# Patient Record
Sex: MALE | Race: WHITE | NOT HISPANIC OR LATINO | Employment: UNEMPLOYED | URBAN - METROPOLITAN AREA
[De-identification: names, ages, dates, MRNs, and addresses within clinical notes are randomized per-mention and may not be internally consistent; named-entity substitution may affect disease eponyms.]

---

## 2018-11-06 ENCOUNTER — APPOINTMENT (EMERGENCY)
Dept: CT IMAGING | Facility: HOSPITAL | Age: 48
End: 2018-11-06

## 2018-11-06 ENCOUNTER — HOSPITAL ENCOUNTER (EMERGENCY)
Facility: HOSPITAL | Age: 48
Discharge: HOME | End: 2018-11-06
Attending: EMERGENCY MEDICINE | Admitting: EMERGENCY MEDICINE

## 2018-11-06 ENCOUNTER — APPOINTMENT (EMERGENCY)
Dept: RADIOLOGY | Facility: HOSPITAL | Age: 48
End: 2018-11-06

## 2018-11-06 VITALS
OXYGEN SATURATION: 99 % | WEIGHT: 170.42 LBS | DIASTOLIC BLOOD PRESSURE: 80 MMHG | RESPIRATION RATE: 20 BRPM | BODY MASS INDEX: 25.91 KG/M2 | SYSTOLIC BLOOD PRESSURE: 132 MMHG | HEART RATE: 64 BPM | TEMPERATURE: 98.1 F

## 2018-11-06 DIAGNOSIS — H81.10 BPPV (BENIGN PAROXYSMAL POSITIONAL VERTIGO): Primary | ICD-10-CM

## 2018-11-06 DIAGNOSIS — R07.9 CHEST PAIN: ICD-10-CM

## 2018-11-06 LAB
ALBUMIN SERPL BCP-MCNC: 3.5 G/DL (ref 3.5–5)
ALP SERPL-CCNC: 53 U/L (ref 46–116)
ALT SERPL W P-5'-P-CCNC: 57 U/L (ref 12–78)
ANION GAP SERPL CALCULATED.3IONS-SCNC: 7 MMOL/L (ref 4–13)
AST SERPL W P-5'-P-CCNC: 41 U/L (ref 5–45)
ATRIAL RATE: 59 BPM
ATRIAL RATE: 74 BPM
BASOPHILS # BLD AUTO: 0.03 THOUSANDS/ΜL (ref 0–0.1)
BASOPHILS NFR BLD AUTO: 0 % (ref 0–1)
BILIRUB SERPL-MCNC: 0.5 MG/DL (ref 0.2–1)
BUN SERPL-MCNC: 11 MG/DL (ref 5–25)
CALCIUM SERPL-MCNC: 9 MG/DL (ref 8.3–10.1)
CHLORIDE SERPL-SCNC: 105 MMOL/L (ref 100–108)
CO2 SERPL-SCNC: 29 MMOL/L (ref 21–32)
CREAT SERPL-MCNC: 0.78 MG/DL (ref 0.6–1.3)
EOSINOPHIL # BLD AUTO: 0.05 THOUSAND/ΜL (ref 0–0.61)
EOSINOPHIL NFR BLD AUTO: 1 % (ref 0–6)
ERYTHROCYTE [DISTWIDTH] IN BLOOD BY AUTOMATED COUNT: 11.5 % (ref 11.6–15.1)
GFR SERPL CREATININE-BSD FRML MDRD: 105 ML/MIN/1.73SQ M
GLUCOSE SERPL-MCNC: 92 MG/DL (ref 65–140)
HCT VFR BLD AUTO: 43.9 % (ref 36.5–49.3)
HGB BLD-MCNC: 15.3 G/DL (ref 12–17)
IMM GRANULOCYTES # BLD AUTO: 0.02 THOUSAND/UL (ref 0–0.2)
IMM GRANULOCYTES NFR BLD AUTO: 0 % (ref 0–2)
LYMPHOCYTES # BLD AUTO: 1.34 THOUSANDS/ΜL (ref 0.6–4.47)
LYMPHOCYTES NFR BLD AUTO: 19 % (ref 14–44)
MCH RBC QN AUTO: 34.2 PG (ref 26.8–34.3)
MCHC RBC AUTO-ENTMCNC: 34.9 G/DL (ref 31.4–37.4)
MCV RBC AUTO: 98 FL (ref 82–98)
MONOCYTES # BLD AUTO: 0.7 THOUSAND/ΜL (ref 0.17–1.22)
MONOCYTES NFR BLD AUTO: 10 % (ref 4–12)
NEUTROPHILS # BLD AUTO: 5.1 THOUSANDS/ΜL (ref 1.85–7.62)
NEUTS SEG NFR BLD AUTO: 70 % (ref 43–75)
NRBC BLD AUTO-RTO: 0 /100 WBCS
P AXIS: 73 DEGREES
P AXIS: 75 DEGREES
PLATELET # BLD AUTO: 143 THOUSANDS/UL (ref 149–390)
PMV BLD AUTO: 10.2 FL (ref 8.9–12.7)
POTASSIUM SERPL-SCNC: 4.3 MMOL/L (ref 3.5–5.3)
PR INTERVAL: 152 MS
PR INTERVAL: 154 MS
PROT SERPL-MCNC: 6.9 G/DL (ref 6.4–8.2)
QRS AXIS: 64 DEGREES
QRS AXIS: 64 DEGREES
QRSD INTERVAL: 92 MS
QRSD INTERVAL: 94 MS
QT INTERVAL: 364 MS
QT INTERVAL: 398 MS
QTC INTERVAL: 394 MS
QTC INTERVAL: 404 MS
RBC # BLD AUTO: 4.48 MILLION/UL (ref 3.88–5.62)
SODIUM SERPL-SCNC: 141 MMOL/L (ref 136–145)
T WAVE AXIS: 67 DEGREES
T WAVE AXIS: 70 DEGREES
TROPONIN I SERPL-MCNC: <0.02 NG/ML
TROPONIN I SERPL-MCNC: <0.02 NG/ML
VENTRICULAR RATE: 59 BPM
VENTRICULAR RATE: 74 BPM
WBC # BLD AUTO: 7.24 THOUSAND/UL (ref 4.31–10.16)

## 2018-11-06 PROCEDURE — 80053 COMPREHEN METABOLIC PANEL: CPT | Performed by: PHYSICIAN ASSISTANT

## 2018-11-06 PROCEDURE — 70450 CT HEAD/BRAIN W/O DYE: CPT

## 2018-11-06 PROCEDURE — 85025 COMPLETE CBC W/AUTO DIFF WBC: CPT | Performed by: PHYSICIAN ASSISTANT

## 2018-11-06 PROCEDURE — 71046 X-RAY EXAM CHEST 2 VIEWS: CPT

## 2018-11-06 PROCEDURE — 93010 ELECTROCARDIOGRAM REPORT: CPT | Performed by: INTERNAL MEDICINE

## 2018-11-06 PROCEDURE — 93005 ELECTROCARDIOGRAM TRACING: CPT

## 2018-11-06 PROCEDURE — 96360 HYDRATION IV INFUSION INIT: CPT

## 2018-11-06 PROCEDURE — 96361 HYDRATE IV INFUSION ADD-ON: CPT

## 2018-11-06 PROCEDURE — 36415 COLL VENOUS BLD VENIPUNCTURE: CPT | Performed by: PHYSICIAN ASSISTANT

## 2018-11-06 PROCEDURE — 99285 EMERGENCY DEPT VISIT HI MDM: CPT

## 2018-11-06 PROCEDURE — 84484 ASSAY OF TROPONIN QUANT: CPT | Performed by: PHYSICIAN ASSISTANT

## 2018-11-06 RX ORDER — MECLIZINE HCL 12.5 MG/1
25 TABLET ORAL ONCE
Status: COMPLETED | OUTPATIENT
Start: 2018-11-06 | End: 2018-11-06

## 2018-11-06 RX ORDER — ONDANSETRON 4 MG/1
4 TABLET, FILM COATED ORAL EVERY 6 HOURS
Qty: 12 TABLET | Refills: 0 | Status: SHIPPED | OUTPATIENT
Start: 2018-11-06 | End: 2019-05-06

## 2018-11-06 RX ORDER — ASPIRIN 325 MG
325 TABLET ORAL ONCE
Status: COMPLETED | OUTPATIENT
Start: 2018-11-06 | End: 2018-11-06

## 2018-11-06 RX ORDER — MECLIZINE HYDROCHLORIDE 25 MG/1
25 TABLET ORAL 3 TIMES DAILY PRN
Qty: 30 TABLET | Refills: 0 | Status: SHIPPED | OUTPATIENT
Start: 2018-11-06 | End: 2020-06-18

## 2018-11-06 RX ADMIN — MECLIZINE 25 MG: 12.5 TABLET ORAL at 10:26

## 2018-11-06 RX ADMIN — ASPIRIN 325 MG: 325 TABLET ORAL at 10:26

## 2018-11-06 RX ADMIN — SODIUM CHLORIDE 1000 ML: 0.9 INJECTION, SOLUTION INTRAVENOUS at 10:27

## 2018-11-06 NOTE — ED PROVIDER NOTES
History  Chief Complaint   Patient presents with    Dizziness     dizziness with movement since thursday morning, + nausea,  +headache, my head feels "groggy"  and blurred vision, pt had chest pain on saturday, felt like a muscle fatigue, denies on arrival     58-year-old male with past medical history of hepatitis-C, who presents to the emergency department for dizziness that started 3 days ago  Patient states that he woke up in the morning, and when he sat up he experienced a spinning sensation  Dizziness is worse with any positional change of the head, and improved with lying down and staying still completely  Patient also reports associated bandlike headache to the frontal head, difficulty with ambulation secondary to losing balance, nausea, vomiting  Patient had multiple episodes of nausea and vomiting for the initial 2 days from the dizziness, but none today  Does have abdominal pain, which he suspects is due to the vomiting  Denies diarrhea  He also reports a head strike yesterday as patient tripped and lost his balance  Denies LOC, neck pain/stiffness  Denies fever, chills, double vision, blurred vision, slurred speech, confusion, numbness, tingling, weakness  Does take ASA 81 mg daily  He also complains of left sided chest pain that started 2 days ago  States that it feels muscular in nature as he does do lots of heavy lifting at work  Worse with lifting arm over the head and palpation and improved with rest, but is still present with rest  Denies SOB, palpitations, leg pain or swelling  History provided by:  Patient   used: No        None       History reviewed  No pertinent past medical history  History reviewed  No pertinent surgical history  History reviewed  No pertinent family history  I have reviewed and agree with the history as documented      Social History   Substance Use Topics    Smoking status: Current Every Day Smoker     Packs/day: 0 50     Years: 25 00     Types: Cigarettes    Smokeless tobacco: Never Used    Alcohol use No        Review of Systems   Constitutional: Negative for chills and fever  HENT: Negative for congestion, ear pain, postnasal drip, rhinorrhea, sinus pain, sinus pressure, sore throat and trouble swallowing  Eyes: Negative  Negative for visual disturbance  Respiratory: Negative for cough, chest tightness, shortness of breath and wheezing  Cardiovascular: Positive for chest pain  Negative for palpitations and leg swelling  Gastrointestinal: Positive for abdominal pain, nausea and vomiting  Negative for anal bleeding, constipation and diarrhea  Genitourinary: Negative for dysuria, flank pain, frequency, hematuria and urgency  Musculoskeletal: Positive for gait problem  Negative for arthralgias, back pain, joint swelling, myalgias, neck pain and neck stiffness  Skin: Negative for color change, pallor, rash and wound  Neurological: Positive for dizziness, light-headedness and headaches  Negative for tremors, seizures, syncope, facial asymmetry, speech difficulty, weakness and numbness  Psychiatric/Behavioral: Negative  Physical Exam  Physical Exam   Constitutional: He is oriented to person, place, and time  He appears well-developed and well-nourished  No distress  HENT:   Head: Normocephalic and atraumatic  Mouth/Throat: Oropharynx is clear and moist    Eyes: Pupils are equal, round, and reactive to light  Conjunctivae and EOM are normal    Neck: Normal range of motion  Neck supple  Cardiovascular: Normal rate, regular rhythm and intact distal pulses  Pulmonary/Chest: Effort normal and breath sounds normal  No respiratory distress  He has no wheezes  He has no rales  He exhibits tenderness (Mild Left superior anterior chest pain that is reproducible on palpation  )  Abdominal: Soft  Bowel sounds are normal  He exhibits no distension  There is no tenderness  There is no rebound and no guarding  Musculoskeletal: Normal range of motion  He exhibits no edema or tenderness  Neurological: He is alert and oriented to person, place, and time  No cranial nerve deficit or sensory deficit  He exhibits normal muscle tone  Coordination normal    Positive jhoan spaulding pike on the left  +horizontal nystagmus  Normal finger to nose  Negative pronator drift  Able to ambulate without support  Skin: Skin is warm and dry  Capillary refill takes less than 2 seconds  He is not diaphoretic  Psychiatric: He has a normal mood and affect  His behavior is normal    Nursing note and vitals reviewed        Vital Signs  ED Triage Vitals   Temperature Pulse Respirations Blood Pressure SpO2   11/06/18 0816 11/06/18 0816 11/06/18 0816 11/06/18 0816 11/06/18 0816   98 1 °F (36 7 °C) 78 20 147/90 99 %      Temp Source Heart Rate Source Patient Position - Orthostatic VS BP Location FiO2 (%)   11/06/18 0816 11/06/18 1103 11/06/18 0816 11/06/18 0816 --   Oral Monitor Lying Right arm       Pain Score       11/06/18 0816       5           Vitals:    11/06/18 0816 11/06/18 0900 11/06/18 1045 11/06/18 1103   BP: 147/90 130/75 132/86 132/80   Pulse: 78 66 60 64   Patient Position - Orthostatic VS: Lying   Sitting       Visual Acuity  Visual Acuity      Most Recent Value   L Pupil Size (mm)  4   R Pupil Size (mm)  4          ED Medications  Medications   meclizine (ANTIVERT) tablet 25 mg (25 mg Oral Given 11/6/18 1026)   sodium chloride 0 9 % bolus 1,000 mL (0 mL Intravenous Stopped 11/6/18 1458)   aspirin tablet 325 mg (325 mg Oral Given 11/6/18 1026)       Diagnostic Studies  Results Reviewed     Procedure Component Value Units Date/Time    Troponin I [[id number]]  (Normal) Collected:  11/06/18 1347    Lab Status:  Final result Specimen:  Blood from Arm, Left Updated:  11/06/18 1410     Troponin I <0 02 ng/mL     Comprehensive metabolic panel [[id number]] Collected:  11/06/18 1027    Lab Status:  Final result Specimen:  Blood from Arm, Left Updated:  11/06/18 1144     Sodium 141 mmol/L      Potassium 4 3 mmol/L      Chloride 105 mmol/L      CO2 29 mmol/L      ANION GAP 7 mmol/L      BUN 11 mg/dL      Creatinine 0 78 mg/dL      Glucose 92 mg/dL      Calcium 9 0 mg/dL      AST 41 U/L      ALT 57 U/L      Alkaline Phosphatase 53 U/L      Total Protein 6 9 g/dL      Albumin 3 5 g/dL      Total Bilirubin 0 50 mg/dL      eGFR 105 ml/min/1 73sq m     Narrative:         National Kidney Disease Education Program recommendations are as follows:  GFR calculation is accurate only with a steady state creatinine  Chronic Kidney disease less than 60 ml/min/1 73 sq  meters  Kidney failure less than 15 ml/min/1 73 sq  meters  Troponin I [[id number]]  (Normal) Collected:  11/06/18 1110    Lab Status:  Final result Specimen:  Blood from Arm, Left Updated:  11/06/18 1133     Troponin I <0 02 ng/mL     CBC and differential [[id number]]  (Abnormal) Collected:  11/06/18 1027    Lab Status:  Final result Specimen:  Blood from Arm, Left Updated:  11/06/18 1037     WBC 7 24 Thousand/uL      RBC 4 48 Million/uL      Hemoglobin 15 3 g/dL      Hematocrit 43 9 %      MCV 98 fL      MCH 34 2 pg      MCHC 34 9 g/dL      RDW 11 5 (L) %      MPV 10 2 fL      Platelets 147 (L) Thousands/uL      nRBC 0 /100 WBCs      Neutrophils Relative 70 %      Immat GRANS % 0 %      Lymphocytes Relative 19 %      Monocytes Relative 10 %      Eosinophils Relative 1 %      Basophils Relative 0 %      Neutrophils Absolute 5 10 Thousands/µL      Immature Grans Absolute 0 02 Thousand/uL      Lymphocytes Absolute 1 34 Thousands/µL      Monocytes Absolute 0 70 Thousand/µL      Eosinophils Absolute 0 05 Thousand/µL      Basophils Absolute 0 03 Thousands/µL                  XR chest 2 views   Final Result by Reina Kuo DO (11/06 1132)   No acute cardiopulmonary disease              Workstation performed: [id number]         CT head without contrast   Final Result by Maria D Osman MD (11/06 9802)      No acute intracranial hemorrhage seen   No mass effect or midline shift seen   No CT signs of acute territorial infarction                  Workstation performed: [id number]                    Procedures  ECG 12 Lead Documentation  Date/Time: 11/6/2018 1:54 PM  Performed by: Real Form  Authorized by: JOAQUINA MATA     Indications / Diagnosis:  Dizziness, chest pain  ECG reviewed by me, the ED Provider: no    Patient location:  ED  Previous ECG:     Previous ECG:  Compared to current    Comparison ECG info:  11/6/18    Similarity:  No change  Interpretation:     Interpretation: normal    Rate:     ECG rate:  59    ECG rate assessment: bradycardic    Rhythm:     Rhythm: sinus bradycardia    Ectopy:     Ectopy: none    QRS:     QRS axis:  Normal  Conduction:     Conduction: normal    ST segments:     ST segments:  Normal  T waves:     T waves: normal      ECG 12 Lead Documentation  Date/Time: 11/6/2018 8:26 AM  Performed by: Real Form  Authorized by: JOAQUINA MATA     Indications / Diagnosis:  Dizziness, chest pain  ECG reviewed by me, the ED Provider: no    Patient location:  ED  Interpretation:     Interpretation: normal    Rate:     ECG rate:  74    ECG rate assessment: normal    Rhythm:     Rhythm: sinus rhythm    Ectopy:     Ectopy: none    QRS:     QRS axis:  Normal  Conduction:     Conduction: normal    ST segments:     ST segments:  Normal  T waves:     T waves: normal             Phone Contacts  ED Phone Contact    ED Course  ED Course as of Nov 06 1636   Tue Nov 06, 2018   0926 Positive jhoan hallpike on left with horizontal nystagmus  1105 Trop was not sent  Requested RN to send trop  1202 Room spinning sensation improved following IV fluids and meclizine  Pending repeat EKG and Trop due at 2 pm     1417 Patient ambulatory with steady gait  Eating and tolerating po intake                                   MDM  Number of Diagnoses or Management Options  BPPV (benign paroxysmal positional vertigo):   Chest pain:   Diagnosis management comments: Differential Diagnosis includes but is not limited to: vertigo, BPPV, stroke, electrolyte abnormality, dehydration, ACS, muscle spasm, costochondritis  CT head negative for ICH or ischemia  EKG NSR with no ectopy x2  Trop negative x2  Chest x-ray is negative  IV fluids and meclizine and zofran given  Patient feels improved following medications  Able to ambulate with stable gait  Patient tolerating p o  Symptoms are consistent with BPPV  Left shoulder pain is likely musculoskeletal in nature  Improved after aspirin  Patient will be discharged home with primary care follow-up  Amount and/or Complexity of Data Reviewed  Clinical lab tests: ordered and reviewed  Tests in the radiology section of CPT®: ordered and reviewed  Independent visualization of images, tracings, or specimens: yes      CritCare Time    Disposition  Final diagnoses:   Chest pain   BPPV (benign paroxysmal positional vertigo)     Time reflects when diagnosis was documented in both MDM as applicable and the Disposition within this note     Time User Action Codes Description Comment    11/6/2018  2:23 PM Gena Quintero Add [R07 9] Chest pain     11/6/2018  2:23 PM Alfa Gomez Add [H81 10] BPPV (benign paroxysmal positional vertigo)     11/6/2018  2:23 PM Gena Yoderer Modify [R07 9] Chest pain     11/6/2018  2:23 PM Alfa Gomez Modify [H81 10] BPPV (benign paroxysmal positional vertigo)       ED Disposition     ED Disposition Condition Comment    Discharge  [facility] discharge to home/self care      Condition at discharge: Good        Follow-up Information     Follow up With Specialties Details Why Contact Info    Shayla Aguillon MD Internal Medicine In 1 week If symptoms worsen [address] [place] [address] [phone]            Discharge Medication List as of 11/6/2018  2:25 PM      START taking these medications    Details   meclizine (ANTIVERT) 25 mg tablet Take 1 tablet (25 mg total) by mouth 3 (three) times a day as needed for dizziness, Starting Tue 11/6/2018, Print           No discharge procedures on file      ED Provider  Electronically Signed by           Manju Mcelroy PA-C  11/06/18 9247

## 2018-11-06 NOTE — DISCHARGE INSTRUCTIONS
Benign Paroxysmal Positional Vertigo, Ambulatory Care   GENERAL INFORMATION:   Benign paroxysmal positional vertigo (BPPV)  is an inner ear condition  With BPPV you have paroxysmal (sudden) attacks of vertigo when you change your head position  Vertigo is the sudden feeling that you or the room is moving or spinning  With each attack of vertigo, you may have nystagmus  Nystagmus is a quick, shaky eye movement that you cannot control  The attacks of vertigo and nystagmus last from a few seconds up to 1 minute  Common symptoms include the following:  Head movements, such as looking up or down and bending over, may lead to an attack of vertigo  Vertigo may also occur when you first lie down or roll over in bed  The nystagmus will decrease with vertigo attacks that occur close together  When you have an attack of BPPV, you may also have the following symptoms:  · Changes in your vision    · Nausea or vomiting    · Poor balance or feeling unsteady when you walk  Seek immediate care for the following symptoms:   · A severe headache that does not go away    · New changes in your vision or feeling weak or confused    · Trouble hearing, or ringing or buzzing in your ears    · Symptoms that last longer than 1 minute  Treatment for BPPV  may go away on it's own  Treatments may include head movements or balance therapy  You may need surgery if other treatments have failed  Manage your symptoms:   · Avoid sudden head movements  · Do not bend over at the waist       · Keep your head raised when you lie down  Place pillows under your upper back and head or rest in a recliner  · Try not to stay in bed for long periods of time  Change your position often when you are in bed  Try not to lie with your head on the same side for long periods of time  · Go to vestibular and balance rehabilitation therapy (VBRT)  During VBRT, you learn exercises to improve your balance and strength   VBRT may help decrease your dizziness and prevent injuries if you are at risk for falls  Ask for more information about VBRT and exercises to decrease your symptoms  Follow up with your healthcare provider as directed:  Write down your questions so you remember to ask them during your visits  CARE AGREEMENT:   You have the right to help plan your care  Learn about your health condition and how it may be treated  Discuss treatment options with your caregivers to decide what care you want to receive  You always have the right to refuse treatment  The above information is an  only  It is not intended as medical advice for individual conditions or treatments  Talk to your doctor, nurse or pharmacist before following any medical regimen to see if it is safe and effective for you  © 2014 [employer] is for End User's use only and may not be sold, redistributed or otherwise used for commercial purposes  All illustrations and images included in CareNotes® are the copyrighted property of A D A M , Inc  or [facility]  Chest Pain   WHAT YOU NEED TO KNOW:   Chest pain can be caused by a range of conditions, from not serious to life-threatening  Chest pain can be a symptom of a digestive problem, such as acid reflux or a stomach ulcer  An anxiety attack or a strong emotion, such as anger, can also cause chest pain  Infection, inflammation, or a fracture in the bones or cartilage in your chest can cause pain or discomfort  Sometimes chest pain or pressure is caused by poor blood flow to your heart (angina)  Chest pain may also be caused by life-threatening conditions such as a heart attack or blood clot in your lungs     DISCHARGE INSTRUCTIONS:   Call 911 if:   · You have any of the following signs of a heart attack:      ¨ Squeezing, pressure, or pain in your chest that lasts longer than 5 minutes or returns    ¨ Discomfort or pain in your back, neck, jaw, stomach, or arm     ¨ Trouble breathing    ¨ Nausea or vomiting    ¨ Lightheadedness or a sudden cold sweat, especially with chest pain or trouble breathing    Seek care immediately if:   · You have chest discomfort that gets worse, even with medicine  · You cough or vomit blood  · Your bowel movements are black or bloody  · You cannot stop vomiting, or it hurts to swallow  Contact your healthcare provider if:   · You have questions or concerns about your condition or care  Medicines:   · Medicines  may be given to treat the cause of your chest pain  Examples include pain medicine, anxiety medicine, or medicines to increase blood flow to your heart  · Do not take certain medicines without asking your healthcare provider first   These include NSAIDs, herbal or vitamin supplements, or hormones (estrogen or progestin)  · Take your medicine as directed  Contact your healthcare provider if you think your medicine is not helping or if you have side effects  Tell him or her if you are allergic to any medicine  Keep a list of the medicines, vitamins, and herbs you take  Include the amounts, and when and why you take them  Bring the list or the pill bottles to follow-up visits  Carry your medicine list with you in case of an emergency  Follow up with your healthcare provider within 72 hours, or as directed: You may need to return for more tests to find the cause of your chest pain  You may be referred to a specialist, such as a cardiologist or gastroenterologist  Write down your questions so you remember to ask them during your visits  Healthy living tips: The following are general healthy guidelines  If your chest pain is caused by a heart problem, your healthcare provider will give you specific guidelines to follow  · Do not smoke  Nicotine and other chemicals in cigarettes and cigars can cause lung and heart damage  Ask your healthcare provider for information if you currently smoke and need help to quit   E-cigarettes or smokeless tobacco still contain nicotine  Talk to your healthcare provider before you use these products  · Eat a variety of healthy, low-fat foods  Healthy foods include fruits, vegetables, whole-grain breads, low-fat dairy products, beans, lean meats, and fish  Ask for more information about a heart healthy diet  · Ask about activity  Your healthcare provider will tell you which activities to limit or avoid  Ask when you can drive, return to work, and have sex  Ask about the best exercise plan for you  · Maintain a healthy weight  Ask your healthcare provider how much you should weigh  Ask him or her to help you create a weight loss plan if you are overweight  © 2017 [address] [employer] Information is for End User's use only and may not be sold, redistributed or otherwise used for commercial purposes  All illustrations and images included in CareNotes® are the copyrighted property of [employer]  or Isai Longoria  The above information is an  only  It is not intended as medical advice for individual conditions or treatments  Talk to your doctor, nurse or pharmacist before following any medical regimen to see if it is safe and effective for you

## 2019-04-03 ENCOUNTER — APPOINTMENT (EMERGENCY)
Dept: RADIOLOGY | Facility: HOSPITAL | Age: 49
End: 2019-04-03
Payer: COMMERCIAL

## 2019-04-03 ENCOUNTER — HOSPITAL ENCOUNTER (EMERGENCY)
Facility: HOSPITAL | Age: 49
Discharge: HOME | End: 2019-04-03
Attending: EMERGENCY MEDICINE | Admitting: EMERGENCY MEDICINE
Payer: COMMERCIAL

## 2019-04-03 VITALS
HEART RATE: 65 BPM | SYSTOLIC BLOOD PRESSURE: 178 MMHG | WEIGHT: 163.58 LBS | BODY MASS INDEX: 24.87 KG/M2 | TEMPERATURE: 97.9 F | OXYGEN SATURATION: 98 % | DIASTOLIC BLOOD PRESSURE: 92 MMHG | RESPIRATION RATE: 18 BRPM

## 2019-04-03 DIAGNOSIS — S60.222A CONTUSION OF LEFT HAND, INITIAL ENCOUNTER: Primary | ICD-10-CM

## 2019-04-03 PROCEDURE — 73130 X-RAY EXAM OF HAND: CPT

## 2019-04-03 PROCEDURE — 99283 EMERGENCY DEPT VISIT LOW MDM: CPT

## 2019-04-03 PROCEDURE — 99283 EMERGENCY DEPT VISIT LOW MDM: CPT | Performed by: EMERGENCY MEDICINE

## 2019-05-06 ENCOUNTER — APPOINTMENT (EMERGENCY)
Dept: CT IMAGING | Facility: HOSPITAL | Age: 49
End: 2019-05-06
Payer: COMMERCIAL

## 2019-05-06 ENCOUNTER — HOSPITAL ENCOUNTER (EMERGENCY)
Facility: HOSPITAL | Age: 49
Discharge: HOME | End: 2019-05-06
Attending: EMERGENCY MEDICINE | Admitting: EMERGENCY MEDICINE
Payer: COMMERCIAL

## 2019-05-06 VITALS
SYSTOLIC BLOOD PRESSURE: 139 MMHG | RESPIRATION RATE: 18 BRPM | BODY MASS INDEX: 23.63 KG/M2 | HEART RATE: 80 BPM | TEMPERATURE: 101.3 F | DIASTOLIC BLOOD PRESSURE: 82 MMHG | WEIGHT: 155.42 LBS | OXYGEN SATURATION: 98 %

## 2019-05-06 DIAGNOSIS — R10.30 LOWER ABDOMINAL PAIN: ICD-10-CM

## 2019-05-06 DIAGNOSIS — K52.9 ENTERITIS: Primary | ICD-10-CM

## 2019-05-06 LAB
ALBUMIN SERPL BCP-MCNC: 3.7 G/DL (ref 3.5–5)
ALP SERPL-CCNC: 59 U/L (ref 46–116)
ALT SERPL W P-5'-P-CCNC: 49 U/L (ref 12–78)
ANION GAP SERPL CALCULATED.3IONS-SCNC: 9 MMOL/L (ref 4–13)
AST SERPL W P-5'-P-CCNC: 48 U/L (ref 5–45)
BASOPHILS # BLD AUTO: 0.02 THOUSANDS/ΜL (ref 0–0.1)
BASOPHILS NFR BLD AUTO: 0 % (ref 0–1)
BILIRUB SERPL-MCNC: 0.8 MG/DL (ref 0.2–1)
BUN SERPL-MCNC: 21 MG/DL (ref 5–25)
CALCIUM SERPL-MCNC: 8.6 MG/DL (ref 8.3–10.1)
CHLORIDE SERPL-SCNC: 102 MMOL/L (ref 100–108)
CO2 SERPL-SCNC: 28 MMOL/L (ref 21–32)
CREAT SERPL-MCNC: 1.06 MG/DL (ref 0.6–1.3)
EOSINOPHIL # BLD AUTO: 0.05 THOUSAND/ΜL (ref 0–0.61)
EOSINOPHIL NFR BLD AUTO: 1 % (ref 0–6)
ERYTHROCYTE [DISTWIDTH] IN BLOOD BY AUTOMATED COUNT: 11.7 % (ref 11.6–15.1)
GFR SERPL CREATININE-BSD FRML MDRD: 81 ML/MIN/1.73SQ M
GLUCOSE SERPL-MCNC: 119 MG/DL (ref 65–140)
HCT VFR BLD AUTO: 44.5 % (ref 36.5–49.3)
HGB BLD-MCNC: 15.8 G/DL (ref 12–17)
IMM GRANULOCYTES # BLD AUTO: 0.02 THOUSAND/UL (ref 0–0.2)
IMM GRANULOCYTES NFR BLD AUTO: 0 % (ref 0–2)
LACTATE SERPL-SCNC: 1.4 MMOL/L (ref 0.5–2)
LIPASE SERPL-CCNC: 878 U/L (ref 73–393)
LYMPHOCYTES # BLD AUTO: 0.83 THOUSANDS/ΜL (ref 0.6–4.47)
LYMPHOCYTES NFR BLD AUTO: 11 % (ref 14–44)
MCH RBC QN AUTO: 34.8 PG (ref 26.8–34.3)
MCHC RBC AUTO-ENTMCNC: 35.5 G/DL (ref 31.4–37.4)
MCV RBC AUTO: 98 FL (ref 82–98)
MONOCYTES # BLD AUTO: 0.96 THOUSAND/ΜL (ref 0.17–1.22)
MONOCYTES NFR BLD AUTO: 13 % (ref 4–12)
NEUTROPHILS # BLD AUTO: 5.52 THOUSANDS/ΜL (ref 1.85–7.62)
NEUTS SEG NFR BLD AUTO: 75 % (ref 43–75)
NRBC BLD AUTO-RTO: 0 /100 WBCS
PLATELET # BLD AUTO: 127 THOUSANDS/UL (ref 149–390)
PMV BLD AUTO: 10.6 FL (ref 8.9–12.7)
POTASSIUM SERPL-SCNC: 3.8 MMOL/L (ref 3.5–5.3)
PROT SERPL-MCNC: 7.1 G/DL (ref 6.4–8.2)
RBC # BLD AUTO: 4.54 MILLION/UL (ref 3.88–5.62)
SODIUM SERPL-SCNC: 139 MMOL/L (ref 136–145)
WBC # BLD AUTO: 7.4 THOUSAND/UL (ref 4.31–10.16)

## 2019-05-06 PROCEDURE — 80053 COMPREHEN METABOLIC PANEL: CPT | Performed by: EMERGENCY MEDICINE

## 2019-05-06 PROCEDURE — 36415 COLL VENOUS BLD VENIPUNCTURE: CPT | Performed by: EMERGENCY MEDICINE

## 2019-05-06 PROCEDURE — 96360 HYDRATION IV INFUSION INIT: CPT

## 2019-05-06 PROCEDURE — 83605 ASSAY OF LACTIC ACID: CPT | Performed by: EMERGENCY MEDICINE

## 2019-05-06 PROCEDURE — 99284 EMERGENCY DEPT VISIT MOD MDM: CPT

## 2019-05-06 PROCEDURE — 99284 EMERGENCY DEPT VISIT MOD MDM: CPT | Performed by: EMERGENCY MEDICINE

## 2019-05-06 PROCEDURE — 85025 COMPLETE CBC W/AUTO DIFF WBC: CPT | Performed by: EMERGENCY MEDICINE

## 2019-05-06 PROCEDURE — 74177 CT ABD & PELVIS W/CONTRAST: CPT

## 2019-05-06 PROCEDURE — 83690 ASSAY OF LIPASE: CPT | Performed by: EMERGENCY MEDICINE

## 2019-05-06 PROCEDURE — 96361 HYDRATE IV INFUSION ADD-ON: CPT

## 2019-05-06 RX ORDER — IBUPROFEN 400 MG/1
400 TABLET ORAL ONCE
Status: COMPLETED | OUTPATIENT
Start: 2019-05-06 | End: 2019-05-06

## 2019-05-06 RX ADMIN — IBUPROFEN 400 MG: 400 TABLET ORAL at 18:10

## 2019-05-06 RX ADMIN — SODIUM CHLORIDE 1000 ML: 0.9 INJECTION, SOLUTION INTRAVENOUS at 18:06

## 2019-05-06 RX ADMIN — IOHEXOL 100 ML: 350 INJECTION, SOLUTION INTRAVENOUS at 19:01

## 2019-05-13 ENCOUNTER — HOSPITAL ENCOUNTER (EMERGENCY)
Facility: HOSPITAL | Age: 49
Discharge: HOME | End: 2019-05-13
Attending: EMERGENCY MEDICINE | Admitting: EMERGENCY MEDICINE
Payer: COMMERCIAL

## 2019-05-13 ENCOUNTER — APPOINTMENT (EMERGENCY)
Dept: RADIOLOGY | Facility: HOSPITAL | Age: 49
End: 2019-05-13
Payer: COMMERCIAL

## 2019-05-13 VITALS
SYSTOLIC BLOOD PRESSURE: 146 MMHG | TEMPERATURE: 97.9 F | BODY MASS INDEX: 23.97 KG/M2 | OXYGEN SATURATION: 98 % | WEIGHT: 157.63 LBS | DIASTOLIC BLOOD PRESSURE: 84 MMHG | RESPIRATION RATE: 17 BRPM | HEART RATE: 84 BPM

## 2019-05-13 DIAGNOSIS — K52.9 GASTROENTERITIS: ICD-10-CM

## 2019-05-13 DIAGNOSIS — K58.9 SPASM OF BOWEL: ICD-10-CM

## 2019-05-13 DIAGNOSIS — R10.9 ABDOMINAL PAIN: Primary | ICD-10-CM

## 2019-05-13 DIAGNOSIS — R19.7 DIARRHEA: ICD-10-CM

## 2019-05-13 LAB
ALBUMIN SERPL BCP-MCNC: 3.3 G/DL (ref 3.5–5)
ALP SERPL-CCNC: 58 U/L (ref 46–116)
ALT SERPL W P-5'-P-CCNC: 39 U/L (ref 12–78)
ANION GAP SERPL CALCULATED.3IONS-SCNC: 6 MMOL/L (ref 4–13)
AST SERPL W P-5'-P-CCNC: 31 U/L (ref 5–45)
BASOPHILS # BLD AUTO: 0.06 THOUSANDS/ΜL (ref 0–0.1)
BASOPHILS NFR BLD AUTO: 1 % (ref 0–1)
BILIRUB SERPL-MCNC: 0.5 MG/DL (ref 0.2–1)
BUN SERPL-MCNC: 12 MG/DL (ref 5–25)
CALCIUM SERPL-MCNC: 8.4 MG/DL (ref 8.3–10.1)
CHLORIDE SERPL-SCNC: 105 MMOL/L (ref 100–108)
CO2 SERPL-SCNC: 31 MMOL/L (ref 21–32)
CREAT SERPL-MCNC: 0.97 MG/DL (ref 0.6–1.3)
EOSINOPHIL # BLD AUTO: 0.17 THOUSAND/ΜL (ref 0–0.61)
EOSINOPHIL NFR BLD AUTO: 2 % (ref 0–6)
ERYTHROCYTE [DISTWIDTH] IN BLOOD BY AUTOMATED COUNT: 11.8 % (ref 11.6–15.1)
GFR SERPL CREATININE-BSD FRML MDRD: 91 ML/MIN/1.73SQ M
GLUCOSE SERPL-MCNC: 91 MG/DL (ref 65–140)
HCT VFR BLD AUTO: 43.1 % (ref 36.5–49.3)
HGB BLD-MCNC: 15.3 G/DL (ref 12–17)
IMM GRANULOCYTES # BLD AUTO: 0.04 THOUSAND/UL (ref 0–0.2)
IMM GRANULOCYTES NFR BLD AUTO: 1 % (ref 0–2)
LACTATE SERPL-SCNC: 1.2 MMOL/L (ref 0.5–2)
LIPASE SERPL-CCNC: 310 U/L (ref 73–393)
LYMPHOCYTES # BLD AUTO: 1.28 THOUSANDS/ΜL (ref 0.6–4.47)
LYMPHOCYTES NFR BLD AUTO: 15 % (ref 14–44)
MCH RBC QN AUTO: 34.7 PG (ref 26.8–34.3)
MCHC RBC AUTO-ENTMCNC: 35.5 G/DL (ref 31.4–37.4)
MCV RBC AUTO: 98 FL (ref 82–98)
MONOCYTES # BLD AUTO: 0.82 THOUSAND/ΜL (ref 0.17–1.22)
MONOCYTES NFR BLD AUTO: 10 % (ref 4–12)
NEUTROPHILS # BLD AUTO: 6.05 THOUSANDS/ΜL (ref 1.85–7.62)
NEUTS SEG NFR BLD AUTO: 71 % (ref 43–75)
NRBC BLD AUTO-RTO: 0 /100 WBCS
PLATELET # BLD AUTO: 183 THOUSANDS/UL (ref 149–390)
PMV BLD AUTO: 9.9 FL (ref 8.9–12.7)
POTASSIUM SERPL-SCNC: 3.9 MMOL/L (ref 3.5–5.3)
PROT SERPL-MCNC: 6.6 G/DL (ref 6.4–8.2)
RBC # BLD AUTO: 4.41 MILLION/UL (ref 3.88–5.62)
SODIUM SERPL-SCNC: 142 MMOL/L (ref 136–145)
WBC # BLD AUTO: 8.42 THOUSAND/UL (ref 4.31–10.16)

## 2019-05-13 PROCEDURE — 99284 EMERGENCY DEPT VISIT MOD MDM: CPT

## 2019-05-13 PROCEDURE — 74022 RADEX COMPL AQT ABD SERIES: CPT

## 2019-05-13 PROCEDURE — 83605 ASSAY OF LACTIC ACID: CPT | Performed by: EMERGENCY MEDICINE

## 2019-05-13 PROCEDURE — 99283 EMERGENCY DEPT VISIT LOW MDM: CPT | Performed by: EMERGENCY MEDICINE

## 2019-05-13 PROCEDURE — 80053 COMPREHEN METABOLIC PANEL: CPT | Performed by: EMERGENCY MEDICINE

## 2019-05-13 PROCEDURE — 96360 HYDRATION IV INFUSION INIT: CPT

## 2019-05-13 PROCEDURE — 85025 COMPLETE CBC W/AUTO DIFF WBC: CPT | Performed by: EMERGENCY MEDICINE

## 2019-05-13 PROCEDURE — 83690 ASSAY OF LIPASE: CPT | Performed by: EMERGENCY MEDICINE

## 2019-05-13 PROCEDURE — 36415 COLL VENOUS BLD VENIPUNCTURE: CPT | Performed by: EMERGENCY MEDICINE

## 2019-05-13 PROCEDURE — 96361 HYDRATE IV INFUSION ADD-ON: CPT

## 2019-05-13 RX ORDER — SIMETHICONE 125 MG
125 TABLET,CHEWABLE ORAL EVERY 6 HOURS PRN
Qty: 30 TABLET | Refills: 0 | Status: SHIPPED | OUTPATIENT
Start: 2019-05-13 | End: 2020-06-18

## 2019-05-13 RX ORDER — DICYCLOMINE HCL 20 MG
20 TABLET ORAL EVERY 6 HOURS PRN
Qty: 20 TABLET | Refills: 0 | Status: SHIPPED | OUTPATIENT
Start: 2019-05-13 | End: 2020-06-18

## 2019-05-13 RX ORDER — DICYCLOMINE HCL 20 MG
20 TABLET ORAL EVERY 6 HOURS PRN
Qty: 20 TABLET | Refills: 0 | Status: SHIPPED | OUTPATIENT
Start: 2019-05-13 | End: 2019-05-13

## 2019-05-13 RX ADMIN — SODIUM CHLORIDE 1000 ML: 0.9 INJECTION, SOLUTION INTRAVENOUS at 10:34

## 2019-12-06 ENCOUNTER — HOSPITAL ENCOUNTER (EMERGENCY)
Facility: HOSPITAL | Age: 49
Discharge: HOME | End: 2019-12-06
Attending: EMERGENCY MEDICINE | Admitting: EMERGENCY MEDICINE

## 2019-12-06 ENCOUNTER — APPOINTMENT (EMERGENCY)
Dept: RADIOLOGY | Facility: HOSPITAL | Age: 49
End: 2019-12-06

## 2019-12-06 VITALS
RESPIRATION RATE: 16 BRPM | TEMPERATURE: 98.2 F | DIASTOLIC BLOOD PRESSURE: 89 MMHG | SYSTOLIC BLOOD PRESSURE: 136 MMHG | HEART RATE: 86 BPM | OXYGEN SATURATION: 98 %

## 2019-12-06 DIAGNOSIS — S61.012A LACERATION OF LEFT THUMB: Primary | ICD-10-CM

## 2019-12-06 PROCEDURE — 99284 EMERGENCY DEPT VISIT MOD MDM: CPT | Performed by: PHYSICIAN ASSISTANT

## 2019-12-06 PROCEDURE — 90471 IMMUNIZATION ADMIN: CPT

## 2019-12-06 PROCEDURE — 90715 TDAP VACCINE 7 YRS/> IM: CPT | Performed by: PHYSICIAN ASSISTANT

## 2019-12-06 PROCEDURE — 73130 X-RAY EXAM OF HAND: CPT

## 2019-12-06 PROCEDURE — 12001 RPR S/N/AX/GEN/TRNK 2.5CM/<: CPT | Performed by: PHYSICIAN ASSISTANT

## 2019-12-06 PROCEDURE — 99282 EMERGENCY DEPT VISIT SF MDM: CPT

## 2019-12-06 RX ORDER — GINSENG 100 MG
1 CAPSULE ORAL ONCE
Status: COMPLETED | OUTPATIENT
Start: 2019-12-06 | End: 2019-12-06

## 2019-12-06 RX ORDER — CEPHALEXIN 500 MG/1
500 CAPSULE ORAL EVERY 6 HOURS SCHEDULED
Qty: 20 CAPSULE | Refills: 0 | Status: SHIPPED | OUTPATIENT
Start: 2019-12-06 | End: 2019-12-11

## 2019-12-06 RX ORDER — LIDOCAINE HYDROCHLORIDE 10 MG/ML
5 INJECTION, SOLUTION EPIDURAL; INFILTRATION; INTRACAUDAL; PERINEURAL ONCE
Status: COMPLETED | OUTPATIENT
Start: 2019-12-06 | End: 2019-12-06

## 2019-12-06 RX ADMIN — LIDOCAINE HYDROCHLORIDE 5 ML: 10 INJECTION, SOLUTION EPIDURAL; INFILTRATION; INTRACAUDAL; PERINEURAL at 14:27

## 2019-12-06 RX ADMIN — BACITRACIN ZINC 1 SMALL APPLICATION: 500 OINTMENT TOPICAL at 14:27

## 2019-12-06 RX ADMIN — TETANUS TOXOID, REDUCED DIPHTHERIA TOXOID AND ACELLULAR PERTUSSIS VACCINE, ADSORBED 0.5 ML: 5; 2.5; 8; 8; 2.5 SUSPENSION INTRAMUSCULAR at 14:27

## 2019-12-06 NOTE — ED PROVIDER NOTES
History  Chief Complaint   Patient presents with    Thumb Laceration     pt sustained lac to his L thumb  unknown it if tetnus UTD     The patient is a 15-year-old male who presents to the emergency department for a laceration of his left thumb  He states he was working on his house when a piece of tin roof slipped causing the laceration  He states he feels like he cannot move his left thumb normally, and he is unsure if this is just due to swelling  He states the bleeding is under control at this time  He denies any numbness or tingling to his finger  He states he is unsure of when he last had his tetanus updated  History provided by:  Patient   used: No        Prior to Admission Medications   Prescriptions Last Dose Informant Patient Reported? Taking?   dicyclomine (BENTYL) 20 mg tablet   No No   Sig: Take 1 tablet (20 mg total) by mouth every 6 (six) hours as needed (abdominal cramping)   meclizine (ANTIVERT) 25 mg tablet   No No   Sig: Take 1 tablet (25 mg total) by mouth 3 (three) times a day as needed for dizziness   Patient not taking: Reported on 5/6/2019   simethicone (MYLICON) 281 MG chewable tablet   No No   Sig: Chew 1 tablet (125 mg total) every 6 (six) hours as needed for flatulence      Facility-Administered Medications: None       History reviewed  No pertinent past medical history  Past Surgical History:   Procedure Laterality Date    APPENDECTOMY         History reviewed  No pertinent family history  I have reviewed and agree with the history as documented  Social History     Tobacco Use    Smoking status: Current Every Day Smoker     Packs/day: 0 50     Years: 25 00     Pack years: 12 50     Types: Cigarettes    Smokeless tobacco: Never Used   Substance Use Topics    Alcohol use: No    Drug use: Yes     Types: Marijuana        Review of Systems   Constitutional: Negative for chills and fever  Skin: Positive for wound (Laceration)   Negative for color change and pallor  Neurological: Negative for numbness  Physical Exam  Physical Exam   Constitutional: He is oriented to person, place, and time  He appears well-developed and well-nourished  Non-toxic appearance  He does not have a sickly appearance  He does not appear ill  No distress  HENT:   Head: Normocephalic and atraumatic  Eyes: Conjunctivae and lids are normal    Neck: Normal range of motion  Neck supple  Cardiovascular: Intact distal pulses  Musculoskeletal:        Hands:  Neurological: He is alert and oriented to person, place, and time  He has normal strength  No sensory deficit  Skin: Skin is warm and dry  Vital Signs  ED Triage Vitals [12/06/19 1347]   Temperature Pulse Respirations Blood Pressure SpO2   98 2 °F (36 8 °C) 86 16 136/89 98 %      Temp Source Heart Rate Source Patient Position - Orthostatic VS BP Location FiO2 (%)   Oral Monitor -- -- --      Pain Score       No Pain           Vitals:    12/06/19 1347   BP: 136/89   Pulse: 86         Visual Acuity      ED Medications  Medications   tetanus-diphtheria-acellular pertussis (BOOSTRIX) IM injection 0 5 mL (0 5 mL Intramuscular Given 12/6/19 1427)   lidocaine (PF) (XYLOCAINE-MPF) 1 % injection 5 mL (5 mL Infiltration Given 12/6/19 1427)   bacitracin topical ointment 1 small application (1 small application Topical Given 12/6/19 1427)       Diagnostic Studies  Results Reviewed     None                 XR hand 3+ views LEFT   ED Interpretation by Anna Martin PA-C (12/06 1430)   No fracture or foreign body noted  Final Result by Bernardo Arrieta MD (12/06 7811)      No acute osseous abnormality  Workstation performed: [id number]                    Procedures  Laceration repair  Date/Time: 12/6/2019 3:07 PM  Performed by: Anna Martin PA-C  Authorized by: Anna Martin PA-C   Consent: Verbal consent obtained    Risks and benefits: risks, benefits and alternatives were discussed  Consent given by: patient  Patient understanding: patient states understanding of the procedure being performed  Patient identity confirmed: verbally with patient  Body area: upper extremity  Location details: left thumb  Laceration length: 2 cm  Foreign bodies: no foreign bodies  Tendon involvement: none  Nerve involvement: none  Vascular damage: no  Anesthesia: local infiltration    Anesthesia:  Local Anesthetic: lidocaine 1% without epinephrine      Procedure Details:  Preparation: Patient was prepped and draped in the usual sterile fashion  Irrigation solution: saline  Irrigation method: jet lavage  Amount of cleaning: standard  Debridement: none  Degree of undermining: none  Skin closure: 5-0 nylon  Number of sutures: 4  Technique: simple  Approximation: close  Approximation difficulty: simple  Dressing: 4x4 sterile gauze, antibiotic ointment and splint  Patient tolerance: Patient tolerated the procedure well with no immediate complications               ED Course                               MDM  Number of Diagnoses or Management Options  Laceration of left thumb: new and requires workup  Diagnosis management comments: Patient sustained laceration to left thumb with a piece of metal     X-ray obtained and reviewed  No foreign body noted and no bony abnormality noted  Patient was expressing concern for inability to move his thumb, however patient had full active range of motion on my exam     Patient's tetanus was updated  Repaired the laceration without complication  I placed the patient's thumb and is splint to allow for better healing  The patient was given standard laceration care instructions  The patient was advised to return in 7-10 days to have the sutures removed or sooner if any signs of infection such as increased redness, swelling, purulent discharge or fever  Patient will be started on a course of Keflex prophylactically    Patient acknowledges understanding and agrees with plan     Patient was given opportunity to ask questions  Patient is appropriate for discharge at this time  Amount and/or Complexity of Data Reviewed  Tests in the radiology section of CPT®: ordered and reviewed  Decide to obtain previous medical records or to obtain history from someone other than the patient: yes  Review and summarize past medical records: yes    Risk of Complications, Morbidity, and/or Mortality  Presenting problems: minimal  Diagnostic procedures: minimal  Management options: minimal    Patient Progress  Patient progress: stable        Disposition  Final diagnoses:   Laceration of left thumb     Time reflects when diagnosis was documented in both MDM as applicable and the Disposition within this note     Time User Action Codes Description Comment    12/6/2019  2:55 PM Edgar Villatoro Add [Q29 793O] Laceration of left thumb       ED Disposition     ED Disposition Condition Date/Time Comment    Discharge Stable Fri Dec 6, 2019  2:55 PM Carmelo Arredondo discharge to home/self care  Follow-up Information     Follow up With Specialties Details Why Contact Info Additional Information    [facility] Emergency Medicine  For suture removal in 7-10 days or sooner if signs of infection   [address] [facility] [address] [phone]  ED, [address], [place], [place], [address]          Discharge Medication List as of 12/6/2019  2:57 PM      START taking these medications    Details   cephalexin (KEFLEX) 500 mg capsule Take 1 capsule (500 mg total) by mouth every 6 (six) hours for 5 days, Starting Fri 12/6/2019, Until Wed 12/11/2019, Normal         CONTINUE these medications which have NOT CHANGED    Details   dicyclomine (BENTYL) 20 mg tablet Take 1 tablet (20 mg total) by mouth every 6 (six) hours as needed (abdominal cramping), Starting Mon 5/13/2019, Print      meclizine (ANTIVERT) 25 mg tablet Take 1 tablet (25 mg total) by mouth 3 (three) times a day as needed for dizziness, Starting Tue 11/6/2018, Print      simethicone (MYLICON) 605 MG chewable tablet Chew 1 tablet (125 mg total) every 6 (six) hours as needed for flatulence, Starting Mon 5/13/2019, Print           No discharge procedures on file      ED Provider  Electronically Signed by           Deondre Luo PA-C  12/06/19 2541

## 2020-01-07 ENCOUNTER — APPOINTMENT (EMERGENCY)
Dept: RADIOLOGY | Facility: HOSPITAL | Age: 50
End: 2020-01-07

## 2020-01-07 ENCOUNTER — HOSPITAL ENCOUNTER (EMERGENCY)
Facility: HOSPITAL | Age: 50
Discharge: HOME | End: 2020-01-07
Attending: EMERGENCY MEDICINE | Admitting: EMERGENCY MEDICINE

## 2020-01-07 VITALS
HEART RATE: 76 BPM | TEMPERATURE: 97.7 F | OXYGEN SATURATION: 97 % | RESPIRATION RATE: 14 BRPM | DIASTOLIC BLOOD PRESSURE: 78 MMHG | SYSTOLIC BLOOD PRESSURE: 127 MMHG

## 2020-01-07 DIAGNOSIS — R07.9 CHEST PAIN: Primary | ICD-10-CM

## 2020-01-07 LAB
ANION GAP SERPL CALCULATED.3IONS-SCNC: 8 MMOL/L (ref 4–13)
APTT PPP: 44 SECONDS (ref 23–37)
BASOPHILS # BLD AUTO: 0.03 THOUSANDS/ΜL (ref 0–0.1)
BASOPHILS NFR BLD AUTO: 0 % (ref 0–1)
BUN SERPL-MCNC: 35 MG/DL (ref 5–25)
CALCIUM SERPL-MCNC: 8.4 MG/DL (ref 8.3–10.1)
CHLORIDE SERPL-SCNC: 100 MMOL/L (ref 100–108)
CO2 SERPL-SCNC: 29 MMOL/L (ref 21–32)
CREAT SERPL-MCNC: 1.57 MG/DL (ref 0.6–1.3)
EOSINOPHIL # BLD AUTO: 0.09 THOUSAND/ΜL (ref 0–0.61)
EOSINOPHIL NFR BLD AUTO: 1 % (ref 0–6)
ERYTHROCYTE [DISTWIDTH] IN BLOOD BY AUTOMATED COUNT: 12.1 % (ref 11.6–15.1)
GFR SERPL CREATININE-BSD FRML MDRD: 50 ML/MIN/1.73SQ M
GLUCOSE SERPL-MCNC: 102 MG/DL (ref 65–140)
HCT VFR BLD AUTO: 47.9 % (ref 36.5–49.3)
HGB BLD-MCNC: 16.7 G/DL (ref 12–17)
IMM GRANULOCYTES # BLD AUTO: 0.03 THOUSAND/UL (ref 0–0.2)
IMM GRANULOCYTES NFR BLD AUTO: 0 % (ref 0–2)
INR PPP: 1.07 (ref 0.84–1.19)
LYMPHOCYTES # BLD AUTO: 1.83 THOUSANDS/ΜL (ref 0.6–4.47)
LYMPHOCYTES NFR BLD AUTO: 21 % (ref 14–44)
MCH RBC QN AUTO: 33.9 PG (ref 26.8–34.3)
MCHC RBC AUTO-ENTMCNC: 34.9 G/DL (ref 31.4–37.4)
MCV RBC AUTO: 97 FL (ref 82–98)
MONOCYTES # BLD AUTO: 0.76 THOUSAND/ΜL (ref 0.17–1.22)
MONOCYTES NFR BLD AUTO: 9 % (ref 4–12)
NEUTROPHILS # BLD AUTO: 6.14 THOUSANDS/ΜL (ref 1.85–7.62)
NEUTS SEG NFR BLD AUTO: 69 % (ref 43–75)
NRBC BLD AUTO-RTO: 0 /100 WBCS
PLATELET # BLD AUTO: 170 THOUSANDS/UL (ref 149–390)
PMV BLD AUTO: 10.1 FL (ref 8.9–12.7)
POTASSIUM SERPL-SCNC: 4.2 MMOL/L (ref 3.5–5.3)
PROTHROMBIN TIME: 13.3 SECONDS (ref 11.6–14.5)
RBC # BLD AUTO: 4.92 MILLION/UL (ref 3.88–5.62)
SODIUM SERPL-SCNC: 137 MMOL/L (ref 136–145)
TROPONIN I SERPL-MCNC: 0.02 NG/ML
WBC # BLD AUTO: 8.88 THOUSAND/UL (ref 4.31–10.16)

## 2020-01-07 PROCEDURE — 85025 COMPLETE CBC W/AUTO DIFF WBC: CPT | Performed by: EMERGENCY MEDICINE

## 2020-01-07 PROCEDURE — 80048 BASIC METABOLIC PNL TOTAL CA: CPT | Performed by: EMERGENCY MEDICINE

## 2020-01-07 PROCEDURE — 93005 ELECTROCARDIOGRAM TRACING: CPT

## 2020-01-07 PROCEDURE — 99285 EMERGENCY DEPT VISIT HI MDM: CPT

## 2020-01-07 PROCEDURE — 85610 PROTHROMBIN TIME: CPT | Performed by: EMERGENCY MEDICINE

## 2020-01-07 PROCEDURE — 84484 ASSAY OF TROPONIN QUANT: CPT | Performed by: EMERGENCY MEDICINE

## 2020-01-07 PROCEDURE — 36415 COLL VENOUS BLD VENIPUNCTURE: CPT | Performed by: EMERGENCY MEDICINE

## 2020-01-07 PROCEDURE — 71046 X-RAY EXAM CHEST 2 VIEWS: CPT

## 2020-01-07 PROCEDURE — 85730 THROMBOPLASTIN TIME PARTIAL: CPT | Performed by: EMERGENCY MEDICINE

## 2020-01-07 PROCEDURE — 96360 HYDRATION IV INFUSION INIT: CPT

## 2020-01-07 PROCEDURE — 99284 EMERGENCY DEPT VISIT MOD MDM: CPT | Performed by: EMERGENCY MEDICINE

## 2020-01-07 RX ADMIN — SODIUM CHLORIDE 1000 ML: 0.9 INJECTION, SOLUTION INTRAVENOUS at 18:06

## 2020-01-07 NOTE — ED PROVIDER NOTES
History  Chief Complaint   Patient presents with    Chest Pain     Pt reports 2 days of intermittant substernal chest pain with exertion  Reports dizziness artur when standing  Reports episodes of sweating   Blurred Vision     reports blurry vision over the past day     Patient presents to the emergency department for evaluation of intermittent chest discomfort that he describes as chest tightness over the past 40 hours  Currently does not have symptoms  He states he has some stress him and his wife may getting   Denies jaw arm neck pain but has had some diaphoresis  He also reports some blurry vision  He headache visual complaints currently  Denies neck stiffness  Denies fever chills rash  Denies back or belly pain  Denies nausea vomiting diarrhea  Denies weight  Denies trauma fall injury  Prior to Admission Medications   Prescriptions Last Dose Informant Patient Reported? Taking?   dicyclomine (BENTYL) 20 mg tablet   No No   Sig: Take 1 tablet (20 mg total) by mouth every 6 (six) hours as needed (abdominal cramping)   meclizine (ANTIVERT) 25 mg tablet   No No   Sig: Take 1 tablet (25 mg total) by mouth 3 (three) times a day as needed for dizziness   Patient not taking: Reported on 5/6/2019   simethicone (MYLICON) 513 MG chewable tablet   No No   Sig: Chew 1 tablet (125 mg total) every 6 (six) hours as needed for flatulence      Facility-Administered Medications: None       Past Medical History:   Diagnosis Date    Hepatitis C     PTSD (post-traumatic stress disorder)        Past Surgical History:   Procedure Laterality Date    APPENDECTOMY         History reviewed  No pertinent family history  I have reviewed and agree with the history as documented      Social History     Tobacco Use    Smoking status: Current Every Day Smoker     Packs/day: 0 50     Years: 25 00     Pack years: 12 50     Types: Cigarettes    Smokeless tobacco: Never Used   Substance Use Topics    Alcohol use: No    Drug use: Yes     Types: Marijuana        Review of Systems   Constitutional: Positive for diaphoresis  Negative for activity change, appetite change, chills, fatigue and fever  HENT: Negative  Eyes: Positive for visual disturbance  Negative for photophobia  Respiratory: Positive for chest tightness  Negative for cough, choking, shortness of breath, wheezing and stridor  Cardiovascular: Positive for chest pain  Negative for palpitations and leg swelling  Gastrointestinal: Negative  Negative for abdominal pain, diarrhea, nausea and vomiting  Endocrine: Negative  Genitourinary: Negative  Negative for difficulty urinating, discharge, dysuria, frequency, hematuria and urgency  Musculoskeletal: Negative  Negative for back pain, neck pain and neck stiffness  Skin: Negative  Negative for rash and wound  Allergic/Immunologic: Negative  Neurological: Positive for dizziness  Negative for tremors, seizures, syncope, speech difficulty, weakness, numbness and headaches  Hematological: Negative  Does not bruise/bleed easily  Psychiatric/Behavioral: Negative  Negative for confusion  The patient is not nervous/anxious and is not hyperactive          Physical Exam  Physical Exam    Vital Signs  ED Triage Vitals [01/07/20 1628]   Temperature Pulse Respirations Blood Pressure SpO2   97 7 °F (36 5 °C) 93 14 127/78 97 %      Temp Source Heart Rate Source Patient Position - Orthostatic VS BP Location FiO2 (%)   Oral -- -- -- --      Pain Score       3           Vitals:    01/07/20 1628   BP: 127/78   Pulse: 93         Visual Acuity      ED Medications  Medications   sodium chloride 0 9 % bolus 1,000 mL (1,000 mL Intravenous New Bag 1/7/20 1806)       Diagnostic Studies  Results Reviewed     Procedure Component Value Units Date/Time    Troponin I [[id number]]  (Normal) Collected:  01/07/20 1705    Lab Status:  Final result Specimen:  Blood from Arm, Right Updated:  01/07/20 1724     Troponin I 0 02 ng/mL     Protime-INR [[id number]]  (Normal) Collected:  01/07/20 1705    Lab Status:  Final result Specimen:  Blood from Arm, Right Updated:  01/07/20 1725     Protime 13 3 seconds      INR 1 07    APTT [[id number]]  (Abnormal) Collected:  01/07/20 1705    Lab Status:  Final result Specimen:  Blood from Arm, Right Updated:  01/07/20 1725     PTT 44 seconds     Basic metabolic panel [[id number]]  (Abnormal) Collected:  01/07/20 1705    Lab Status:  Final result Specimen:  Blood from Arm, Right Updated:  01/07/20 1720     Sodium 137 mmol/L      Potassium 4 2 mmol/L      Chloride 100 mmol/L      CO2 29 mmol/L      ANION GAP 8 mmol/L      BUN 35 mg/dL      Creatinine 1 57 mg/dL      Glucose 102 mg/dL      Calcium 8 4 mg/dL      eGFR 50 ml/min/1 73sq m     Narrative:       National Kidney Disease Foundation guidelines for Chronic Kidney Disease (CKD):     Stage 1 with normal or high GFR (GFR > 90 mL/min/1 73 square meters)    Stage 2 Mild CKD (GFR = 60-89 mL/min/1 73 square meters)    Stage 3A Moderate CKD (GFR = 45-59 mL/min/1 73 square meters)    Stage 3B Moderate CKD (GFR = 30-44 mL/min/1 73 square meters)    Stage 4 Severe CKD (GFR = 15-29 mL/min/1 73 square meters)    Stage 5 End Stage CKD (GFR <15 mL/min/1 73 square meters)  Note: GFR calculation is accurate only with a steady state creatinine    CBC and differential [[id number]] Collected:  01/07/20 1705    Lab Status:  Final result Specimen:  Blood from Arm, Right Updated:  01/07/20 1711     WBC 8 88 Thousand/uL      RBC 4 92 Million/uL      Hemoglobin 16 7 g/dL      Hematocrit 47 9 %      MCV 97 fL      MCH 33 9 pg      MCHC 34 9 g/dL      RDW 12 1 %      MPV 10 1 fL      Platelets 488 Thousands/uL      nRBC 0 /100 WBCs      Neutrophils Relative 69 %      Immat GRANS % 0 %      Lymphocytes Relative 21 %      Monocytes Relative 9 %      Eosinophils Relative 1 %      Basophils Relative 0 %      Neutrophils Absolute 6 14 Thousands/µL      Immature Grans Absolute 0 03 Thousand/uL      Lymphocytes Absolute 1 83 Thousands/µL      Monocytes Absolute 0 76 Thousand/µL      Eosinophils Absolute 0 09 Thousand/µL      Basophils Absolute 0 03 Thousands/µL                  XR chest 2 views   ED Interpretation by Mesha Ceja MD (01/07 1824)   No acute disease  No CHF consolidation or pneumothorax  Procedures  ECG 12 Lead Documentation Only  Date/Time: 1/7/2020 5:03 PM  Performed by: Mesha Ceja MD  Authorized by: Mesha Ceja MD     ECG reviewed by me, the ED Provider: yes    Patient location:  ED  Previous ECG:     Previous ECG:  Compared to current    Similarity:  Changes noted  Interpretation:     Interpretation: abnormal    Rate:     ECG rate:  84    ECG rate assessment: normal    Rhythm:     Rhythm: sinus rhythm    Ectopy:     Ectopy: PAC    QRS:     QRS axis:  Normal    QRS intervals:  Normal  Conduction:     Conduction: abnormal      Abnormal conduction: non-specific intraventricular conduction delay    ST segments:     ST segments:  Non-specific  T waves:     T waves: non-specific               ED Course  ED Course as of Jan 07 1829   Minerva Span Jan 07, 2020 1827 Patient is stable for discharge  I preferred to admit this patient but patient states he is lots personal issues to deal with tomorrow  I expressed concern that this may be angina and the tip of the ice burning and that patient could have a myocardial infarction if not diagnosed accurately  Welcomed patient back to the emergency department at any time  He is asymptomatic at this point  Will refer to Cardiology              HEART Risk Score      Most Recent Value   History  1 Filed at: 01/07/2020 1828   ECG  0 Filed at: 01/07/2020 1828   Age  1 Filed at: 01/07/2020 1828   Risk Factors  1 Filed at: 01/07/2020 1828   Troponin  0 Filed at: 01/07/2020 1828   Heart Score Risk Calculator   History  1 Filed at: 01/07/2020 1828   ECG  0 Filed at: 01/07/2020 1828   Age  1 Filed at: 01/07/2020 1828   Risk Factors  1 Filed at: 01/07/2020 1828   Troponin  0 Filed at: 01/07/2020 1828   HEART Score  3 Filed at: 01/07/2020 1828   HEART Score  3 Filed at: 01/07/2020 4881                            MDM      Disposition  Final diagnoses:   Chest pain     Time reflects when diagnosis was documented in both MDM as applicable and the Disposition within this note     Time User Action Codes Description Comment    1/7/2020  6:29 PM Paula Gandhi Add [R07 9] Chest pain       ED Disposition     ED Disposition Condition Date/Time Comment    Discharge Stable Tue Jan 7, 2020  6:29 PM Amador Marcelino discharge to home/self care  Follow-up Information     Follow up With Specialties Details Why Janis Yepez MD Cardiology, Cardiology Imaging, Multidisciplinary Schedule an appointment as soon as possible for a visit  Call cardiologist for an appointment [address] [place] [phone]            Patient's Medications   Discharge Prescriptions    No medications on file     No discharge procedures on file      ED Provider  Electronically Signed by           Adamaris Noriega MD  01/07/20 1186

## 2020-01-08 LAB
ATRIAL RATE: 88 BPM
P AXIS: 82 DEGREES
PR INTERVAL: 114 MS
QRS AXIS: 71 DEGREES
QRSD INTERVAL: 94 MS
QT INTERVAL: 344 MS
QTC INTERVAL: 407 MS
T WAVE AXIS: 77 DEGREES
VENTRICULAR RATE: 84 BPM

## 2020-01-08 PROCEDURE — 93010 ELECTROCARDIOGRAM REPORT: CPT | Performed by: INTERNAL MEDICINE

## 2020-01-16 ENCOUNTER — OFFICE VISIT (OUTPATIENT)
Dept: CARDIOLOGY CLINIC | Facility: CLINIC | Age: 50
End: 2020-01-16

## 2020-01-16 VITALS
WEIGHT: 161 LBS | HEART RATE: 86 BPM | OXYGEN SATURATION: 98 % | BODY MASS INDEX: 24.4 KG/M2 | DIASTOLIC BLOOD PRESSURE: 90 MMHG | SYSTOLIC BLOOD PRESSURE: 134 MMHG | HEIGHT: 68 IN

## 2020-01-16 DIAGNOSIS — I10 ESSENTIAL HYPERTENSION: ICD-10-CM

## 2020-01-16 DIAGNOSIS — F43.10 PTSD (POST-TRAUMATIC STRESS DISORDER): ICD-10-CM

## 2020-01-16 DIAGNOSIS — R07.9 CHEST PAIN, UNSPECIFIED TYPE: Primary | ICD-10-CM

## 2020-01-16 DIAGNOSIS — F19.11 HISTORY OF SUBSTANCE ABUSE (HCC): ICD-10-CM

## 2020-01-16 PROCEDURE — 99204 OFFICE O/P NEW MOD 45 MIN: CPT | Performed by: INTERNAL MEDICINE

## 2020-01-16 NOTE — PROGRESS NOTES
Cardiology Consultation     Cuca Burns  [id number]  1970  [facility] [place]  [address] [facility] [address]      1  Chest pain, unspecified type  Lipid Panel with Direct LDL reflex    Basic metabolic panel    TSH, 3rd generation with Free T4 reflex    Stress test only, exercise    Echo complete with contrast if indicated   2  Essential hypertension  Lipid Panel with Direct LDL reflex    Basic metabolic panel    TSH, 3rd generation with Free T4 reflex    Stress test only, exercise    Echo complete with contrast if indicated   3  PTSD (post-traumatic stress disorder)     4  History of substance abuse ([address] )  Echo complete with contrast if indicated       Discussion/Summary:    - chest pain: smoker, borderline BP  History of substance abuse  No previous lipid panel, family history of early CAD in   His cousins  Recommend exercise treadmill test   Should have lipid panel for risk stratification  Check TSH  Monitor BP  Decrease sodium intake  Smoking cessation     - Abnormal EKG: right atrial enlargement  Chest pain, check echocardiogram  History of substance abuse as well which can cause cardiomyopathy     - JOÃO: likely dehydration in the ER  Repeat BMP  He has no insurance coverage  He can arrange testing through [facility], although advised to check cost of this before performing the tests  Alternatively, he tells me he is eligible for VA benefits  Would probably be best for him to establish with them  History of Present Illness:    52-year-old man  He does not follow regularly with physicians  Does not take any medications  He typically works in active job in better weather rides his bicycle regularly without any symptoms  He was in the [facility], has PTSD  He does not have a family physician    Has not sought any care through the [place] previously, although he tells me this could be possible  He was in the emergency room for chest pain recently  The symptoms were going on for a few days prior to the ER visit, but were progressive  His EKG showed no ischemia, troponin was negative  He did have an acute kidney injury  Offered admission, but did not want to stay, a lot of family issues going on causing stress  Smokes 3/4 PPD  Smokes MJ nightly  No significant EtOH currently, but previously with heavy substance abuse (cocaine, heroin, alcohol, "everything") but sober 6 years  No previous lipid panel  No prior cardiac workup  Cousins with stents at a young age  Patient Active Problem List   Diagnosis    Chest pain    Essential hypertension    PTSD (post-traumatic stress disorder)    History of substance abuse ([facility] )     Past Medical History:   Diagnosis Date    Hepatitis C     PTSD (post-traumatic stress disorder)      Social History     Tobacco Use    Smoking status: Current Every Day Smoker     Packs/day: 0 50     Years: 25 00     Pack years: 12 50     Types: Cigarettes    Smokeless tobacco: Never Used   Substance Use Topics    Alcohol use: No    Drug use: Yes     Types: Marijuana      No family history on file    Past Surgical History:   Procedure Laterality Date    APPENDECTOMY         Current Outpatient Medications:     dicyclomine (BENTYL) 20 mg tablet, Take 1 tablet (20 mg total) by mouth every 6 (six) hours as needed (abdominal cramping) (Patient not taking: Reported on 1/16/2020), Disp: 20 tablet, Rfl: 0    meclizine (ANTIVERT) 25 mg tablet, Take 1 tablet (25 mg total) by mouth 3 (three) times a day as needed for dizziness (Patient not taking: Reported on 1/16/2020), Disp: 30 tablet, Rfl: 0    simethicone (MYLICON) 388 MG chewable tablet, Chew 1 tablet (125 mg total) every 6 (six) hours as needed for flatulence (Patient not taking: Reported on 1/16/2020), Disp: 30 tablet, Rfl: 0  Allergies   Allergen Reactions    Acetaminophen        Vitals:    01/16/20 1412 BP: 134/90   BP Location: Right arm   Patient Position: Sitting   Cuff Size: Adult   Pulse: 86   SpO2: 98%   Weight: 73 kg (161 lb)   Height: 5' 8" (1 727 m)     Vitals:    01/16/20 1412   Weight: 73 kg (161 lb)      Height: 5' 8" (172 7 cm)   Body mass index is 24 48 kg/m²  Physical Exam:  GENERAL: Alert, well appearing, and in no distress  HEENT:  PERRL, EOMI, no scleral icterus, no conjunctival pallor  NECK:  Supple, No elevated JVP, no thyromegaly, no carotid bruits  HEART:  Regular rate and rhythm, normal S1/S2, no S3/S4, no murmur or rub  LUNGS:  Clear to auscultation bilaterally  ABDOMEN:  Soft, non-tender, positive bowel sounds, no rebound or guarding  EXTREMITIES:  No edema  VASCULAR:  Normal pedal pulses   NEURO: No focal deficits,  SKIN: Normal without suspicious lesions on exposed skin      ROS:  Positive for anxiety, depression  Except as noted in HPI, is otherwise reviewed in detail and a 12 point review of systems is negative  Labs:  Lab Results   Component Value Date     11/15/2015    K 4 2 01/07/2020     01/07/2020    CREATININE 1 57 (H) 01/07/2020    BUN 35 (H) 01/07/2020    CO2 29 01/07/2020    ALT 39 05/13/2019    AST 31 05/13/2019    INR 1 07 01/07/2020    WBC 8 88 01/07/2020    HGB 16 7 01/07/2020    HCT 47 9 01/07/2020     01/07/2020     EKG:  Sinus rhythm, PAC   Right atrial enlargement, RSR' V1

## 2020-06-18 ENCOUNTER — HOSPITAL ENCOUNTER (EMERGENCY)
Facility: HOSPITAL | Age: 50
Discharge: HOME | End: 2020-06-18
Attending: EMERGENCY MEDICINE | Admitting: EMERGENCY MEDICINE
Payer: OTHER GOVERNMENT

## 2020-06-18 VITALS
OXYGEN SATURATION: 97 % | DIASTOLIC BLOOD PRESSURE: 84 MMHG | HEIGHT: 68 IN | RESPIRATION RATE: 16 BRPM | BODY MASS INDEX: 24.48 KG/M2 | SYSTOLIC BLOOD PRESSURE: 145 MMHG | HEART RATE: 98 BPM | TEMPERATURE: 98.4 F

## 2020-06-18 DIAGNOSIS — Z13.9 ENCOUNTER FOR MEDICAL SCREENING EXAMINATION: Primary | ICD-10-CM

## 2020-06-18 LAB — SARS-COV-2 RNA RESP QL NAA+PROBE: NEGATIVE

## 2020-06-18 PROCEDURE — 99282 EMERGENCY DEPT VISIT SF MDM: CPT

## 2020-06-18 PROCEDURE — 87635 SARS-COV-2 COVID-19 AMP PRB: CPT | Performed by: PHYSICIAN ASSISTANT

## 2020-06-18 PROCEDURE — 99283 EMERGENCY DEPT VISIT LOW MDM: CPT | Performed by: PHYSICIAN ASSISTANT

## 2020-06-18 RX ORDER — MIRTAZAPINE 15 MG/1
15 TABLET, FILM COATED ORAL
COMMUNITY
End: 2021-04-05 | Stop reason: ALTCHOICE

## 2020-06-18 RX ORDER — BUSPIRONE HYDROCHLORIDE 5 MG/1
5 TABLET ORAL DAILY
COMMUNITY
End: 2021-04-05 | Stop reason: ALTCHOICE

## 2020-06-18 RX ORDER — DIVALPROEX SODIUM 250 MG/1
250 TABLET, DELAYED RELEASE ORAL DAILY
COMMUNITY
End: 2021-04-05 | Stop reason: ALTCHOICE

## 2020-08-31 ENCOUNTER — HOSPITAL ENCOUNTER (EMERGENCY)
Facility: HOSPITAL | Age: 50
Discharge: HOME | End: 2020-08-31
Attending: EMERGENCY MEDICINE | Admitting: EMERGENCY MEDICINE
Payer: OTHER GOVERNMENT

## 2020-08-31 VITALS
TEMPERATURE: 97.8 F | SYSTOLIC BLOOD PRESSURE: 138 MMHG | RESPIRATION RATE: 16 BRPM | DIASTOLIC BLOOD PRESSURE: 86 MMHG | BODY MASS INDEX: 27.98 KG/M2 | OXYGEN SATURATION: 98 % | HEART RATE: 86 BPM | WEIGHT: 184 LBS

## 2020-08-31 DIAGNOSIS — Z20.822 ENCOUNTER FOR LABORATORY TESTING FOR COVID-19 VIRUS: Primary | ICD-10-CM

## 2020-08-31 PROCEDURE — U0003 INFECTIOUS AGENT DETECTION BY NUCLEIC ACID (DNA OR RNA); SEVERE ACUTE RESPIRATORY SYNDROME CORONAVIRUS 2 (SARS-COV-2) (CORONAVIRUS DISEASE [COVID-19]), AMPLIFIED PROBE TECHNIQUE, MAKING USE OF HIGH THROUGHPUT TECHNOLOGIES AS DESCRIBED BY CMS-2020-01-R: HCPCS | Performed by: PHYSICIAN ASSISTANT

## 2020-08-31 PROCEDURE — 99282 EMERGENCY DEPT VISIT SF MDM: CPT

## 2020-08-31 PROCEDURE — 99282 EMERGENCY DEPT VISIT SF MDM: CPT | Performed by: PHYSICIAN ASSISTANT

## 2020-08-31 NOTE — ED PROVIDER NOTES
History  Chief Complaint   Patient presents with    Labs Only     sent here from [facility] for covid testing for placement     44year old male hx PTSD, hep C presents for covid testing  Needs housing for [facility] again and they are requesting covid test  He is completely asymptomatic at this time  No fever, chills, cough, shortness of breath, difficulty breathing  Prior to Admission Medications   Prescriptions Last Dose Informant Patient Reported? Taking?   busPIRone (BUSPAR) 5 mg tablet   Yes No   Sig: Take 5 mg by mouth daily   divalproex sodium (DEPAKOTE) 250 mg EC tablet   Yes No   Sig: Take 250 mg by mouth daily   mirtazapine (REMERON) 15 mg tablet   Yes No   Sig: Take 15 mg by mouth daily at bedtime      Facility-Administered Medications: None       Past Medical History:   Diagnosis Date    Hepatitis C     PTSD (post-traumatic stress disorder)        Past Surgical History:   Procedure Laterality Date    APPENDECTOMY         History reviewed  No pertinent family history  I have reviewed and agree with the history as documented  E-Cigarette/Vaping    E-Cigarette Use Never User      E-Cigarette/Vaping Substances     Social History     Tobacco Use    Smoking status: Current Every Day Smoker     Packs/day: 0 50     Years: 25 00     Pack years: 12 50     Types: Cigarettes    Smokeless tobacco: Never Used   Substance Use Topics    Alcohol use: No    Drug use: Not Currently       Review of Systems   Constitutional: Negative for chills and fever  HENT: Negative for sneezing and sore throat  Respiratory: Negative for cough and shortness of breath  Cardiovascular: Negative for chest pain, palpitations and leg swelling  Gastrointestinal: Negative for abdominal pain, constipation, diarrhea, nausea and vomiting  Musculoskeletal: Negative for back pain, gait problem, joint swelling and myalgias  Skin: Negative for color change, pallor, rash and wound     Neurological: Negative for dizziness, syncope, weakness, light-headedness, numbness and headaches  All other systems reviewed and are negative  Physical Exam  Physical Exam  Vitals signs and nursing note reviewed  Constitutional:       General: He is not in acute distress  Appearance: Normal appearance  He is well-developed and normal weight  He is not diaphoretic  HENT:      Head: Normocephalic and atraumatic  Nose: Nose normal    Neck:      Musculoskeletal: Normal range of motion  Cardiovascular:      Rate and Rhythm: Normal rate and regular rhythm  Pulses: Normal pulses  Heart sounds: Normal heart sounds  No murmur  No friction rub  No gallop  Pulmonary:      Effort: Pulmonary effort is normal  No respiratory distress  Breath sounds: Normal breath sounds  No stridor  No wheezing or rales  Comments: Sp02 is 98% indicating adequate oxygenation on room air  Musculoskeletal: Normal range of motion  Skin:     General: Skin is warm and dry  Capillary Refill: Capillary refill takes less than 2 seconds  Coloration: Skin is not pale  Findings: No erythema or rash  Neurological:      General: No focal deficit present  Mental Status: He is alert  Mental status is at baseline  Vital Signs  ED Triage Vitals [08/31/20 1351]   Temperature Pulse Respirations Blood Pressure SpO2   97 8 °F (36 6 °C) 86 16 138/86 98 %      Temp Source Heart Rate Source Patient Position - Orthostatic VS BP Location FiO2 (%)   Tympanic Monitor Sitting Right arm --      Pain Score       No Pain           Vitals:    08/31/20 1351   BP: 138/86   Pulse: 86   Patient Position - Orthostatic VS: Sitting         Visual Acuity      ED Medications  Medications - No data to display    Diagnostic Studies  Results Reviewed     Procedure Component Value Units Date/Time    Novel Coronavirus (COVID-19), PCR [facility] [[id number]] Collected:  08/31/20 1408    Lab Status:   In process Specimen:  Nares from Nose Updated: 08/31/20 1413                 No orders to display              Procedures  Procedures         ED Course                                             MDM  Number of Diagnoses or Management Options  Encounter for laboratory testing for COVID-19 virus:   Diagnosis management comments: Sent for covid test, will contact with results  Gave patient proper education regarding diagnosis  Answered all questions  Return to ED for any worsening of symptoms otherwise follow up with primary care physician for re-evaluation  Discussed plan with patient who verbalized understanding and agreed to plan  Amount and/or Complexity of Data Reviewed  Tests in the medicine section of CPT®: ordered  Review and summarize past medical records: yes  Discuss the patient with other providers: yes          Disposition  Final diagnoses:   Encounter for laboratory testing for COVID-19 virus     Time reflects when diagnosis was documented in both MDM as applicable and the Disposition within this note     Time User Action Codes Description Comment    8/31/2020  2:09 PM Winter Noland Add [Z11 59] Encounter for laboratory testing for COVID-19 virus       ED Disposition     ED Disposition Condition Date/Time Comment    Discharge Stable Mon Aug 31, 2020  2:09 PM Courtney Hartmann discharge to home/self care  Follow-up Information     Follow up With Specialties Details Why Contact Info Additional Information    [facility] Emergency Department Emergency Medicine Go to  As needed [facility]  [phone] [facility] ED, [facility], [facility]          Patient's Medications   Discharge Prescriptions    No medications on file     No discharge procedures on file      PDMP Review     None          ED Provider  Electronically Signed by           Deann Nolan PA-C  08/31/20 1420

## 2020-08-31 NOTE — DISCHARGE INSTRUCTIONS

## 2020-09-02 LAB — SARS-COV-2 RNA SPEC QL NAA+PROBE: NOT DETECTED

## 2020-09-03 ENCOUNTER — TELEPHONE (OUTPATIENT)
Dept: OTHER | Facility: OTHER | Age: 50
End: 2020-09-03

## 2020-09-03 NOTE — TELEPHONE ENCOUNTER
The patient was called for notification of a test result for COVID-19  The patient did not answer the phone  Received a recorded message that subscriber dialed is not in service  Verified number and called again  Same message was received  Attempted to call the emergency contact  Received a recorded message that subscriber is not available at this time

## 2020-09-03 NOTE — RESULT ENCOUNTER NOTE
Patient was called for COVID-19 test results  Phone number listed for patient is a non-working number  Emergency contact's number was called, but no answer or VM was received

## 2021-03-22 ENCOUNTER — HOSPITAL ENCOUNTER (EMERGENCY)
Facility: HOSPITAL | Age: 51
Discharge: HOME | End: 2021-03-22
Attending: EMERGENCY MEDICINE | Admitting: EMERGENCY MEDICINE
Payer: COMMERCIAL

## 2021-03-22 VITALS
OXYGEN SATURATION: 100 % | HEART RATE: 79 BPM | RESPIRATION RATE: 18 BRPM | TEMPERATURE: 98.1 F | DIASTOLIC BLOOD PRESSURE: 61 MMHG | SYSTOLIC BLOOD PRESSURE: 111 MMHG

## 2021-03-22 DIAGNOSIS — R06.02 SOB (SHORTNESS OF BREATH): ICD-10-CM

## 2021-03-22 DIAGNOSIS — J06.9 URI (UPPER RESPIRATORY INFECTION): Primary | ICD-10-CM

## 2021-03-22 LAB
FLUAV RNA RESP QL NAA+PROBE: NEGATIVE
FLUBV RNA RESP QL NAA+PROBE: NEGATIVE
RSV RNA RESP QL NAA+PROBE: NEGATIVE
SARS-COV-2 RNA RESP QL NAA+PROBE: NEGATIVE

## 2021-03-22 PROCEDURE — 0241U HB NFCT DS VIR RESP RNA 4 TRGT: CPT | Performed by: PHYSICIAN ASSISTANT

## 2021-03-22 PROCEDURE — 99284 EMERGENCY DEPT VISIT MOD MDM: CPT

## 2021-03-22 PROCEDURE — 99282 EMERGENCY DEPT VISIT SF MDM: CPT | Performed by: PHYSICIAN ASSISTANT

## 2021-03-22 NOTE — DISCHARGE INSTRUCTIONS
You can use over-the-counter antihistamines like Claritin-D, Zyrtec-D with Flonase for nasal congestion symptoms  Drink plenty of fluids and get rest   If no improvement follow-up with your doctor in next few days

## 2021-03-22 NOTE — ED PROVIDER NOTES
History  Chief Complaint   Patient presents with    Shortness of Breath     Pt with PMH: Hep C, PTSD, PSH: appendectomy  Presents to ED c/o 1 week h/o intermittent uri sx, congestion, myalgias, "feeling more run down"/SOB, that was worse today  Was more SOB at work, although was able to finish his work day, but employer wanted him tested for Sharon  NO fever, no cp, no abd pain, no NVD, no joint pain/swelling, no rash  Pt staying with friend, but states room gets hot  Prior to Admission Medications   Prescriptions Last Dose Informant Patient Reported? Taking?   busPIRone (BUSPAR) 5 mg tablet   Yes No   Sig: Take 5 mg by mouth daily   divalproex sodium (DEPAKOTE) 250 mg EC tablet   Yes No   Sig: Take 250 mg by mouth daily   mirtazapine (REMERON) 15 mg tablet   Yes No   Sig: Take 15 mg by mouth daily at bedtime      Facility-Administered Medications: None       Past Medical History:   Diagnosis Date    Hepatitis C     PTSD (post-traumatic stress disorder)        Past Surgical History:   Procedure Laterality Date    APPENDECTOMY         No family history on file  I have reviewed and agree with the history as documented  E-Cigarette/Vaping    E-Cigarette Use Never User      E-Cigarette/Vaping Substances     Social History     Tobacco Use    Smoking status: Current Every Day Smoker     Packs/day: 0 50     Years: 25 00     Pack years: 12 50     Types: Cigarettes    Smokeless tobacco: Never Used   Substance Use Topics    Alcohol use: No    Drug use: Not Currently       Review of Systems   Constitutional: Positive for fatigue  Negative for chills and fever  HENT: Positive for congestion  Negative for hearing loss, sore throat and trouble swallowing  Eyes: Negative for visual disturbance  Respiratory: Positive for shortness of breath and wheezing  Negative for cough  Cardiovascular: Negative for chest pain and leg swelling     Gastrointestinal: Negative for abdominal pain, constipation, diarrhea and vomiting  Genitourinary: Negative for dysuria and frequency  Musculoskeletal: Positive for myalgias  Negative for arthralgias  Skin: Negative for color change and pallor  Neurological: Positive for weakness  Negative for dizziness, light-headedness and headaches  Psychiatric/Behavioral: Negative for behavioral problems  All other systems reviewed and are negative  Physical Exam  Physical Exam  Vitals signs and nursing note reviewed  Constitutional:       General: He is not in acute distress  Appearance: He is well-developed  He is not ill-appearing  HENT:      Head: Normocephalic and atraumatic  Right Ear: External ear normal       Left Ear: External ear normal       Nose: Nose normal       Mouth/Throat:      Mouth: Mucous membranes are moist       Pharynx: Oropharynx is clear  No oropharyngeal exudate  Comments: enlongated uvula without swelling  Eyes:      Conjunctiva/sclera: Conjunctivae normal       Pupils: Pupils are equal, round, and reactive to light  Neck:      Musculoskeletal: Normal range of motion  Cardiovascular:      Rate and Rhythm: Normal rate and regular rhythm  Pulmonary:      Effort: Pulmonary effort is normal       Breath sounds: Normal breath sounds  No wheezing or rhonchi  Abdominal:      General: Bowel sounds are normal       Palpations: Abdomen is soft  Musculoskeletal: Normal range of motion  Right lower leg: No edema  Left lower leg: No edema  Skin:     General: Skin is warm and dry  Findings: No rash  Neurological:      General: No focal deficit present  Mental Status: He is alert and oriented to person, place, and time     Psychiatric:         Mood and Affect: Mood normal          Behavior: Behavior normal          Vital Signs  ED Triage Vitals [03/22/21 1700]   Temperature Pulse Respirations Blood Pressure SpO2   98 1 °F (36 7 °C) 79 18 111/61 100 %      Temp src Heart Rate Source Patient Position - Orthostatic VS BP Location FiO2 (%)   -- -- -- -- --      Pain Score       --           Vitals:    03/22/21 1700   BP: 111/61   Pulse: 79         Visual Acuity      ED Medications  Medications - No data to display    Diagnostic Studies  Results Reviewed     Procedure Component Value Units Date/Time    COVID19, Influenza A/B, RSV PCR, [facility] [[id number]]  (Normal) Collected: 03/22/21 1850    Lab Status: Final result Specimen: Nares from Nasopharyngeal Swab Updated: 03/22/21 1936     SARS-CoV-2 Negative     INFLUENZA A PCR Negative     INFLUENZA B PCR Negative     RSV PCR Negative    Narrative: This test has been authorized by FDA under an EUA (Emergency Use Assay) for use by authorized laboratories  Clinical caution and judgement should be used with the interpretation of these results with consideration of the clinical impression and other laboratory testing  Testing reported as "Positive" or "Negative" has been proven to be accurate according to standard laboratory validation requirements  All testing is performed with control materials showing appropriate reactivity at standard intervals  No orders to display              Procedures  Procedures         ED Course                             SBIRT 20yo+      Most Recent Value   SBIRT (24 yo +)   In order to provide better care to our patients, we are screening all of our patients for alcohol and drug use  Would it be okay to ask you these screening questions? Yes Filed at: 03/22/2021 6589   Initial Alcohol Screen: US AUDIT-C    1  How often do you have a drink containing alcohol?  0 Filed at: 03/22/2021 1838   2  How many drinks containing alcohol do you have on a typical day you are drinking? 0 Filed at: 03/22/2021 1838   3a  Male UNDER 65: How often do you have five or more drinks on one occasion? 0 Filed at: 03/22/2021 1838   3b  FEMALE Any Age, or MALE 65+: How often do you have 4 or more drinks on one occassion?   0 Filed at: 03/22/2021 1838   Audit-C Score  0 Filed at: 03/22/2021 9627   FRANCIA: How many times in the past year have you    Used an illegal drug or used a prescription medication for non-medical reasons? Never Filed at: 03/22/2021 8864                    MDM    Disposition  Final diagnoses:   URI (upper respiratory infection)   SOB (shortness of breath)     Time reflects when diagnosis was documented in both MDM as applicable and the Disposition within this note     Time User Action Codes Description Comment    3/22/2021  7:51 PM Nitish Jones Add [J06 9] URI (upper respiratory infection)     3/22/2021  7:51 PM Nitish Karen Add [R06 02] SOB (shortness of breath)       ED Disposition     ED Disposition Condition Date/Time Comment    Discharge Stable Mon Mar 22, 2021  7:51 PM Niki Ramírez discharge to home/self care  Follow-up Information     Follow up With Specialties Details Why Contact Info Additional Information    [employer]    [address] [facility]   [address] [id number]  [address], [address] [place], [place], [address], [phone]          Patient's Medications   Discharge Prescriptions    No medications on file     No discharge procedures on file      PDMP Review     None          ED Provider  Electronically Signed by           González Singh PA-C  03/22/21 1956

## 2021-03-22 NOTE — Clinical Note
Carmelo Arredondo was seen and treated in our emergency department on 3/22/2021  Diagnosis:     Melquiades Lee  may return to work on return date  He may return on this date: 03/23/2021    COVID test NEGATIVE  If you have any questions or concerns, please don't hesitate to call        Stanford Trinidad PA-C    ______________________________           _______________          _______________  Hospital Representative                              Date                                Time

## 2021-03-23 NOTE — ED NOTES
AVS reviewed and questions answered, pt verbalized understanding  Pt d/c to home, ambulatory with steady gait        Bora Peres RN  03/22/21 2019

## 2021-04-05 ENCOUNTER — HOSPITAL ENCOUNTER (EMERGENCY)
Facility: HOSPITAL | Age: 51
Discharge: HOME | End: 2021-04-05
Attending: EMERGENCY MEDICINE | Admitting: EMERGENCY MEDICINE
Payer: COMMERCIAL

## 2021-04-05 ENCOUNTER — APPOINTMENT (EMERGENCY)
Dept: RADIOLOGY | Facility: HOSPITAL | Age: 51
End: 2021-04-05
Payer: COMMERCIAL

## 2021-04-05 VITALS
WEIGHT: 159.39 LBS | SYSTOLIC BLOOD PRESSURE: 143 MMHG | OXYGEN SATURATION: 97 % | TEMPERATURE: 98.1 F | RESPIRATION RATE: 16 BRPM | DIASTOLIC BLOOD PRESSURE: 91 MMHG | HEART RATE: 98 BPM | BODY MASS INDEX: 24.24 KG/M2

## 2021-04-05 DIAGNOSIS — M77.8 TENDINITIS OF LEFT WRIST: Primary | ICD-10-CM

## 2021-04-05 PROCEDURE — 73110 X-RAY EXAM OF WRIST: CPT

## 2021-04-05 PROCEDURE — 99284 EMERGENCY DEPT VISIT MOD MDM: CPT | Performed by: PHYSICIAN ASSISTANT

## 2021-04-05 PROCEDURE — 99283 EMERGENCY DEPT VISIT LOW MDM: CPT

## 2021-04-05 RX ORDER — NAPROXEN 500 MG/1
500 TABLET ORAL 2 TIMES DAILY PRN
Qty: 10 TABLET | Refills: 0 | Status: SHIPPED | OUTPATIENT
Start: 2021-04-05 | End: 2022-06-24

## 2021-04-05 NOTE — Clinical Note
Griselda Tucker was seen and treated in our emergency department on 4/5/2021  Diagnosis: Tendinitis left wrist    Gagandeep    He may return on this date: 04/07/2021         If you have any questions or concerns, please don't hesitate to call        Hector David PA-C    ______________________________           _______________          _______________  Hospital Representative                              Date                                Time

## 2021-04-05 NOTE — ED PROVIDER NOTES
History  Chief Complaint   Patient presents with    Hand Swelling     Left hand swelling x 2 days, denies any trauma or injury  This is a 57-year-old male patient who works with his hands  4 days ago he started with pain over his left wrist with swelling  He states when he holds something he gets sharp pain in his wrist and radiates into his forearm  He does not recall any trauma but once again works with his hands  No numbness or tingling  The swelling has greatly reduced he has tried nothing over-the-counter however he still gets pain with picking something up  He is ambidextrous  Has never had this before  Denies any fever chills headache blurred vision double vision cough congestion sore throat nausea vomiting diarrhea abdominal pain no pain or shortness of breath no urinary symptoms  Differential diagnosis includes not limited to tendinitis, tenosynovitis, scaphoid fracture less likely, gout less likely, infection less likely          None       Past Medical History:   Diagnosis Date    Hepatitis C     PTSD (post-traumatic stress disorder)        Past Surgical History:   Procedure Laterality Date    APPENDECTOMY         History reviewed  No pertinent family history  I have reviewed and agree with the history as documented  E-Cigarette/Vaping    E-Cigarette Use Never User      E-Cigarette/Vaping Substances     Social History     Tobacco Use    Smoking status: Current Every Day Smoker     Packs/day: 0 50     Years: 25 00     Pack years: 12 50     Types: Cigarettes    Smokeless tobacco: Never Used   Substance Use Topics    Alcohol use: No    Drug use: Yes     Types: Marijuana       Review of Systems   Constitutional: Negative for diaphoresis, fatigue and fever  HENT: Negative for congestion, ear pain, nosebleeds and sore throat  Eyes: Negative for photophobia, pain, discharge and visual disturbance     Respiratory: Negative for cough, choking, chest tightness, shortness of breath and wheezing  Cardiovascular: Negative for chest pain and palpitations  Gastrointestinal: Negative for abdominal distention, abdominal pain, diarrhea and vomiting  Genitourinary: Negative for dysuria, flank pain and frequency  Musculoskeletal: Negative for back pain, gait problem and joint swelling  Left wrist pain   Skin: Negative for color change and rash  Neurological: Negative for dizziness, syncope and headaches  Psychiatric/Behavioral: Negative for behavioral problems and confusion  The patient is not nervous/anxious  All other systems reviewed and are negative  Physical Exam  Physical Exam  Vitals signs and nursing note reviewed  Constitutional:       Appearance: He is well-developed  HENT:      Head: Normocephalic and atraumatic  Right Ear: Tympanic membrane, ear canal and external ear normal       Left Ear: Tympanic membrane, ear canal and external ear normal       Nose: Nose normal       Mouth/Throat:      Mouth: Mucous membranes are moist       Pharynx: No oropharyngeal exudate or posterior oropharyngeal erythema  Eyes:      Conjunctiva/sclera: Conjunctivae normal       Pupils: Pupils are equal, round, and reactive to light  Neck:      Musculoskeletal: Normal range of motion and neck supple  Cardiovascular:      Rate and Rhythm: Normal rate and regular rhythm  Pulmonary:      Effort: Pulmonary effort is normal       Breath sounds: Normal breath sounds  Abdominal:      General: Bowel sounds are normal       Palpations: Abdomen is soft  Tenderness: There is no abdominal tenderness  Musculoskeletal: Normal range of motion  Hands:    Skin:     General: Skin is warm  Neurological:      Mental Status: He is alert     Psychiatric:         Behavior: Behavior normal          Vital Signs  ED Triage Vitals [04/05/21 1608]   Temperature Pulse Respirations Blood Pressure SpO2   98 1 °F (36 7 °C) 98 16 143/91 97 %      Temp src Heart Rate Source Patient Position - Orthostatic VS BP Location FiO2 (%)   -- Monitor Sitting Left arm --      Pain Score       --           Vitals:    04/05/21 1608   BP: 143/91   Pulse: 98   Patient Position - Orthostatic VS: Sitting         Visual Acuity      ED Medications  Medications - No data to display    Diagnostic Studies  Results Reviewed     None                 XR wrist 3+ views LEFT   ED Interpretation by Ash Baker PA-C (04/05 3378)   No acute fracture dislocation                 Procedures  Procedures         ED Course                                           MDM    Disposition  Final diagnoses:   Tendinitis of left wrist     Time reflects when diagnosis was documented in both MDM as applicable and the Disposition within this note     Time User Action Codes Description Comment    4/5/2021  4:59 PM Jevon Santana Add [M77 8] Tendinitis of left wrist       ED Disposition     ED Disposition Condition Date/Time Comment    Discharge Stable Mon Apr 5, 2021  4:59 PM Alva Foots discharge to home/self care  Follow-up Information     Follow up With Specialties Details Why Contact Info Additional Information    [facility] WITH [facility] Orthopedic Surgery Schedule an appointment as soon as possible for a visit   [id number] [address] [facility], [address] [place] [address]          Patient's Medications   Discharge Prescriptions    NAPROXEN (NAPROSYN) 500 MG TABLET    Take 1 tablet (500 mg total) by mouth 2 (two) times a day as needed for mild pain       Start Date: 4/5/2021  End Date: --       Order Dose: 500 mg       Quantity: 10 tablet    Refills: 0     No discharge procedures on file      PDMP Review     None          ED Provider  Electronically Signed by           Ash Baker PA-C  04/05/21 6077

## 2021-04-07 NOTE — ED ATTENDING ATTESTATION
I supervised the Advanced Practitioner  I was present in the ED at the time the patient was seen, and I reviewed the AP note, prescribed medications, orders placed, and was available for consultation      Carolina Joseph DO

## 2021-07-08 ENCOUNTER — HOSPITAL ENCOUNTER (OUTPATIENT)
Facility: HOSPITAL | Age: 51
Setting detail: OBSERVATION
Discharge: LEFT BEFORE DISCHARGE | End: 2021-07-08
Attending: EMERGENCY MEDICINE | Admitting: INTERNAL MEDICINE
Payer: COMMERCIAL

## 2021-07-08 ENCOUNTER — APPOINTMENT (EMERGENCY)
Dept: NON INVASIVE DIAGNOSTICS | Facility: HOSPITAL | Age: 51
End: 2021-07-08
Payer: COMMERCIAL

## 2021-07-08 ENCOUNTER — APPOINTMENT (EMERGENCY)
Dept: RADIOLOGY | Facility: HOSPITAL | Age: 51
End: 2021-07-08
Payer: COMMERCIAL

## 2021-07-08 VITALS
HEIGHT: 68 IN | DIASTOLIC BLOOD PRESSURE: 86 MMHG | HEART RATE: 70 BPM | RESPIRATION RATE: 20 BRPM | TEMPERATURE: 98 F | OXYGEN SATURATION: 99 % | BODY MASS INDEX: 24.25 KG/M2 | WEIGHT: 160 LBS | SYSTOLIC BLOOD PRESSURE: 140 MMHG

## 2021-07-08 DIAGNOSIS — R42 LIGHTHEADEDNESS: ICD-10-CM

## 2021-07-08 DIAGNOSIS — R07.9 CHEST PAIN, UNSPECIFIED TYPE: Primary | ICD-10-CM

## 2021-07-08 LAB
ALBUMIN SERPL BCP-MCNC: 3.8 G/DL (ref 3.4–4.8)
ALP SERPL-CCNC: 46 U/L (ref 10–129)
ALT SERPL W P-5'-P-CCNC: 31 U/L (ref 5–63)
ANION GAP SERPL CALCULATED.3IONS-SCNC: 6 MMOL/L (ref 4–13)
AST SERPL W P-5'-P-CCNC: 25 U/L (ref 15–41)
ATRIAL RATE: 84 BPM
BASOPHILS # BLD AUTO: 0.01 THOUSANDS/ΜL (ref 0–0.1)
BASOPHILS NFR BLD AUTO: 0 % (ref 0–1)
BILIRUB SERPL-MCNC: 0.56 MG/DL (ref 0.3–1.2)
BUN SERPL-MCNC: 11 MG/DL (ref 6–20)
CALCIUM SERPL-MCNC: 8.4 MG/DL (ref 8.4–10.2)
CHLORIDE SERPL-SCNC: 107 MMOL/L (ref 96–108)
CO2 SERPL-SCNC: 28 MMOL/L (ref 22–33)
CREAT SERPL-MCNC: 1.17 MG/DL (ref 0.5–1.2)
EOSINOPHIL # BLD AUTO: 0.13 THOUSAND/ΜL (ref 0–0.61)
EOSINOPHIL NFR BLD AUTO: 2 % (ref 0–6)
ERYTHROCYTE [DISTWIDTH] IN BLOOD BY AUTOMATED COUNT: 11.6 % (ref 11.6–15.1)
GFR SERPL CREATININE-BSD FRML MDRD: 71 ML/MIN/1.73SQ M
GLUCOSE SERPL-MCNC: 110 MG/DL (ref 65–140)
HCT VFR BLD AUTO: 42.7 % (ref 36.5–49.3)
HGB BLD-MCNC: 15.4 G/DL (ref 12–17)
IMM GRANULOCYTES # BLD AUTO: 0.01 THOUSAND/UL (ref 0–0.2)
IMM GRANULOCYTES NFR BLD AUTO: 0 % (ref 0–2)
LYMPHOCYTES # BLD AUTO: 1.56 THOUSANDS/ΜL (ref 0.6–4.47)
LYMPHOCYTES NFR BLD AUTO: 20 % (ref 14–44)
MCH RBC QN AUTO: 35.3 PG (ref 26.8–34.3)
MCHC RBC AUTO-ENTMCNC: 36.1 G/DL (ref 31.4–37.4)
MCV RBC AUTO: 98 FL (ref 82–98)
MONOCYTES # BLD AUTO: 0.67 THOUSAND/ΜL (ref 0.17–1.22)
MONOCYTES NFR BLD AUTO: 9 % (ref 4–12)
NEUTROPHILS # BLD AUTO: 5.26 THOUSANDS/ΜL (ref 1.85–7.62)
NEUTS SEG NFR BLD AUTO: 69 % (ref 43–75)
P AXIS: 70 DEGREES
PLATELET # BLD AUTO: 155 THOUSANDS/UL (ref 149–390)
PMV BLD AUTO: 10.7 FL (ref 8.9–12.7)
POTASSIUM SERPL-SCNC: 4 MMOL/L (ref 3.5–5)
PR INTERVAL: 150 MS
PROT SERPL-MCNC: 6.5 G/DL (ref 6.4–8.3)
QRS AXIS: -23 DEGREES
QRSD INTERVAL: 102 MS
QT INTERVAL: 366 MS
QTC INTERVAL: 430 MS
RBC # BLD AUTO: 4.36 MILLION/UL (ref 3.88–5.62)
SODIUM SERPL-SCNC: 141 MMOL/L (ref 133–145)
T WAVE AXIS: 60 DEGREES
TROPONIN I SERPL-MCNC: <0.03 NG/ML (ref 0–0.07)
TROPONIN I SERPL-MCNC: <0.03 NG/ML (ref 0–0.07)
VENTRICULAR RATE: 83 BPM
WBC # BLD AUTO: 7.64 THOUSAND/UL (ref 4.31–10.16)

## 2021-07-08 PROCEDURE — 80053 COMPREHEN METABOLIC PANEL: CPT | Performed by: STUDENT IN AN ORGANIZED HEALTH CARE EDUCATION/TRAINING PROGRAM

## 2021-07-08 PROCEDURE — 71045 X-RAY EXAM CHEST 1 VIEW: CPT

## 2021-07-08 PROCEDURE — 99285 EMERGENCY DEPT VISIT HI MDM: CPT | Performed by: INTERNAL MEDICINE

## 2021-07-08 PROCEDURE — 36415 COLL VENOUS BLD VENIPUNCTURE: CPT | Performed by: STUDENT IN AN ORGANIZED HEALTH CARE EDUCATION/TRAINING PROGRAM

## 2021-07-08 PROCEDURE — 93306 TTE W/DOPPLER COMPLETE: CPT

## 2021-07-08 PROCEDURE — 93005 ELECTROCARDIOGRAM TRACING: CPT

## 2021-07-08 PROCEDURE — 85025 COMPLETE CBC W/AUTO DIFF WBC: CPT | Performed by: STUDENT IN AN ORGANIZED HEALTH CARE EDUCATION/TRAINING PROGRAM

## 2021-07-08 PROCEDURE — 99285 EMERGENCY DEPT VISIT HI MDM: CPT

## 2021-07-08 PROCEDURE — 84484 ASSAY OF TROPONIN QUANT: CPT | Performed by: STUDENT IN AN ORGANIZED HEALTH CARE EDUCATION/TRAINING PROGRAM

## 2021-07-08 PROCEDURE — 93306 TTE W/DOPPLER COMPLETE: CPT | Performed by: INTERNAL MEDICINE

## 2021-07-08 PROCEDURE — 93010 ELECTROCARDIOGRAM REPORT: CPT | Performed by: INTERNAL MEDICINE

## 2021-07-08 PROCEDURE — 84484 ASSAY OF TROPONIN QUANT: CPT | Performed by: INTERNAL MEDICINE

## 2021-07-08 RX ORDER — ASPIRIN 81 MG/1
324 TABLET, CHEWABLE ORAL ONCE
Status: COMPLETED | OUTPATIENT
Start: 2021-07-08 | End: 2021-07-08

## 2021-07-08 RX ADMIN — SODIUM CHLORIDE 1000 ML: 0.9 INJECTION, SOLUTION INTRAVENOUS at 07:26

## 2021-07-08 RX ADMIN — ASPIRIN 81 MG CHEWABLE TABLET 324 MG: 81 TABLET CHEWABLE at 07:25

## 2021-07-08 NOTE — ED NOTES
Pt signed out AMA, risks explained by Dr Daniela Randhawa  Pt verbalized understanding  IV removed, site symptomatic        Darci Delacruz RN  07/08/21 2687

## 2021-07-08 NOTE — ED PROCEDURE NOTE
PROCEDURE  Pt while at er stated he can no to stay because he has anxiety , and refused admission  Pt already admitted for work up and cardiology evaluation, but he refused  All risks and dangers of signing AMA had been explained to the pt  Pt full understand and he said he appreciate all what I did for him       Rivka Miramontes MD  07/08/21 3296
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
## 2021-07-08 NOTE — DISCHARGE INSTRUCTIONS
Follow up with cardiologist dr Mackenzie Naidu, ASAP  You are signing AMA , which can result in heart attack, arrhythemias, syncope, shock, loss of function, death  You can come back to er at [facility] if you change your mind  Labs Reviewed   CBC AND DIFFERENTIAL - Abnormal       Result Value Ref Range Status    WBC 7 64  4 31 - 10 16 Thousand/uL Final    RBC 4 36  3 88 - 5 62 Million/uL Final    Hemoglobin 15 4  12 0 - 17 0 g/dL Final    Hematocrit 42 7  36 5 - 49 3 % Final    MCV 98  82 - 98 fL Final    MCH 35 3 (*) 26 8 - 34 3 pg Final    MCHC 36 1  31 4 - 37 4 g/dL Final    RDW 11 6  11 6 - 15 1 % Final    MPV 10 7  8 9 - 12 7 fL Final    Platelets 665  207 - 390 Thousands/uL Final    Neutrophils Relative 69  43 - 75 % Final    Immat GRANS % 0  0 - 2 % Final    Lymphocytes Relative 20  14 - 44 % Final    Monocytes Relative 9  4 - 12 % Final    Eosinophils Relative 2  0 - 6 % Final    Basophils Relative 0  0 - 1 % Final    Neutrophils Absolute 5 26  1 85 - 7 62 Thousands/µL Final    Immature Grans Absolute 0 01  0 00 - 0 20 Thousand/uL Final    Lymphocytes Absolute 1 56  0 60 - 4 47 Thousands/µL Final    Monocytes Absolute 0 67  0 17 - 1 22 Thousand/µL Final    Eosinophils Absolute 0 13  0 00 - 0 61 Thousand/µL Final    Basophils Absolute 0 01  0 00 - 0 10 Thousands/µL Final   TROPONIN I - Normal    Troponin I <0 03  0 00 - 0 07 ng/mL Final    Comment: [employer] Chemistry analyzer 99% cutoff is > 0 03ng/mL    o cTnl 99% cutoff is useful only when applied to patients in the clinical setting of myocardial ischemia  o cTnl 99% cutoff should be interpreted in the context of clinical history, ECG findings and possibly cardiac imaging to establish correct diagnosis  o cTnl 99% cutoff may be suggestive but clearly not indicative of a coronary event without the clinical setting of myocardial ischemia     TROPONIN I - Normal    Troponin I <0 03  0 00 - 0 07 ng/mL Final    Comment: [employer] Chemistry analyzer 99% cutoff is > 0 03ng/mL    o cTnl 99% cutoff is useful only when applied to patients in the clinical setting of myocardial ischemia  o cTnl 99% cutoff should be interpreted in the context of clinical history, ECG findings and possibly cardiac imaging to establish correct diagnosis  o cTnl 99% cutoff may be suggestive but clearly not indicative of a coronary event without the clinical setting of myocardial ischemia  NOVEL CORONAVIRUS (COVID-19), PCR [facility]   COMPREHENSIVE METABOLIC PANEL    Sodium 798  133 - 145 mmol/L Final    Potassium 4 0  3 5 - 5 0 mmol/L Final    Chloride 107  96 - 108 mmol/L Final    CO2 28  22 - 33 mmol/L Final    ANION GAP 6  4 - 13 mmol/L Final    BUN 11  6 - 20 mg/dL Final    Creatinine 1 17  0 50 - 1 20 mg/dL Final    Comment: Standardized to IDMS reference method    Glucose 110  65 - 140 mg/dL Final    Comment: If the patient is fasting, the ADA then defines impaired fasting glucose as > 100 mg/dL and diabetes as > or equal to 123 mg/dL  Specimen collection should occur prior to Sulfasalazine administration due to the potential for falsely depressed results  Specimen collection should occur prior to Sulfapyridine administration due to the potential for falsely elevated results  Calcium 8 4  8 4 - 10 2 mg/dL Final    AST 25  15 - 41 U/L Final    Comment: Specimen collection should occur prior to Sulfasalazine administration due to the potential for falsely depressed results  ALT 31  5 - 63 U/L Final    Comment: Specimen collection should occur prior to Sulfasalazine administration due to the potential for falsely depressed results       Alkaline Phosphatase 46 0  10 - 129 U/L Final    Total Protein 6 5  6 4 - 8 3 g/dL Final    Albumin 3 8  3 4 - 4 8 g/dL Final    Total Bilirubin 0 56  0 30 - 1 20 mg/dL Final    eGFR 71  ml/min/1 73sq m Final    Narrative:     [place] guidelines for Chronic Kidney Disease (CKD):     Stage 1 with normal or high GFR (GFR > 90 mL/min/1 73 square meters)    Stage 2 Mild CKD (GFR = 60-89 mL/min/1 73 square meters)    Stage 3A Moderate CKD (GFR = 45-59 mL/min/1 73 square meters)    Stage 3B Moderate CKD (GFR = 30-44 mL/min/1 73 square meters)    Stage 4 Severe CKD (GFR = 15-29 mL/min/1 73 square meters)    Stage 5 End Stage CKD (GFR <15 mL/min/1 73 square meters)  Note: GFR calculation is accurate only with a steady state creatinine     XR chest 1 view portable   ED Interpretation   No acute cardiopulmonary findings      Final Result      No acute cardiopulmonary disease                 Workstation performed: [id number]

## 2021-07-08 NOTE — ED NOTES
Pt came to nurses station and told staff he is needs to leave and does not want to stay in hospital  Dr Pedro Lentz made aware and came to bedside to speak with pt  Pt very anxious and pacing        Domingo Davila RN  07/08/21 7848

## 2021-07-08 NOTE — ED PROVIDER NOTES
History  Chief Complaint   Patient presents with    Dizziness     got dizzy at work 2 days ago, almost passed out and vomited  CO HA and dull CP "like someone punched me"      Patient 78-year-old male presents ED today with complaint of dizziness x2 days and chest discomfort x12 hours  Patient states 2 days ago while he was at work he began experiencing sudden onset dizziness feeling as though the room was spinning which prompted him to and sit down where he began sweating and feeling as though he was about to pass out  He states he had 1 of his friends drive him home from work and when he got of the car at his apartment he began profusely vomiting and felt so fatigued that he could barely get himself into his apartment on the 4th floor  He states since then he has been experiencing continued fatigue, intermittent dizziness that is worse with rapid head movement and a new onset of chest discomfort x 12 hours  He describes the chest discomfort as pressure located in center of his chest and feeling as though someone punched him in the chest, but denies presence of actual pain  Patient denies any aggravating or relieving factors denies taking any medications for relief of symptoms  He states he originally thought that he was severely dehydrated and has been drinking plenty of Gatorade and additional fluids but still feels off    He also admits to associated headache which has been present for the past day  Patient states he had very similar symptoms to today's presentation 1 year ago was advised to follow-up with cardiology for an exercise stress test but was unable to fulfill this evaluation due to lack of insurance  He denies current dizziness or lightheadedness, palpitations, shortness of breath, abdominal pain, nausea, vomiting, diarrhea, constipation, tinnitus, weakness, change in vision        History provided by:  Patient   used: No    Dizziness  Associated symptoms: chest pain and headaches    Associated symptoms: no blood in stool, no diarrhea, no hearing loss, no nausea, no palpitations, no shortness of breath, no tinnitus, no vomiting and no weakness        Prior to Admission Medications   Prescriptions Last Dose Informant Patient Reported? Taking?   naproxen (NAPROSYN) 500 mg tablet   No No   Sig: Take 1 tablet (500 mg total) by mouth 2 (two) times a day as needed for mild pain      Facility-Administered Medications: None       Past Medical History:   Diagnosis Date    Hepatitis C     PTSD (post-traumatic stress disorder)        Past Surgical History:   Procedure Laterality Date    APPENDECTOMY         History reviewed  No pertinent family history  I have reviewed and agree with the history as documented  E-Cigarette/Vaping    E-Cigarette Use Never User      E-Cigarette/Vaping Substances     Social History     Tobacco Use    Smoking status: Current Every Day Smoker     Packs/day: 0 50     Years: 25 00     Pack years: 12 50     Types: Cigarettes    Smokeless tobacco: Never Used   Vaping Use    Vaping Use: Never used   Substance Use Topics    Alcohol use: No    Drug use: Yes     Types: Marijuana     Comment: smokes every other day       Review of Systems   Constitutional: Negative for chills and fever  HENT: Negative for congestion, ear pain, facial swelling, hearing loss, rhinorrhea, sinus pain, sore throat and tinnitus  Eyes: Negative for visual disturbance  Respiratory: Positive for chest tightness  Negative for shortness of breath  Cardiovascular: Positive for chest pain  Negative for palpitations  Gastrointestinal: Negative for abdominal pain, blood in stool, constipation, diarrhea, nausea and vomiting  Musculoskeletal: Positive for neck stiffness  Negative for arthralgias, back pain, gait problem and neck pain  Skin: Negative  Neurological: Positive for dizziness, light-headedness and headaches   Negative for syncope, facial asymmetry, weakness and numbness  All other systems reviewed and are negative  Physical Exam  Physical Exam  Vitals and nursing note reviewed  Constitutional:       General: He is not in acute distress  Appearance: Normal appearance  He is normal weight  He is not ill-appearing  HENT:      Head: Normocephalic and atraumatic  Right Ear: Hearing and external ear normal       Left Ear: Hearing and external ear normal       Nose: Nose normal       Mouth/Throat:      Mouth: Mucous membranes are moist       Dentition: Dental caries present  Pharynx: Oropharynx is clear  No oropharyngeal exudate  Tonsils: No tonsillar exudate  Eyes:      General: Lids are normal  Vision grossly intact  No visual field deficit  Extraocular Movements: Extraocular movements intact  Right eye: Normal extraocular motion and no nystagmus  Left eye: Normal extraocular motion and no nystagmus  Conjunctiva/sclera: Conjunctivae normal       Pupils: Pupils are equal, round, and reactive to light  Right eye: Pupil is not sluggish  Left eye: Pupil is not sluggish  Neck:      Trachea: Trachea normal    Cardiovascular:      Rate and Rhythm: Normal rate and regular rhythm  Heart sounds: Normal heart sounds  No murmur heard  Pulmonary:      Effort: Pulmonary effort is normal  No respiratory distress  Breath sounds: Normal breath sounds  No decreased breath sounds or wheezing  Chest:      Chest wall: No tenderness  Abdominal:      General: Abdomen is flat  Bowel sounds are normal  There is no distension  Palpations: Abdomen is soft  Tenderness: There is no abdominal tenderness  There is no guarding or rebound  Musculoskeletal:         General: Normal range of motion  Cervical back: Normal range of motion  No pain with movement, spinous process tenderness or muscular tenderness  Right lower leg: No edema  Left lower leg: No edema     Lymphadenopathy:      Cervical: No cervical adenopathy  Skin:     General: Skin is warm and dry  Neurological:      General: No focal deficit present  Mental Status: He is alert and oriented to person, place, and time  Cranial Nerves: Cranial nerves are intact  Sensory: Sensation is intact  No sensory deficit  Motor: Motor function is intact  No weakness or tremor  Coordination: Coordination is intact  Coordination normal  Finger-Nose-Finger Test normal       Gait: Gait is intact  Comments: Muscle strength 5 of 5 in upper and lower extremities bilaterally  Cranial nerves intact  Sensation to light touch throughout upper and lower extremities bilaterally   Psychiatric:         Attention and Perception: Attention normal          Mood and Affect: Mood normal          Speech: Speech normal          Behavior: Behavior normal  Behavior is cooperative  Thought Content:  Thought content normal          Cognition and Memory: Cognition normal          Judgment: Judgment normal          Vital Signs  ED Triage Vitals [07/08/21 0648]   Temperature Pulse Respirations Blood Pressure SpO2   97 8 °F (36 6 °C) 81 20 96/75 100 %      Temp Source Heart Rate Source Patient Position - Orthostatic VS BP Location FiO2 (%)   Oral Monitor Sitting -- --      Pain Score       4           Vitals:    07/08/21 0648   BP: 96/75   Pulse: 81   Patient Position - Orthostatic VS: Sitting         Visual Acuity      ED Medications  Medications   sodium chloride 0 9 % bolus 1,000 mL (has no administration in time range)   aspirin chewable tablet 324 mg (has no administration in time range)       Diagnostic Studies  Results Reviewed     Procedure Component Value Units Date/Time    CBC and differential [[id number]]     Lab Status: No result Specimen: Blood     Comprehensive metabolic panel [[id number]]     Lab Status: No result Specimen: Blood     Troponin I [[id number]]     Lab Status: No result Specimen: Blood                  XR chest 1 view portable (Results Pending)              Procedures  ECG 12 Lead Documentation Only    Date/Time: 7/8/2021 7:43 AM  Performed by: Dieter Valles PA-C  Authorized by: Dieter Valles PA-C     Indications / Diagnosis:  Chest discomfort  ECG reviewed by me, the ED Provider: yes    Patient location:  ED  Previous ECG:     Previous ECG:  Unavailable    Comparison to cardiac monitor: No    Interpretation:     Interpretation: abnormal    Rate:     ECG rate:  83    ECG rate assessment: normal    Rhythm:     Rhythm: sinus rhythm    Ectopy:     Ectopy: PAC    QRS:     QRS axis:  Normal  Conduction:     Conduction: normal    ST segments:     ST segments:  Normal  T waves:     T waves: normal               ED Course  ED Course as of Jul 08 1905   Thu Jul 08, 2021   0732 EKG; Sinus rhythm rate 83/min, no ST elevation or T wave inversions  APC       7280 Case discussed with hospitalist  And she called dr Kyleigh Zambrano cardiologist who want him to be transferred to [facility] for stress test and evaluation  65 Pt signing AMA, risks and dangers of doing that explained to him and he understand                                                MDM  Number of Diagnoses or Management Options  Diagnosis management comments: Patient 52-year-old male presents ED today with complaint of dizziness x2 days and chest discomfort x12 hours  Examination is unremarkable; muscle strength 5/5, sensation of light touch, cranial nerves intact, no nystagmus, regular rate and rhythm, clear to auscultation, no abdominal tenderness, bowel sounds normal  Suspected cardiac etiology indicating full cardiac workup  CBC/CMP/troponin all within normal limits    Sign out given to Dr Gibson Favorite       Amount and/or Complexity of Data Reviewed  Clinical lab tests: ordered and reviewed  Tests in the radiology section of CPT®: ordered and reviewed  Independent visualization of images, tracings, or specimens: yes    Patient Progress  Patient progress: stable      Disposition  Final diagnoses:   None     ED Disposition     None      Follow-up Information    None         Patient's Medications   Discharge Prescriptions    No medications on file     No discharge procedures on file      PDMP Review     None          ED Provider  Electronically Signed by           Shani Subramanian PA-C  07/08/21 [address] [place] [address] MD Kian  07/08/21 7175

## 2021-07-08 NOTE — ED RE-EVALUATION NOTE
hospitalist dr Laney Anderson, talked to cardiologist dr Marlo Malone , by phone or text, and he advised to transfer to [facility] for stress test  Called transfer center and [facility] is full  So talked to dr Maru Kincaid and he stated stress test can be done at outpatient, does not need to be admitted for it , in addition will not accept pt unless he is examined and seen physically  by cardiologist in our facility   Pt had echo at ER  And will admit to [facility] for cardiology evaluation and cardiologist to read echo done at er       Benson Monterroso MD  07/08/21 8755

## 2021-07-08 NOTE — Clinical Note
Case was discussed with dr Dorita Moore and the patient's admission status was agreed to be admitted to tele observation to the service of Dr Dorita Moore

## 2021-08-25 ENCOUNTER — HOSPITAL ENCOUNTER (EMERGENCY)
Facility: HOSPITAL | Age: 51
Discharge: HOME | End: 2021-08-25
Payer: COMMERCIAL

## 2021-08-25 VITALS
OXYGEN SATURATION: 98 % | BODY MASS INDEX: 24.33 KG/M2 | WEIGHT: 160 LBS | HEART RATE: 107 BPM | SYSTOLIC BLOOD PRESSURE: 154 MMHG | TEMPERATURE: 98.3 F | DIASTOLIC BLOOD PRESSURE: 75 MMHG | RESPIRATION RATE: 20 BRPM

## 2021-08-25 DIAGNOSIS — B34.9 ACUTE VIRAL SYNDROME: Primary | ICD-10-CM

## 2021-08-25 DIAGNOSIS — Z20.822 COVID-19 VIRUS TEST RESULT UNKNOWN: ICD-10-CM

## 2021-08-25 LAB — SARS-COV-2 RNA RESP QL NAA+PROBE: NEGATIVE

## 2021-08-25 PROCEDURE — 87635 SARS-COV-2 COVID-19 AMP PRB: CPT | Performed by: PHYSICIAN ASSISTANT

## 2021-08-25 PROCEDURE — 99283 EMERGENCY DEPT VISIT LOW MDM: CPT

## 2021-08-25 PROCEDURE — 99282 EMERGENCY DEPT VISIT SF MDM: CPT | Performed by: PHYSICIAN ASSISTANT

## 2021-08-25 NOTE — ED NOTES
Pt family member advised she could not wait in the waiting room at this time due to pts symptoms  She stated they live together and it was explained that she has also been exposed and that is why we can't allow her to sit in the waiting room at this time  She said he didn't have a phone  I offered to take her number and we could let him use a phone to call her  She threw her hands up in the air and walked out       Polly Dotson RN  08/25/21 [address], RN  08/25/21 6935

## 2021-08-25 NOTE — ED PROVIDER NOTES
History  Chief Complaint   Patient presents with    Cough     Pt states he is ONLY here for a COVID test   Pt c/o SOB, cough, diarrhea  Pt does not want anything else done  26-year-old male comes in today requesting a COVID test after he began having viral syndrome on Monday  Reports runny nose, sneezing, cough productive of white phlegm, chest tightness, intermittent abdominal pain, vomiting, diarrhea, fatigue, generalized weakness, frontal headache, dyspnea on exertion  Denies any loss of taste or smell  No known exposures to COVID that he is aware of  He is fully vaccinated  Denies any chest pain  Denies any medical problems aside from anxiety  Smokes marijuana at night  Smokes 1 pack per day of cigarettes          Prior to Admission Medications   Prescriptions Last Dose Informant Patient Reported? Taking?   naproxen (NAPROSYN) 500 mg tablet   No No   Sig: Take 1 tablet (500 mg total) by mouth 2 (two) times a day as needed for mild pain      Facility-Administered Medications: None       Past Medical History:   Diagnosis Date    Hepatitis C     PTSD (post-traumatic stress disorder)        Past Surgical History:   Procedure Laterality Date    APPENDECTOMY         History reviewed  No pertinent family history  I have reviewed and agree with the history as documented  E-Cigarette/Vaping    E-Cigarette Use Never User      E-Cigarette/Vaping Substances     Social History     Tobacco Use    Smoking status: Current Every Day Smoker     Packs/day: 0 50     Years: 25 00     Pack years: 12 50     Types: Cigarettes    Smokeless tobacco: Never Used   Vaping Use    Vaping Use: Never used   Substance Use Topics    Alcohol use: No    Drug use: Yes     Types: Marijuana     Comment: smokes every other day       Review of Systems   Constitutional: Positive for fatigue  Negative for fever  HENT: Positive for rhinorrhea and sneezing  Negative for ear pain and sore throat      Eyes: Negative for visual disturbance  Respiratory: Positive for cough, chest tightness and shortness of breath  Cardiovascular: Negative for chest pain  Gastrointestinal: Positive for diarrhea and vomiting  Negative for abdominal pain and nausea  Musculoskeletal: Negative for back pain  Skin: Negative for rash  Neurological: Positive for headaches  Psychiatric/Behavioral: Negative for behavioral problems  Physical Exam  Physical Exam  Constitutional:       Appearance: Normal appearance  HENT:      Head: Normocephalic and atraumatic  Right Ear: Tympanic membrane and external ear normal       Left Ear: Tympanic membrane and external ear normal       Nose: Nose normal    Eyes:      Extraocular Movements: Extraocular movements intact  Cardiovascular:      Rate and Rhythm: Regular rhythm  Tachycardia present  Pulmonary:      Effort: Pulmonary effort is normal  No respiratory distress  Breath sounds: Normal breath sounds  Comments: Exertional pulse ox drops to 97%, but quickly recovers  Abdominal:      General: Abdomen is flat  Musculoskeletal:         General: Normal range of motion  Cervical back: Normal range of motion  Skin:     General: Skin is warm and dry  Neurological:      General: No focal deficit present  Mental Status: He is alert  Psychiatric:         Mood and Affect: Mood normal          Speech: Speech is rapid and pressured  Behavior: Behavior is hyperactive           Vital Signs  ED Triage Vitals   Temperature Pulse Respirations Blood Pressure SpO2   08/25/21 0923 08/25/21 0923 08/25/21 0923 08/25/21 0923 08/25/21 0923   98 3 °F (36 8 °C) (!) 107 20 154/75 98 %      Temp Source Heart Rate Source Patient Position - Orthostatic VS BP Location FiO2 (%)   08/25/21 0923 08/25/21 0923 -- -- --   Oral Monitor         Pain Score       08/25/21 0921       No Pain           Vitals:    08/25/21 0923   BP: 154/75   Pulse: (!) 107         Visual Acuity      ED Medications  Medications - No data to display    Diagnostic Studies  Results Reviewed     Procedure Component Value Units Date/Time    Novel Coronavirus [facility] - 2 Hour Stat [[id number]] Collected: 08/25/21 0943    Lab Status: No result Specimen: Nares from Nasopharyngeal Swab                  No orders to display              Procedures  Procedures         ED Course                                           MDM    Disposition  Final diagnoses:   Acute viral syndrome   COVID-19 virus test result unknown     Time reflects when diagnosis was documented in both MDM as applicable and the Disposition within this note     Time User Action Codes Description Comment    8/25/2021  9:42 AM Alicja Walters [B34 9] Acute viral syndrome     8/25/2021  9:43 AM Alicja Walters [Z20 822] COVID-19 virus test result unknown       ED Disposition     ED Disposition Condition Date/Time Comment    Discharge Stable Wed Aug 25, 2021  9:42 AM Peyman Lennon discharge to home/self care  Follow-up Information    None         Patient's Medications   Discharge Prescriptions    No medications on file     No discharge procedures on file      PDMP Review     None          ED Provider  Electronically Signed by           Viet Long PA-C  08/25/21 6361

## 2021-08-25 NOTE — DISCHARGE INSTRUCTIONS
Please Be Courteous:  You are being tested for novel coronavirus (COVID-19) your test is pending at this time  You need to self quarantine at least until you have the results back  This means go home and stay home  Have someone else  your medications for you and bring them to you and drop them off at your door  In Your Home:  If you live with other people, trying to avoid common spaces and disinfect areas that you come into contact with  Per the CDC's recommendations: persons who suspect that they might have COVID-19 should isolate, stay at home, and use a separate bedroom and bathroom if feasible  Isolation should begin even before seeking testing and before test results become available  All household members should start wearing a mask in the home, particularly in shared spaces where appropriate distancing is not possible  Close household contacts of the patient should also self-quarantine, to the extent possible, particularly staying away from those at higher risk of getting severe COVID-19  CDC also recommends a 14 day quarantine for close contacts of COVID+ patients  Take Care of Yourself:  Try to sleep on your stomach as much as possible  If you have the ability to, take vitamin D3 2000 IU by mouth daily, vitamin-C 1000 mg by mouth twice a day, a multivitamin daily to help boost your immune system  You should check your temperature twice day  Return to the emergency department for any severe shortness of breath or pulse ox less than 92%

## 2021-11-18 ENCOUNTER — NURSE TRIAGE (OUTPATIENT)
Dept: OTHER | Facility: OTHER | Age: 51
End: 2021-11-18

## 2021-11-18 DIAGNOSIS — Z20.822 ENCOUNTER FOR SCREENING LABORATORY TESTING FOR COVID-19 VIRUS IN ASYMPTOMATIC PATIENT: Primary | ICD-10-CM

## 2021-11-18 PROCEDURE — U0003 INFECTIOUS AGENT DETECTION BY NUCLEIC ACID (DNA OR RNA); SEVERE ACUTE RESPIRATORY SYNDROME CORONAVIRUS 2 (SARS-COV-2) (CORONAVIRUS DISEASE [COVID-19]), AMPLIFIED PROBE TECHNIQUE, MAKING USE OF HIGH THROUGHPUT TECHNOLOGIES AS DESCRIBED BY CMS-2020-01-R: HCPCS | Performed by: FAMILY MEDICINE

## 2021-11-18 PROCEDURE — U0005 INFEC AGEN DETEC AMPLI PROBE: HCPCS | Performed by: FAMILY MEDICINE

## 2021-11-19 ENCOUNTER — TELEPHONE (OUTPATIENT)
Dept: OTHER | Facility: OTHER | Age: 51
End: 2021-11-19

## 2022-06-10 ENCOUNTER — HOSPITAL ENCOUNTER (INPATIENT)
Facility: HOSPITAL | Age: 52
LOS: 14 days | Discharge: HOME HEALTH | DRG: 469 | End: 2022-06-24
Attending: INTERNAL MEDICINE | Admitting: INTERNAL MEDICINE
Payer: COMMERCIAL

## 2022-06-10 ENCOUNTER — APPOINTMENT (EMERGENCY)
Dept: CT IMAGING | Facility: HOSPITAL | Age: 52
End: 2022-06-10
Payer: COMMERCIAL

## 2022-06-10 ENCOUNTER — HOSPITAL ENCOUNTER (EMERGENCY)
Facility: HOSPITAL | Age: 52
Discharge: ANOTHER HOSPITAL | End: 2022-06-10
Attending: EMERGENCY MEDICINE
Payer: COMMERCIAL

## 2022-06-10 ENCOUNTER — APPOINTMENT (EMERGENCY)
Dept: RADIOLOGY | Facility: HOSPITAL | Age: 52
End: 2022-06-10
Payer: COMMERCIAL

## 2022-06-10 VITALS
HEART RATE: 101 BPM | BODY MASS INDEX: 26.63 KG/M2 | SYSTOLIC BLOOD PRESSURE: 103 MMHG | OXYGEN SATURATION: 100 % | TEMPERATURE: 99.1 F | DIASTOLIC BLOOD PRESSURE: 67 MMHG | WEIGHT: 175.71 LBS | HEIGHT: 68 IN | RESPIRATION RATE: 16 BRPM

## 2022-06-10 DIAGNOSIS — R78.81 BACTEREMIA: ICD-10-CM

## 2022-06-10 DIAGNOSIS — N17.9 AKI (ACUTE KIDNEY INJURY) (HCC): ICD-10-CM

## 2022-06-10 DIAGNOSIS — R77.8 ELEVATED TROPONIN: Primary | ICD-10-CM

## 2022-06-10 DIAGNOSIS — T79.7XXA SUBCUTANEOUS EMPHYSEMA, INITIAL ENCOUNTER (HCC): ICD-10-CM

## 2022-06-10 DIAGNOSIS — R41.82 ALTERED MENTAL STATUS: Primary | ICD-10-CM

## 2022-06-10 DIAGNOSIS — E87.5 HYPERKALEMIA: ICD-10-CM

## 2022-06-10 DIAGNOSIS — A41.9 SEPSIS (HCC): ICD-10-CM

## 2022-06-10 DIAGNOSIS — I48.91 ATRIAL FIBRILLATION, UNSPECIFIED TYPE (HCC): ICD-10-CM

## 2022-06-10 DIAGNOSIS — F19.11 HISTORY OF SUBSTANCE ABUSE (HCC): ICD-10-CM

## 2022-06-10 DIAGNOSIS — K72.00 ACUTE LIVER FAILURE WITHOUT HEPATIC COMA: ICD-10-CM

## 2022-06-10 DIAGNOSIS — N17.9 ACUTE RENAL FAILURE, UNSPECIFIED ACUTE RENAL FAILURE TYPE (HCC): ICD-10-CM

## 2022-06-10 DIAGNOSIS — R57.9 SHOCK (HCC): ICD-10-CM

## 2022-06-10 DIAGNOSIS — N17.0 ACUTE RENAL FAILURE WITH TUBULAR NECROSIS (HCC): ICD-10-CM

## 2022-06-10 DIAGNOSIS — R77.8 ELEVATED TROPONIN: ICD-10-CM

## 2022-06-10 DIAGNOSIS — I10 HYPERTENSION, UNSPECIFIED TYPE: ICD-10-CM

## 2022-06-10 DIAGNOSIS — F14.10 COCAINE ABUSE (HCC): ICD-10-CM

## 2022-06-10 PROBLEM — R41.89 UNRESPONSIVENESS: Status: ACTIVE | Noted: 2022-06-10

## 2022-06-10 PROBLEM — F19.90 IV DRUG USER: Status: ACTIVE | Noted: 2022-06-10

## 2022-06-10 LAB
2HR DELTA HS TROPONIN: -1617 NG/L
2HR DELTA HS TROPONIN: 489 NG/L
4HR DELTA HS TROPONIN: 1617 NG/L
ALBUMIN SERPL BCP-MCNC: 3.2 G/DL (ref 3.5–5)
ALBUMIN SERPL BCP-MCNC: 4.5 G/DL (ref 3.5–5)
ALP SERPL-CCNC: 77 U/L (ref 46–116)
ALP SERPL-CCNC: 82 U/L (ref 34–104)
ALT SERPL W P-5'-P-CCNC: 195 U/L (ref 7–52)
ALT SERPL W P-5'-P-CCNC: 893 U/L (ref 12–78)
AMMONIA PLAS-SCNC: 42 UMOL/L (ref 18–72)
AMPHETAMINES SERPL QL SCN: POSITIVE
ANION GAP SERPL CALCULATED.3IONS-SCNC: 13 MMOL/L (ref 4–13)
ANION GAP SERPL CALCULATED.3IONS-SCNC: 15 MMOL/L (ref 4–13)
ANION GAP SERPL CALCULATED.3IONS-SCNC: 9 MMOL/L (ref 4–13)
APAP SERPL-MCNC: <10 UG/ML (ref 10–20)
APTT PPP: 33 SECONDS (ref 23–37)
APTT PPP: 33 SECONDS (ref 23–37)
ARTERIAL PATENCY WRIST A: YES
ARTERIAL PATENCY WRIST A: YES
AST SERPL W P-5'-P-CCNC: 2717 U/L (ref 5–45)
AST SERPL W P-5'-P-CCNC: 350 U/L (ref 13–39)
ATRIAL RATE: 106 BPM
BACTERIA UR QL AUTO: ABNORMAL /HPF
BARBITURATES UR QL: NEGATIVE
BASE EXCESS BLDA CALC-SCNC: -10.5 MMOL/L
BASE EXCESS BLDA CALC-SCNC: -4.9 MMOL/L
BASOPHILS # BLD AUTO: 0.05 THOUSANDS/ΜL (ref 0–0.1)
BASOPHILS NFR BLD AUTO: 0 % (ref 0–1)
BENZODIAZ UR QL: NEGATIVE
BILIRUB SERPL-MCNC: 0.85 MG/DL (ref 0.2–1)
BILIRUB SERPL-MCNC: 1.12 MG/DL (ref 0.2–1)
BILIRUB UR QL STRIP: NEGATIVE
BODY TEMPERATURE: 97.5 DEGREES FEHRENHEIT
BUN SERPL-MCNC: 33 MG/DL (ref 5–25)
BUN SERPL-MCNC: 37 MG/DL (ref 5–25)
BUN SERPL-MCNC: 40 MG/DL (ref 5–25)
CA-I BLD-SCNC: 0.89 MMOL/L (ref 1.12–1.32)
CALCIUM ALBUM COR SERPL-MCNC: 7.9 MG/DL (ref 8.3–10.1)
CALCIUM SERPL-MCNC: 7.3 MG/DL (ref 8.3–10.1)
CALCIUM SERPL-MCNC: 7.3 MG/DL (ref 8.4–10.2)
CALCIUM SERPL-MCNC: 8.3 MG/DL (ref 8.4–10.2)
CARDIAC TROPONIN I PNL SERPL HS: 2102 NG/L
CARDIAC TROPONIN I PNL SERPL HS: 3771 NG/L
CARDIAC TROPONIN I PNL SERPL HS: 4075 NG/L
CARDIAC TROPONIN I PNL SERPL HS: 485 NG/L
CARDIAC TROPONIN I PNL SERPL HS: 5388 NG/L
CARDIAC TROPONIN I PNL SERPL HS: 974 NG/L
CHLORIDE SERPL-SCNC: 104 MMOL/L (ref 96–108)
CHLORIDE SERPL-SCNC: 107 MMOL/L (ref 100–108)
CHLORIDE SERPL-SCNC: 98 MMOL/L (ref 96–108)
CK MB SERPL-MCNC: 1652.8 NG/ML (ref 0–5)
CK MB SERPL-MCNC: <1.7 % (ref 0–2.5)
CK MB SERPL-MCNC: >2.5 % (ref 0–2.5)
CK MB SERPL-MCNC: >540 NG/ML (ref 0.6–6.3)
CK SERPL-CCNC: ABNORMAL U/L (ref 39–308)
CK SERPL-CCNC: ABNORMAL U/L (ref 39–308)
CLARITY UR: ABNORMAL
CO2 SERPL-SCNC: 22 MMOL/L (ref 21–32)
CO2 SERPL-SCNC: 23 MMOL/L (ref 21–32)
CO2 SERPL-SCNC: 25 MMOL/L (ref 21–32)
COCAINE UR QL: POSITIVE
COLOR UR: ABNORMAL
CREAT SERPL-MCNC: 2.91 MG/DL (ref 0.6–1.3)
CREAT SERPL-MCNC: 2.93 MG/DL (ref 0.6–1.3)
CREAT SERPL-MCNC: 3.23 MG/DL (ref 0.6–1.3)
EOSINOPHIL # BLD AUTO: 0 THOUSAND/ΜL (ref 0–0.61)
EOSINOPHIL NFR BLD AUTO: 0 % (ref 0–6)
ERYTHROCYTE [DISTWIDTH] IN BLOOD BY AUTOMATED COUNT: 11.6 % (ref 11.6–15.1)
ETHANOL SERPL-MCNC: <10 MG/DL
FIBRINOGEN PPP-MCNC: 243 MG/DL (ref 227–495)
FINE GRAN CASTS URNS QL MICRO: ABNORMAL /LPF
FLUAV RNA RESP QL NAA+PROBE: NEGATIVE
FLUBV RNA RESP QL NAA+PROBE: NEGATIVE
GFR SERPL CREATININE-BSD FRML MDRD: 20 ML/MIN/1.73SQ M
GFR SERPL CREATININE-BSD FRML MDRD: 23 ML/MIN/1.73SQ M
GFR SERPL CREATININE-BSD FRML MDRD: 23 ML/MIN/1.73SQ M
GLUCOSE SERPL-MCNC: 118 MG/DL (ref 65–140)
GLUCOSE SERPL-MCNC: 119 MG/DL (ref 65–140)
GLUCOSE SERPL-MCNC: 125 MG/DL (ref 65–140)
GLUCOSE SERPL-MCNC: 126 MG/DL (ref 65–140)
GLUCOSE SERPL-MCNC: 131 MG/DL (ref 65–140)
GLUCOSE SERPL-MCNC: 179 MG/DL (ref 65–140)
GLUCOSE SERPL-MCNC: 86 MG/DL (ref 65–140)
GLUCOSE SERPL-MCNC: 87 MG/DL (ref 65–140)
GLUCOSE SERPL-MCNC: 87 MG/DL (ref 65–140)
GLUCOSE UR STRIP-MCNC: NEGATIVE MG/DL
HCO3 BLDA-SCNC: 17 MMOL/L (ref 22–28)
HCO3 BLDA-SCNC: 18.5 MMOL/L (ref 22–28)
HCT VFR BLD AUTO: 51.1 % (ref 36.5–49.3)
HGB BLD-MCNC: 17.7 G/DL (ref 12–17)
HGB UR QL STRIP.AUTO: ABNORMAL
HOROWITZ INDEX BLDA+IHG-RTO: 40 MM[HG]
HOROWITZ INDEX BLDA+IHG-RTO: 50 MM[HG]
IMM GRANULOCYTES # BLD AUTO: 0.17 THOUSAND/UL (ref 0–0.2)
IMM GRANULOCYTES NFR BLD AUTO: 1 % (ref 0–2)
INR PPP: 1.22 (ref 0.84–1.19)
KETONES UR STRIP-MCNC: NEGATIVE MG/DL
LACTATE SERPL-SCNC: 2.6 MMOL/L (ref 0.5–2)
LACTATE SERPL-SCNC: 2.8 MMOL/L (ref 0.5–2)
LACTATE SERPL-SCNC: 3.2 MMOL/L (ref 0.5–2)
LACTATE SERPL-SCNC: 3.3 MMOL/L (ref 0.5–2)
LACTATE SERPL-SCNC: 4.6 MMOL/L (ref 0.5–2)
LEUKOCYTE ESTERASE UR QL STRIP: NEGATIVE
LYMPHOCYTES # BLD AUTO: 0.87 THOUSANDS/ΜL (ref 0.6–4.47)
LYMPHOCYTES NFR BLD AUTO: 4 % (ref 14–44)
MAGNESIUM SERPL-MCNC: 2.8 MG/DL (ref 1.6–2.6)
MCH RBC QN AUTO: 35 PG (ref 26.8–34.3)
MCHC RBC AUTO-ENTMCNC: 34.6 G/DL (ref 31.4–37.4)
MCV RBC AUTO: 101 FL (ref 82–98)
METHADONE UR QL: NEGATIVE
MONOCYTES # BLD AUTO: 1.71 THOUSAND/ΜL (ref 0.17–1.22)
MONOCYTES NFR BLD AUTO: 8 % (ref 4–12)
NEUTROPHILS # BLD AUTO: 19.2 THOUSANDS/ΜL (ref 1.85–7.62)
NEUTS SEG NFR BLD AUTO: 87 % (ref 43–75)
NITRITE UR QL STRIP: NEGATIVE
NON-SQ EPI CELLS URNS QL MICRO: ABNORMAL /HPF
NRBC BLD AUTO-RTO: 0 /100 WBCS
O2 CT BLDA-SCNC: 21.6 ML/DL (ref 16–23)
O2 CT BLDA-SCNC: 21.7 ML/DL (ref 16–23)
OPIATES UR QL SCN: NEGATIVE
OXYCODONE+OXYMORPHONE UR QL SCN: NEGATIVE
OXYHGB MFR BLDA: 96.3 % (ref 94–97)
OXYHGB MFR BLDA: 96.6 % (ref 94–97)
P AXIS: 66 DEGREES
PCO2 BLDA: 30.6 MM HG (ref 36–44)
PCO2 BLDA: 43.5 MM HG (ref 36–44)
PCO2 TEMP ADJ BLDA: 42.4 MM HG (ref 36–44)
PCP UR QL: NEGATIVE
PEEP RESPIRATORY: 6 CM[H2O]
PEEP RESPIRATORY: 6 CM[H2O]
PH BLD: 7.22 [PH] (ref 7.35–7.45)
PH BLDA: 7.21 [PH] (ref 7.35–7.45)
PH BLDA: 7.4 [PH] (ref 7.35–7.45)
PH UR STRIP.AUTO: 6 [PH]
PHOSPHATE SERPL-MCNC: 6.5 MG/DL (ref 2.7–4.5)
PLATELET # BLD AUTO: 213 THOUSANDS/UL (ref 149–390)
PLATELET # BLD AUTO: 258 THOUSANDS/UL (ref 149–390)
PMV BLD AUTO: 10.2 FL (ref 8.9–12.7)
PMV BLD AUTO: 10.5 FL (ref 8.9–12.7)
PO2 BLD: 127.5 MM HG (ref 75–129)
PO2 BLDA: 116 MM HG (ref 75–129)
PO2 BLDA: 131.2 MM HG (ref 75–129)
POTASSIUM SERPL-SCNC: 6.6 MMOL/L (ref 3.5–5.3)
POTASSIUM SERPL-SCNC: 7.3 MMOL/L (ref 3.5–5.3)
POTASSIUM SERPL-SCNC: 8.3 MMOL/L (ref 3.5–5.3)
PR INTERVAL: 142 MS
PROT SERPL-MCNC: 6.3 G/DL (ref 6.4–8.2)
PROT SERPL-MCNC: 7.5 G/DL (ref 6.4–8.4)
PROT UR STRIP-MCNC: ABNORMAL MG/DL
PROTHROMBIN TIME: 14.9 SECONDS (ref 11.6–14.5)
PROTHROMBIN TIME: 14.9 SECONDS (ref 11.6–14.5)
PT 1H NP PPP: 14.2 SEC (ref 12–14.3)
PT IMM NP PPP: 13.4 SECONDS (ref 12–14.3)
QRS AXIS: 81 DEGREES
QRSD INTERVAL: 96 MS
QT INTERVAL: 333 MS
QTC INTERVAL: 443 MS
RBC # BLD AUTO: 5.05 MILLION/UL (ref 3.88–5.62)
RBC #/AREA URNS AUTO: ABNORMAL /HPF
RSV RNA RESP QL NAA+PROBE: NEGATIVE
SALICYLATES SERPL-MCNC: <5 MG/DL (ref 3–20)
SARS-COV-2 RNA RESP QL NAA+PROBE: NEGATIVE
SODIUM SERPL-SCNC: 138 MMOL/L (ref 135–147)
SODIUM SERPL-SCNC: 139 MMOL/L (ref 135–147)
SODIUM SERPL-SCNC: 139 MMOL/L (ref 136–145)
SP GR UR STRIP.AUTO: >=1.03 (ref 1–1.03)
SPECIMEN SOURCE: ABNORMAL
SPECIMEN SOURCE: ABNORMAL
T WAVE AXIS: 48 DEGREES
THC UR QL: POSITIVE
THROMBIN TIME MIXING INCUBATED: 18.5 SECONDS (ref 14.7–18.4)
THROMBIN TIME MIXING ROOM TEMP: 18.3 SECONDS (ref 14.7–18.4)
THROMBIN TIME: 19.1 SECONDS (ref 14.7–18.4)
THROMBIN TIME: 19.1 SECONDS (ref 14.7–18.4)
UROBILINOGEN UR QL STRIP.AUTO: 4 E.U./DL
VENT AC: 14
VENT AC: 24
VENT- AC: AC
VENTRICULAR RATE: 106 BPM
VT SETTING VENT: 500 ML
VT SETTING VENT: 500 ML
WBC # BLD AUTO: 22 THOUSAND/UL (ref 4.31–10.16)
WBC #/AREA URNS AUTO: ABNORMAL /HPF

## 2022-06-10 PROCEDURE — 96361 HYDRATE IV INFUSION ADD-ON: CPT

## 2022-06-10 PROCEDURE — 81003 URINALYSIS AUTO W/O SCOPE: CPT | Performed by: EMERGENCY MEDICINE

## 2022-06-10 PROCEDURE — 85610 PROTHROMBIN TIME: CPT | Performed by: STUDENT IN AN ORGANIZED HEALTH CARE EDUCATION/TRAINING PROGRAM

## 2022-06-10 PROCEDURE — 36415 COLL VENOUS BLD VENIPUNCTURE: CPT | Performed by: EMERGENCY MEDICINE

## 2022-06-10 PROCEDURE — 84484 ASSAY OF TROPONIN QUANT: CPT | Performed by: STUDENT IN AN ORGANIZED HEALTH CARE EDUCATION/TRAINING PROGRAM

## 2022-06-10 PROCEDURE — 96360 HYDRATION IV INFUSION INIT: CPT

## 2022-06-10 PROCEDURE — 96365 THER/PROPH/DIAG IV INF INIT: CPT

## 2022-06-10 PROCEDURE — 36556 INSERT NON-TUNNEL CV CATH: CPT | Performed by: EMERGENCY MEDICINE

## 2022-06-10 PROCEDURE — 84484 ASSAY OF TROPONIN QUANT: CPT | Performed by: EMERGENCY MEDICINE

## 2022-06-10 PROCEDURE — 87040 BLOOD CULTURE FOR BACTERIA: CPT | Performed by: EMERGENCY MEDICINE

## 2022-06-10 PROCEDURE — 82140 ASSAY OF AMMONIA: CPT | Performed by: EMERGENCY MEDICINE

## 2022-06-10 PROCEDURE — 83605 ASSAY OF LACTIC ACID: CPT | Performed by: STUDENT IN AN ORGANIZED HEALTH CARE EDUCATION/TRAINING PROGRAM

## 2022-06-10 PROCEDURE — 96375 TX/PRO/DX INJ NEW DRUG ADDON: CPT

## 2022-06-10 PROCEDURE — 80053 COMPREHEN METABOLIC PANEL: CPT | Performed by: EMERGENCY MEDICINE

## 2022-06-10 PROCEDURE — NC001 PR NO CHARGE: Performed by: EMERGENCY MEDICINE

## 2022-06-10 PROCEDURE — 5A1945Z RESPIRATORY VENTILATION, 24-96 CONSECUTIVE HOURS: ICD-10-PCS | Performed by: HOSPITALIST

## 2022-06-10 PROCEDURE — 80307 DRUG TEST PRSMV CHEM ANLYZR: CPT | Performed by: EMERGENCY MEDICINE

## 2022-06-10 PROCEDURE — 82948 REAGENT STRIP/BLOOD GLUCOSE: CPT

## 2022-06-10 PROCEDURE — 80048 BASIC METABOLIC PNL TOTAL CA: CPT | Performed by: EMERGENCY MEDICINE

## 2022-06-10 PROCEDURE — 80179 DRUG ASSAY SALICYLATE: CPT | Performed by: EMERGENCY MEDICINE

## 2022-06-10 PROCEDURE — 83735 ASSAY OF MAGNESIUM: CPT | Performed by: STUDENT IN AN ORGANIZED HEALTH CARE EDUCATION/TRAINING PROGRAM

## 2022-06-10 PROCEDURE — 94644 CONT INHLJ TX 1ST HOUR: CPT

## 2022-06-10 PROCEDURE — 87040 BLOOD CULTURE FOR BACTERIA: CPT | Performed by: STUDENT IN AN ORGANIZED HEALTH CARE EDUCATION/TRAINING PROGRAM

## 2022-06-10 PROCEDURE — 36600 WITHDRAWAL OF ARTERIAL BLOOD: CPT

## 2022-06-10 PROCEDURE — 83605 ASSAY OF LACTIC ACID: CPT | Performed by: EMERGENCY MEDICINE

## 2022-06-10 PROCEDURE — 85025 COMPLETE CBC W/AUTO DIFF WBC: CPT | Performed by: EMERGENCY MEDICINE

## 2022-06-10 PROCEDURE — 85384 FIBRINOGEN ACTIVITY: CPT | Performed by: STUDENT IN AN ORGANIZED HEALTH CARE EDUCATION/TRAINING PROGRAM

## 2022-06-10 PROCEDURE — 85611 PROTHROMBIN TEST: CPT | Performed by: STUDENT IN AN ORGANIZED HEALTH CARE EDUCATION/TRAINING PROGRAM

## 2022-06-10 PROCEDURE — 81001 URINALYSIS AUTO W/SCOPE: CPT | Performed by: EMERGENCY MEDICINE

## 2022-06-10 PROCEDURE — 85670 THROMBIN TIME PLASMA: CPT | Performed by: STUDENT IN AN ORGANIZED HEALTH CARE EDUCATION/TRAINING PROGRAM

## 2022-06-10 PROCEDURE — 93005 ELECTROCARDIOGRAM TRACING: CPT

## 2022-06-10 PROCEDURE — 84100 ASSAY OF PHOSPHORUS: CPT | Performed by: STUDENT IN AN ORGANIZED HEALTH CARE EDUCATION/TRAINING PROGRAM

## 2022-06-10 PROCEDURE — 0241U HB NFCT DS VIR RESP RNA 4 TRGT: CPT | Performed by: EMERGENCY MEDICINE

## 2022-06-10 PROCEDURE — 85732 THROMBOPLASTIN TIME PARTIAL: CPT | Performed by: STUDENT IN AN ORGANIZED HEALTH CARE EDUCATION/TRAINING PROGRAM

## 2022-06-10 PROCEDURE — 85049 AUTOMATED PLATELET COUNT: CPT | Performed by: STUDENT IN AN ORGANIZED HEALTH CARE EDUCATION/TRAINING PROGRAM

## 2022-06-10 PROCEDURE — 99291 CRITICAL CARE FIRST HOUR: CPT | Performed by: EMERGENCY MEDICINE

## 2022-06-10 PROCEDURE — 82550 ASSAY OF CK (CPK): CPT | Performed by: EMERGENCY MEDICINE

## 2022-06-10 PROCEDURE — 93010 ELECTROCARDIOGRAM REPORT: CPT | Performed by: INTERNAL MEDICINE

## 2022-06-10 PROCEDURE — 94002 VENT MGMT INPAT INIT DAY: CPT

## 2022-06-10 PROCEDURE — 96376 TX/PRO/DX INJ SAME DRUG ADON: CPT

## 2022-06-10 PROCEDURE — 71250 CT THORAX DX C-: CPT

## 2022-06-10 PROCEDURE — 94760 N-INVAS EAR/PLS OXIMETRY 1: CPT

## 2022-06-10 PROCEDURE — 99291 CRITICAL CARE FIRST HOUR: CPT

## 2022-06-10 PROCEDURE — 82805 BLOOD GASES W/O2 SATURATION: CPT | Performed by: STUDENT IN AN ORGANIZED HEALTH CARE EDUCATION/TRAINING PROGRAM

## 2022-06-10 PROCEDURE — C9113 INJ PANTOPRAZOLE SODIUM, VIA: HCPCS | Performed by: STUDENT IN AN ORGANIZED HEALTH CARE EDUCATION/TRAINING PROGRAM

## 2022-06-10 PROCEDURE — 70450 CT HEAD/BRAIN W/O DYE: CPT

## 2022-06-10 PROCEDURE — 82553 CREATINE MB FRACTION: CPT | Performed by: EMERGENCY MEDICINE

## 2022-06-10 PROCEDURE — 83605 ASSAY OF LACTIC ACID: CPT | Performed by: PHYSICIAN ASSISTANT

## 2022-06-10 PROCEDURE — G1004 CDSM NDSC: HCPCS

## 2022-06-10 PROCEDURE — 31500 INSERT EMERGENCY AIRWAY: CPT | Performed by: EMERGENCY MEDICINE

## 2022-06-10 PROCEDURE — 82553 CREATINE MB FRACTION: CPT | Performed by: STUDENT IN AN ORGANIZED HEALTH CARE EDUCATION/TRAINING PROGRAM

## 2022-06-10 PROCEDURE — 82805 BLOOD GASES W/O2 SATURATION: CPT | Performed by: EMERGENCY MEDICINE

## 2022-06-10 PROCEDURE — 82330 ASSAY OF CALCIUM: CPT | Performed by: STUDENT IN AN ORGANIZED HEALTH CARE EDUCATION/TRAINING PROGRAM

## 2022-06-10 PROCEDURE — 85027 COMPLETE CBC AUTOMATED: CPT | Performed by: STUDENT IN AN ORGANIZED HEALTH CARE EDUCATION/TRAINING PROGRAM

## 2022-06-10 PROCEDURE — 99292 CRITICAL CARE ADDL 30 MIN: CPT | Performed by: EMERGENCY MEDICINE

## 2022-06-10 PROCEDURE — 96368 THER/DIAG CONCURRENT INF: CPT

## 2022-06-10 PROCEDURE — 82077 ASSAY SPEC XCP UR&BREATH IA: CPT | Performed by: EMERGENCY MEDICINE

## 2022-06-10 PROCEDURE — 94664 DEMO&/EVAL PT USE INHALER: CPT

## 2022-06-10 PROCEDURE — 82550 ASSAY OF CK (CPK): CPT | Performed by: STUDENT IN AN ORGANIZED HEALTH CARE EDUCATION/TRAINING PROGRAM

## 2022-06-10 PROCEDURE — 72125 CT NECK SPINE W/O DYE: CPT

## 2022-06-10 PROCEDURE — 85730 THROMBOPLASTIN TIME PARTIAL: CPT | Performed by: STUDENT IN AN ORGANIZED HEALTH CARE EDUCATION/TRAINING PROGRAM

## 2022-06-10 PROCEDURE — 80143 DRUG ASSAY ACETAMINOPHEN: CPT | Performed by: EMERGENCY MEDICINE

## 2022-06-10 PROCEDURE — 80053 COMPREHEN METABOLIC PANEL: CPT | Performed by: STUDENT IN AN ORGANIZED HEALTH CARE EDUCATION/TRAINING PROGRAM

## 2022-06-10 PROCEDURE — 0BH17EZ INSERTION OF ENDOTRACHEAL AIRWAY INTO TRACHEA, VIA NATURAL OR ARTIFICIAL OPENING: ICD-10-PCS | Performed by: HOSPITALIST

## 2022-06-10 PROCEDURE — 74176 CT ABD & PELVIS W/O CONTRAST: CPT

## 2022-06-10 RX ORDER — CALCIUM GLUCONATE 20 MG/ML
1 INJECTION, SOLUTION INTRAVENOUS ONCE
Status: COMPLETED | OUTPATIENT
Start: 2022-06-10 | End: 2022-06-10

## 2022-06-10 RX ORDER — CALCIUM GLUCONATE 20 MG/ML
INJECTION, SOLUTION INTRAVENOUS
Status: COMPLETED
Start: 2022-06-10 | End: 2022-06-10

## 2022-06-10 RX ORDER — SODIUM CHLORIDE 9 MG/ML
125 INJECTION, SOLUTION INTRAVENOUS CONTINUOUS
Status: DISCONTINUED | OUTPATIENT
Start: 2022-06-10 | End: 2022-06-10 | Stop reason: HOSPADM

## 2022-06-10 RX ORDER — DEXTROSE MONOHYDRATE 25 G/50ML
25 INJECTION, SOLUTION INTRAVENOUS ONCE
Status: COMPLETED | OUTPATIENT
Start: 2022-06-10 | End: 2022-06-10

## 2022-06-10 RX ORDER — PROPOFOL 10 MG/ML
5-50 INJECTION, EMULSION INTRAVENOUS
Status: DISCONTINUED | OUTPATIENT
Start: 2022-06-10 | End: 2022-06-10 | Stop reason: HOSPADM

## 2022-06-10 RX ORDER — FENTANYL CITRATE 50 UG/ML
50 INJECTION, SOLUTION INTRAMUSCULAR; INTRAVENOUS EVERY 4 HOURS PRN
Status: DISCONTINUED | OUTPATIENT
Start: 2022-06-10 | End: 2022-06-11

## 2022-06-10 RX ORDER — FENTANYL CITRATE 50 UG/ML
100 INJECTION, SOLUTION INTRAMUSCULAR; INTRAVENOUS ONCE
Status: COMPLETED | OUTPATIENT
Start: 2022-06-10 | End: 2022-06-10

## 2022-06-10 RX ORDER — SODIUM CHLORIDE, SODIUM GLUCONATE, SODIUM ACETATE, POTASSIUM CHLORIDE, MAGNESIUM CHLORIDE, SODIUM PHOSPHATE, DIBASIC, AND POTASSIUM PHOSPHATE .53; .5; .37; .037; .03; .012; .00082 G/100ML; G/100ML; G/100ML; G/100ML; G/100ML; G/100ML; G/100ML
2000 INJECTION, SOLUTION INTRAVENOUS ONCE
Status: COMPLETED | OUTPATIENT
Start: 2022-06-10 | End: 2022-06-10

## 2022-06-10 RX ORDER — SODIUM CHLORIDE, SODIUM GLUCONATE, SODIUM ACETATE, POTASSIUM CHLORIDE, MAGNESIUM CHLORIDE, SODIUM PHOSPHATE, DIBASIC, AND POTASSIUM PHOSPHATE .53; .5; .37; .037; .03; .012; .00082 G/100ML; G/100ML; G/100ML; G/100ML; G/100ML; G/100ML; G/100ML
150 INJECTION, SOLUTION INTRAVENOUS CONTINUOUS
Status: DISCONTINUED | OUTPATIENT
Start: 2022-06-10 | End: 2022-06-10

## 2022-06-10 RX ORDER — SODIUM CHLORIDE, SODIUM GLUCONATE, SODIUM ACETATE, POTASSIUM CHLORIDE, MAGNESIUM CHLORIDE, SODIUM PHOSPHATE, DIBASIC, AND POTASSIUM PHOSPHATE .53; .5; .37; .037; .03; .012; .00082 G/100ML; G/100ML; G/100ML; G/100ML; G/100ML; G/100ML; G/100ML
1000 INJECTION, SOLUTION INTRAVENOUS ONCE
Status: COMPLETED | OUTPATIENT
Start: 2022-06-10 | End: 2022-06-10

## 2022-06-10 RX ORDER — PANTOPRAZOLE SODIUM 40 MG/1
40 INJECTION, POWDER, FOR SOLUTION INTRAVENOUS
Status: DISCONTINUED | OUTPATIENT
Start: 2022-06-10 | End: 2022-06-13

## 2022-06-10 RX ORDER — CALCIUM GLUCONATE 20 MG/ML
1 INJECTION, SOLUTION INTRAVENOUS ONCE
Status: COMPLETED | OUTPATIENT
Start: 2022-06-10 | End: 2022-06-11

## 2022-06-10 RX ORDER — ACETAMINOPHEN 325 MG/1
650 TABLET ORAL EVERY 6 HOURS PRN
Status: DISCONTINUED | OUTPATIENT
Start: 2022-06-10 | End: 2022-06-11

## 2022-06-10 RX ORDER — PROPOFOL 10 MG/ML
5-50 INJECTION, EMULSION INTRAVENOUS
Status: DISCONTINUED | OUTPATIENT
Start: 2022-06-10 | End: 2022-06-11

## 2022-06-10 RX ORDER — DEXTROSE MONOHYDRATE 25 G/50ML
50 INJECTION, SOLUTION INTRAVENOUS ONCE
Status: COMPLETED | OUTPATIENT
Start: 2022-06-10 | End: 2022-06-10

## 2022-06-10 RX ORDER — ROCURONIUM BROMIDE 10 MG/ML
1 INJECTION, SOLUTION INTRAVENOUS ONCE
Status: COMPLETED | OUTPATIENT
Start: 2022-06-10 | End: 2022-06-10

## 2022-06-10 RX ORDER — DEXTROSE MONOHYDRATE 25 G/50ML
25 INJECTION, SOLUTION INTRAVENOUS ONCE
Status: DISCONTINUED | OUTPATIENT
Start: 2022-06-10 | End: 2022-06-10

## 2022-06-10 RX ORDER — FENTANYL CITRATE 50 UG/ML
INJECTION, SOLUTION INTRAMUSCULAR; INTRAVENOUS
Status: COMPLETED
Start: 2022-06-10 | End: 2022-06-10

## 2022-06-10 RX ORDER — ETOMIDATE 2 MG/ML
25 INJECTION INTRAVENOUS ONCE
Status: COMPLETED | OUTPATIENT
Start: 2022-06-10 | End: 2022-06-10

## 2022-06-10 RX ADMIN — NOREPINEPHRINE BITARTRATE 6 MCG/MIN: 1 INJECTION, SOLUTION, CONCENTRATE INTRAVENOUS at 19:30

## 2022-06-10 RX ADMIN — SODIUM CHLORIDE 1000 ML: 0.9 INJECTION, SOLUTION INTRAVENOUS at 10:28

## 2022-06-10 RX ADMIN — SODIUM CHLORIDE 125 ML/HR: 0.9 INJECTION, SOLUTION INTRAVENOUS at 14:37

## 2022-06-10 RX ADMIN — PANTOPRAZOLE SODIUM 40 MG: 40 INJECTION, POWDER, FOR SOLUTION INTRAVENOUS at 22:01

## 2022-06-10 RX ADMIN — PROPOFOL 20 MCG/KG/MIN: 10 INJECTION, EMULSION INTRAVENOUS at 16:07

## 2022-06-10 RX ADMIN — FENTANYL CITRATE 100 MCG: 50 INJECTION INTRAMUSCULAR; INTRAVENOUS at 21:00

## 2022-06-10 RX ADMIN — INSULIN HUMAN 10 UNITS: 100 INJECTION, SOLUTION PARENTERAL at 23:00

## 2022-06-10 RX ADMIN — CALCIUM GLUCONATE 1 G: 20 INJECTION, SOLUTION INTRAVENOUS at 21:00

## 2022-06-10 RX ADMIN — DEXTROSE MONOHYDRATE 50 ML: 25 INJECTION, SOLUTION INTRAVENOUS at 11:19

## 2022-06-10 RX ADMIN — SODIUM CHLORIDE, SODIUM GLUCONATE, SODIUM ACETATE, POTASSIUM CHLORIDE, MAGNESIUM CHLORIDE, SODIUM PHOSPHATE, DIBASIC, AND POTASSIUM PHOSPHATE 2000 ML: .53; .5; .37; .037; .03; .012; .00082 INJECTION, SOLUTION INTRAVENOUS at 11:32

## 2022-06-10 RX ADMIN — PROPOFOL 30 MCG/KG/MIN: 10 INJECTION, EMULSION INTRAVENOUS at 22:59

## 2022-06-10 RX ADMIN — SODIUM BICARBONATE 50 MEQ: 84 INJECTION, SOLUTION INTRAVENOUS at 21:00

## 2022-06-10 RX ADMIN — ETOMIDATE 25 MG: 20 INJECTION, SOLUTION INTRAVENOUS at 11:57

## 2022-06-10 RX ADMIN — FENTANYL CITRATE 100 MCG: 50 INJECTION, SOLUTION INTRAMUSCULAR; INTRAVENOUS at 14:04

## 2022-06-10 RX ADMIN — PIPERACILLIN AND TAZOBACTAM 3.38 G: 36; 4.5 INJECTION, POWDER, FOR SOLUTION INTRAVENOUS at 21:57

## 2022-06-10 RX ADMIN — CALCIUM GLUCONATE 1 G: 20 INJECTION, SOLUTION INTRAVENOUS at 12:19

## 2022-06-10 RX ADMIN — CALCIUM GLUCONATE 1 G: 20 INJECTION, SOLUTION INTRAVENOUS at 11:22

## 2022-06-10 RX ADMIN — SODIUM CHLORIDE, SODIUM GLUCONATE, SODIUM ACETATE, POTASSIUM CHLORIDE, MAGNESIUM CHLORIDE, SODIUM PHOSPHATE, DIBASIC, AND POTASSIUM PHOSPHATE 1000 ML: .53; .5; .37; .037; .03; .012; .00082 INJECTION, SOLUTION INTRAVENOUS at 17:06

## 2022-06-10 RX ADMIN — PIPERACILLIN AND TAZOBACTAM 3.38 G: 3; .375 INJECTION, POWDER, FOR SOLUTION INTRAVENOUS at 12:10

## 2022-06-10 RX ADMIN — SODIUM CHLORIDE, SODIUM GLUCONATE, SODIUM ACETATE, POTASSIUM CHLORIDE, MAGNESIUM CHLORIDE, SODIUM PHOSPHATE, DIBASIC, AND POTASSIUM PHOSPHATE 150 ML/HR: .53; .5; .37; .037; .03; .012; .00082 INJECTION, SOLUTION INTRAVENOUS at 21:00

## 2022-06-10 RX ADMIN — PROPOFOL 10 MCG/KG/MIN: 10 INJECTION, EMULSION INTRAVENOUS at 15:31

## 2022-06-10 RX ADMIN — CALCIUM GLUCONATE 1 G: 20 INJECTION, SOLUTION INTRAVENOUS at 23:00

## 2022-06-10 RX ADMIN — ACETAMINOPHEN 650 MG: 325 TABLET ORAL at 23:51

## 2022-06-10 RX ADMIN — ALBUTEROL SULFATE 10 MG: 2.5 SOLUTION RESPIRATORY (INHALATION) at 11:22

## 2022-06-10 RX ADMIN — DEXTROSE MONOHYDRATE 25 ML: 25 INJECTION, SOLUTION INTRAVENOUS at 22:59

## 2022-06-10 RX ADMIN — ROCURONIUM BROMIDE 80 MG: 50 INJECTION, SOLUTION INTRAVENOUS at 11:57

## 2022-06-10 RX ADMIN — FENTANYL CITRATE 100 MCG: 50 INJECTION, SOLUTION INTRAMUSCULAR; INTRAVENOUS at 21:00

## 2022-06-10 RX ADMIN — FENTANYL CITRATE 100 MCG: 50 INJECTION, SOLUTION INTRAMUSCULAR; INTRAVENOUS at 14:42

## 2022-06-10 RX ADMIN — INSULIN HUMAN 10 UNITS: 100 INJECTION, SOLUTION PARENTERAL at 11:19

## 2022-06-10 RX ADMIN — Medication 50 MEQ: at 21:00

## 2022-06-10 NOTE — H&P
H&P Exam - Critical Care   Ranjeet Barrios 46 y o  male MRN: [id number]  Unit/Bed#: [facility] 04 Encounter: [id number]      -------------------------------------------------------------------------------------------------------------  Chief Complaint: Hyperkalemia and Non-Traumatic Collapse     History of Present Illness   HX and PE limited by: Sedated and intubated ; hx obtained from sign out / EMS, Family and chart review     Ranjeet Barrios is a 46 y o  male   · with PMH remarkable for: IV drug use, hepatitis C, PTSD and nicotine smoking   · had witnessed collapse while with a friend, deemed shaky and the friend noted that there was some blood unclear source if hemoptysis/hematemisis or other, then patient collapsed and EMS was called who took him to the ED  · found to have hyperkalemic K 8 3, troponin 485 > 974, LA 4 6, ABG 7 2, 43 5, 131, 17 0 & U cocaine, Meth and TCH +,  · was intubated in ED for airway protection, given Ca Gluconate, Insulin, Dextrose, 2 L Isolyte + 1 L NS +  cc/hr started, BP remained low, was started on Levo, and brought from ED to MICU  · On arrival to the MICU, sedated, intubated, ABG improved, LA trending down 2 6, HS Trop trending up, bedside echo and chest xray both unremarkable, on levo 6, propofol 20,  cc/hr, volume control 24/500/6/40, followed commands on holding propofol during exam    · Daughter who is next of kin, son and his girlfriend (do not live together) came into bedside, confirmed full code, consented for PICC, A line, dialysis,+/- transfusion if needed  · Family reported patient noted not feeling well x 1-2 weeks, "some" nausea and vomiting; works as  and carry "tires" outdoor with extensive sweating/dehydration reported, confirmed drug abuse including iv injection, no allergy (tylenol was in error reported as allergy in chart), updated and agreeable      History obtained from chart review, the patient and unobtainable from patient due to mental status   -------------------------------------------------------------------------------------------------------------  Assessment and Plan:    Neuro:    Diagnosis: non-traumatic collapse / unresponsiveness   o Likely due to cocaine & Meth intoxication, possible arrhythmia, hyperkalemia, and questionable if had seizure vs tremors as reported by the witness friend prior to collapsing  o PLAN: sedated and intubated for airway protection ; on Propofol; PRN Fentanyl and Tylenol    o BPS score 3, no pain   Diagnosis: hx PTSD   o Plan: not on psych medication at home, per family  o Will continue to monitor  CV:    Diagnosis: Elevated Troponin   o Plan: likely type II supply/demand due to cocaine use and suspected arrhythmia   o EKG, initially with peaked T waves; sinus tachy 100 BPM with PVCs,   o Will continue to trend troponin per protocol, with repeate EKG  o Bedside echo done overall unremarkable; Xray chest unremarkable  o Complete Echo ordered  o Cardiology consulted   Diagnosis: r/o cardiogenic shock   o Plan: BP running low requiring levophed in ED & MICU   o Wean down as appropriate ; was 9 >> 6 mcg/min  o IV fluids : s/p 3 L boluses and following with infusion ; in/out    o ECHO ordered   o Cardiology consulted  o Close BP monitoring; on tele  Pulm:   Diagnosis: failure to protect Airway d/t AMS   o Plan: intubated ; on volume control 24/500/6/40  o Repeate ABG improved; Xray chest unremarkable, tube in place; CTAB  GI:    Diagnosis: Nausea and vomiting  o Reported per family as recent symptoms over past 1-2 weeks  o Differential include but not limited to GI infection, d/t drug abuse; electrolyte abnormality, GERD     o The friend witnessed the collapse noted blood at site of unclear source, GI!   o Currently intubated; OG tube in place  o Prophylactic IV Protonix 40 mg QD     :    Diagnosis: Acute Kidney Injury +/- Rhabdomyolysis (d/t reported tremors & collapse on floor)     JOÃO likely multifactorial; cardiorenal in context of low BP/arrhythmia, drug abuse/intoxication; baseline Cr 0 9-1 0; Cr on admission 3 23 with BUN 33 ; torres in, producing urine, dark yellow;    R/o UTI / urosepsis; UA + blood, protein and innumerable bacteria; nitrite and leukocytes negative; leukocytosis and low BP requiring levo  o Plan: IV fluids ; 3 L boluses + 150 cc/hr after; avoid hypotension/ BP fluctuation; on Levophed    o Avoid nephrotoxic agents as possible    o In / Out ; torres in    o Was started on Zosyn in ED ; Blood cultures & MRSA swab collected and sent in; will continue Zosyn + Vancomycin  o Recheck BMP and CBC , continue monitor vitals  F/E/N:    Diagnosis: Hyperkalemia   o Plan: s/p dextrose , insulin and Ca gluconate  o Repeate BMP and manage as indicated   Donell Hernandes Diagnosis:Hypocalcemia    o Plan: s/p Ca Gluconate    Diagnosis: Elevated Anion Gap Acidosis; AG 15; HCO3 25; with Delta Delta gradient 7 ; consistent with Metabolic acidosis w pre-exist elevated bicarb   o Plan: BMP q6H ; Sodium Bicarb   Diagnosis:Hypocalcemia    o Plan: s/p Calcium Gluconate; recheck and manage     Heme/Onc:    Diagnosis: Leukocytosis  o Likely reactive vs infection ; see ID   o Will recheck CBC       Endo:    Diagnosis: no active issue; glucose on admission 87 and repeate WNL ; will check TSH     ID:    Diagnosis: r/o septic shock ; hx IV drug use, leukocytosis and low BP requiring levo   UA innumerable bacteria but negative nitrite and leukocytes   o Plan: repeate blood cultures collected and sent in; MRSA swab in process   o Was given 1 dose Zosyn in ED; will continue Zosyn and Vancomycin for now   o Trend CBC and monitor vitals     MSK/Skin:   Diagnosis: no acute issue ; frequent turn and reposition     Disposition: Admit to Critical Care   Code Status: No Order  --------------------------------------------------------------------------------------------------------------  Review of Systems    Review of systems was unable to be performed secondary to sedated and intubated; obtained from family; see HPI    Physical Exam  Constitutional:       Comments: Sedated and intubated ; BPS 3 , no pain    HENT:      Head: Normocephalic and atraumatic  Comments: Long beared       Right Ear: External ear normal       Left Ear: External ear normal       Nose: Nose normal       Mouth/Throat:      Mouth: Mucous membranes are dry  Eyes:      General: No scleral icterus  Conjunctiva/sclera: Conjunctivae normal       Pupils: Pupils are equal, round, and reactive to light  Cardiovascular:      Rate and Rhythm: Regular rhythm  Tachycardia present  Pulses: Normal pulses  Heart sounds: No murmur heard  No gallop  Comments: Mild sinus tachy ; feet felt cold on arrival   Pulmonary:      Breath sounds: No stridor  No wheezing  Comments: Sedated and intubated; CTAB   Abdominal:      General: Abdomen is flat  Bowel sounds are normal  There is no distension  Palpations: Abdomen is soft  Musculoskeletal:         General: No swelling  Right lower leg: No edema  Left lower leg: No edema  Skin:     Capillary Refill: Capillary refill takes less than 2 seconds  Neurological:      Comments: Sedated; was able to follow simple commands on weaning down the sedation ; moved all 4 extremities        --------------------------------------------------------------------------------------------------------------  Vitals: There were no vitals filed for this visit    Temp  Min: 96 3 °F (35 7 °C)  Max: 99 1 °F (37 3 °C)        BMI 27 35    Laboratory and Diagnostics:  Results from last 7 days   Lab Units 06/10/22  1027   WBC Thousand/uL 22 00*   HEMOGLOBIN g/dL 17 7*   HEMATOCRIT % 51 1*   PLATELETS Thousands/uL 258   NEUTROS PCT % 87*   MONOS PCT % 8     Results from last 7 days   Lab Units 06/10/22  1559 06/10/22  1027   SODIUM mmol/L 139 138   POTASSIUM mmol/L 6 6* 8 3* CHLORIDE mmol/L 104 98   CO2 mmol/L 22 25   ANION GAP mmol/L 13 15*   BUN mg/dL 37* 33*   CREATININE mg/dL 2 93* 3 23*   CALCIUM mg/dL 7 3* 8 3*   GLUCOSE RANDOM mg/dL 125 86   ALT U/L  --  195*   AST U/L  --  350*   ALK PHOS U/L  --  82   ALBUMIN g/dL  --  4 5   TOTAL BILIRUBIN mg/dL  --  0 85                   Results from last 7 days   Lab Units 06/10/22  1700 06/10/22  1357 06/10/22  1113   LACTIC ACID mmol/L 2 8* 3 3* 4 6*     ABG:  Results from last 7 days   Lab Units 06/10/22  1229   PH ART  7 211*   PCO2 ART mm Hg 43 5   PO2 ART mm Hg 131 2*   HCO3 ART mmol/L 17 0*   BASE EXC ART mmol/L -10 5   ABG SOURCE  Radial, Right     VBG:  Results from last 7 days   Lab Units 06/10/22  1229   ABG SOURCE  Radial, Right           Micro:  Results from last 7 days   Lab Units 06/10/22  1113   BLOOD CULTURE  Received in Microbiology Lab  Culture in Progress  Received in Microbiology Lab  Culture in Progress  EKG: EKG, initially with peaked T waves; sinus tachy 100 BPM with PVCs,  Imaging: I have personally reviewed pertinent reports  Historical Information   Past Medical History:   Diagnosis Date    Hepatitis C     PTSD (post-traumatic stress disorder)      Past Surgical History:   Procedure Laterality Date    APPENDECTOMY       Social History   Social History     Substance and Sexual Activity   Alcohol Use No     Social History     Substance and Sexual Activity   Drug Use Yes    Types: Marijuana    Comment: smokes every other day     Social History     Tobacco Use   Smoking Status Current Every Day Smoker    Packs/day: 0 50    Years: 25 00    Pack years: 12 50    Types: Cigarettes   Smokeless Tobacco Never Used     Exercise History: N/A  Family History:   No family history on file  I have reviewed this patient's family history and commented on sigificant items within the HPI      Medications:  No current facility-administered medications for this encounter       Facility-Administered Medications Ordered in Other Encounters   Medication Dose Route Frequency    propofol (DIPRIVAN) 1000 mg in 100 mL infusion (premix)  5-50 mcg/kg/min Intravenous Titrated    sodium chloride 0 9 % infusion  125 mL/hr Intravenous Continuous     Home medications:  Cannot display prior to admission medications because the patient has not been admitted in this contact  Allergies: Allergies   Allergen Reactions    Acetaminophen      ------------------------------------------------------------------------------------------------------------  Advance Directive and Living Will:      Power of :    POLST:    ------------------------------------------------------------------------------------------------------------  Anticipated Length of Stay is > 2 midnights    Care Time Delivered:   No Critical Care time spent     Dock DO Shawn   Internal [address] record may have been created with voice recognition software  Occasional wrong word or "sound a like" substitutions may have occurred due to the inherent limitations of voice recognition software    Read the chart carefully and recognize, using context, where substitutions have occurred

## 2022-06-10 NOTE — ED PROVIDER NOTES
History  Chief Complaint   Patient presents with    Altered Mental Status     Pt presents to ED via EMS after pt had witnessed fall, now AMS  This is a 56-year-old male brought in by EMS for altered mental status  As per EMS the patient was talking with a friend at a table and then became unresponsive and fell to the ground  As per EMS his blood sugar was 77  They state he responds to verbal stimuli  History review of systems are limited due to patient's mental status  Prior to Admission Medications   Prescriptions Last Dose Informant Patient Reported? Taking?   naproxen (NAPROSYN) 500 mg tablet   No No   Sig: Take 1 tablet (500 mg total) by mouth 2 (two) times a day as needed for mild pain      Facility-Administered Medications: None       Past Medical History:   Diagnosis Date    Hepatitis C     PTSD (post-traumatic stress disorder)        Past Surgical History:   Procedure Laterality Date    APPENDECTOMY         No family history on file  I have reviewed and agree with the history as documented      E-Cigarette/Vaping    E-Cigarette Use Never User      E-Cigarette/Vaping Substances     Social History     Tobacco Use    Smoking status: Current Every Day Smoker     Packs/day: 0 50     Years: 25 00     Pack years: 12 50     Types: Cigarettes    Smokeless tobacco: Never Used   Vaping Use    Vaping Use: Never used   Substance Use Topics    Alcohol use: No    Drug use: Yes     Types: Marijuana     Comment: smokes every other day       Review of Systems   Unable to perform ROS: Mental status change       Physical Exam  Physical Exam  Constitutional:  Vital signs reviewed, response to verbal stimuli  Eyes: Pupils equal round reactive to light and accommodation, 3 mm,   HEENT: trachea midline, no JVD, dry mucous membranes, small amount of blood on the lateral aspect of the tongue  Respiratory: lung sounds clear throughout, no rhonchi, no rales  Cardiovascular:  Tachycardic rate, regular rhythm, no murmurs or rubs  Abdomen: soft, nontender, mild right upper quadrant tenderness, no rebound or guarding  Back: no CVA tenderness, normal inspection  Extremities: no edema, pulses equal in all 4 extremities  Neuro:  Responsive to verbal stimuli, no focal weakness, able to move all extremities  Skin: warm, dry, intact, no rashes noted    Vital Signs  ED Triage Vitals   Temperature Pulse Respirations Blood Pressure SpO2   06/10/22 1019 06/10/22 1014 06/10/22 1014 06/10/22 1020 06/10/22 1014   (!) 96 3 °F (35 7 °C) 102 15 152/93 100 %      Temp Source Heart Rate Source Patient Position - Orthostatic VS BP Location FiO2 (%)   06/10/22 1019 06/10/22 1203 06/10/22 1203 06/10/22 1203 --   Axillary Monitor Lying Left arm       Pain Score       --                  Vitals:    06/10/22 1439 06/10/22 1540 06/10/22 1620 06/10/22 1750   BP: 107/77 114/72 106/65 103/67   Pulse: (!) 108 105 104 101   Patient Position - Orthostatic VS: Lying Lying Lying Lying         Visual Acuity      ED Medications  Medications   sodium chloride 0 9 % infusion (125 mL/hr Intravenous New Bag 6/10/22 1437)   propofol (DIPRIVAN) 1000 mg in 100 mL infusion (premix) (20 mcg/kg/min × 79 7 kg Intravenous New Bag 6/10/22 1607)   sodium chloride 0 9 % bolus 1,000 mL (0 mL Intravenous Stopped 6/10/22 1131)   insulin regular (HumuLIN R,NovoLIN R) injection 10 Units (10 Units Intravenous Given 6/10/22 1119)   albuterol inhalation solution 10 mg (10 mg Nebulization Given 6/10/22 1122)   calcium gluconate 1 g in sodium chloride 0 9% 50 mL (premix) (0 g Intravenous Stopped 6/10/22 1141)   dextrose 50 % IV solution 50 mL (50 mL Intravenous Given 6/10/22 1119)   piperacillin-tazobactam (ZOSYN) IVPB 3 375 g (0 g Intravenous Stopped 6/10/22 1258)   multi-electrolyte (ISOLYTE-S PH 7 4) bolus 2,000 mL (0 mL Intravenous Stopped 6/10/22 1345)   etomidate (AMIDATE) 2 mg/mL injection 25 mg (25 mg Intravenous Given 6/10/22 1157)   ROCuronium (ZEMURON) injection 80 mg (80 mg Intravenous Given 6/10/22 1157)   calcium gluconate 1 g in sodium chloride 0 9% 50 mL (premix) (0 g Intravenous Stopped 6/10/22 1320)   fentanyl citrate (PF) 100 MCG/2ML 100 mcg (100 mcg Intravenous Given 6/10/22 1404)   fentanyl citrate (PF) 100 MCG/2ML 100 mcg (100 mcg Intravenous Given 6/10/22 1442)   multi-electrolyte (ISOLYTE-S PH 7 4) bolus 1,000 mL (0 mL Intravenous Stopped 6/10/22 1800)       Diagnostic Studies  Results Reviewed     Procedure Component Value Units Date/Time    HS Troponin I 2hr [[id number]]     Lab Status: No result Specimen: Blood     HS Troponin 0hr (reflex protocol) [[id number]]  (Abnormal) Collected: 06/10/22 1659    Lab Status: Final result Specimen: Blood from Hand, Right Updated: 06/10/22 1726     hs TnI 0hr 4,075 ng/L     Lactic acid, plasma [[id number]]  (Abnormal) Collected: 06/10/22 1700    Lab Status: Final result Specimen: Blood from Hand, Right Updated: 06/10/22 1724     LACTIC ACID 2 8 mmol/L     Narrative:      Result may be elevated if tourniquet was used during collection      Lactic acid 2 Hours [[id number]]     Lab Status: No result Specimen: Blood     Basic metabolic panel [[id number]]  (Abnormal) Collected: 06/10/22 1559    Lab Status: Final result Specimen: Blood from Arm, Left Updated: 06/10/22 1630     Sodium 139 mmol/L      Potassium 6 6 mmol/L      Chloride 104 mmol/L      CO2 22 mmol/L      ANION GAP 13 mmol/L      BUN 37 mg/dL      Creatinine 2 93 mg/dL      Glucose 125 mg/dL      Calcium 7 3 mg/dL      eGFR 23 ml/min/1 73sq m     Narrative:      Rosalia guidelines for Chronic Kidney Disease (CKD):     Stage 1 with normal or high GFR (GFR > 90 mL/min/1 73 square meters)    Stage 2 Mild CKD (GFR = 60-89 mL/min/1 73 square meters)    Stage 3A Moderate CKD (GFR = 45-59 mL/min/1 73 square meters)    Stage 3B Moderate CKD (GFR = 30-44 mL/min/1 73 square meters)    Stage 4 Severe CKD (GFR = 15-29 mL/min/1 73 square meters)    Stage 5 End Stage CKD (GFR <15 mL/min/1 73 square meters)  Note: GFR calculation is accurate only with a steady state creatinine    Blood culture #1 [[id number]] Collected: 06/10/22 1113    Lab Status: Preliminary result Specimen: Blood from Arm, Left Updated: 06/10/22 1604     Blood Culture Received in Microbiology Lab  Culture in Progress  Blood culture #2 [[id number]] Collected: 06/10/22 1113    Lab Status: Preliminary result Specimen: Blood from Arm, Left Updated: 06/10/22 1604     Blood Culture Received in Microbiology Lab  Culture in Progress  HS Troponin I 4hr [[id number]]  (Abnormal) Collected: 06/10/22 1449    Lab Status: Final result Specimen: Blood from Arm, Left Updated: 06/10/22 1516     hs TnI 4hr 2,102 ng/L      Delta 4hr hsTnI 1,617 ng/L     CK (with reflex to MB) [[id number]]  (Abnormal) Collected: 06/10/22 1027    Lab Status: Final result Specimen: Blood from Arm, Left Updated: 06/10/22 1449     Total CK 21,706 U/L     CKMB [[id number]] Collected: 06/10/22 1027    Lab Status: In process Specimen: Blood from Arm, Left Updated: 06/10/22 1449    Lactic acid 2 Hours [[id number]]  (Abnormal) Collected: 06/10/22 1357    Lab Status: Final result Specimen: Blood from Arm, Right Updated: 06/10/22 1424     LACTIC ACID 3 3 mmol/L     Narrative:      Result may be elevated if tourniquet was used during collection      Fingerstick Glucose (POCT) [[id number]]  (Normal) Collected: 06/10/22 1402    Lab Status: Final result Updated: 06/10/22 1403     POC Glucose 87 mg/dl     HS Troponin I 2hr [[id number]]  (Abnormal) Collected: 06/10/22 1227    Lab Status: Final result Specimen: Blood from Hand, Left Updated: 06/10/22 1258     hs TnI 2hr 974 ng/L      Delta 2hr hsTnI 489 ng/L     COVID/FLU/RSV - 2 hour TAT [[id number]]  (Normal) Collected: 06/10/22 1155    Lab Status: Final result Specimen: Nares from Nose Updated: 06/10/22 1242     SARS-CoV-2 Negative     INFLUENZA A PCR Negative     INFLUENZA B PCR Negative     RSV PCR Negative Narrative:      FOR PEDIATRIC PATIENTS - copy/paste COVID Guidelines URL to browser: [email or web address]    SARS-CoV-2 assay is a Nucleic Acid Amplification assay intended for the  qualitative detection of nucleic acid from SARS-CoV-2 in nasopharyngeal  swabs  Results are for the presumptive identification of SARS-CoV-2 RNA  Positive results are indicative of infection with SARS-CoV-2, the virus  causing COVID-19, but do not rule out bacterial infection or co-infection  with other viruses  Laboratories within the [place] and its  territories are required to report all positive results to the appropriate  public health authorities  Negative results do not preclude SARS-CoV-2  infection and should not be used as the sole basis for treatment or other  patient management decisions  Negative results must be combined with  clinical observations, patient history, and epidemiological information  This test has not been FDA cleared or approved  This test has been authorized by FDA under an Emergency Use Authorization  (EUA)  This test is only authorized for the duration of time the  declaration that circumstances exist justifying the authorization of the  emergency use of an in vitro diagnostic tests for detection of SARS-CoV-2  virus and/or diagnosis of COVID-19 infection under section 564(b)(1) of  the Act, 21 U  S C  141QBX-6(A)(6), unless the authorization is terminated  or revoked sooner  The test has been validated but independent review by FDA  and CLIA is pending  Test performed using [facility] GeneXpert: This RT-PCR assay targets N2,  a region unique to SARS-CoV-2  A conserved region in the E-gene was chosen  for pan-Sarbecovirus detection which includes SARS-CoV-2      Blood gas, arterial [[id number]]  (Abnormal) Collected: 06/10/22 1229    Lab Status: Final result Specimen: Blood, Arterial from Radial, Right Updated: 06/10/22 1237     pH, Arterial 7 211     PH ART TC 7 219     pCO2, Arterial 43 5 mm Hg      PCO2 (TC) Arterial 42 4 mm Hg      pO2, Arterial 131 2 mm Hg      PO2 (TC) Arterial 127 5 mm Hg      HCO3, Arterial 17 0 mmol/L      Base Excess, Arterial -10 5 mmol/L      O2 Content, Arterial 21 7 mL/dL      O2 HGB,Arterial  96 3 %      SOURCE Radial, Right     NARDA TEST Yes     Temperature 97 5 Degrees Fehrenheit      AC Rate 14     Tidal Volume 500 ml      Inspired Air (FIO2) 50     PEEP 6    Fingerstick Glucose (POCT) [[id number]]  (Normal) Collected: 06/10/22 1217    Lab Status: Final result Updated: 06/10/22 1218     POC Glucose 126 mg/dl     Rapid drug screen, urine [[id number]]  (Abnormal) Collected: 06/10/22 1149    Lab Status: Final result Specimen: Urine, Catheter Updated: 06/10/22 1211     Amph/Meth UR Positive     Barbiturate Ur Negative     Benzodiazepine Urine Negative     Cocaine Urine Positive     Methadone Urine Negative     Opiate Urine Negative     PCP Ur Negative     THC Urine Positive     Oxycodone Urine Negative    Narrative:      Presumptive report  If requested, specimen will be sent to reference lab for confirmation  FOR MEDICAL PURPOSES ONLY  IF CONFIRMATION NEEDED PLEASE CONTACT THE LAB WITHIN 5 DAYS      Drug Screen Cutoff Levels:  AMPHETAMINE/METHAMPHETAMINES  1000 ng/mL  BARBITURATES     200 ng/mL  BENZODIAZEPINES     200 ng/mL  COCAINE      300 ng/mL  METHADONE      300 ng/mL  OPIATES      300 ng/mL  PHENCYCLIDINE     25 ng/mL  THC       50 ng/mL  OXYCODONE      100 ng/mL    Urine Microscopic [[id number]]  (Abnormal) Collected: 06/10/22 1149    Lab Status: Final result Specimen: Urine, Straight Cath Updated: 06/10/22 1211     RBC, UA Innumerable /hpf      WBC, UA 1-2 /hpf      Epithelial Cells Occasional /hpf      Bacteria, UA Innumerable /hpf      Fine granular casts 0-1 /lpf     UA (URINE) with reflex to Scope [[id number]]  (Abnormal) Collected: 06/10/22 1149    Lab Status: Final result Specimen: Urine, Straight Cath Updated: 06/10/22 1202     Color, UA Ambika     Clarity, UA Cloudy     Specific Gravity, UA >=1 030     pH, UA 6 0     Leukocytes, UA Negative     Nitrite, UA Negative     Protein, UA 2+ mg/dl      Glucose, UA Negative mg/dl      Ketones, UA Negative mg/dl      Urobilinogen, UA 4 0 E U /dl      Bilirubin, UA Negative     Blood, UA 3+    Lactic acid [[id number]]  (Abnormal) Collected: 06/10/22 1113    Lab Status: Final result Specimen: Blood from Arm, Left Updated: 06/10/22 1145     LACTIC ACID 4 6 mmol/L     Narrative:      Result may be elevated if tourniquet was used during collection      Fingerstick Glucose (POCT) [[id number]]  (Abnormal) Collected: 06/10/22 1125    Lab Status: Final result Updated: 06/10/22 1128     POC Glucose 179 mg/dl     Comprehensive metabolic panel [[id number]]  (Abnormal) Collected: 06/10/22 1027    Lab Status: Final result Specimen: Blood from Arm, Left Updated: 06/10/22 1108     Sodium 138 mmol/L      Potassium 8 3 mmol/L      Chloride 98 mmol/L      CO2 25 mmol/L      ANION GAP 15 mmol/L      BUN 33 mg/dL      Creatinine 3 23 mg/dL      Glucose 86 mg/dL      Calcium 8 3 mg/dL       U/L       U/L      Alkaline Phosphatase 82 U/L      Total Protein 7 5 g/dL      Albumin 4 5 g/dL      Total Bilirubin 0 85 mg/dL      eGFR 20 ml/min/1 73sq m     Narrative:      Rosalia guidelines for Chronic Kidney Disease (CKD):     Stage 1 with normal or high GFR (GFR > 90 mL/min/1 73 square meters)    Stage 2 Mild CKD (GFR = 60-89 mL/min/1 73 square meters)    Stage 3A Moderate CKD (GFR = 45-59 mL/min/1 73 square meters)    Stage 3B Moderate CKD (GFR = 30-44 mL/min/1 73 square meters)    Stage 4 Severe CKD (GFR = 15-29 mL/min/1 73 square meters)    Stage 5 End Stage CKD (GFR <15 mL/min/1 73 square meters)  Note: GFR calculation is accurate only with a steady state creatinine    HS Troponin 0hr (reflex protocol) [[id number]]  (Abnormal) Collected: 06/10/22 1027    Lab Status: Final result Specimen: Blood from Arm, Left Updated: 06/10/22 1104     hs TnI 0hr 485 ng/L     Acetaminophen level-"If concentration is detectable, please discuss with medical  on call " [[id number]]  (Abnormal) Collected: 06/10/22 1027    Lab Status: Final result Specimen: Blood from Arm, Left Updated: 06/10/22 1102     Acetaminophen Level <83 ug/mL     Salicylate level [[id number]]  (Normal) Collected: 06/10/22 1027    Lab Status: Final result Specimen: Blood from Arm, Left Updated: 95/40/91 1244     Salicylate Lvl <5 mg/dL     Ammonia [[id number]]  (Normal) Collected: 06/10/22 1027    Lab Status: Final result Specimen: Blood from Arm, Left Updated: 06/10/22 1102     Ammonia 42 umol/L     Ethanol [[id number]]  (Normal) Collected: 06/10/22 1027    Lab Status: Final result Specimen: Blood from Arm, Left Updated: 06/10/22 1059     Ethanol Lvl <10 mg/dL     CBC and differential [[id number]]  (Abnormal) Collected: 06/10/22 1027    Lab Status: Final result Specimen: Blood from Arm, Left Updated: 06/10/22 1038     WBC 22 00 Thousand/uL      RBC 5 05 Million/uL      Hemoglobin 17 7 g/dL      Hematocrit 51 1 %       fL      MCH 35 0 pg      MCHC 34 6 g/dL      RDW 11 6 %      MPV 10 2 fL      Platelets 889 Thousands/uL      nRBC 0 /100 WBCs      Neutrophils Relative 87 %      Immat GRANS % 1 %      Lymphocytes Relative 4 %      Monocytes Relative 8 %      Eosinophils Relative 0 %      Basophils Relative 0 %      Neutrophils Absolute 19 20 Thousands/µL      Immature Grans Absolute 0 17 Thousand/uL      Lymphocytes Absolute 0 87 Thousands/µL      Monocytes Absolute 1 71 Thousand/µL      Eosinophils Absolute 0 00 Thousand/µL      Basophils Absolute 0 05 Thousands/µL     Fingerstick Glucose (POCT) [[id number]]  (Normal) Collected: 06/10/22 1026    Lab Status: Final result Updated: 06/10/22 1027     POC Glucose 87 mg/dl                  XR chest 1 view portable   Final Result by Robert Funes MD (06/10 4719)      Endotracheal tube tip is 5 5 cm above the jesika                  Workstation performed: [id number]         CT head without contrast   Final Result by Jef Chapman MD (06/10 1142)      No acute intracranial abnormality  Bilateral facial subcutaneous air present  Please refer to cervical spine CT report for more specific detail                  Workstation performed: [id number]         CT spine cervical wo contrast   Final Result by Jef Chapman MD (06/10 1141)      No cervical spine fracture or traumatic malalignment  Subcutaneous emphysema seen in the neck  Given that the CT of the chest abdomen and pelvis demonstrates no evidence of pneumothorax, mediastinal or paraesophageal air, this most likely is related to air introduced through an IV catheter  Also correlate for any penetrating injury that may have    allowed generalized subcutaneous air to occur  The examination demonstrates a significant  finding and was documented as such in [facility] for liaison and referring practitioner immediate notification  Workstation performed: [id number]         CT chest abdomen pelvis wo contrast   Final Result by Jef Chapman MD (06/10 1140)      1  Limited examination demonstrating no evidence of intrathoracic, intra-abdominal or intrapelvic traumatic injury   2  There is nonspecific perihilar and upper lobe airspace opacities, could represent mild pulmonary edema versus aspiration  3  No pneumothorax, hemothorax or mediastinal air   4  Tiny amount of intravascular and cardiac air, likely introduced through an IV catheter                     Workstation performed: [id number]                    Procedures  ECG 12 Lead Documentation Only    Date/Time: 6/10/2022 1:16 PM  Performed by: Yvette Penldeton DO  Authorized by: Yvette Pendleton DO     ECG reviewed by me, the ED Provider: yes    Patient location:  ED  Comments:      EKG 1    Sinus rhythm, rate of 100 , normal WV, widened QTC, hyperacute T-waves with peaked Ts in V4, V5, V6, nearing sine wave pattern in V1 V2  ECG 12 Lead Documentation Only    Date/Time: 6/10/2022 1:18 PM  Performed by: Mumtaz Ventura DO  Authorized by: Mumtaz Ventura DO     ECG reviewed by me, the ED Provider: yes    Patient location:  ED  Comments:      EKG 2  Normal sinus rhythm, rate 100, normal WV, normal QTC, no STEMI, improved T-wave abnormalities compared to EKG 1  CriticalCare Time  Performed by: Mumtaz Ventura DO  Authorized by: Mumtaz Ventura DO     Critical care provider statement:     Critical care time (minutes):  30    Critical care time was exclusive of:  Separately billable procedures and treating other patients and teaching time    Critical care was necessary to treat or prevent imminent or life-threatening deterioration of the following conditions:  CNS failure or compromise, respiratory failure, shock, dehydration, sepsis, renal failure, circulatory failure, cardiac failure and metabolic crisis    Critical care was time spent personally by me on the following activities:  Obtaining history from patient or surrogate, development of treatment plan with patient or surrogate, discussions with consultants, evaluation of patient's response to treatment, examination of patient, ordering and performing treatments and interventions, ordering and review of laboratory studies, ordering and review of radiographic studies, re-evaluation of patient's condition and ventilator management  Comments:       Altered mental status, acute kidney injury, hyperkalemia, respiratory failure  Intubation    Date/Time: 6/10/2022 1:21 PM  Performed by: Mumtaz Ventura DO  Authorized by: Mumtaz Ventura DO     Patient location:  ED  Consent:     Consent obtained:  Emergent situation  Pre-procedure details:     Patient status:  Altered mental status    Pretreatment medications:  Etomidate    Paralytics:  Rocuronium  Indications:     Indications for intubation: respiratory failure and airway protection    Procedure details:     Preoxygenation:  Nasal cannula    Intubation method:  Oral    Oral intubation technique:  Glidescope    Laryngoscope blade: Mac 4    Tube size (mm):  7 5    Tube type:  Cuffed    Number of attempts:  1    Tube visualized through cords: yes    Placement assessment:     ETT to lip:  25    Tube secured with:  ETT jin    Breath sounds:  Equal    Placement verification: chest rise, condensation, CXR verification, direct visualization, equal breath sounds and tube exhalation      CXR findings:  ETT in proper place  Post-procedure details:     Patient tolerance of procedure: Tolerated well, no immediate complications             ED Course  ED Course as of 06/10/22 1811   Fri Armando 10, 2022   1119 The patient's mental status appears to be getting worse  He is no longer responsive to verbal stimuli  He is responsible painful stimuli  Given his critically ill status, I decided to intubate the patient  The patient does have a lactate of 4 6 as well as a potassium of 8 3 and a creatinine of 3 23  The patient was started on dextrose, insulin, albuterol, and calcium gluconate  The patient's repeat fingersticks were 179  He has a white count of 22  He was started on Zosyn for possible infection  He will be transferred for critical care  1220 I spoke with Dr Adi Saenz with Trauma, he does not feel that this presentation is related to his fall, including [address]  Will speak with CC     1225 Repeat EKG looks much improved compared to first     1257 I spoke with [place] [facility]  They will accept the patient  They are waiting for a bed  Patient accepted by Dr Vasquez  1750 Second BMP looks improved with K of 6 6  The patient is making urine and has had a total of 1L out  He was given an additional L of fluid for a total of 4L and has IV fluids running at 125ml/hour  His troponin is trending up   His EKG shows no signs of ST elevation or depression  I doubt ischemia  He will be transferred to the ICU at [facility]  SBIRT 22yo+    Flowsheet Row Most Recent Value   SBIRT (25 yo +)    In order to provide better care to our patients, we are screening all of our patients for alcohol and drug use  Would it be okay to ask you these screening questions? Unable to answer at this time Filed at: 06/10/2022 1020                    MDM  Number of Diagnoses or Management Options  JOÃO (acute kidney injury) ([facility] )  Altered mental status  Elevated troponin  Hyperkalemia  [facility]  Subcutaneous emphysema, initial encounter [facility])  Diagnosis management comments: This is a 68-year-old male who presented to the emergency department with altered mental status  I considered electrolyte abnormality, sepsis, drug induced encephalopathy, hepatic encephalopathy, trauma  These and other diagnoses were considered  Amount and/or Complexity of Data Reviewed  Clinical lab tests: reviewed and ordered  Tests in the radiology section of CPT®: reviewed and ordered  Decide to obtain previous medical records or to obtain history from someone other than the patient: yes  Obtain history from someone other than the patient: yes  Review and summarize past medical records: yes  Discuss the patient with other providers: yes  Independent visualization of images, tracings, or specimens: yes        Disposition  Final diagnoses:    Altered mental status   [facility]eps[facility]   Elevated troponin   Hyperkalemia   JOÃO (acute kidney injury) ([facility] )   Subcutaneous emphysema, initial encounter [facility])     Time reflects when diagnosis was documented in both MDM as applicable and the Disposition within this note     Time User Action Codes Description Comment    6/10/2022 12:55 PM Henry Maki Add [R41 82] Altered mental status     6/10/2022 12:55 PM Henry Maki Add [A41 9] Sepsis ([facility] [address] )     6/10/2022 12:55 PM Henry Maki Add [R77 8] Elevated troponin 6/10/2022 12:55 PM Mandi Law Add [E87 5] Hyperkalemia     6/10/2022 12:55 PM Cristi Laboy Add [N17 9] JOÃO (acute kidney injury) ([facility] [address] )     6/10/2022 12:56 PM Rigoberto Laboy 89  [id number]] Subcutaneous emphysema, initial encounter [facility])       ED Disposition     ED Disposition   Transfer to Another Facility-In Network    Condition   --    Date/Time   Fri Armando 10, 2022 12:55 PM    Comment   Pravin Arredondo should be transferred out to [place]  Follow-up Information    None         Patient's Medications   Discharge Prescriptions    No medications on file       No discharge procedures on file      PDMP Review     None          ED Provider  Electronically Signed by           Tristan Hyde DO  06/10/22 5568

## 2022-06-10 NOTE — EMTALA/ACUTE CARE TRANSFER
[facility] EMERGENCY DEPARTMENT  [address] [address]  Dept: [phone]      EMTALA TRANSFER CONSENT    NAME Ranjeet Barrios                                         1970                              MRN [id number]    I have been informed of my rights regarding examination, treatment, and transfer   by Dr Nano Hammonds DO    Benefits:      Risks:        Consent for Transfer:  I acknowledge that my medical condition has been evaluated and explained to me by the emergency department physician or other qualified medical person and/or my attending physician, who has recommended that I be transferred to the service of    at    The above potential benefits of such transfer, the potential risks associated with such transfer, and the probable risks of not being transferred have been explained to me, and I fully understand them  The doctor has explained that, in my case, the benefits of transfer outweigh the risks  I agree to be transferred  I authorize the performance of emergency medical procedures and treatments upon me in both transit and upon arrival at the receiving facility  Additionally, I authorize the release of any and all medical records to the receiving facility and request they be transported with me, if possible  I understand that the safest mode of transportation during a medical emergency is an ambulance and that the Hospital advocates the use of this mode of transport  Risks of traveling to the receiving facility by car, including absence of medical control, life sustaining equipment, such as oxygen, and medical personnel has been explained to me and I fully understand them  (NIKKI CORRECT BOX BELOW)  [  ]  I consent to the stated transfer and to be transported by ambulance/helicopter  [  ]  I consent to the stated transfer, but refuse transportation by ambulance and accept full responsibility for my transportation by car    I understand the risks of non-ambulance transfers and I exonerate the Hospital and its staff from any deterioration in my condition that results from this refusal     X___________________________________________    DATE  06/10/22  TIME________  Signature of patient or legally responsible individual signing on patient behalf           RELATIONSHIP TO PATIENT_________________________          Provider Certification    NAME Pravin Arredondo                                         1970                              MRN [id number]    A medical screening exam was performed on the above named patient  Based on the examination:    Condition Necessitating Transfer The primary encounter diagnosis was Altered mental status  Diagnoses of Sepsis ([facility] [address] ), Elevated troponin, Hyperkalemia, JOÃO (acute kidney injury) ([facility] [address] ), and Subcutaneous emphysema, initial encounter [facility]) were also pertinent to this visit  Patient Condition:      Reason for Transfer:      Transfer Requirements: Facility     · Space available and qualified personnel available for treatment as acknowledged by    · Agreed to accept transfer and to provide appropriate medical treatment as acknowledged by          · Appropriate medical records of the examination and treatment of the patient are provided at the time of transfer   [address] [facility], [address] _______  · Transfer will be performed by qualified personnel from    and appropriate transfer equipment as required, including the use of necessary and appropriate life support measures      Provider Certification: I have examined the patient and explained the following risks and benefits of being transferred/refusing transfer to the patient/family:         Based on these reasonable risks and benefits to the patient and/or the unborn child(robby), and based upon the information available at the time of the patients examination, I certify that the medical benefits reasonably to be expected from the provision of appropriate medical treatments at another medical facility outweigh the increasing risks, if any, to the individuals medical condition, and in the case of labor to the unborn child, from effecting the transfer      X____________________________________________ DATE 06/10/22        TIME_______      ORIGINAL - SEND TO MEDICAL RECORDS   COPY - SEND WITH PATIENT DURING TRANSFER

## 2022-06-10 NOTE — ED NOTES
P/u slets 1900 to   b micu 4    accepted dr John Garcia     report Tato Lombardi 134, RN  06/10/22 [id number]

## 2022-06-11 ENCOUNTER — APPOINTMENT (INPATIENT)
Dept: RADIOLOGY | Facility: HOSPITAL | Age: 52
DRG: 469 | End: 2022-06-11
Payer: COMMERCIAL

## 2022-06-11 ENCOUNTER — APPOINTMENT (INPATIENT)
Dept: NON INVASIVE DIAGNOSTICS | Facility: HOSPITAL | Age: 52
DRG: 469 | End: 2022-06-11
Payer: COMMERCIAL

## 2022-06-11 PROBLEM — F15.10 METHAMPHETAMINE ABUSE (HCC): Status: ACTIVE | Noted: 2022-06-11

## 2022-06-11 PROBLEM — I21.4 NSTEMI (NON-ST ELEVATED MYOCARDIAL INFARCTION) (HCC): Status: ACTIVE | Noted: 2022-06-11

## 2022-06-11 LAB
2HR DELTA HS TROPONIN: -1463 NG/L
4HR DELTA HS TROPONIN: -1941 NG/L
ALBUMIN SERPL BCP-MCNC: 2.3 G/DL (ref 3.5–5)
ALBUMIN SERPL BCP-MCNC: 2.4 G/DL (ref 3.5–5)
ALBUMIN SERPL BCP-MCNC: 2.4 G/DL (ref 3.5–5)
ALP SERPL-CCNC: 55 U/L (ref 46–116)
ALP SERPL-CCNC: 58 U/L (ref 46–116)
ALP SERPL-CCNC: 69 U/L (ref 46–116)
ALT SERPL W P-5'-P-CCNC: 2624 U/L (ref 12–78)
ALT SERPL W P-5'-P-CCNC: 2864 U/L (ref 12–78)
ALT SERPL W P-5'-P-CCNC: 2898 U/L (ref 12–78)
ANION GAP SERPL CALCULATED.3IONS-SCNC: 12 MMOL/L (ref 4–13)
ANION GAP SERPL CALCULATED.3IONS-SCNC: 13 MMOL/L (ref 4–13)
ANION GAP SERPL CALCULATED.3IONS-SCNC: 6 MMOL/L (ref 4–13)
ANION GAP SERPL CALCULATED.3IONS-SCNC: 7 MMOL/L (ref 4–13)
ANION GAP SERPL CALCULATED.3IONS-SCNC: 8 MMOL/L (ref 4–13)
AORTIC ROOT: 3.4 CM
AORTIC VALVE MEAN VELOCITY: 6.4 M/S
APICAL FOUR CHAMBER EJECTION FRACTION: 63 %
AST SERPL W P-5'-P-CCNC: 6260 U/L (ref 5–45)
AST SERPL W P-5'-P-CCNC: 6320 U/L (ref 5–45)
AST SERPL W P-5'-P-CCNC: 6509 U/L (ref 5–45)
AV LVOT MEAN GRADIENT: 2 MMHG
AV LVOT PEAK GRADIENT: 3 MMHG
AV MEAN GRADIENT: 2 MMHG
AV PEAK GRADIENT: 3 MMHG
AV VELOCITY RATIO: 1.02
BASE EXCESS BLDA CALC-SCNC: -1.2 MMOL/L
BASE EXCESS BLDA CALC-SCNC: -3.8 MMOL/L
BASOPHILS # BLD AUTO: 0.03 THOUSANDS/ΜL (ref 0–0.1)
BASOPHILS # BLD AUTO: 0.04 THOUSANDS/ΜL (ref 0–0.1)
BASOPHILS NFR BLD AUTO: 0 % (ref 0–1)
BASOPHILS NFR BLD AUTO: 0 % (ref 0–1)
BILIRUB DIRECT SERPL-MCNC: 0.53 MG/DL (ref 0–0.2)
BILIRUB DIRECT SERPL-MCNC: 1.19 MG/DL (ref 0–0.2)
BILIRUB SERPL-MCNC: 1.04 MG/DL (ref 0.2–1)
BILIRUB SERPL-MCNC: 1.27 MG/DL (ref 0.2–1)
BILIRUB SERPL-MCNC: 1.76 MG/DL (ref 0.2–1)
BUN SERPL-MCNC: 47 MG/DL (ref 5–25)
BUN SERPL-MCNC: 54 MG/DL (ref 5–25)
BUN SERPL-MCNC: 55 MG/DL (ref 5–25)
BUN SERPL-MCNC: 56 MG/DL (ref 5–25)
BUN SERPL-MCNC: 59 MG/DL (ref 5–25)
BUN SERPL-MCNC: 61 MG/DL (ref 5–25)
BUN SERPL-MCNC: 65 MG/DL (ref 5–25)
CA-I BLD-SCNC: 0.94 MMOL/L (ref 1.12–1.32)
CA-I BLD-SCNC: 0.97 MMOL/L (ref 1.12–1.32)
CALCIUM ALBUM COR SERPL-MCNC: 8.4 MG/DL (ref 8.3–10.1)
CALCIUM ALBUM COR SERPL-MCNC: 8.7 MG/DL (ref 8.3–10.1)
CALCIUM SERPL-MCNC: 7.1 MG/DL (ref 8.3–10.1)
CALCIUM SERPL-MCNC: 7.2 MG/DL (ref 8.3–10.1)
CALCIUM SERPL-MCNC: 7.2 MG/DL (ref 8.3–10.1)
CALCIUM SERPL-MCNC: 7.3 MG/DL (ref 8.3–10.1)
CALCIUM SERPL-MCNC: 7.6 MG/DL (ref 8.3–10.1)
CALCIUM SERPL-MCNC: 7.7 MG/DL (ref 8.3–10.1)
CALCIUM SERPL-MCNC: 7.8 MG/DL (ref 8.3–10.1)
CARDIAC TROPONIN I PNL SERPL HS: ABNORMAL NG/L
CHLORIDE SERPL-SCNC: 106 MMOL/L (ref 100–108)
CHLORIDE SERPL-SCNC: 106 MMOL/L (ref 100–108)
CHLORIDE SERPL-SCNC: 107 MMOL/L (ref 100–108)
CHLORIDE SERPL-SCNC: 108 MMOL/L (ref 100–108)
CHLORIDE SERPL-SCNC: 110 MMOL/L (ref 100–108)
CK MB SERPL-MCNC: 1.1 % (ref 0–2.5)
CK MB SERPL-MCNC: 394 NG/ML (ref 0–5)
CK MB SERPL-MCNC: 550.5 NG/ML (ref 0–5)
CK MB SERPL-MCNC: 763.8 NG/ML (ref 0–5)
CK MB SERPL-MCNC: 986.3 NG/ML (ref 0–5)
CK MB SERPL-MCNC: <1 % (ref 0–2.5)
CK SERPL-CCNC: ABNORMAL U/L (ref 39–308)
CO2 SERPL-SCNC: 22 MMOL/L (ref 21–32)
CO2 SERPL-SCNC: 23 MMOL/L (ref 21–32)
CO2 SERPL-SCNC: 26 MMOL/L (ref 21–32)
CO2 SERPL-SCNC: 27 MMOL/L (ref 21–32)
CO2 SERPL-SCNC: 28 MMOL/L (ref 21–32)
CREAT SERPL-MCNC: 3.49 MG/DL (ref 0.6–1.3)
CREAT SERPL-MCNC: 3.89 MG/DL (ref 0.6–1.3)
CREAT SERPL-MCNC: 3.9 MG/DL (ref 0.6–1.3)
CREAT SERPL-MCNC: 4.37 MG/DL (ref 0.6–1.3)
CREAT SERPL-MCNC: 4.51 MG/DL (ref 0.6–1.3)
CREAT SERPL-MCNC: 5.2 MG/DL (ref 0.6–1.3)
CREAT SERPL-MCNC: 5.55 MG/DL (ref 0.6–1.3)
DOP CALC AO PEAK VEL: 0.87 M/S
DOP CALC AO VTI: 14.1 CM
DOP CALC LVOT PEAK VEL VTI: 14.78 CM
DOP CALC LVOT PEAK VEL: 0.89 M/S
EOSINOPHIL # BLD AUTO: 0.03 THOUSAND/ΜL (ref 0–0.61)
EOSINOPHIL # BLD AUTO: 0.11 THOUSAND/ΜL (ref 0–0.61)
EOSINOPHIL NFR BLD AUTO: 0 % (ref 0–6)
EOSINOPHIL NFR BLD AUTO: 1 % (ref 0–6)
ERYTHROCYTE [DISTWIDTH] IN BLOOD BY AUTOMATED COUNT: 11.9 % (ref 11.6–15.1)
ERYTHROCYTE [DISTWIDTH] IN BLOOD BY AUTOMATED COUNT: 12 % (ref 11.6–15.1)
ERYTHROCYTE [DISTWIDTH] IN BLOOD BY AUTOMATED COUNT: 12 % (ref 11.6–15.1)
FRACTIONAL SHORTENING: 37 (ref 28–44)
GFR SERPL CREATININE-BSD FRML MDRD: 10 ML/MIN/1.73SQ M
GFR SERPL CREATININE-BSD FRML MDRD: 11 ML/MIN/1.73SQ M
GFR SERPL CREATININE-BSD FRML MDRD: 13 ML/MIN/1.73SQ M
GFR SERPL CREATININE-BSD FRML MDRD: 14 ML/MIN/1.73SQ M
GFR SERPL CREATININE-BSD FRML MDRD: 16 ML/MIN/1.73SQ M
GFR SERPL CREATININE-BSD FRML MDRD: 16 ML/MIN/1.73SQ M
GFR SERPL CREATININE-BSD FRML MDRD: 18 ML/MIN/1.73SQ M
GLUCOSE SERPL-MCNC: 101 MG/DL (ref 65–140)
GLUCOSE SERPL-MCNC: 108 MG/DL (ref 65–140)
GLUCOSE SERPL-MCNC: 127 MG/DL (ref 65–140)
GLUCOSE SERPL-MCNC: 144 MG/DL (ref 65–140)
GLUCOSE SERPL-MCNC: 147 MG/DL (ref 65–140)
GLUCOSE SERPL-MCNC: 148 MG/DL (ref 65–140)
GLUCOSE SERPL-MCNC: 77 MG/DL (ref 65–140)
HAV IGM SER QL: ABNORMAL
HBV CORE IGM SER QL: ABNORMAL
HBV SURFACE AG SER QL: ABNORMAL
HCO3 BLDA-SCNC: 21 MMOL/L (ref 22–28)
HCO3 BLDA-SCNC: 21.4 MMOL/L (ref 22–28)
HCT VFR BLD AUTO: 39.9 % (ref 36.5–49.3)
HCT VFR BLD AUTO: 42 % (ref 36.5–49.3)
HCT VFR BLD AUTO: 54.8 % (ref 36.5–49.3)
HCV AB SER QL: ABNORMAL
HGB BLD-MCNC: 13.9 G/DL (ref 12–17)
HGB BLD-MCNC: 14.4 G/DL (ref 12–17)
HGB BLD-MCNC: 18.3 G/DL (ref 12–17)
HOROWITZ INDEX BLDA+IHG-RTO: 40 MM[HG]
HOROWITZ INDEX BLDA+IHG-RTO: 40 MM[HG]
IMM GRANULOCYTES # BLD AUTO: 0.04 THOUSAND/UL (ref 0–0.2)
IMM GRANULOCYTES # BLD AUTO: 0.05 THOUSAND/UL (ref 0–0.2)
IMM GRANULOCYTES NFR BLD AUTO: 0 % (ref 0–2)
IMM GRANULOCYTES NFR BLD AUTO: 0 % (ref 0–2)
INTERVENTRICULAR SEPTUM IN DIASTOLE (PARASTERNAL SHORT AXIS VIEW): 1.4 CM
INTERVENTRICULAR SEPTUM: 1.4 CM (ref 0.6–1.1)
LACTATE SERPL-SCNC: 1.8 MMOL/L (ref 0.5–2)
LACTATE SERPL-SCNC: 2.4 MMOL/L (ref 0.5–2)
LEFT ATRIUM SIZE: 2.2 CM
LEFT INTERNAL DIMENSION IN SYSTOLE: 2.6 CM (ref 2.1–4)
LEFT VENTRICULAR INTERNAL DIMENSION IN DIASTOLE: 4.1 CM (ref 3.5–6)
LEFT VENTRICULAR POSTERIOR WALL IN END DIASTOLE: 0.9 CM
LEFT VENTRICULAR STROKE VOLUME: 49 ML
LVSV (TEICH): 49 ML
LYMPHOCYTES # BLD AUTO: 1.73 THOUSANDS/ΜL (ref 0.6–4.47)
LYMPHOCYTES # BLD AUTO: 1.99 THOUSANDS/ΜL (ref 0.6–4.47)
LYMPHOCYTES NFR BLD AUTO: 15 % (ref 14–44)
LYMPHOCYTES NFR BLD AUTO: 15 % (ref 14–44)
MAGNESIUM SERPL-MCNC: 2.4 MG/DL (ref 1.6–2.6)
MAGNESIUM SERPL-MCNC: 2.5 MG/DL (ref 1.6–2.6)
MAGNESIUM SERPL-MCNC: 2.6 MG/DL (ref 1.6–2.6)
MCH RBC QN AUTO: 34.4 PG (ref 26.8–34.3)
MCH RBC QN AUTO: 35 PG (ref 26.8–34.3)
MCH RBC QN AUTO: 35.2 PG (ref 26.8–34.3)
MCHC RBC AUTO-ENTMCNC: 33.9 G/DL (ref 31.4–37.4)
MCHC RBC AUTO-ENTMCNC: 34.3 G/DL (ref 31.4–37.4)
MCHC RBC AUTO-ENTMCNC: 34.8 G/DL (ref 31.4–37.4)
MCV RBC AUTO: 101 FL (ref 82–98)
MCV RBC AUTO: 101 FL (ref 82–98)
MCV RBC AUTO: 105 FL (ref 82–98)
MONOCYTES # BLD AUTO: 0.39 THOUSAND/ΜL (ref 0.17–1.22)
MONOCYTES # BLD AUTO: 0.48 THOUSAND/ΜL (ref 0.17–1.22)
MONOCYTES NFR BLD AUTO: 4 % (ref 4–12)
MONOCYTES NFR BLD AUTO: 4 % (ref 4–12)
NEUTROPHILS # BLD AUTO: 10.37 THOUSANDS/ΜL (ref 1.85–7.62)
NEUTROPHILS # BLD AUTO: 9.04 THOUSANDS/ΜL (ref 1.85–7.62)
NEUTS SEG NFR BLD AUTO: 80 % (ref 43–75)
NEUTS SEG NFR BLD AUTO: 81 % (ref 43–75)
NRBC BLD AUTO-RTO: 0 /100 WBCS
NRBC BLD AUTO-RTO: 0 /100 WBCS
O2 CT BLDA-SCNC: 19.9 ML/DL (ref 16–23)
O2 CT BLDA-SCNC: 20.3 ML/DL (ref 16–23)
OXYHGB MFR BLDA: 97 % (ref 94–97)
OXYHGB MFR BLDA: 97.1 % (ref 94–97)
PCO2 BLDA: 28.9 MM HG (ref 36–44)
PCO2 BLDA: 39.7 MM HG (ref 36–44)
PEEP RESPIRATORY: 6 CM[H2O]
PEEP RESPIRATORY: 6 CM[H2O]
PH BLDA: 7.35 [PH] (ref 7.35–7.45)
PH BLDA: 7.48 [PH] (ref 7.35–7.45)
PHOSPHATE SERPL-MCNC: 4 MG/DL (ref 2.7–4.5)
PHOSPHATE SERPL-MCNC: 4 MG/DL (ref 2.7–4.5)
PHOSPHATE SERPL-MCNC: 4.6 MG/DL (ref 2.7–4.5)
PHOSPHATE SERPL-MCNC: 4.7 MG/DL (ref 2.7–4.5)
PHOSPHATE SERPL-MCNC: 5.9 MG/DL (ref 2.7–4.5)
PLATELET # BLD AUTO: 128 THOUSANDS/UL (ref 149–390)
PLATELET # BLD AUTO: 134 THOUSANDS/UL (ref 149–390)
PLATELET # BLD AUTO: 213 THOUSANDS/UL (ref 149–390)
PMV BLD AUTO: 10.5 FL (ref 8.9–12.7)
PMV BLD AUTO: 10.6 FL (ref 8.9–12.7)
PMV BLD AUTO: 10.9 FL (ref 8.9–12.7)
PO2 BLDA: 109.2 MM HG (ref 75–129)
PO2 BLDA: 134.3 MM HG (ref 75–129)
POTASSIUM SERPL-SCNC: 4 MMOL/L (ref 3.5–5.3)
POTASSIUM SERPL-SCNC: 4 MMOL/L (ref 3.5–5.3)
POTASSIUM SERPL-SCNC: 4.2 MMOL/L (ref 3.5–5.3)
POTASSIUM SERPL-SCNC: 4.3 MMOL/L (ref 3.5–5.3)
POTASSIUM SERPL-SCNC: 4.3 MMOL/L (ref 3.5–5.3)
POTASSIUM SERPL-SCNC: 5.2 MMOL/L (ref 3.5–5.3)
POTASSIUM SERPL-SCNC: 5.6 MMOL/L (ref 3.5–5.3)
PROCALCITONIN SERPL-MCNC: 49.11 NG/ML
PROT SERPL-MCNC: 4.6 G/DL (ref 6.4–8.2)
PROT SERPL-MCNC: 4.6 G/DL (ref 6.4–8.2)
PROT SERPL-MCNC: 5.1 G/DL (ref 6.4–8.2)
RBC # BLD AUTO: 3.95 MILLION/UL (ref 3.88–5.62)
RBC # BLD AUTO: 4.18 MILLION/UL (ref 3.88–5.62)
RBC # BLD AUTO: 5.23 MILLION/UL (ref 3.88–5.62)
RIGHT ATRIAL 2D VOLUME: 39 ML
RIGHT ATRIUM AREA SYSTOLE A4C: 15.2 CM2
RIGHT VENTRICLE ID DIMENSION: 3.6 CM
SL CV LV EF: 65
SL CV PED ECHO LEFT VENTRICLE DIASTOLIC VOLUME (MOD BIPLANE) 2D: 73 ML
SL CV PED ECHO LEFT VENTRICLE SYSTOLIC VOLUME (MOD BIPLANE) 2D: 24 ML
SODIUM SERPL-SCNC: 140 MMOL/L (ref 136–145)
SODIUM SERPL-SCNC: 140 MMOL/L (ref 136–145)
SODIUM SERPL-SCNC: 142 MMOL/L (ref 136–145)
SODIUM SERPL-SCNC: 143 MMOL/L (ref 136–145)
SODIUM SERPL-SCNC: 144 MMOL/L (ref 136–145)
SPECIMEN SOURCE: ABNORMAL
SPECIMEN SOURCE: ABNORMAL
TR MAX PG: 27 MMHG
TR PEAK VELOCITY: 2.6 M/S
TRICUSPID VALVE PEAK REGURGITATION VELOCITY: 2.59 M/S
TSH SERPL DL<=0.05 MIU/L-ACNC: 2.9 UIU/ML (ref 0.45–4.5)
VANCOMYCIN TROUGH SERPL-MCNC: 32 UG/ML (ref 10–20)
VENT AC: 14
VENT AC: 24
VENT- AC: AC
VENT- AC: AC
VT SETTING VENT: 450 ML
VT SETTING VENT: 500 ML
WBC # BLD AUTO: 11.27 THOUSAND/UL (ref 4.31–10.16)
WBC # BLD AUTO: 13.03 THOUSAND/UL (ref 4.31–10.16)
WBC # BLD AUTO: 17.04 THOUSAND/UL (ref 4.31–10.16)

## 2022-06-11 PROCEDURE — 94760 N-INVAS EAR/PLS OXIMETRY 1: CPT

## 2022-06-11 PROCEDURE — 82550 ASSAY OF CK (CPK): CPT | Performed by: STUDENT IN AN ORGANIZED HEALTH CARE EDUCATION/TRAINING PROGRAM

## 2022-06-11 PROCEDURE — 82248 BILIRUBIN DIRECT: CPT | Performed by: STUDENT IN AN ORGANIZED HEALTH CARE EDUCATION/TRAINING PROGRAM

## 2022-06-11 PROCEDURE — 80048 BASIC METABOLIC PNL TOTAL CA: CPT | Performed by: INTERNAL MEDICINE

## 2022-06-11 PROCEDURE — 80048 BASIC METABOLIC PNL TOTAL CA: CPT | Performed by: STUDENT IN AN ORGANIZED HEALTH CARE EDUCATION/TRAINING PROGRAM

## 2022-06-11 PROCEDURE — 83735 ASSAY OF MAGNESIUM: CPT | Performed by: STUDENT IN AN ORGANIZED HEALTH CARE EDUCATION/TRAINING PROGRAM

## 2022-06-11 PROCEDURE — 87184 SC STD DISK METHOD PER PLATE: CPT | Performed by: STUDENT IN AN ORGANIZED HEALTH CARE EDUCATION/TRAINING PROGRAM

## 2022-06-11 PROCEDURE — 87147 CULTURE TYPE IMMUNOLOGIC: CPT | Performed by: STUDENT IN AN ORGANIZED HEALTH CARE EDUCATION/TRAINING PROGRAM

## 2022-06-11 PROCEDURE — 99291 CRITICAL CARE FIRST HOUR: CPT | Performed by: INTERNAL MEDICINE

## 2022-06-11 PROCEDURE — 84484 ASSAY OF TROPONIN QUANT: CPT | Performed by: STUDENT IN AN ORGANIZED HEALTH CARE EDUCATION/TRAINING PROGRAM

## 2022-06-11 PROCEDURE — 84100 ASSAY OF PHOSPHORUS: CPT | Performed by: STUDENT IN AN ORGANIZED HEALTH CARE EDUCATION/TRAINING PROGRAM

## 2022-06-11 PROCEDURE — 93306 TTE W/DOPPLER COMPLETE: CPT

## 2022-06-11 PROCEDURE — 83735 ASSAY OF MAGNESIUM: CPT | Performed by: INTERNAL MEDICINE

## 2022-06-11 PROCEDURE — 93306 TTE W/DOPPLER COMPLETE: CPT | Performed by: INTERNAL MEDICINE

## 2022-06-11 PROCEDURE — 80053 COMPREHEN METABOLIC PANEL: CPT | Performed by: STUDENT IN AN ORGANIZED HEALTH CARE EDUCATION/TRAINING PROGRAM

## 2022-06-11 PROCEDURE — 82553 CREATINE MB FRACTION: CPT | Performed by: STUDENT IN AN ORGANIZED HEALTH CARE EDUCATION/TRAINING PROGRAM

## 2022-06-11 PROCEDURE — 80076 HEPATIC FUNCTION PANEL: CPT

## 2022-06-11 PROCEDURE — 80202 ASSAY OF VANCOMYCIN: CPT | Performed by: STUDENT IN AN ORGANIZED HEALTH CARE EDUCATION/TRAINING PROGRAM

## 2022-06-11 PROCEDURE — 84145 PROCALCITONIN (PCT): CPT | Performed by: STUDENT IN AN ORGANIZED HEALTH CARE EDUCATION/TRAINING PROGRAM

## 2022-06-11 PROCEDURE — 90945 DIALYSIS ONE EVALUATION: CPT

## 2022-06-11 PROCEDURE — 80074 ACUTE HEPATITIS PANEL: CPT | Performed by: STUDENT IN AN ORGANIZED HEALTH CARE EDUCATION/TRAINING PROGRAM

## 2022-06-11 PROCEDURE — 82805 BLOOD GASES W/O2 SATURATION: CPT | Performed by: EMERGENCY MEDICINE

## 2022-06-11 PROCEDURE — 85025 COMPLETE CBC W/AUTO DIFF WBC: CPT | Performed by: STUDENT IN AN ORGANIZED HEALTH CARE EDUCATION/TRAINING PROGRAM

## 2022-06-11 PROCEDURE — 99254 IP/OBS CNSLTJ NEW/EST MOD 60: CPT | Performed by: INTERNAL MEDICINE

## 2022-06-11 PROCEDURE — 82553 CREATINE MB FRACTION: CPT

## 2022-06-11 PROCEDURE — 87522 HEPATITIS C REVRS TRNSCRPJ: CPT | Performed by: INTERNAL MEDICINE

## 2022-06-11 PROCEDURE — 99255 IP/OBS CONSLTJ NEW/EST HI 80: CPT | Performed by: INTERNAL MEDICINE

## 2022-06-11 PROCEDURE — 83605 ASSAY OF LACTIC ACID: CPT | Performed by: STUDENT IN AN ORGANIZED HEALTH CARE EDUCATION/TRAINING PROGRAM

## 2022-06-11 PROCEDURE — 87185 SC STD ENZYME DETCJ PER NZM: CPT | Performed by: STUDENT IN AN ORGANIZED HEALTH CARE EDUCATION/TRAINING PROGRAM

## 2022-06-11 PROCEDURE — NC001 PR NO CHARGE: Performed by: EMERGENCY MEDICINE

## 2022-06-11 PROCEDURE — 99292 CRITICAL CARE ADDL 30 MIN: CPT | Performed by: EMERGENCY MEDICINE

## 2022-06-11 PROCEDURE — 82805 BLOOD GASES W/O2 SATURATION: CPT | Performed by: STUDENT IN AN ORGANIZED HEALTH CARE EDUCATION/TRAINING PROGRAM

## 2022-06-11 PROCEDURE — 71045 X-RAY EXAM CHEST 1 VIEW: CPT

## 2022-06-11 PROCEDURE — 82550 ASSAY OF CK (CPK): CPT

## 2022-06-11 PROCEDURE — 82948 REAGENT STRIP/BLOOD GLUCOSE: CPT

## 2022-06-11 PROCEDURE — 84100 ASSAY OF PHOSPHORUS: CPT | Performed by: INTERNAL MEDICINE

## 2022-06-11 PROCEDURE — 87205 SMEAR GRAM STAIN: CPT | Performed by: STUDENT IN AN ORGANIZED HEALTH CARE EDUCATION/TRAINING PROGRAM

## 2022-06-11 PROCEDURE — 87081 CULTURE SCREEN ONLY: CPT | Performed by: STUDENT IN AN ORGANIZED HEALTH CARE EDUCATION/TRAINING PROGRAM

## 2022-06-11 PROCEDURE — C9113 INJ PANTOPRAZOLE SODIUM, VIA: HCPCS | Performed by: STUDENT IN AN ORGANIZED HEALTH CARE EDUCATION/TRAINING PROGRAM

## 2022-06-11 PROCEDURE — 93005 ELECTROCARDIOGRAM TRACING: CPT

## 2022-06-11 PROCEDURE — 94003 VENT MGMT INPAT SUBQ DAY: CPT

## 2022-06-11 PROCEDURE — 36620 INSERTION CATHETER ARTERY: CPT | Performed by: EMERGENCY MEDICINE

## 2022-06-11 PROCEDURE — 87070 CULTURE OTHR SPECIMN AEROBIC: CPT | Performed by: STUDENT IN AN ORGANIZED HEALTH CARE EDUCATION/TRAINING PROGRAM

## 2022-06-11 PROCEDURE — 82330 ASSAY OF CALCIUM: CPT | Performed by: INTERNAL MEDICINE

## 2022-06-11 PROCEDURE — 84443 ASSAY THYROID STIM HORMONE: CPT | Performed by: STUDENT IN AN ORGANIZED HEALTH CARE EDUCATION/TRAINING PROGRAM

## 2022-06-11 PROCEDURE — 87077 CULTURE AEROBIC IDENTIFY: CPT | Performed by: STUDENT IN AN ORGANIZED HEALTH CARE EDUCATION/TRAINING PROGRAM

## 2022-06-11 PROCEDURE — 82330 ASSAY OF CALCIUM: CPT | Performed by: STUDENT IN AN ORGANIZED HEALTH CARE EDUCATION/TRAINING PROGRAM

## 2022-06-11 RX ORDER — FUROSEMIDE 10 MG/ML
20 SYRINGE (ML) INJECTION CONTINUOUS
Status: DISCONTINUED | OUTPATIENT
Start: 2022-06-11 | End: 2022-06-11

## 2022-06-11 RX ORDER — FENTANYL CITRATE 50 UG/ML
50 INJECTION, SOLUTION INTRAMUSCULAR; INTRAVENOUS
Status: DISCONTINUED | OUTPATIENT
Start: 2022-06-11 | End: 2022-06-13

## 2022-06-11 RX ORDER — PROPOFOL 10 MG/ML
5-50 INJECTION, EMULSION INTRAVENOUS
Status: DISCONTINUED | OUTPATIENT
Start: 2022-06-11 | End: 2022-06-13

## 2022-06-11 RX ORDER — HEPARIN SODIUM 5000 [USP'U]/ML
5000 INJECTION, SOLUTION INTRAVENOUS; SUBCUTANEOUS EVERY 8 HOURS SCHEDULED
Status: DISCONTINUED | OUTPATIENT
Start: 2022-06-11 | End: 2022-06-24 | Stop reason: HOSPADM

## 2022-06-11 RX ORDER — ACETAMINOPHEN 160 MG/5ML
975 SUSPENSION, ORAL (FINAL DOSE FORM) ORAL ONCE
Status: COMPLETED | OUTPATIENT
Start: 2022-06-11 | End: 2022-06-11

## 2022-06-11 RX ORDER — FUROSEMIDE 10 MG/ML
80 INJECTION INTRAMUSCULAR; INTRAVENOUS ONCE
Status: COMPLETED | OUTPATIENT
Start: 2022-06-11 | End: 2022-06-11

## 2022-06-11 RX ORDER — FENTANYL CITRATE-0.9 % NACL/PF 10 MCG/ML
50 PLASTIC BAG, INJECTION (ML) INTRAVENOUS CONTINUOUS
Status: DISCONTINUED | OUTPATIENT
Start: 2022-06-11 | End: 2022-06-13

## 2022-06-11 RX ORDER — ALBUMIN, HUMAN INJ 5% 5 %
12.5 SOLUTION INTRAVENOUS ONCE
Status: COMPLETED | OUTPATIENT
Start: 2022-06-11 | End: 2022-06-11

## 2022-06-11 RX ADMIN — HEPARIN SODIUM 5000 UNITS: 5000 INJECTION INTRAVENOUS; SUBCUTANEOUS at 05:27

## 2022-06-11 RX ADMIN — Medication 20 MG/HR: at 05:01

## 2022-06-11 RX ADMIN — CALCIUM GLUCONATE 3 G: 98 INJECTION, SOLUTION INTRAVENOUS at 20:07

## 2022-06-11 RX ADMIN — PROPOFOL 30 MCG/KG/MIN: 10 INJECTION, EMULSION INTRAVENOUS at 10:07

## 2022-06-11 RX ADMIN — FENTANYL CITRATE 50 MCG/HR: 50 INJECTION INTRAMUSCULAR; INTRAVENOUS at 17:50

## 2022-06-11 RX ADMIN — PROPOFOL 30 MCG/KG/MIN: 10 INJECTION, EMULSION INTRAVENOUS at 02:03

## 2022-06-11 RX ADMIN — PIPERACILLIN AND TAZOBACTAM 3.38 G: 36; 4.5 INJECTION, POWDER, FOR SOLUTION INTRAVENOUS at 10:07

## 2022-06-11 RX ADMIN — FUROSEMIDE 80 MG: 10 INJECTION, SOLUTION INTRAMUSCULAR; INTRAVENOUS at 01:50

## 2022-06-11 RX ADMIN — FUROSEMIDE 80 MG: 10 INJECTION, SOLUTION INTRAMUSCULAR; INTRAVENOUS at 04:14

## 2022-06-11 RX ADMIN — HEPARIN SODIUM 5000 UNITS: 5000 INJECTION INTRAVENOUS; SUBCUTANEOUS at 21:06

## 2022-06-11 RX ADMIN — PROPOFOL 20 MCG/KG/MIN: 10 INJECTION, EMULSION INTRAVENOUS at 15:01

## 2022-06-11 RX ADMIN — FENTANYL CITRATE 50 MCG/HR: 50 INJECTION INTRAMUSCULAR; INTRAVENOUS at 01:58

## 2022-06-11 RX ADMIN — VANCOMYCIN HYDROCHLORIDE 1750 MG: 1 INJECTION, POWDER, LYOPHILIZED, FOR SOLUTION INTRAVENOUS at 02:04

## 2022-06-11 RX ADMIN — HEPARIN SODIUM 5000 UNITS: 5000 INJECTION INTRAVENOUS; SUBCUTANEOUS at 02:08

## 2022-06-11 RX ADMIN — PIPERACILLIN AND TAZOBACTAM 3.38 G: 36; 4.5 INJECTION, POWDER, FOR SOLUTION INTRAVENOUS at 04:13

## 2022-06-11 RX ADMIN — PROPOFOL 30 MCG/KG/MIN: 10 INJECTION, EMULSION INTRAVENOUS at 06:30

## 2022-06-11 RX ADMIN — Medication 20000 ML: at 17:14

## 2022-06-11 RX ADMIN — ALBUMIN (HUMAN) 12.5 G: 12.5 INJECTION, SOLUTION INTRAVENOUS at 01:50

## 2022-06-11 RX ADMIN — PANTOPRAZOLE SODIUM 40 MG: 40 INJECTION, POWDER, FOR SOLUTION INTRAVENOUS at 08:42

## 2022-06-11 RX ADMIN — PIPERACILLIN AND TAZOBACTAM 3.38 G: 36; 4.5 INJECTION, POWDER, FOR SOLUTION INTRAVENOUS at 16:50

## 2022-06-11 RX ADMIN — HEPARIN SODIUM 5000 UNITS: 5000 INJECTION INTRAVENOUS; SUBCUTANEOUS at 13:32

## 2022-06-11 RX ADMIN — ACETAMINOPHEN 975 MG: 650 SUSPENSION ORAL at 20:55

## 2022-06-11 RX ADMIN — SODIUM BICARBONATE 150 ML/HR: 84 INJECTION, SOLUTION INTRAVENOUS at 01:59

## 2022-06-11 RX ADMIN — FENTANYL CITRATE 50 MCG: 50 INJECTION INTRAMUSCULAR; INTRAVENOUS at 03:03

## 2022-06-11 RX ADMIN — PROPOFOL 15 MCG/KG/MIN: 10 INJECTION, EMULSION INTRAVENOUS at 18:44

## 2022-06-11 NOTE — PLAN OF CARE
Problem: MOBILITY - ADULT  Goal: Maintain or return to baseline ADL function  Description: INTERVENTIONS:  -  Assess patient's ability to carry out ADLs; assess patient's baseline for ADL function and identify physical deficits which impact ability to perform ADLs (bathing, care of mouth/teeth, toileting, grooming, dressing, etc )  - Assess/evaluate cause of self-care deficits   - Assess range of motion  - Assess patient's mobility; develop plan if impaired  - Assess patient's need for assistive devices and provide as appropriate  - Encourage maximum independence but intervene and supervise when necessary  - Involve family in performance of ADLs  - Assess for home care needs following discharge   - Consider OT consult to assist with ADL evaluation and planning for discharge  - Provide patient education as appropriate  Outcome: Progressing  Goal: Maintains/Returns to pre admission functional level  Description: INTERVENTIONS:  - Perform BMAT or MOVE assessment daily    - Set and communicate daily mobility goal to care team and patient/family/caregiver  - Collaborate with rehabilitation services on mobility goals if consulted  - Perform Range of Motion 3 times a day  - Reposition patient every 2 hours    - Dangle patient 3 times a day  - Stand patient 3 times a day  - Ambulate patient 3 times a day  - Out of bed to chair 3 times a day   - Out of bed for meals 3 times a day  - Out of bed for toileting  - Record patient progress and toleration of activity level   Outcome: Progressing     Problem: Prexisting or High Potential for Compromised Skin Integrity  Goal: Skin integrity is maintained or improved  Description: INTERVENTIONS:  - Identify patients at risk for skin breakdown  - Assess and monitor skin integrity  - Assess and monitor nutrition and hydration status  - Monitor labs   - Assess for incontinence   - Turn and reposition patient  - Assist with mobility/ambulation  - Relieve pressure over bony prominences  - Avoid friction and shearing  - Provide appropriate hygiene as needed including keeping skin clean and dry  - Evaluate need for skin moisturizer/barrier cream  - Collaborate with interdisciplinary team   - Patient/family teaching  - Consider wound care consult   Outcome: Progressing     Problem: PAIN - ADULT  Goal: Verbalizes/displays adequate comfort level or baseline comfort level  Description: Interventions:  - Encourage patient to monitor pain and request assistance  - Assess pain using appropriate pain scale  - Administer analgesics based on type and severity of pain and evaluate response  - Implement non-pharmacological measures as appropriate and evaluate response  - Consider cultural and social influences on pain and pain management  - Notify physician/advanced practitioner if interventions unsuccessful or patient reports new pain  Outcome: Progressing     Problem: INFECTION - ADULT  Goal: Absence or prevention of progression during hospitalization  Description: INTERVENTIONS:  - Assess and monitor for signs and symptoms of infection  - Monitor lab/diagnostic results  - Monitor all insertion sites, i e  indwelling lines, tubes, and drains  - Monitor endotracheal if appropriate and nasal secretions for changes in amount and color  - [place] appropriate cooling/warming therapies per order  - Administer medications as ordered  - Instruct and encourage patient and family to use good hand hygiene technique  - Identify and instruct in appropriate isolation precautions for identified infection/condition  Outcome: Progressing  Goal: Absence of fever/infection during neutropenic period  Description: INTERVENTIONS:  - Monitor WBC    Outcome: Progressing     Problem: SAFETY ADULT  Goal: Maintain or return to baseline ADL function  Description: INTERVENTIONS:  -  Assess patient's ability to carry out ADLs; assess patient's baseline for ADL function and identify physical deficits which impact ability to perform ADLs (bathing, care of mouth/teeth, toileting, grooming, dressing, etc )  - Assess/evaluate cause of self-care deficits   - Assess range of motion  - Assess patient's mobility; develop plan if impaired  - Assess patient's need for assistive devices and provide as appropriate  - Encourage maximum independence but intervene and supervise when necessary  - Involve family in performance of ADLs  - Assess for home care needs following discharge   - Consider OT consult to assist with ADL evaluation and planning for discharge  - Provide patient education as appropriate  Outcome: Progressing  Goal: Maintains/Returns to pre admission functional level  Description: INTERVENTIONS:  - Perform BMAT or MOVE assessment daily    - Set and communicate daily mobility goal to care team and patient/family/caregiver  - Collaborate with rehabilitation services on mobility goals if consulted  - Perform Range of Motion 3 times a day  - Reposition patient every 2 hours    - Dangle patient 3 times a day  - Stand patient 3 times a day  - Ambulate patient 3 times a day  - Out of bed to chair 3 times a day   - Out of bed for meals 3 times a day  - Out of bed for toileting  - Record patient progress and toleration of activity level   Outcome: Progressing  Goal: Patient will remain free of falls  Description: INTERVENTIONS:  - Educate patient/family on patient safety including physical limitations  - Instruct patient to call for assistance with activity   - Consult OT/PT to assist with strengthening/mobility   - Keep Call bell within reach  - Keep bed low and locked with side rails adjusted as appropriate  - Keep care items and personal belongings within reach  - Initiate and maintain comfort rounds  - Make Fall Risk Sign visible to staff  - Offer Toileting every 2 Hours, in advance of need  - Initiate/Maintain bed alarm  - Obtain necessary fall risk management equipment: bed alarm  - Apply yellow socks and bracelet for high fall risk patients  - Consider moving patient to room near nurses station  Outcome: Progressing     Problem: DISCHARGE PLANNING  Goal: Discharge to home or other facility with appropriate resources  Description: INTERVENTIONS:  - Identify barriers to discharge w/patient and caregiver  - Arrange for needed discharge resources and transportation as appropriate  - Identify discharge learning needs (meds, wound care, etc )  - Arrange for interpretive services to assist at discharge as needed  - Refer to Case Management Department for coordinating discharge planning if the patient needs post-hospital services based on physician/advanced practitioner order or complex needs related to functional status, cognitive ability, or social support system  Outcome: Progressing     Problem: Knowledge Deficit  Goal: Patient/family/caregiver demonstrates understanding of disease process, treatment plan, medications, and discharge instructions  Description: Complete learning assessment and assess knowledge base  Interventions:  - Provide teaching at level of understanding  - Provide teaching via preferred learning methods  Outcome: Progressing     Problem: Nutrition/Hydration-ADULT  Goal: Nutrient/Hydration intake appropriate for improving, restoring or maintaining nutritional needs  Description: Monitor and assess patient's nutrition/hydration status for malnutrition  Collaborate with interdisciplinary team and initiate plan and interventions as ordered  Monitor patient's weight and dietary intake as ordered or per policy  Utilize nutrition screening tool and intervene as necessary  Determine patient's food preferences and provide high-protein, high-caloric foods as appropriate       INTERVENTIONS:  - Monitor oral intake, urinary output, labs, and treatment plans  - Assess nutrition and hydration status and recommend course of action  - Evaluate amount of meals eaten  - Assist patient with eating if necessary   - Allow adequate time for meals  - Recommend/ encourage appropriate diets, oral nutritional supplements, and vitamin/mineral supplements  - Order, calculate, and assess calorie counts as needed  - Recommend, monitor, and adjust tube feedings and TPN/PPN based on assessed needs  - Assess need for intravenous fluids  - Provide specific nutrition/hydration education as appropriate  - Include patient/family/caregiver in decisions related to nutrition  Outcome: Progressing     Problem: SAFETY,RESTRAINT: NV/NON-SELF DESTRUCTIVE BEHAVIOR  Goal: Remains free of harm/injury (restraint for non violent/non self-detsructive behavior)  Description: INTERVENTIONS:  - Instruct patient/family regarding restraint use   - Assess and monitor physiologic and psychological status   - Provide interventions and comfort measures to meet assessed patient needs   - Identify and implement measures to help patient regain control  - Assess readiness for release of restraint   Outcome: Progressing  Goal: Returns to optimal restraint-free functioning  Description: INTERVENTIONS:  - Assess the patient's behavior and symptoms that indicate continued need for restraint  - Identify and implement measures to help patient regain control  - Assess readiness for release of restraint   Outcome: Progressing

## 2022-06-11 NOTE — PROCEDURES
Arterial Line Insertion    Date/Time: 6/11/2022 1:41 AM  Performed by: Padmini Baker PA-C  Authorized by: Padmini Baker PA-C     Patient location:  ICU and bedside  Other Assisting Provider: No    Consent:     Consent obtained:  Emergent situation  Universal protocol:     Procedure explained and questions answered to patient or proxy's satisfaction: no      Relevant documents present and verified: yes      Test results available and properly labeled: yes      Radiology Images displayed and confirmed  If images not available, report reviewed: yes      Required blood products, implants, devices, and special equipment available: yes      Site/side marked: yes      Immediately prior to procedure a time out was called: yes      Patient identity confirmed:  Arm band and hospital-assigned identification number  Indications:     Indications: continuous blood pressure monitoring    Pre-procedure details:     Skin preparation:  Chlorhexidine    Preparation: Patient was prepped and draped in sterile fashion    Sedation:     Sedation type: Patient on propofol infusion @30  Anesthesia (see MAR for exact dosages): Anesthesia method:  None  Procedure details:     Location / Tip of Catheter:  Femoral    Laterality:  Left    Needle gauge:  18 G    Placement technique:  Ultrasound guided    Ultrasound image availability:  Not saved    Sterile ultrasound techniques: Sterile gel and sterile probe covers were used      Number of attempts:  1    Successful placement: yes      Transducer: waveform confirmed and dampened amplitude    Post-procedure details:     Post-procedure:  Sterile dressing applied and sutured    CMS:  Unable to assess    Patient tolerance of procedure:   Tolerated well, no immediate complications

## 2022-06-11 NOTE — PROCEDURES
Temporary HD Catheter    Date/Time: 6/11/2022 1:37 AM  Performed by: Estrella Urena PA-C  Authorized by: Estrella Urena PA-C     Patient location:  Bedside  Other Assisting Provider: Yes (comment) (Dr Tawana Paige)    Consent:     Consent obtained:  Emergent situation  Universal protocol:     Procedure explained and questions answered to patient or proxy's satisfaction: no      Relevant documents present and verified: yes      Test results available and properly labeled: yes      Radiology Images displayed and confirmed  If images not available, report reviewed: yes      Required blood products, implants, devices, and special equipment available: yes      Site/side marked: yes      Immediately prior to procedure, a time out was called: yes      Patient identity confirmed:  Hospital-assigned identification number and arm band  Pre-procedure details:     Hand hygiene: Hand hygiene performed prior to insertion      Sterile barrier technique: All elements of maximal sterile technique followed      Skin preparation:  2% chlorhexidine    Skin preparation agent: Skin preparation agent completely dried prior to procedure    Indications:     Central line indications: dialysis    Sedation:     Sedation type: On propofol infusion @30  Anesthesia (see MAR for exact dosages):      Anesthesia method:  None  Procedure details:     Location:  Right internal jugular    Vessel type: vein      Laterality:  Right    Approach: percutaneous endoscopic technique used      Patient position:  Trendelenburg    Catheter type:  Double lumen    Catheter size:  13 5 Fr    Catheter length:  16 cm    Landmarks identified: yes      Ultrasound guidance: yes      Ultrasound image availability:  Not saved    Sterile ultrasound techniques: Sterile gel and sterile probe covers were used      Number of attempts:  2    Successful placement: yes    Post-procedure details:     Post-procedure:  Dressing applied and line sutured    Assessment:  Blood return through all ports, no pneumothorax on x-ray, free fluid flow and placement verified by x-ray    Patient tolerance of procedure:   Tolerated well, no immediate complications

## 2022-06-11 NOTE — PROGRESS NOTES
Patient's ABG reviewed and respiratory rate adjusted  Awaiting on chemistry labs from 1:00 a m  [facility] was contacted CPK decreased to 85,000  Patient did receive additional IV fluid/bolus  Patient's urine output has not improved after administration of Lasix  Plan to initiate Lasix infusion and administer 2nd bolus of Lasix  Presently patient is Levophed infusion is running at 6 micrograms/minute  Continue with Levophed to maintain mean arterial pressure greater than 65  Patient has been febrile to greater than 101  He did receive a dose of Tylenol  He continues to be febrile  Due to patient's acute renal failure as well as transaminitis medications for fever reduction may result in worsening hepatic and renal function  Continue with passive cooling and consider addition of cooling blanket versus additional dose of Tylenol  Continue with IV fluids  Patient is not following commands on exam but does appear to move all extremities  He remains encephalopathic      Critical care time 48 minutes

## 2022-06-11 NOTE — QUICK NOTE
Patient reassessed in the afternoon, repeat labs reviewed, rising creatinine, potassium to 5 2  Only made 95 cc of urine since 6:00 a m  Per nurse  Plan to start CVVHD, even UF for now, follow electrolytes and replace as per protocol  Follow daily CK level    Discussed with critical care

## 2022-06-11 NOTE — CONSULTS
Reason for Consult / Principal Problem:Elevated troponin    Physician Requesting Consult:  Renate Stevenson MD    Cardiologist: None        Assessment and Plan      Current Problem List   Principal Problem:    IV drug user  Active Problems:    PTSD (post-traumatic stress disorder)    Hyperkalemia    Cocaine abuse (HCC)    Elevated troponin    Unresponsiveness    JOÃO (acute kidney injury) ([facility] [address] )    Assessment/Plan:    1  Elevated troponin - in the setting of hyperkalemia, seizure-like activity, sepsis, and lactic acidosis  Does have a history of chest pain per daughter, unclear the exact precipitating or relieving factors, no previous ischemic workup or known myocardial infarctions per daughter  At this time given the elevated creatinine, hemodynamic instability would favor medical management, at this time elevated troponin is likely secondary to septic shock, this and seizure-like activity associated with acute kidney injury and rhabdomyolysis  Would obtain echocardiogram  Continue to monitor on telemetry  Possible eventual ischemic workup pending neurological recovery, hemodynamic stability and renal recovery  2   Possible brugada pattern on EKG  No family or personal history of any arrhythmia, likely precipitated by sepsis, at this time favor conservative management, pattern has resolved  3  Recent swelling and shortness of breath   Unclear if this related to rhabdomyolysis and fluid overload secondary to acute kidney injury or this represents heart failure, he has significant risk factors including polysubstance abuse would obtain echocardiogram for now patient remains on a furosemide drip - no evidence of cardiogenic shock  Subjective     CC: elevated troponin  HPI: This is a 78-year-old male a past medical history significant for cocaine abuse, PTSD, hypertension who initially presented to [facility] yesterday secondary to altered mental status    On presentation patient was significantly altered, tachycardic, with lactic acidosis and elevated potassium and acute kidney injury  A CT head was negative for any acute intracranial abnormality, however CT cervical spine showed subcutaneous emphysema in the neck, the patient eventually was intubated and had lactic of 4 6 potassium 8 3, creatinine 3 23  an EKG was obtained at was the 1st 1 notable for ST elevation in V1 V2 repeat EKG showing sinus tachycardia incomplete right bundle branch block at that time  Subsequently, the patient was admitted to intensive care unit, per their documentation the patient recently had a witnessed collapse while with a friend, arrival MICU the patient was sedated, intubated arterial blood class improved lactic acid is trending down, patient is a full code, he works as a , does use IV drugs,, his troponin went from 900-3700, lactic acid this morning is 1 8, he has a leukocytosis, and thrombocytopenia, be his creatinine is 3 9 this morning, he received copious IV fluids UDS was positive for amphetamine, cocaine and THC, a echocardiogram is pending, he has acute rhabdomyolysis with CPK greater than 100,000, he has got a total of 160 mg of Lasix and is on a bicarbonate infusion  The daughter does say that recently the patient had episodes of increased leg swelling, shortness of breath on exertion, intermittent chest pain  No previous ischemic workup  Unclear the exact characteristic factors of the chest pain per the daughter  He has been currently doing at least cocaine per the daughter  Telemetry: Sinus rhythm  Net fluid balance:  +2L          Family History: No family history on file    Historical Information   Past Medical History:   Diagnosis Date    Hepatitis C     PTSD (post-traumatic stress disorder)      Past Surgical History:   Procedure Laterality Date    APPENDECTOMY       Social History   Social History     Substance and Sexual Activity   Alcohol Use No     Social History     Substance and Sexual Activity   Drug Use Yes    Types: Marijuana    Comment: smokes every other day     Social History     Tobacco Use   Smoking Status Current Every Day Smoker    Packs/day: 0 50    Years: 25 00    Pack years: 12 50    Types: Cigarettes   Smokeless Tobacco Never Used     Family History: No family history on file      Review of Systems:  Review of Systems   Unable to perform ROS: Intubated           Scheduled Meds:  Current Facility-Administered Medications   Medication Dose Route Frequency Provider Last Rate    fentaNYL  50 mcg/hr Intravenous Continuous Faby R Axelband, DO 50 mcg/hr (06/11/22 0158)    fentanyl citrate (PF)  50 mcg Intravenous Q4H PRN Joe Aiad, DO      furosemide  20 mg/hr Intravenous Continuous Faby R Axelband, DO 20 mg/hr (06/11/22 0501)    heparin (porcine)  5,000 Units Subcutaneous [facility] Joe Aiad, DO      norepinephrine  1-30 mcg/min Intravenous Titrated Joe Aiad, DO 5 mcg/min (06/11/22 0434)    pantoprazole  40 mg Intravenous Q24H [address] Joe Aiad, DO      piperacillin-tazobactam  3 375 g Intravenous Q6H Joe Aiad, DO Stopped (06/11/22 0501)    propofol  5-50 mcg/kg/min Intravenous Titrated Joe Aiad, DO 30 mcg/kg/min (06/11/22 0630)    sodium bicarbonate infusion  150 mL/hr Intravenous Continuous Faby R Axelband,  mL/hr (06/11/22 0159)    [START ON 6/12/2022] vancomycin  15 mg/kg Intravenous Daily PRN Joe Aiad, DO       Continuous Infusions:fentaNYL, 50 mcg/hr, Last Rate: 50 mcg/hr (06/11/22 0158)  furosemide, 20 mg/hr, Last Rate: 20 mg/hr (06/11/22 0501)  norepinephrine, 1-30 mcg/min, Last Rate: 5 mcg/min (06/11/22 0434)  propofol, 5-50 mcg/kg/min, Last Rate: 30 mcg/kg/min (06/11/22 0630)  sodium bicarbonate infusion, 150 mL/hr, Last Rate: 150 mL/hr (06/11/22 0159)      PRN Meds:   fentanyl citrate (PF)    [START ON 6/12/2022] vancomycin  all current active meds have been reviewed    No Known Allergies    Objective   Vitals: Temp (24hrs), Av 9 °F (37 7 °C), Min:96 3 °F (35 7 °C), Max:101 48 °F (38 6 °C)  Current: Temperature: 100 04 °F (37 8 °C)  Patient Vitals for the past 24 hrs:   BP Temp Temp src Pulse Resp SpO2 Height Weight   22 0600 93/61 100 04 °F (37 8 °C) -- (!) 106 -- 100 % -- --   22 0500 99/58 100 04 °F (37 8 °C) -- (!) 112 -- 99 % -- --   22 0400 103/67 100 4 °F (38 °C) -- (!) 120 -- 100 % -- --   22 0300 98/59 (!) 101 12 °F (38 4 °C) -- (!) 124 -- 100 % -- --   22 0234 -- -- -- (!) 108 -- 98 % -- --   22 0200 109/67 (!) 101 48 °F (38 6 °C) -- (!) 106 -- 99 % -- --   22 0100 105/58 (!) 101 12 °F (38 4 °C) -- 102 -- 98 % -- --   22 0000 104/64 (!) 101 12 °F (38 4 °C) Bladder 104 20 99 % -- --   06/10/22 2300 -- (!) 101 12 °F (38 4 °C) -- 102 -- 98 % -- --   06/10/22 2200 113/81 100 4 °F (38 °C) -- 98 -- 99 % -- --   06/10/22 2100 -- 100 04 °F (37 8 °C) -- 98 -- 99 % -- --   06/10/22 2000 (!) 89/72 99 68 °F (37 6 °C) -- 96 -- 99 % -- --   06/10/22 1913 98/69 99 3 °F (37 4 °C) -- 94 -- 100 % 5' 8" (1 727 m) 81 6 kg (179 lb 14 3 oz)   06/10/22 1910 -- -- -- -- -- 100 % -- --   06/10/22 1909 114/72 99 7 °F (37 6 °C) -- 92 -- -- -- --   06/10/22 1906 140/77 99 3 °F (37 4 °C) -- 78 -- 100 % -- --   06/10/22 1905 161/88 99 3 °F (37 4 °C) -- 72 -- 100 % -- --   06/10/22 1901 -- 99 3 °F (37 4 °C) -- 98 -- 91 % -- --   06/10/22 1857 -- -- -- 100 -- -- -- --   06/10/22 1856 97/74 -- -- -- -- -- -- --    Body mass index is 27 35 kg/m²    Orthostatic Blood Pressures    Flowsheet Row Most Recent Value   Blood Pressure 93/61 filed at 2022 0600   Patient Position - Orthostatic VS Lying filed at 2022 0000              Invasive Devices  Report    Central Venous Catheter Line  Duration           CVC Central Lines 22 Triple <1 day          Peripheral Intravenous Line  Duration           Peripheral IV 06/10/22 Right Forearm <1 day    Peripheral IV 06/10/22 Right Hand <1 day Arterial Line  Duration           Arterial Line 06/11/22 Femoral <1 day          Hemodialysis Catheter  Duration           HD Temporary Double Catheter <1 day          Drain  Duration           NG/OG/Enteral Tube Orogastric 16 Fr Right mouth <1 day    Urethral Catheter Latex 16 Fr  <1 day          Airway  Duration           ETT  7 5 mm <1 day                Physical Exam:  Physical Exam  Constitutional:       Comments: Intubated   Cardiovascular:      Rate and Rhythm: Normal rate and regular rhythm  Pulmonary:      Comments: Intubated and sedated mechanical breath sounds  Abdominal:      General: There is no distension  Musculoskeletal:         General: No swelling     Psychiatric:         Mood and Affect: Mood normal              Lab Results:   Results from last 7 days   Lab Units 06/11/22  0506 06/10/22  2051 06/10/22  1027   WBC Thousand/uL 13 03* 17 04* 22 00*   HEMOGLOBIN g/dL 13 9 18 3* 17 7*   HEMATOCRIT % 39 9 54 8* 51 1*   PLATELETS Thousands/uL 134* 213  213 258   NEUTROS PCT % 80*  --  87*   MONOS PCT % 4  --  8      Results from last 7 days   Lab Units 06/11/22  0506 06/11/22  0110 06/10/22  2051 06/10/22  1915 06/10/22  1559 06/10/22  1027   SODIUM mmol/L 142  142 144  --  139 139 138   POTASSIUM mmol/L 4 3  4 2 4 3  --  7 3* 6 6* 8 3*   CHLORIDE mmol/L 108  107 110*  --  107 104 98   CO2 mmol/L 22 22 22  --  23 22 25   BUN mg/dL 54*  55* 47*  --  40* 37* 33*   CREATININE mg/dL 3 89*  3 90* 3 49*  --  2 91* 2 93* 3 23*   CALCIUM mg/dL 7 2*  7 1* 7 6*  --  7 3* 7 3* 8 3*   ALK PHOS U/L 55  --   --  77  --  82   ALT U/L 2,624*  --   --  893*  --  195*   AST U/L 6,260*  --   --  2,717*  --  350*   MAGNESIUM mg/dL 2 4  --   --  2 8*  --   --    PHOSPHORUS mg/dL 4 6* 4 0  --  6 5*  --   --    INR   --   --  1 22*  --   --   --    PTT seconds  --   --  33  33  --   --   --    EGFR ml/min/1 73sq m 16  16 18  --  23 23 20     Results from last 7 days   Lab Units 06/10/22  2051   INR  1 22* PTT seconds 33  33     Results from last 7 days   Lab Units 06/11/22  0413   LACTIC ACID mmol/L 1 8         Lab Results   Component Value Date    PHART 7 480 (H) 06/11/2022    VTT0STT 28 9 (LL) 06/11/2022    PO2ART 109 2 06/11/2022    SVX2TZW 21 0 (L) 06/11/2022    BEART -1 2 06/11/2022    SOURCE Line, Arterial 06/11/2022     No components found for: HIV1X2  No results found for: HAV, HEPAIGM, HEPBIGM, HEPBCAB, HBEAG, HEPCAB  No results found for: SPEP, UPEP No results found for: HGBA1C  No results found for: CHOL No results found for: HDL No results found for: LDLCALC No results found for: TRIG  No components found for: PROCAL          Imaging: I have personally reviewed pertinent reports

## 2022-06-11 NOTE — RESPIRATORY THERAPY NOTE
pt remains stable on S-CMV settings  decreased RR 20 and advanced e-t tube 2cm per xray and MD request  will monitor

## 2022-06-11 NOTE — PROCEDURES
Central Line Insertion    Date/Time: 6/10/2022 11:07 PM  Performed by: Araceli Sinclair DO  Authorized by: Araceli Sinclair DO     Consent:     Consent obtained:  Verbal    Consent given by: patients daughter  Risks discussed:  Pneumothorax, infection, bleeding and incorrect placement  Universal protocol:     Immediately prior to procedure, a time out was called: yes      Patient identity confirmed:  Arm band  Pre-procedure details:     Hand hygiene: Hand hygiene performed prior to insertion      Sterile barrier technique: All elements of maximal sterile technique followed      Skin preparation:  2% chlorhexidine and ChloraPrep  Indications:     Central line indications: medications requiring central line and hemodynamic monitoring    Sedation:     Sedation type: Sedated on ventilator  Anesthesia (see MAR for exact dosages): Anesthesia method:  None  Procedure details:     Location:  Left subclavian    Vessel type: vein      Laterality:  Left    Approach: percutaneous technique used      Patient position:  Reverse Trendelenburg    Catheter type:  Triple lumen 20cm    Landmarks identified: yes      Ultrasound guidance: no      Manometry confirmation: no      Number of attempts:  1    Successful placement: yes    Post-procedure details:     Post-procedure:  Dressing applied and line sutured    Assessment:  Blood return through all ports and no pneumothorax on x-ray    Patient tolerance of procedure:   Tolerated well, no immediate complications  Comments:      Procedure does not include critical care time

## 2022-06-11 NOTE — PROGRESS NOTES
Daily Progress Note - Critical Care   Michelle Wright 46 y o  male MRN: [id number]  [facility]: [id number]      Summary:   Michelle Wright is a 46 y o  male   · with PMH remarkable for: IV drug use, hepatitis C, PTSD and nicotine smoking   · had witnessed collapse while with a friend, deemed shaky and the friend noted that there was some blood unclear source if hemoptysis/hematemisis or other, then patient collapsed and EMS was called who took him to the ED  · found to have hyperkalemic K 8 3, troponin 485 > 974, LA 4 6, ABG 7 2, 43 5, 131, 17 0 & U cocaine +,  · was intubated in ED for airway protection, given Ca Gluconate, Insulin, Dextrose, 2 L Isolyte + 1 L NS +  cc/hr started, BP remained low, was started on Levo, and brought from ED to MICU  · On arrival to the MICU, sedated, intubated, ABG improved, LA trending down 2 6, HS Trop trending up, bedside echo and chest xray both unremarkable, on levo 6, propofol 20,  cc/hr, volume control 24/500/6/40, followed commands on holding propofol during exam    · Daughter who is next of kin, son and his girlfriend (do not live together) came into bedside, confirmed full code, consented for PICC, A line, dialysis,+/- transfusion if needed     · Family reported patient noted not feeling well x 1-2 weeks, "some" nausea and vomiting; works as  and carry "tires" outdoor with extensive sweating/dehydration reported, confirmed drug abuse including iv injection, no allergy (tylenol was in error reported as allergy in chart), updated and agreeable     ----------------------------------------------------------------------------------------  HPI/24hr events: no acute events overnight; sedated and intubated; PICC, temporary port cath and arterial line placed overnight    ---------------------------------------------------------------------------------------  SUBJECTIVE  Review of Systems  Review of systems was unable to be performed secondary to Sedated and intubated  ---------------------------------------------------------------------------------------  Assessment and Plan:    Neuro:   · Diagnosis: non-traumatic collapse / unresponsiveness   ? Likely due to cocaine & Meth intoxication, possible arrhythmia, hyperkalemia, and questionable if had seizure vs tremors as reported by the witness friend prior to collapsing  ? PLAN: sedated and intubated for airway protection ; on Propofol; PRN Fentanyl and Tylenol  ? BPS score 3, no pain       · Diagnosis: hx PTSD   ? Plan: not on psych medication at home, per family  ? Will continue to monitor       CV:   · Diagnosis: Elevated Troponin   ? Plan: likely type II supply/demand due to cocaine use and suspected arrhythmia   ? EKG, initially with peaked T waves; sinus tachy 100 BPM with PVCs,   ? Will continue to trend troponin per protocol, with repeate EKG  ? Bedside echo done overall unremarkable; Xray chest unremarkable  ? Complete Echo ordered  ? Cardiology consulted       · Diagnosis: r/o cardiogenic shock   ? Plan: BP running low requiring levophed in ED & MICU   ? Wean down as appropriate ; was 9 >> 6 mcg/min  ? IV fluids : s/p 3 L boluses and following with infusion ; in/out  ? ECHO ordered   ? Cardiology consulted  ? Close BP monitoring; on tele       Pulm:  · Diagnosis: failure to protect Airway d/t AMS   ? Plan: intubated ; on volume control 24/500/6/40  ? Repeate ABG improved; Xray chest unremarkable, tube in place; CTAB        GI:   · Diagnosis: Nausea and vomiting  ? Reported per family as recent symptoms over past 1-2 weeks  ? Differential include but not limited to GI infection, d/t drug abuse; electrolyte abnormality, GERD  ? The friend witnessed the collapse noted blood at site of unclear source, GI!   ? Currently intubated; OG tube in place  ?  Prophylactic IV Protonix 40 mg QD      :   · Diagnosis: Acute Kidney Injury +/- Rhabdomyolysis (d/t reported tremors & collapse on floor)     · JOÃO likely multifactorial; cardiorenal in context of low BP/arrhythmia, drug abuse/intoxication; baseline Cr 0 9-1 0; Cr on admission 3 23 with BUN 33 ; torres in, producing urine, dark yellow;   · R/o UTI / urosepsis; UA + blood, protein and innumerable bacteria; nitrite and leukocytes negative; leukocytosis and low BP requiring levo  ? Plan: IV fluids ; 3 L boluses + 150 cc/hr after; avoid hypotension/ BP fluctuation; on Levophed  ? Avoid nephrotoxic agents as possible  ? In / Out ; torres in    ? Was started on Zosyn in ED ; Blood cultures & MRSA swab collected and sent in; will continue Zosyn + Vancomycin  ? Recheck BMP and CBC , continue monitor vitals          F/E/N:   · Diagnosis: Hyperkalemia   ? Plan: s/p dextrose , insulin and Ca gluconate  ? Repeate BMP and manage as indicated      · Diagnosis:Hypocalcemia    ? Plan: s/p Ca Gluconate   · Diagnosis: Elevated Anion Gap Acidosis; AG 15; HCO3 25; with Delta Delta gradient 7 ; consistent with Metabolic acidosis w pre-exist elevated bicarb   ? Plan: BMP q6H ; Sodium Bicarb  · Diagnosis:Hypocalcemia    ? Plan: s/p Calcium Gluconate; recheck and manage      Heme/Onc:   · Diagnosis: Leukocytosis  ? Likely reactive vs infection ; see ID   ? Will recheck CBC         Endo:   · Diagnosis: no active issue; glucose on admission 87 and repeate WNL ; will check TSH      ID:   · Diagnosis: r/o septic shock ; hx IV drug use, leukocytosis and low BP requiring levo  · UA innumerable bacteria but negative nitrite and leukocytes   ? Plan: repeate blood cultures collected and sent in; MRSA swab in process   ? Was given 1 dose Zosyn in ED; will continue Zosyn and Vancomycin for now   ?  Trend CBC and monitor vitals      MSK/Skin:   Diagnosis: no acute issue ; frequent turn and reposition           Patient appropriate for transfer out of the ICU today?: No  Disposition: Admit to Critical Care   Code Status: No Order  ---------------------------------------------------------------------------------------  ICU CORE MEASURES    Prophylaxis   VTE Pharmacologic Prophylaxis: Heparin  VTE Mechanical Prophylaxis: sequential compression device  Stress Ulcer Prophylaxis: Pantoprazole IV     ABCDE Protocol (if indicated)  Plan to perform spontaneous awakening trial today? Yes  Plan to perform spontaneous breathing trial today? Yes  Obvious barriers to extubation? Yes  CAM-ICU: Negative    Invasive Devices Review  Invasive Devices  Report    Peripheral Intravenous Line  Duration           Peripheral IV Left Antecubital -- days    Peripheral IV 06/10/22 Right Forearm <1 day    Peripheral IV 06/10/22 Right Hand <1 day          Drain  Duration           Urethral Catheter Latex 16 Fr  <1 day          Airway  Duration           ETT  7 5 mm <1 day              Can any invasive devices be discontinued today? No  ---------------------------------------------------------------------------------------  OBJECTIVE    Vitals   Vitals:    06/10/22 1909 06/10/22 1910 06/10/22 1913 22 0000   BP: 114/72  98/69 104/64   Pulse: 92  94 104   Temp: 99 7 °F (37 6 °C)  99 3 °F (37 4 °C) (!) 101 12 °F (38 4 °C)   SpO2:  100% 100% 99%   Weight:   81 6 kg (179 lb 14 3 oz)    Height:   5' 8" (1 727 m)      Temp (24hrs), Av 2 °F (37 3 °C), Min:96 3 °F (35 7 °C), Max:101 12 °F (38 4 °C)  Current: Temperature: (!) 101 12 °F (38 4 °C)      Respiratory:  SpO2: SpO2: 99 %       Invasive/non-invasive ventilation settings   Respiratory  Report   Lab Data (Last 4 hours)    None         O2/Vent Data (Last 4 hours)    None                Physical Exam  Constitutional:       Comments: Sedated and intubated ; BPS 3 , no pain    HENT:      Head: Normocephalic and atraumatic  Comments: Long beared       Right Ear: External ear normal       Left Ear: External ear normal       Nose: Nose normal       Mouth/Throat:      Mouth: Mucous membranes are dry     Eyes: General: No scleral icterus  Conjunctiva/sclera: Conjunctivae normal       Pupils: Pupils are equal, round, and reactive to light  Cardiovascular:      Rate and Rhythm: Regular rhythm  Tachycardia present  Pulses: Normal pulses  Heart sounds: No murmur heard  No gallop  Comments: Mild sinus tachy ; feet felt cold on arrival   Pulmonary:      Breath sounds: No stridor  No wheezing  Comments: Sedated and intubated; CTAB   Abdominal:      General: Abdomen is flat  Bowel sounds are normal  There is no distension  Palpations: Abdomen is soft  Musculoskeletal:         General: No swelling  Right lower leg: No edema  Left lower leg: No edema  Skin:     Capillary Refill: Capillary refill takes less than 2 seconds     Neurological:      Comments: Sedated; was able to follow simple commands on weaning down the sedation ; moved all 4 extremities        Laboratory and Diagnostics:  Results from last 7 days   Lab Units 06/10/22  2051 06/10/22  1027   WBC Thousand/uL  --  22 00*   HEMOGLOBIN g/dL  --  17 7*   HEMATOCRIT %  --  51 1*   PLATELETS Thousands/uL 213 258   NEUTROS PCT %  --  87*   MONOS PCT %  --  8     Results from last 7 days   Lab Units 06/10/22  1915 06/10/22  1559 06/10/22  1027   SODIUM mmol/L 139 139 138   POTASSIUM mmol/L 7 3* 6 6* 8 3*   CHLORIDE mmol/L 107 104 98   CO2 mmol/L 23 22 25   ANION GAP mmol/L 9 13 15*   BUN mg/dL 40* 37* 33*   CREATININE mg/dL 2 91* 2 93* 3 23*   CALCIUM mg/dL 7 3* 7 3* 8 3*   GLUCOSE RANDOM mg/dL 119 125 86   ALT U/L 893*  --  195*   AST U/L 2,717*  --  350*   ALK PHOS U/L 77  --  82   ALBUMIN g/dL 3 2*  --  4 5   TOTAL BILIRUBIN mg/dL 1 12*  --  0 85     Results from last 7 days   Lab Units 06/10/22  1915   MAGNESIUM mg/dL 2 8*   PHOSPHORUS mg/dL 6 5*      Results from last 7 days   Lab Units 06/10/22  2051   INR  1 22*   PTT seconds 33  33          Results from last 7 days   Lab Units 06/10/22  2255 06/10/22  1915 06/10/22  1700 06/10/22  1357 06/10/22  1113   LACTIC ACID mmol/L 3 2* 2 6* 2 8* 3 3* 4 6*     ABG:  Results from last 7 days   Lab Units 06/10/22  2007   PH ART  7 400   PCO2 ART mm Hg 30 6*   PO2 ART mm Hg 116 0   HCO3 ART mmol/L 18 5*   BASE EXC ART mmol/L -4 9   ABG SOURCE  Line, Arterial     VBG:  Results from last 7 days   Lab Units 06/10/22  2007   ABG SOURCE  Line, Arterial           Micro  Results from last 7 days   Lab Units 06/10/22  2109 06/10/22  1113   BLOOD CULTURE  Received in Microbiology Lab  Culture in Progress  Received in Microbiology Lab  Culture in Progress  Received in Microbiology Lab  Culture in Progress  Received in Microbiology Lab  Culture in Progress  EKG: sinus rhythm ; peaked T waves improved on repeate ; RBBB  Imaging:  I have personally reviewed pertinent reports  Intake and Output  I/O       06/09 0701  06/10 0700 06/10 0701  06/11 0700    I V  (mL/kg)  617 4 (7 6)    IV Piggyback  200    Total Intake(mL/kg)  817 4 (10)    Urine (mL/kg/hr)  350    Total Output  350    Net  +467 4              UOP: oliguric     Height and Weights   Height: 5' 8" (172 7 cm)  IBW (Ideal Body Weight): 68 4 kg  Body mass index is 27 35 kg/m²    Weight (last 2 days)     Date/Time Weight    06/10/22 1913 81 6 (179 9)            Nutrition    TF N/A       Active Medications  Scheduled Meds:  Current Facility-Administered Medications   Medication Dose Route Frequency Provider Last Rate    albumin human  12 5 g Intravenous Once [employer], DO      fentaNYL  50 mcg/hr Intravenous Continuous Faby Ortiz DO      fentanyl citrate (PF)  50 mcg Intravenous Q4H PRN Joe Aiad, DO      furosemide  80 mg Intravenous Once [employer], DO      heparin (porcine)  5,000 Units Subcutaneous [facility] Joe Aiad, DO      norepinephrine  1-30 mcg/min Intravenous Titrated Joe Aiad, DO 6 mcg/min (06/10/22 1930)    pantoprazole  40 mg Intravenous Q24H [address] Joe Aiad, DO      piperacillin-tazobactam 3 375 g Intravenous Q6H Joe Aiad, DO Stopped (06/11/22 0000)    propofol  5-50 mcg/kg/min Intravenous Titrated Joe Aiad, DO 30 mcg/kg/min (06/10/22 2259)    sodium bicarbonate infusion  150 mL/hr Intravenous Continuous Faby Ortiz, DO      [START ON 6/12/2022] vancomycin  15 mg/kg Intravenous Daily PRN Joe Aiad, DO      vancomycin  20 mg/kg Intravenous Once Joe Aiad, DO       Continuous Infusions:  fentaNYL, 50 mcg/hr  norepinephrine, 1-30 mcg/min, Last Rate: 6 mcg/min (06/10/22 1930)  propofol, 5-50 mcg/kg/min, Last Rate: 30 mcg/kg/min (06/10/22 2259)  sodium bicarbonate infusion, 150 mL/hr      PRN Meds:   fentanyl citrate (PF), 50 mcg, Q4H PRN  [START ON 6/12/2022] vancomycin, 15 mg/kg, Daily PRN        Allergies   No Known Allergies  ---------------------------------------------------------------------------------------  Advance Directive and Living Will:      Power of :    POLST:    ---------------------------------------------------------------------------------------    Samanta Willis DO   Internal Medicine - PGY2  [place]      Portions of the record may have been created with voice recognition software  Occasional wrong word or "sound a like" substitutions may have occurred due to the inherent limitations of voice recognition software    Read the chart carefully and recognize, using context, where substitutions have occurred

## 2022-06-11 NOTE — PROGRESS NOTES
Vancomycin Assessment    Pravin Arredondo is a 46 y o  male who is currently receiving vancomycin 1750 mg IV once for bacteremia     Relevant clinical data and objective history reviewed:  Creatinine   Date Value Ref Range Status   06/10/2022 2 91 (H) 0 60 - 1 30 mg/dL Final     Comment:     Standardized to IDMS reference method   06/10/2022 2 93 (H) 0 60 - 1 30 mg/dL Final     Comment:     Standardized to IDMS reference method   06/10/2022 3 23 (H) 0 60 - 1 30 mg/dL Final     Comment:     Standardized to IDMS reference method   11/15/2015 1 01 0 60 - 1 30 mg/dL Final     Comment:     Standardized to IDMS reference method   02/28/2014 0 53 (L) 0 60 - 1 30 mg/dL Final     Comment:     Standardized to IDMS reference method     /64   Pulse 104   Temp (!) 101 12 °F (38 4 °C)   Ht 5' 8" (1 727 m)   Wt 81 6 kg (179 lb 14 3 oz)   SpO2 99%   BMI 27 35 kg/m²   No intake/output data recorded  Lab Results   Component Value Date/Time    BUN 40 (H) 06/10/2022 07:15 PM    BUN 16 11/15/2015 06:19 PM    WBC 22 00 (H) 06/10/2022 10:27 AM    WBC 11 54 (H) 11/15/2015 06:19 PM    HGB 17 7 (H) 06/10/2022 10:27 AM    HGB 15 4 11/15/2015 06:19 PM    HCT 51 1 (H) 06/10/2022 10:27 AM    HCT 44 2 11/15/2015 06:19 PM     (H) 06/10/2022 10:27 AM    MCV 95 11/15/2015 06:19 PM     06/10/2022 08:51 PM     11/15/2015 06:19 PM     Temp Readings from Last 3 Encounters:   06/11/22 (!) 101 12 °F (38 4 °C)   06/10/22 99 1 °F (37 3 °C) (Bladder)   08/25/21 98 3 °F (36 8 °C) (Oral)     Vancomycin Days of Therapy: 1    Assessment/Plan  The patient is currently on vancomycin utilizing pulse dosing based on actual body weight  Baseline risks associated with therapy include: pre-existing renal impairment, concomitant nephrotoxic medications, and dehydration  The patient is currently receiving 1750 mg IV once and after clinical evaluation will be changed to 1250 mg IV qd prn when level <20    Pharmacy will also follow closely for s/sx of nephrotoxicity, infusion reactions, and appropriateness of therapy  BMP and CBC will be ordered per protocol  Plan for trough as patient approaches steady state, prior to the other  dose at approximately random drawn 6/11/22 at 13:00  Due to infection severity, will target a trough of 15-20 (appropriate for most indications)   Pharmacy will continue to follow the patients culture results and clinical progress daily      Lili Rodgers, Pharmacist

## 2022-06-11 NOTE — PROGRESS NOTES
The patients standard-infusion Piperacillin-Tazobactam / Zosyn (infused over 30-60 minutes) has been converted to extended-infusion (infused over 4 hours) per [facility] Extended-Infusion Piperacillin-Tazobactam Protocol for Adults as approved by the Pharmacy and Therapeutics Committee (accessible here on MyNET)  Initiated on Q12H dosing  If started on CRRT, frequency should be increased to Q8H      The patient met ALL eligible criteria:    Age >= 25years old   Critical Care patient    And did NOT have ANY exclusions:     Emergency Department or Operating Room patient  Drug incompatibilities that could NOT be avoided with timing or separate line administration    The following are reminders for Nursing regarding administration:  Infuse the first dose of Zosyn over 30min as a load (if new start), and then all subsequent doses will be given as an extended-infusion over 4 hours (see dosing below)  Use primary tubing as an intermittent infusion; change out primary tubing every 24 hours   Ensure full dose of the medication is given at the appropriate rate  Most incompatible drugs can be scheduled during times when the Zosyn is not being infused; however, if one requires administration during the same time, a separate site or lumen MUST be used  If access is limited and an incompatible medication urgently needs to be given, the Zosyn extended-infusion can be held for up to 30min (remember to flush line before/after)  Extended-infusion Zosyn does NOT require special timing around hemodialysis (it can even be given simultaneously)  If a patient needs an urgent MRI while Zosyn is infusing and there is not a MRI-compatible pump available for use, finish the infusion over the traditional length (30min) and ask Pharmacy to reschedule the next doses so that they start a few hours earlier  Pharmacy will assist nursing in troubleshooting other administration issues as they arise  Dosing for Piperacillin-Tazobactam  CrCl (mL/min) Traditional Dosing Extended-Infusion Dosing #   CrCl > 40 High-Dose  CrCl > 40 Low-Dose 4 5g Q6H (over 30min)  3 375g IV Q6H (over 30min) 3 375g IV Q8H (over 4hr)*    *1st dose loaded over 30min, then start extended-infusion dosing 4hr later   CrCl 20-40 High-Dose  CrCl 20-40 Low-Dose 3 375g IV Q6H (over 30min)  2 25g IV Q6H (over 30min)    CrCl < 20 High-Dose  CrCl < 20 Low-Dose 2 25g IV Q6H (over 30min)  2 25g IV Q8H (over 30min) 3 375g IV Q12H (over 4hr)*    *1st dose loaded over 30min, then start extended-infusion dosing 6hr later   Hemo/Peritoneal Dialysis High-Dose  Hemo/Peritoneal Dialysis Low-Dose 2 25g IV Q6H (over 30min)  2 25g IV Q8H (over 30min)    CVVH/D High-Dose  CVVH/D Low-Dose 3 375g IV Q6H (over 30min)  2 25 IV Q6H (over 30min) 3 375g IV Q8H (over 4hr)*    *1st dose loaded over 30min, then start extended-infusion dosing 4hr later   # = Use 4 5g dosing (same interval) if morbidly obese (BMI ?40)    Please call the Pharmacy with any questions or concerns     Renaldo Snyder PharmD  Emergency Medicine Clinical Pharmacist    or [place]

## 2022-06-11 NOTE — CONSULTS
Consultation - Nephrology   Bailee Maier 46 y o  male MRN: [id number]  Unit/Bed#: MICU 04 Encounter: [id number]    ASSESSMENT and PLAN:  1  Acute kidney injury (POA) suspected secondary to ATN in the setting of rhabdomyolysis, shock  Continue with current regimen management  Continue with intravenously fluid resuscitation and Lasix drip to force diuresis  Monitor closely ins and outs, follow serial labs  Noted serum potassium improved  No absolute urgent indication for dialysis at this moment, discussed with patient's daughter Kvng Nguyen, risks and benefits of dialysis were reviewed, she expressed understanding and gave me verbal consent over the phone for CRRT when needed  Will continue with close follow-up  If urine output decreases and or kidney function worsens then will proceed to start CRRT    2  Hyperkalemia on admission in the setting of acute renal failure, acidosis, improving with medical treatment  Follow serial labs  Low threshold to start CRRT    3  Rhabdomyolysis, continue with current medical management, continue with aggressive intravenously fluid resuscitation, continue with Lasix to force diuresis, follow CK levels    4  Shock, suspected infection versus hypovolemic versus vaso plegia, continue with inotropics/pressor support to keep map more than 65 mmHg    5  Transaminitis suspected secondary to rhabdomyolysis    6  Severe metabolic acidosis in admission, improving with bicarb drip, follow serial labs    7  Nontraumatic collapse/unresponsiveness, cocaine and meth intoxication, further management per critical care team      SUMMARY OF RECOMMENDATIONS:  Continue with current medical management with intravenously fluid resuscitation and Lasix drip to for diuresis  Noted acidosis and hyperkalemia are improving  Monitor closely ins and outs and follow serial labs  Low threshold to start CRRT  Discussed with patient's daughter and got verbal consent for CRRT when needed    My plan and recommendations were discussed with critical care team      HISTORY OF PRESENT ILLNESS:  Requesting Physician: Shane Nunes MD  Reason for Consult:  Acute kidney injury, hyperkalemia, acidosis, shock    Olga Contreras is a 46 y o  male who was admitted to [facility] after presenting via EMS after patient collapsed  A renal consultation is requested today for assistance in the management of acute renal failure, hyperkalemia, acidosis  Information obtained after reviewed patient H&P  Patient had a witnessed collapse/syncopal while at friend's house or to the emergency department, initially was responsive and following commands neurology status declined and he was intubated, found to be on acute renal failure with significant hyperkalemia and acidosis     Admitted to the intensive care unit, started with intravenously fluid resuscitation and medical treatment for hyperkalemia  During my evaluation patient is intubated, sedated, ROS unable to be obtained  PAST MEDICAL HISTORY:  Past Medical History:   Diagnosis Date    Hepatitis C     PTSD (post-traumatic stress disorder)        PAST SURGICAL HISTORY:  Past Surgical History:   Procedure Laterality Date    APPENDECTOMY         SOCIAL HISTORY:  Social History     Substance and Sexual Activity   Alcohol Use No     Social History     Substance and Sexual Activity   Drug Use Yes    Types: Marijuana    Comment: smokes every other day     Social History     Tobacco Use   Smoking Status Current Every Day Smoker    Packs/day: 0 50    Years: 25 00    Pack years: 12 50    Types: Cigarettes   Smokeless Tobacco Never Used       FAMILY HISTORY:  No family history on file      ALLERGIES:  No Known Allergies    MEDICATIONS:    Current Facility-Administered Medications:     fentaNYL 1000 mcg in sodium chloride 0 9% 100mL infusion, 50 mcg/hr, Intravenous, Continuous, Faby Ortiz DO, Last Rate: 5 mL/hr at 06/11/22 0158, 50 mcg/hr at 06/11/22 0158    fentanyl citrate (PF) 100 MCG/2ML 50 mcg, 50 mcg, Intravenous, Q4H PRN, Joe Aiad, DO, 50 mcg at 06/11/22 0303    furosemide (LASIX) 500 mg infusion 50 mL, 20 mg/hr, Intravenous, Continuous, Faby Ortiz, DO, Last Rate: 2 mL/hr at 06/11/22 0501, 20 mg/hr at 06/11/22 0501    heparin (porcine) subcutaneous injection 5,000 Units, 5,000 Units, Subcutaneous, Q8H [address], Joe Aiad, DO, 5,000 Units at 06/11/22 0527    norepinephrine (LEVOPHED) 4 mg (STANDARD CONCENTRATION) IV in sodium chloride 0 9% 250 mL, 1-30 mcg/min, Intravenous, Titrated, Joe Aiad, DO, Last Rate: 18 8 mL/hr at 06/11/22 0434, 5 mcg/min at 06/11/22 0434    pantoprazole (PROTONIX) injection 40 mg, 40 mg, Intravenous, Q24H [address], Joe Aiad, DO, 40 mg at 06/11/22 0842    piperacillin-tazobactam (ZOSYN) 3 375 g in sodium chloride 0 9 % 100 mL IVPB, 3 375 g, Intravenous, Q6H, Joe Aiad, DO, Stopped at 06/11/22 0501    propofol (DIPRIVAN) 1000 mg in 100 mL infusion (premix), 5-50 mcg/kg/min, Intravenous, Titrated, Joe Aiad, DO, Last Rate: 14 3 mL/hr at 06/11/22 0630, 30 mcg/kg/min at 06/11/22 0630    sodium bicarbonate 150 mEq in dextrose 5 % 1,000 mL infusion, 150 mL/hr, Intravenous, Continuous, Faby Ortiz, DO, Last Rate: 150 mL/hr at 06/11/22 0159, 150 mL/hr at 06/11/22 0159    [START ON 6/12/2022] vancomycin (VANCOCIN) 1,250 mg in sodium chloride 0 9 % 250 mL IVPB, 15 mg/kg, Intravenous, Daily PRN, Joe Aiad, DO    REVIEW OF SYSTEMS:  Unable to be obtained given patient is intubated and sedated    PHYSICAL EXAM:  Current Weight: Weight - Scale: 81 2 kg (179 lb)  First Weight: Weight - Scale: 81 6 kg (179 lb 14 3 oz)  Vitals:    06/11/22 0400 06/11/22 0500 06/11/22 0600 06/11/22 0720   BP: 103/67 99/58 93/61 93/61   BP Location:       Pulse: (!) 120 (!) 112 (!) 106 (!) 106   Resp:       Temp: 100 4 °F (38 °C) 100 04 °F (37 8 °C) 100 04 °F (37 8 °C)    TempSrc:       SpO2: 100% 99% 100%    Weight:    81 2 kg (179 lb)   Height: 5' 8" (1 727 m)       Intake/Output Summary (Last 24 hours) at 6/11/2022 0908  Last data filed at 6/11/2022 0600  Gross per 24 hour   Intake 2892 49 ml   Output 575 ml   Net 2317 49 ml     General:  Intubated and sedated, in not acute distress  Eyes:  Eyes are closed  ENT:  ET tube and OG tube in place  Neck: supple, no JVD  Chest:  Coarse breath sounds bilateral, on ventilator  CVS:  Tachycardic  Abdomen: soft, non-tender, non-distended, normoactive bowel sounds  Extremities: no significant edema of both legs  Skin: no rash  Neuro:  Intubated and sedated  Temporary HD catheter in place  Genitourinary Marsh catheter in place        Invasive Devices:   Urethral Catheter Latex 16 Fr   (Active)   Reasons to continue Urinary Catheter  Accurate I&O assessment in critically ill patients (48 hr  max) 06/11/22 0400   Goal for Removal Voiding trial when ambulation improves 06/11/22 0400   Site Assessment Clean;Skin intact 06/11/22 0400   Marsh Care Done 06/11/22 0400   Collection Container Standard drainage bag 06/11/22 0400   Securement Method Securing device (Describe) 06/11/22 0400   Output (mL) 100 mL 06/11/22 0600       Lab Results:   Results from last 7 days   Lab Units 06/11/22  0506 06/11/22  0110 06/10/22  2051 06/10/22  1915 06/10/22  1559 06/10/22  1027   WBC Thousand/uL 13 03*  --  17 04*  --   --  22 00*   HEMOGLOBIN g/dL 13 9  --  18 3*  --   --  17 7*   HEMATOCRIT % 39 9  --  54 8*  --   --  51 1*   PLATELETS Thousands/uL 134*  --  213  213  --   --  258   SODIUM mmol/L 142  142 144  --  139   < > 138   POTASSIUM mmol/L 4 3  4 2 4 3  --  7 3*   < > 8 3*   CHLORIDE mmol/L 108  107 110*  --  107   < > 98   CO2 mmol/L 22 22 22  --  23   < > 25   BUN mg/dL 54*  55* 47*  --  40*   < > 33*   CREATININE mg/dL 3 89*  3 90* 3 49*  --  2 91*   < > 3 23*   CALCIUM mg/dL 7 2*  7 1* 7 6*  --  7 3*   < > 8 3*   MAGNESIUM mg/dL 2 4  --   --  2 8*  --   --    PHOSPHORUS mg/dL 4 6* 4 0  --  6 5*  --   --    ALK PHOS U/L 55  --   --  77  --  82   ALT U/L 2,624*  --   --  893*  --  195*   AST U/L 6,260*  --   --  2,717*  --  350*    < > = values in this interval not displayed  Other Studies:   CK level more than 100,000 on 06/10    Imaging studies reviewed    Portions of the record may have been created with voice recognition software  Occasional wrong word or "sound a like" substitutions may have occurred due to the inherent limitations of voice recognition software  Read the chart carefully and recognize, using context, where substitutions have occurred  If you have any questions, please contact the dictating provider

## 2022-06-11 NOTE — PROGRESS NOTES
I have personally seen and examined patient and reviewed all data with resident  Agree with note, assessment and plan  Critical care time 123 minutes  Please refer to attending comments below  Critical care time does not include procedures, family meeting or teaching  Patient had a witnessed collapse/syncopal episode while at a friend's house  He was reported to be shaking possible seizure-like activity  Patient did have blood from his mouth it was unclear if he had hemoptysis or hematemesis or oral injury  Patient was brought to the emergency department initially was responsive and following commands  Unfortunately his neurologic status decline and required endotracheal intubation  At that time he was noted to have a lactic acid of 4 6 and a potassium of 8 3  Creatinine was also elevated at 3 23  He received medical treatment for hyperkalemia  His CBC returned with a white count of 22 and was administered Zosyn for possible infectious etiology  Patient had repeat laboratory data performed prior to arrival which showed a potassium of 6 6  He is reported to be making urine appropriately at that time  He received approximately 4 L of IV fluids  Troponins were noted to be elevated and continue to escalate  His EKG did not show signs of STEMI  Also of note patient's UDS was positive for cocaine, amphetamines and THC  Patient arrived intubated on Levophed and propofol for sedation  Norepinephrine was infusing at 8 micrograms/minute  Patient's family was at bedside and reported that patient has a history of anxiety but does not take any medications  They thought that maybe his anxiety had resulted in him having this syncopal event  It was explained to the family that anxiety is unlikely the etiology of patient's syncopal event  They reported that patient works as a  as well as and where house and is chronically dehydrated    His girlfriend commented that he has become increasing short of breath with mobilization up stairs  Patient has to walk up 4 flights of stairs to his girlfriend's apartment  She said he has been complaining of fatigue and tiredness for approximately 1 week  They deny any recent cough, fever, signs and symptoms of URI or concern for infection  They did state that patient complained that he might have GI bug last week  Shock-infectious versus hypovolemic versus vaso plegia with severe acidosis and hyperkalemia  Hyperkalemia  Acute renal failure  Severe metabolic acidosis  Rhabdomyolysis  Intravascular volume depletion/dehydration  Transaminitis-possibly secondary to rhabdomyolysis  Hyperphosphatemia  Hypermagnesemia  Hypoglycemia  Cardiac ischemia secondary to shock  Bacteriuria-possible urinary tract infection with in numeral blood bacteria, nitrite and leukocyte negative  Leukocytosis-stress reaction versus infectious etiology  Substance abuse disorder  Tobacco abuse/nicotine dependence    Upon arrival to [facility] medical ICU patient was initially evaluated by the day team and care was transition to the overnight team   Laboratory data was drawn on arrival but unfortunately was delayed as patient had significant abnormalities  Laboratory returned with elevated CPK as well as worsening acidosis and increasing potassium  Patient did receive additional medical treatment with bicarbonate, calcium, glucose and insulin for his elevated potassium and need for dialysis  Nephrology, Dr Neal Brewster, was contacted due to concern for patient's worsening hyperkalemia despite medical management  Their recommendation was to transition IV fluids to normal saline and from Plasma-Lyte and continue with medical management  Patient has been maintained on the ventilator  He is not appropriate for extubation  Ventilator was adjusted prior to day team transitioning care  Patient's pH is improved  He remains acidotic with a pCO2 in the 30s to obtain a normal pH  Patient is appropriately oxygenating  He is not appropriate for spontaneous breathing trial or assessment for extubation due to severity of illness  Continue with aggressive airway clearance protocol  Continue patient on hemodynamic support with norepinephrine infusion  Maintain mean arterial pressures greater than 65  Continue to monitor patient's endpoints resuscitation  Unfortunately patient had an increase in his lactic acid level despite maintaining appropriate mean arterial pressures  Point of care ultrasound was performed which showed acceptable left ventricular function  Patient did have appropriate IVC collapse appears to have improved intravascular volume status  Formal echocardiogram to be obtained  Continue to trend troponins  Continue to trend patient's lactic acid level  Empiric antibiotics initiated for possibility of infection  Patient does have innumerable bacteria on his urinalysis  There are no nitrites or leukocytes  Unfortunately patient's urine output declined an unable to obtain culture  He also received antibiotics prior to urine culture being obtained  Repeat blood cultures pending  Imaging studies reviewed  Patient was noted to have nonspecific perihilar and upper lobe airspace opacities possible aspiration  A small amount of intravascular cardiac air was identified likely introduced through IV catheter  Also in review of patient's CT C-spine subcutaneous emphysema was seen in the neck  As per radiology report likely introduced through IV catheter considering findings on CT chest abdomen pelvis  Patient also noted to have bilateral facial subcutaneous air  Patient will be evaluated for possible underlying infection, no abscess is reported on the CT scan of face as well as neck  Patient was not having any complaints related to that    Continue to monitor subcutaneous air a pursue further evaluation if patient does not improve from a clinical standpoint or has worsening imaging findings  CPK returned >100K  Plan to evaluate patient for possible etiology for elevated CPK  Unfortunately patient's urine output is now declining  Will attempt to improve urine output with diuretics  This may be difficult to obtain since he is requiring norepinephrine infusion for hemodynamic support  Patient will also be initiated on a bicarbonate drip  Patient will also be administered a dose of Lasix 80 mg x 1 now to assess if urine output will improve  If patient's potassium continues to be elevated nephrology will be contacted again to discuss the possibility of renal replacement therapy versus HD  Continue check labs q 6 hours  Reposition endotracheal tube as is reported to be 5 5 cm above jesika  Plan to check laboratory data q 6 hours  Date of service 06/10/2022    Critical care time does not include procedures or family update    Total critical care time 123 minutes

## 2022-06-11 NOTE — CONSULTS
Consult Service: Gastroenterology      PATIENT INFORMATION      Clifton Tinsley 46 y o  male MRN: [id number]  Unit/Bed#: MICU 04 Encounter: [id number]  PCP: No primary care provider on file  Date of Admission:  6/10/2022  Date of Consultation: 06/11/22  Requesting Physician: Karin Malone MD       ASSESSMENTS & PLAN   Clifton Tinsley is a 46 y o  old male with PMH of polysubstance abuse, iv drug use, untreated Hep c who presents after collapse with rhabdomyolysis, JOÃO, hyperkalemia, aspiration currently on mechanical ventilation for airway protection     Gastroenterology has been consulted for assistance with management of elevated liver enzymes    Patient is being evaluated for abnormal liver chemistry  · The patient has acute Severe elevation of liver enzymes (>15x ULN)  · Jaundice present: no  · R- ratio suggests- Hepatocellular  · Signs of acute liver failure?- no  · Likely etiology includes: 1- Shock liver; 2- rhabdo induced 3- viral hepatitis; does have a  History of untreated hepatitis C  · Does the patient consume excessive alcohol?- no  · Is the patient on any new medications?- no  · Does the patient use herbal products?- no  · Does the patient use excessive tylenol?- no  · Is the patient critically ill or have signs of ischemia?- yes  · Imaging tests done: CT scan of the abdomen, will obtain liver dopplers  · Laboratory evaluation included:   · Viral hepatitis: Pending   · EBV, CMV: Pending   · Serum drug panel: UDS positive for polysubstance abuse  · Discontinue hepatotoxic medications  · Avoid hypotension    HISTORY OF PRESENT ILLNESS      Clifton Tinsley is a 46 y o  old male with PMH of polysubstance abuse, iv drug use, untreated Hep c who presents after collapse with rhabdomyolysis, JOÃO, hyperkalemia, aspiration currently on mechanical ventilation for airway protection  Patient is currently encephalopathic and unable to provide history   Daughter at bedside reports he was having nausea and vomiting since the past few days  Does use drugs but does not drink much alcohol  Daughter reported that there was some blood coming from his mouth after his collapse  REVIEW OF SYSTEMS     A thorough 12-point review of systems has been conducted  Pertinent positives and negatives are mentioned in the history of present illness  PAST MEDICAL & SURGICAL HISTORY      Past Medical History:   Diagnosis Date    Hepatitis C     PTSD (post-traumatic stress disorder)        Past Surgical History:   Procedure Laterality Date    APPENDECTOMY           MEDICATIONS & ALLERGIES       Medications:   Prior to Admission medications    Medication Sig Start Date End Date Taking?  Authorizing Provider   naproxen (NAPROSYN) 500 mg tablet Take 1 tablet (500 mg total) by mouth 2 (two) times a day as needed for mild pain 4/5/21   Jevon Santana PA-C       Allergies: No Known Allergies      SOCIAL HISTORY      Marital Status: Single    Substance Use History:   Social History     Substance and Sexual Activity   Alcohol Use No     Social History     Tobacco Use   Smoking Status Current Every Day Smoker    Packs/day: 0 50    Years: 25 00    Pack years: 12 50    Types: Cigarettes   Smokeless Tobacco Never Used     Social History     Substance and Sexual Activity   Drug Use Yes    Types: Marijuana    Comment: smokes every other day         FAMILY HISTORY      Non-Contributory      PHYSICAL EXAM     Vitals:   Blood Pressure: (!) 85/68 (06/11/22 1200)  Pulse: (!) 110 (06/11/22 1200)  Temperature: (!) 100 76 °F (38 2 °C) (06/11/22 1200)  Temp Source: Bladder (06/11/22 0000)  Respirations: 20 (06/11/22 0000)  Height: 5' 8" (172 7 cm) (06/11/22 0720)  Weight - Scale: 81 2 kg (179 lb) (06/11/22 0720)  SpO2: 100 % (06/11/22 1200)    Physical Exam:   GENERAL: NAD  HEENT:  NC/AT, MMM  CARDIAC:  RRR, +S1/S2, no S3/S4 heard  PULMONARY: Mechanical ventilation  ABDOMEN:  Soft, NT/ND, +BS, no rebound/guarding/rigidity  DIGITAL RECTAL EXAM: not indicated   NEUROLOGIC:  Alert/oriented x0  SKIN:  No rashes or erythema       ADDITIONAL DATA     Lab Results:     Results from last 7 days   Lab Units 06/11/22  1053   WBC Thousand/uL 11 27*   HEMOGLOBIN g/dL 14 4   HEMATOCRIT % 42 0   PLATELETS Thousands/uL 128*   NEUTROS PCT % 81*   LYMPHS PCT % 15   MONOS PCT % 4   EOS PCT % 0     Results from last 7 days   Lab Units 06/11/22  1053   POTASSIUM mmol/L 4 0   CHLORIDE mmol/L 108   CO2 mmol/L 28   BUN mg/dL 56*   CREATININE mg/dL 4 51*   CALCIUM mg/dL 7 3*   ALK PHOS U/L 58   ALT U/L 2,864*   AST U/L 6,509*     Results from last 7 days   Lab Units 06/10/22  2051   INR  1 22*       Imaging:    CT chest abdomen pelvis wo contrast    Result Date: 6/10/2022  Narrative: CT CHEST, ABDOMEN AND PELVIS WITHOUT IV CONTRAST INDICATION:   Polytrauma, blunt ams, fall  COMPARISON:  None  TECHNIQUE: CT examination of the chest, abdomen and pelvis was performed without intravenous contrast  This examination was performed without intravenous contrast in the context of the critical nationwide Omnipaque shortage  Axial, sagittal, and coronal 2D reformatted images were created from the source data and submitted for interpretation  Radiation dose length product (DLP) for this visit:  892 9 mGy-cm   This examination, like all CT scans performed in the [facility], was performed utilizing techniques to minimize radiation dose exposure, including the use of iterative reconstruction and automated exposure control  Enteric contrast was not administered  There is some unavoidable motion artifact  FINDINGS: CHEST LUNGS:  Mild bilateral patchy central airspace opacities, extending into the upper lung zones  PLEURA:  There is no evidence of pneumothorax or hemothorax HEART/GREAT VESSELS: No pericardial effusion  Tiny amount of intravascular air present  No thoracic aortic aneurysm   MEDIASTINUM AND DAVID:  No pneumomediastinum and in particular, no posterior mediastinal air in the region of the esophagus CHEST WALL AND LOWER NECK:  Limited by positioning of patient's arms, without obvious acute pathology ABDOMEN LIVER/BILIARY TREE:  No definite abnormality GALLBLADDER:  No calcified gallstones  No pericholecystic inflammatory change  SPLEEN:  Unremarkable  PANCREAS:  Unremarkable  ADRENAL GLANDS:  Unremarkable  KIDNEYS/URETERS:  Unremarkable  No hydronephrosis  STOMACH AND BOWEL:  Unremarkable  APPENDIX:  No findings to suggest appendicitis  ABDOMINOPELVIC CAVITY:  No ascites  No pneumoperitoneum  No lymphadenopathy  VESSELS:  Unremarkable for patient's age  PELVIS REPRODUCTIVE ORGANS:  Unremarkable for patient's age  URINARY BLADDER:  Unremarkable  ABDOMINAL WALL/INGUINAL REGIONS:  Right inguinal canal nodule identified, possibly loculated fluid within peritoneal recess  Retraction superiorly of the right testicle also possible  OSSEOUS STRUCTURES:  No acute fracture or destructive osseous lesion  Impression: 1  Limited examination demonstrating no evidence of intrathoracic, intra-abdominal or intrapelvic traumatic injury 2  There is nonspecific perihilar and upper lobe airspace opacities, could represent mild pulmonary edema versus aspiration  3  No pneumothorax, hemothorax or mediastinal air 4  Tiny amount of intravascular and cardiac air, likely introduced through an IV catheter Workstation performed: [id number]     XR chest 1 view portable    Result Date: 6/10/2022  Narrative: CHEST INDICATION:   post intubation  COMPARISON:  7/8/2021 EXAM PERFORMED/VIEWS:  XR CHEST PORTABLE FINDINGS: Cardiomediastinal silhouette appears unremarkable  Endotracheal tube tip is approximately 5 5 cm above the jesika  Nasogastric tube is seen extending below left hemidiaphragm  Prior chest CT better demonstrated the presence of bilateral perihilar airspace disease    No pneumothorax or pleural effusion appreciated     Impression: Endotracheal tube tip is 5 5 cm above the jesika Workstation performed: [id number]     CT head without contrast    Result Date: 6/10/2022  Narrative: CT BRAIN - WITHOUT CONTRAST INDICATION:   Status post fall, altered mental status  COMPARISON:  11/6/2018 TECHNIQUE:  CT examination of the brain was performed  In addition to axial images, sagittal and coronal 2D reformatted images were created and submitted for interpretation  Radiation dose length product (DLP) for this visit:  1100 mGy-cm   This examination, like all CT scans performed in the [facility], was performed utilizing techniques to minimize radiation dose exposure, including the use of iterative reconstruction and automated exposure control  IMAGE QUALITY:  Diagnostic  FINDINGS: PARENCHYMA:  No intracranial mass, mass effect or midline shift  No CT signs of acute infarction  No acute parenchymal hemorrhage  VENTRICLES AND EXTRA-AXIAL SPACES:  Normal for the patient's age  VISUALIZED ORBITS AND PARANASAL SINUSES:  Unremarkable  CALVARIUM AND EXTRACRANIAL SOFT TISSUES:  Bilateral facial subcutaneous air is noted  Impression: No acute intracranial abnormality  Bilateral facial subcutaneous air present  Please refer to cervical spine CT report for more specific detail Workstation performed: [id number]     CT spine cervical wo contrast    Result Date: 6/10/2022  Narrative: CT CERVICAL SPINE - WITHOUT CONTRAST INDICATION:   Neck trauma, intoxicated or obtunded (Age >= 16y) ams, fall  COMPARISON:  None  TECHNIQUE:  CT examination of the cervical spine was performed without intravenous contrast   Contiguous axial images were obtained  Sagittal and coronal reconstructions were performed  Radiation dose length product (DLP) for this visit:  848 8 mGy-cm     This examination, like all CT scans performed in the [facility], was performed utilizing techniques to minimize radiation dose exposure, including the use of iterative reconstruction and automated exposure control  IMAGE QUALITY:  Study somewhat limited by unavoidable motion artifact  Repeat imaging was performed FINDINGS: ALIGNMENT:  Normal alignment of the cervical spine  No subluxation  VERTEBRAL BODIES:  No fracture  DEGENERATIVE CHANGES:  Mild multilevel cervical degenerative changes are noted without critical central canal stenosis  PREVERTEBRAL AND PARASPINAL SOFT TISSUES:  Subcutaneous emphysema in the neck noted  THORACIC INLET:  See separate chest CT report     Impression: No cervical spine fracture or traumatic malalignment  Subcutaneous emphysema seen in the neck  Given that the CT of the chest abdomen and pelvis demonstrates no evidence of pneumothorax, mediastinal or paraesophageal air, this most likely is related to air introduced through an IV catheter  Also correlate for any penetrating injury that may have allowed generalized subcutaneous air to occur  The examination demonstrates a significant  finding and was documented as such in [facility] for liaison and referring practitioner immediate notification  Workstation performed: [id number]     XR chest portable ICU    Result Date: 6/11/2022  Narrative: CHEST INDICATION:   post PICC  COMPARISON:  6/10/2022 EXAM PERFORMED/VIEWS:  XR CHEST PORTABLE ICU FINDINGS: The tip of the endotracheal tube projects 5 1 cm above the jesika  The tip of the enteric tube projects off the inferior margin of the radiograph, at least in the stomach  The tip of the right jugular central venous catheter projects at the superior vena cava  The tip of the left subclavian central venous catheter projects at the superior vena cava  Cardiomediastinal silhouette appears unremarkable  Unchanged bilateral parahilar opacities  No new lung opacity  Deep sulcus sign on the left  No pleural effusion  Osseous structures appear unchanged  Impression: Deep sulcus sign concerning for a small left basilar pneumothorax    Right lateral decubitus or upright radiograph could be obtained for confirmation  The tip of the left subclavian central venous catheter projects at the superior vena cava  The study was marked in [facility] for immediate notification  Workstation performed: [id number]     [id number] bedside procedure    Result Date: 6/10/2022  Narrative: 1 2 [id number] 2 323 [id number] 2    Echo complete w/ contrast if indicated    Result Date: 6/11/2022  Narrative: Stella Guerrero  Left Ventricle: Left ventricular cavity size is normal  Wall thickness is normal  The left ventricular ejection fraction is 65%  Systolic function is normal  Wall motion is normal  Diastolic function is normal  There is a very mild mid-cavity dynamic obstruction at rest    Atrial Septum: There is a small, mobile septal aneurysm  No obvious patent foramen ovale by color doppler   Tricuspid Valve: There is mild regurgitation  EKG, Pathology, and Other Studies Reviewed on Admission:   · EKG: Reviewed      Counseling / Coordination of Care Time: 30 total mins spent n consult  Greater than 50% of total time spent on patient counseling and coordination of care  Kaveh Tello MD   PGY-4,   Gastroenterology         ** Please Note: This note is constructed using a voice recognition dictation system   **

## 2022-06-11 NOTE — PROGRESS NOTES
Patient's 1:00 a m  Laboratory data returned with an improved potassium at 4 3  Patient's creatinine continues to worsen is currently at 3 49  Maintain on bicarbonate infusion  Patient given additional Lasix bolus of 80 mg for total of 160 mg bolus and initiated on Lasix infusion  His CPK initially was greater than 100,000  Most recent CPK is greater than 85,000  If patient does not respond to Lasix with improved urinary output he may require renal replacement therapy  Nephrology has been consulted  Patient does have access for renal replacement therapy if needed      Critical care time 33 minutes

## 2022-06-12 ENCOUNTER — APPOINTMENT (INPATIENT)
Dept: RADIOLOGY | Facility: HOSPITAL | Age: 52
DRG: 469 | End: 2022-06-12
Payer: COMMERCIAL

## 2022-06-12 LAB
ALBUMIN SERPL BCP-MCNC: 2.3 G/DL (ref 3.5–5)
ALP SERPL-CCNC: 71 U/L (ref 46–116)
ALT SERPL W P-5'-P-CCNC: 2301 U/L (ref 12–78)
ANION GAP SERPL CALCULATED.3IONS-SCNC: 11 MMOL/L (ref 4–13)
ANION GAP SERPL CALCULATED.3IONS-SCNC: 12 MMOL/L (ref 4–13)
ANION GAP SERPL CALCULATED.3IONS-SCNC: 4 MMOL/L (ref 4–13)
ANION GAP SERPL CALCULATED.3IONS-SCNC: 9 MMOL/L (ref 4–13)
AST SERPL W P-5'-P-CCNC: 2751 U/L (ref 5–45)
ATRIAL RATE: 113 BPM
ATRIAL RATE: 116 BPM
ATRIAL RATE: 136 BPM
BASE EXCESS BLDA CALC-SCNC: -6 MMOL/L
BASOPHILS # BLD AUTO: 0.05 THOUSANDS/ΜL (ref 0–0.1)
BASOPHILS NFR BLD AUTO: 0 % (ref 0–1)
BILIRUB DIRECT SERPL-MCNC: 1.94 MG/DL (ref 0–0.2)
BILIRUB SERPL-MCNC: 2.45 MG/DL (ref 0.2–1)
BODY TEMPERATURE: 97.7 DEGREES FEHRENHEIT
BUN SERPL-MCNC: 48 MG/DL (ref 5–25)
BUN SERPL-MCNC: 51 MG/DL (ref 5–25)
BUN SERPL-MCNC: 55 MG/DL (ref 5–25)
BUN SERPL-MCNC: 59 MG/DL (ref 5–25)
CA-I BLD-SCNC: 1.07 MMOL/L (ref 1.12–1.32)
CA-I BLD-SCNC: 1.09 MMOL/L (ref 1.12–1.32)
CA-I BLD-SCNC: 1.12 MMOL/L (ref 1.12–1.32)
CA-I BLD-SCNC: 1.14 MMOL/L (ref 1.12–1.32)
CALCIUM SERPL-MCNC: 8.1 MG/DL (ref 8.3–10.1)
CALCIUM SERPL-MCNC: 8.5 MG/DL (ref 8.3–10.1)
CALCIUM SERPL-MCNC: 8.8 MG/DL (ref 8.3–10.1)
CALCIUM SERPL-MCNC: 8.9 MG/DL (ref 8.3–10.1)
CHLORIDE SERPL-SCNC: 105 MMOL/L (ref 100–108)
CHLORIDE SERPL-SCNC: 106 MMOL/L (ref 100–108)
CHLORIDE SERPL-SCNC: 106 MMOL/L (ref 100–108)
CHLORIDE SERPL-SCNC: 107 MMOL/L (ref 100–108)
CK MB SERPL-MCNC: 182.1 NG/ML (ref 0–5)
CK MB SERPL-MCNC: <1 % (ref 0–2.5)
CK SERPL-CCNC: ABNORMAL U/L (ref 39–308)
CO2 SERPL-SCNC: 22 MMOL/L (ref 21–32)
CO2 SERPL-SCNC: 23 MMOL/L (ref 21–32)
CO2 SERPL-SCNC: 23 MMOL/L (ref 21–32)
CO2 SERPL-SCNC: 26 MMOL/L (ref 21–32)
CREAT SERPL-MCNC: 3.89 MG/DL (ref 0.6–1.3)
CREAT SERPL-MCNC: 4.23 MG/DL (ref 0.6–1.3)
CREAT SERPL-MCNC: 4.63 MG/DL (ref 0.6–1.3)
CREAT SERPL-MCNC: 4.9 MG/DL (ref 0.6–1.3)
EOSINOPHIL # BLD AUTO: 0.01 THOUSAND/ΜL (ref 0–0.61)
EOSINOPHIL NFR BLD AUTO: 0 % (ref 0–6)
ERYTHROCYTE [DISTWIDTH] IN BLOOD BY AUTOMATED COUNT: 11.9 % (ref 11.6–15.1)
GFR SERPL CREATININE-BSD FRML MDRD: 12 ML/MIN/1.73SQ M
GFR SERPL CREATININE-BSD FRML MDRD: 13 ML/MIN/1.73SQ M
GFR SERPL CREATININE-BSD FRML MDRD: 14 ML/MIN/1.73SQ M
GFR SERPL CREATININE-BSD FRML MDRD: 16 ML/MIN/1.73SQ M
GLUCOSE SERPL-MCNC: 127 MG/DL (ref 65–140)
GLUCOSE SERPL-MCNC: 130 MG/DL (ref 65–140)
GLUCOSE SERPL-MCNC: 130 MG/DL (ref 65–140)
GLUCOSE SERPL-MCNC: 135 MG/DL (ref 65–140)
GLUCOSE SERPL-MCNC: 92 MG/DL (ref 65–140)
HCO3 BLDA-SCNC: 21.6 MMOL/L (ref 22–28)
HCT VFR BLD AUTO: 44.2 % (ref 36.5–49.3)
HGB BLD-MCNC: 15.3 G/DL (ref 12–17)
HOROWITZ INDEX BLDA+IHG-RTO: 40 MM[HG]
IMM GRANULOCYTES # BLD AUTO: 0.06 THOUSAND/UL (ref 0–0.2)
IMM GRANULOCYTES NFR BLD AUTO: 1 % (ref 0–2)
INR PPP: 1.25 (ref 0.84–1.19)
LACTATE SERPL-SCNC: 2 MMOL/L (ref 0.5–2)
LYMPHOCYTES # BLD AUTO: 0.9 THOUSANDS/ΜL (ref 0.6–4.47)
LYMPHOCYTES NFR BLD AUTO: 7 % (ref 14–44)
MAGNESIUM SERPL-MCNC: 2 MG/DL (ref 1.6–2.6)
MAGNESIUM SERPL-MCNC: 2.1 MG/DL (ref 1.6–2.6)
MAGNESIUM SERPL-MCNC: 2.2 MG/DL (ref 1.6–2.6)
MAGNESIUM SERPL-MCNC: 2.3 MG/DL (ref 1.6–2.6)
MCH RBC QN AUTO: 34.8 PG (ref 26.8–34.3)
MCHC RBC AUTO-ENTMCNC: 34.6 G/DL (ref 31.4–37.4)
MCV RBC AUTO: 101 FL (ref 82–98)
MONOCYTES # BLD AUTO: 0.53 THOUSAND/ΜL (ref 0.17–1.22)
MONOCYTES NFR BLD AUTO: 4 % (ref 4–12)
NEUTROPHILS # BLD AUTO: 11.51 THOUSANDS/ΜL (ref 1.85–7.62)
NEUTS SEG NFR BLD AUTO: 88 % (ref 43–75)
NRBC BLD AUTO-RTO: 0 /100 WBCS
O2 CT BLDA-SCNC: 21.1 ML/DL (ref 16–23)
OXYHGB MFR BLDA: 97 % (ref 94–97)
P AXIS: 80 DEGREES
P AXIS: 81 DEGREES
PCO2 BLDA: 50.2 MM HG (ref 36–44)
PEEP RESPIRATORY: 6 CM[H2O]
PH BLDA: 7.25 [PH] (ref 7.35–7.45)
PHOSPHATE SERPL-MCNC: 3.7 MG/DL (ref 2.7–4.5)
PHOSPHATE SERPL-MCNC: 5.4 MG/DL (ref 2.7–4.5)
PHOSPHATE SERPL-MCNC: 5.8 MG/DL (ref 2.7–4.5)
PHOSPHATE SERPL-MCNC: 6.7 MG/DL (ref 2.7–4.5)
PLATELET # BLD AUTO: 149 THOUSANDS/UL (ref 149–390)
PMV BLD AUTO: 10.8 FL (ref 8.9–12.7)
PO2 BLDA: 138.2 MM HG (ref 75–129)
POTASSIUM SERPL-SCNC: 4.4 MMOL/L (ref 3.5–5.3)
POTASSIUM SERPL-SCNC: 4.7 MMOL/L (ref 3.5–5.3)
POTASSIUM SERPL-SCNC: 5.1 MMOL/L (ref 3.5–5.3)
POTASSIUM SERPL-SCNC: 5.1 MMOL/L (ref 3.5–5.3)
PR INTERVAL: 0 MS
PR INTERVAL: 121 MS
PR INTERVAL: 129 MS
PROT SERPL-MCNC: 5.3 G/DL (ref 6.4–8.2)
PROTHROMBIN TIME: 15.2 SECONDS (ref 11.6–14.5)
QRS AXIS: 46 DEGREES
QRS AXIS: 50 DEGREES
QRS AXIS: 52 DEGREES
QRSD INTERVAL: 83 MS
QRSD INTERVAL: 88 MS
QRSD INTERVAL: 88 MS
QT INTERVAL: 292 MS
QT INTERVAL: 317 MS
QT INTERVAL: 333 MS
QTC INTERVAL: 440 MS
QTC INTERVAL: 441 MS
QTC INTERVAL: 457 MS
RBC # BLD AUTO: 4.4 MILLION/UL (ref 3.88–5.62)
SODIUM SERPL-SCNC: 137 MMOL/L (ref 136–145)
SODIUM SERPL-SCNC: 138 MMOL/L (ref 136–145)
SODIUM SERPL-SCNC: 139 MMOL/L (ref 136–145)
SODIUM SERPL-SCNC: 140 MMOL/L (ref 136–145)
SPECIMEN SOURCE: ABNORMAL
T WAVE AXIS: 52 DEGREES
T WAVE AXIS: 52 DEGREES
T WAVE AXIS: 57 DEGREES
VANCOMYCIN SERPL-MCNC: 17.8 UG/ML (ref 10–20)
VENT AC: 14
VENT- AC: AC
VENTRICULAR RATE: 113 BPM
VENTRICULAR RATE: 116 BPM
VENTRICULAR RATE: 136 BPM
VT SETTING VENT: 450 ML
WBC # BLD AUTO: 13.06 THOUSAND/UL (ref 4.31–10.16)

## 2022-06-12 PROCEDURE — 82330 ASSAY OF CALCIUM: CPT | Performed by: STUDENT IN AN ORGANIZED HEALTH CARE EDUCATION/TRAINING PROGRAM

## 2022-06-12 PROCEDURE — 93010 ELECTROCARDIOGRAM REPORT: CPT | Performed by: INTERNAL MEDICINE

## 2022-06-12 PROCEDURE — C9113 INJ PANTOPRAZOLE SODIUM, VIA: HCPCS | Performed by: STUDENT IN AN ORGANIZED HEALTH CARE EDUCATION/TRAINING PROGRAM

## 2022-06-12 PROCEDURE — 83735 ASSAY OF MAGNESIUM: CPT | Performed by: STUDENT IN AN ORGANIZED HEALTH CARE EDUCATION/TRAINING PROGRAM

## 2022-06-12 PROCEDURE — 80048 BASIC METABOLIC PNL TOTAL CA: CPT | Performed by: STUDENT IN AN ORGANIZED HEALTH CARE EDUCATION/TRAINING PROGRAM

## 2022-06-12 PROCEDURE — 85025 COMPLETE CBC W/AUTO DIFF WBC: CPT

## 2022-06-12 PROCEDURE — 93005 ELECTROCARDIOGRAM TRACING: CPT

## 2022-06-12 PROCEDURE — 83605 ASSAY OF LACTIC ACID: CPT | Performed by: STUDENT IN AN ORGANIZED HEALTH CARE EDUCATION/TRAINING PROGRAM

## 2022-06-12 PROCEDURE — 80202 ASSAY OF VANCOMYCIN: CPT | Performed by: INTERNAL MEDICINE

## 2022-06-12 PROCEDURE — 84100 ASSAY OF PHOSPHORUS: CPT | Performed by: STUDENT IN AN ORGANIZED HEALTH CARE EDUCATION/TRAINING PROGRAM

## 2022-06-12 PROCEDURE — 99291 CRITICAL CARE FIRST HOUR: CPT | Performed by: INTERNAL MEDICINE

## 2022-06-12 PROCEDURE — 90945 DIALYSIS ONE EVALUATION: CPT

## 2022-06-12 PROCEDURE — 82550 ASSAY OF CK (CPK): CPT

## 2022-06-12 PROCEDURE — 93976 VASCULAR STUDY: CPT

## 2022-06-12 PROCEDURE — 94003 VENT MGMT INPAT SUBQ DAY: CPT

## 2022-06-12 PROCEDURE — 82553 CREATINE MB FRACTION: CPT

## 2022-06-12 PROCEDURE — 90945 DIALYSIS ONE EVALUATION: CPT | Performed by: INTERNAL MEDICINE

## 2022-06-12 PROCEDURE — 85610 PROTHROMBIN TIME: CPT | Performed by: STUDENT IN AN ORGANIZED HEALTH CARE EDUCATION/TRAINING PROGRAM

## 2022-06-12 PROCEDURE — 80076 HEPATIC FUNCTION PANEL: CPT

## 2022-06-12 PROCEDURE — 94760 N-INVAS EAR/PLS OXIMETRY 1: CPT

## 2022-06-12 PROCEDURE — 82805 BLOOD GASES W/O2 SATURATION: CPT

## 2022-06-12 RX ORDER — CALCIUM GLUCONATE 20 MG/ML
1 INJECTION, SOLUTION INTRAVENOUS ONCE
Status: COMPLETED | OUTPATIENT
Start: 2022-06-12 | End: 2022-06-12

## 2022-06-12 RX ORDER — AMIODARONE HYDROCHLORIDE 50 MG/ML
INJECTION, SOLUTION INTRAVENOUS
Status: COMPLETED
Start: 2022-06-12 | End: 2022-06-12

## 2022-06-12 RX ORDER — DEXMEDETOMIDINE HYDROCHLORIDE 4 UG/ML
.1-1.2 INJECTION, SOLUTION INTRAVENOUS
Status: DISCONTINUED | OUTPATIENT
Start: 2022-06-12 | End: 2022-06-13

## 2022-06-12 RX ORDER — METOPROLOL TARTRATE 5 MG/5ML
5 INJECTION INTRAVENOUS ONCE
Status: COMPLETED | OUTPATIENT
Start: 2022-06-12 | End: 2022-06-12

## 2022-06-12 RX ORDER — CALCIUM GLUCONATE 20 MG/ML
2 INJECTION, SOLUTION INTRAVENOUS ONCE
Status: COMPLETED | OUTPATIENT
Start: 2022-06-12 | End: 2022-06-12

## 2022-06-12 RX ORDER — METOPROLOL TARTRATE 5 MG/5ML
5 INJECTION INTRAVENOUS ONCE
Status: DISCONTINUED | OUTPATIENT
Start: 2022-06-12 | End: 2022-06-12

## 2022-06-12 RX ADMIN — PROPOFOL 10 MCG/KG/MIN: 10 INJECTION, EMULSION INTRAVENOUS at 07:57

## 2022-06-12 RX ADMIN — FENTANYL CITRATE 50 MCG: 50 INJECTION INTRAMUSCULAR; INTRAVENOUS at 19:41

## 2022-06-12 RX ADMIN — CALCIUM GLUCONATE 1 G: 20 INJECTION, SOLUTION INTRAVENOUS at 19:44

## 2022-06-12 RX ADMIN — Medication 20000 ML: at 06:25

## 2022-06-12 RX ADMIN — AMIODARONE HYDROCHLORIDE 150 MG: 50 INJECTION, SOLUTION INTRAVENOUS at 07:48

## 2022-06-12 RX ADMIN — AMIODARONE HYDROCHLORIDE 1 MG/MIN: 50 INJECTION, SOLUTION INTRAVENOUS at 08:36

## 2022-06-12 RX ADMIN — Medication 20000 ML: at 17:00

## 2022-06-12 RX ADMIN — METOPROLOL TARTRATE 5 MG: 1 INJECTION, SOLUTION INTRAVENOUS at 07:31

## 2022-06-12 RX ADMIN — CALCIUM GLUCONATE 2 G: 20 INJECTION, SOLUTION INTRAVENOUS at 14:02

## 2022-06-12 RX ADMIN — PIPERACILLIN AND TAZOBACTAM 3.38 G: 36; 4.5 INJECTION, POWDER, FOR SOLUTION INTRAVENOUS at 11:26

## 2022-06-12 RX ADMIN — PANTOPRAZOLE SODIUM 40 MG: 40 INJECTION, POWDER, FOR SOLUTION INTRAVENOUS at 09:05

## 2022-06-12 RX ADMIN — DEXMEDETOMIDINE HYDROCHLORIDE 0.4 MCG/KG/HR: 400 INJECTION INTRAVENOUS at 18:43

## 2022-06-12 RX ADMIN — HEPARIN SODIUM 5000 UNITS: 5000 INJECTION INTRAVENOUS; SUBCUTANEOUS at 21:19

## 2022-06-12 RX ADMIN — DEXMEDETOMIDINE HYDROCHLORIDE 0.4 MCG/KG/HR: 400 INJECTION INTRAVENOUS at 11:14

## 2022-06-12 RX ADMIN — AMIODARONE HYDROCHLORIDE 150 MG: 50 INJECTION, SOLUTION INTRAVENOUS at 07:47

## 2022-06-12 RX ADMIN — NOREPINEPHRINE BITARTRATE 4 MCG/MIN: 1 INJECTION, SOLUTION, CONCENTRATE INTRAVENOUS at 07:59

## 2022-06-12 RX ADMIN — HEPARIN SODIUM 5000 UNITS: 5000 INJECTION INTRAVENOUS; SUBCUTANEOUS at 14:02

## 2022-06-12 RX ADMIN — HEPARIN SODIUM 5000 UNITS: 5000 INJECTION INTRAVENOUS; SUBCUTANEOUS at 06:41

## 2022-06-12 RX ADMIN — PIPERACILLIN AND TAZOBACTAM 3.38 G: 36; 4.5 INJECTION, POWDER, FOR SOLUTION INTRAVENOUS at 20:13

## 2022-06-12 RX ADMIN — VANCOMYCIN HYDROCHLORIDE 1500 MG: 10 INJECTION, POWDER, LYOPHILIZED, FOR SOLUTION INTRAVENOUS at 09:25

## 2022-06-12 RX ADMIN — FENTANYL CITRATE 50 MCG: 50 INJECTION INTRAMUSCULAR; INTRAVENOUS at 21:19

## 2022-06-12 RX ADMIN — CALCIUM GLUCONATE 1 G: 20 INJECTION, SOLUTION INTRAVENOUS at 01:09

## 2022-06-12 RX ADMIN — CALCIUM GLUCONATE 2 G: 20 INJECTION, SOLUTION INTRAVENOUS at 07:45

## 2022-06-12 RX ADMIN — FENTANYL CITRATE 50 MCG: 50 INJECTION INTRAMUSCULAR; INTRAVENOUS at 10:23

## 2022-06-12 RX ADMIN — PIPERACILLIN AND TAZOBACTAM 3.38 G: 36; 4.5 INJECTION, POWDER, FOR SOLUTION INTRAVENOUS at 04:05

## 2022-06-12 RX ADMIN — FENTANYL CITRATE 50 MCG: 50 INJECTION INTRAMUSCULAR; INTRAVENOUS at 16:51

## 2022-06-12 NOTE — UTILIZATION REVIEW
Initial Clinical Review    Admission: Date/Time/Statement:   Admission Orders (From admission, onward)     Ordered        06/10/22 1900  Inpatient Admission  Once                      Orders Placed This Encounter   Procedures    Inpatient Admission     Standing Status:   Standing     Number of Occurrences:   1     Order Specific Question:   Level of Care     Answer:   Critical Care [15]     Order Specific Question:   Estimated length of stay     Answer:   More than 2 Midnights     Order Specific Question:   Certification     Answer:   I certify that inpatient services are medically necessary for this patient for a duration of greater than two midnights  See H&P and MD Progress Notes for additional information about the patient's course of treatment  Initial Presentation: 46 y o  male  Initially brought to ER via EMS  Following "collapse"  Witnessed by friend  IN ER  BENJAMIN, Responsive to verbal stimuli, no focal weakness, able to move all extremities  Mental status declined in ER - became responsive only to painful stimuli -  Intubated/ sedated on IV Propofol  for airway protection  Lactate of 4 6 as well as a potassium of 8 3 and a creatinine of 3 23  The patient was started on dextrose, insulin, albuterol, and calcium gluconate:  2 L Isolyte + 1 L NS +  cc/hr started, BP remained low, was started on Levophed gtt    Repeat fingersticks were 179  He has a white count of 22  He was started on Zosyn for possible infection  Trop trending up -  EKG w/o ST elevation  UDS POS  For cocaine, Meth and [address]       WILL TRANSFER TO HIGHER LEVEL OF CARE/ [facility]   (trauma/ neuro services available)       [address] on   6/10  @  1900,  INPT Status,  Critical Level of care:      Will  Treat  Acute Encephalopathy likely 2/2 cocaine/meth intoxication vs/and  Septic shock  (2/2 UTI   Vs  hypovolemia vs vaso plegia)    )   S/p  Witnessed non traumatic collapse  W/possible hemoptysis/hematemesis   In setting of   JOÃO 2/2 dehydration ,  Rhabdomyolysis,   Electrolyte dyscrasias and Elevated Anion Gap Acidosis;     R/o  sz    PMH remarkable for: IV drug use, hepatitis C, PTSD and nicotine smoking  On arrival to the MICU, Intubated / mechanical ventilation/OG tube placed,  Sedated w/ Propofol and Fentanyl, on Levophed  And norepinephrine  gtt's (hypotensive despite 3L boluses)       HS Trop trending up, bedside echo and chest xray both unremarkable,  Family reports patient not feeling well x 1-2 weeks, "some" nausea and vomiting; works as  and carry "tires" outdoor with extensive sweating/dehydration  EKG, initially with peaked T waves; sinus tachy 100 BPM with PVCs,  Will continue to trend troponin per protocol, with serial EKGs  Bedside echo done overall unremarkable; Xray chest unremarkable   Complete Echo ordered  Cardiology consulted  UA innumerable bacteria but negative nitrite and leukocytes  Cont IVF,IVABs,  IV pressors (wean as tolerated)    fup blood/urine cultures,  Repeat BMP q 6 hrs - start Bicarb gtt   6/11  CARDIOLOGY:   Elevated trop 2/2 cocaine, joão, rhabdo - echo unremarkable  Suspect ECG abnormalities were due to electrolyte abnormalities  no further cardiac workup required     6/11  NEPHROLOGY   joão 2/2 atn  In setting of rhabdo/shock:  Continue with intravenously fluid resuscitation and Lasix drip to force diuresis  Closely trend renal indices and volume status    Low threshold to start CRRT    6/11  @  1550 -  Rising crt,  K 5 2,  Only 95 CC urine since 0600     START CVVHD ,  Daily CK level        Date: 6/12  Day 2:   Tolerating CVVHD/ultra filtration :  Cont w/2 K / 3 Ca replacement fluid  Keep 100 cc POS for next few hrs    K+ improving  Transaminitis suspected secondary to rhabdomyolysis  Cont DVVHD  Remains intubated/ mech ventilated on  IV Sedation and IV pressors     On Amiodarone gtt from [address]  When NH'ed for arrhythmia     CULTURES   6/10 Bld cx x4: no growth at 24 hours   6/11 MRSA nasal swab: in progress  6/11 Sputum culture: in progress  ----------------------------------------------------------------------------------------------------------------------------------------     Wt Readings from Last 1 Encounters:   06/12/22 81 9 kg (180 lb 8 9 oz)     Additional Vital Signs:                                                             Pulse          Resp        BP             MAP      Art Line        Map                  SpO2  06/12/22 1200 98 6 °F (37 °C) 80 -- -- -- 92/58 70 mmHg 100 %  On ventilator    06/12/22 1100 98 6 °F (37 °C) 80 -- -- -- 106/56 70 mmHg 100 %   06/12/22 0900 97 88 °F (36 6 °C) 78 -- -- -- 104/52 68 mmHg 100 %   06/12/22 0800 97 52 °F (36 4 °C) 78 -- -- -- 86/56 68 mmHg 100 %   06/12/22 0700 97 52 °F (36 4 °C) 92 -- -- -- 98/52 66 mmHg 100 %   06/12/22 0630 97 52 °F (36 4 °C) -- -- -- -- -- -- --   06/12/22 0615 97 52 °F (36 4 °C) -- -- -- -- -- -- --   06/12/22 0600 97 52 °F (36 4 °C) 94 -- -- -- 102/54 68 mmHg 100 %   06/12/22 0545 97 52 °F (36 4 °C) -- -- -- -- -- -- --   06/12/22 0530 97 52 °F (36 4 °C) -- -- -- -- -- -- --   06/12/22 0515 97 52 °F (36 4 °C) -- -- -- -- -- -- --   06/12/22 0500 97 52 °F (36 4 °C) 92 17 -- -- 102/50 66 mmHg 100 %   06/12/22 0445 97 7 °F (36 5 °C) -- -- -- -- -- -- --   06/12/22 0430 97 52 °F (36 4 °C) -- -- -- -- -- -- --   06/12/22 0415 97 52 °F (36 4 °C) -- -- -- -- -- -- --   06/12/22 0400 97 5 °F (36 4 °C) 92 14 -- -- 96/52 66 mmHg 100 %   06/12/22 0315 96 8 °F (36 °C) Abnormal  88 -- -- -- 92/60 72 mmHg 100 %   06/12/22 0302 96 8 °F (36 °C) Abnormal  88 -- -- -- 96/64 76 mmHg 100 %   06/12/22 0300 96 44 °F (35 8 °C) Abnormal  88 16 -- -- 96/66 78 mmHg 100 %   06/12/22 0245 96 44 °F (35 8 °C) Abnormal  -- -- -- -- -- -- --   06/12/22 0230 96 08 °F (35 6 °C) Abnormal  -- -- -- -- -- -- --   06/12/22 0215 96 08 °F (35 6 °C) Abnormal  -- -- -- -- -- -- -- 06/12/22 0200 95 72 °F (35 4 °C) Abnormal  84 14 -- -- 100/70 82 mmHg 100 %   06/12/22 0145 95 72 °F (35 4 °C) Abnormal  -- -- -- -- -- -- --   06/12/22 0130 95 72 °F (35 4 °C) Abnormal  -- -- -- -- -- -- --   06/12/22 0115 95 72 °F (35 4 °C) Abnormal  -- -- -- -- -- -- --   06/12/22 0100 95 36 °F (35 2 °C) Abnormal  82 17 -- -- 110/70 84 mmHg 100 %   06/12/22 0045 95 36 °F (35 2 °C) Abnormal  -- -- -- -- -- -- --   06/12/22 0030 95 72 °F (35 4 °C) Abnormal  80 -- -- -- 118/68 86 mmHg 100 %   06/12/22 0015 95 72 °F (35 4 °C) Abnormal  -- -- -- -- -- -- --   06/12/22 0000 96 08 °F (35 6 °C) Abnormal  80 15 -- -- 128/72 92 mmHg 100 %   06/11/22 2300 96 8 °F (36 °C) Abnormal  80 14 -- -- 124/64 82 mmHg 99 %   06/11/22 2200 97 52 °F (36 4 °C) 84 14 -- -- 100/74 86 mmHg 98 %   06/11/22 2130 98 24 °F (36 8 °C) 86 -- -- -- 94/68 78 mmHg 95 %   06/11/22 2100 98 6 °F (37 °C) 92 16 -- -- 124/66 82 mmHg 94 %   06/11/22 2000 100 04 °F (37 8 °C) 96 15 -- -- 100/68 78 mmHg 96 %   06/11/22 1900 101 12 °F (38 4 °C) Abnormal  104 -- 65/51 Abnormal  56 96/64 74 mmHg 99 %   06/11/22 1800 101 84 °F (38 8 °C) Abnormal  110 Abnormal  -- 102/69 89 120/64 80 mmHg 99 %   06/11/22 1700 101 48 °F (38 6 °C) Abnormal  108 Abnormal  -- 97/73 83 126/64 82 mmHg 100 %   06/11/22 1623 -- -- -- -- -- -- -- 100 %   06/11/22 1600 101 48 °F (38 6 °C) Abnormal  108 Abnormal  -- 105/76 85 112/62 76 mmHg 100 %   06/11/22 1500 101 48 °F (38 6 °C) Abnormal  110 Abnormal  -- 105/70 92 76/56 62 mmHg 100 %   06/11/22 1400 101 48 °F (38 6 °C) Abnormal  110 Abnormal  -- 106/69 80 110/62 76 mmHg 100 %   06/11/22 1300 101 12 °F (38 4 °C) Abnormal  112 Abnormal  -- 90/59 68 104/54 68 mmHg 100 %   06/11/22 1200 100 76 °F (38 2 °C) Abnormal  110 Abnormal  -- 85/68 Abnormal  73 106/58 72 mmHg 100 %   06/11/22 1100 100 4 °F (38 °C) 114 Abnormal  -- 96/56 111 100/60 74 mmHg 99 %   06/11/22 1000 100 4 °F (38 °C) 108 Abnormal  -- 114/70 80 112/86 94 mmHg 100 %   06/11/22 0900 100 4 °F (38 °C) 110 Abnormal  -- 96/64 75 88/68 76 mmHg 100 %   06/11/22 0800 100 4 °F (38 °C) 108 Abnormal  -- 84/58 Abnormal  71 108/68 78 mmHg 100 %   06/11/22 0720 -- 106 Abnormal  -- 93/61 -- -- -- --   06/11/22 0600 100 04 °F (37 8 °C) 106 Abnormal  -- 93/61 71 108/62 74 mmHg 100 %   06/11/22 0500 100 04 °F (37 8 °C) 112 Abnormal  -- 99/58 74 102/60 74 mmHg 99 %   06/11/22 0400 100 4 °F (38 °C) 120 Abnormal  -- 103/67 77 126/68 84 mmHg 100 %   06/11/22 0300 101 12 °F (38 4 °C) Abnormal  124 Abnormal  -- 98/59 76 110/60 74 mmHg 100 %   06/11/22 0234 -- 108 Abnormal  -- -- -- -- -- 98 %   06/11/22 0200 101 48 °F (38 6 °C) Abnormal  106 Abnormal  -- 109/67 80 134/70 88 mmHg 99 %   06/11/22 0100 101 12 °F (38 4 °C) Abnormal  102 -- 105/58 69 -- -- 98 %   06/11/22 0000 101 12 °F (38 4 °C) Abnormal  104 20 104/64 76 -- -- 99 %   06/10/22 2300 101 12 °F (38 4 °C) Abnormal  102 -- -- -- -- -- 98 %   06/10/22 2200 100 4 °F (38 °C) 98 -- 113/81 101 -- -- 99 %   06/10/22 2100 100 04 °F (37 8 °C) 98 -- -- -- -- -- 99 %   06/10/22 2000 99 68 °F (37 6 °C) 96 -- 89/72 78 -- -- 99 %   06/10/22 1913 99 3 °F (37 4 °C) 94 -- 98/69 81 -- -- 100 %   06/10/22 1910 -- -- -- -- -- -- -- 100 %   06/10/22 1909 99 7 °F (37 6 °C) 92 -- 114/72 89 -- -- --   06/10/22 1906 99 3 °F (37 4 °C) 78 -- 140/77 105 -- -- 100 %   06/10/22 1905 99 3 °F (37 4 °C) 72 -- 161/88 120 -- -- 100 %   06/10/22 1901 99 3 °F (37 4 °C) 98 -- 97/74  92  -- -- 91 %  On ventilator        Pertinent Labs/Diagnostic Test Results:   6/10  Results   (us/liver doppler  Not yet available )   CT BRAIN No acute intracranial abnormality  Bilateral facial subcutaneous air present  CT Cervical spine  Subcutaneous emphysema seen in the neck  Given that the CT of the chest abdomen and pelvis demonstrates no evidence of pneumothorax, mediastinal or paraesophageal air, this most likely is related to air introduced through an IV catheter    Also correlate for any penetrating injury that may have allowed generalized subcutaneous air to occur  CTAP -  1  Limited examination demonstrating no evidence of intrathoracic, intra-abdominal or intrapelvic traumatic injury   2  There is nonspecific perihilar and upper lobe airspace opacities, could represent mild pulmonary edema versus aspiration  3  No pneumothorax, hemothorax or mediastinal air   4  Tiny amount of intravascular and cardiac air, likely introduced through an IV catheter  2nd  CXR - Endotracheal tube tip is approximately 5 5 cm above the jesika  Nasogastric tube is seen extending below left hemidiaphragm  Prior chest CT better demonstrated the presence of bilateral perihilar airspace disease  No pneumothorax or pleural effusion appreciated  1st  CXR -  Deep sulcus sign concerning for a small left basilar pneumothorax  Right lateral decubitus or upright radiograph could be obtained for confirmation  The tip of the left subclavian central venous catheter projects at the superior vena cava  EKG (2)   :  Normal sinus rhythm, rate 100, normal CT, normal QTC, no STEMI, improved   T-wave abnormalities compared to EKG 1         Results from last 7 days   Lab Units 06/10/22  1155   SARS-COV-2  Negative     Results from last 7 days   Lab Units 06/12/22  0610 06/11/22  1053 06/11/22  0506 06/10/22  2051 06/10/22  1027   WBC Thousand/uL 13 06* 11 27* 13 03* 17 04* 22 00*   HEMOGLOBIN g/dL 15 3 14 4 13 9 18 3* 17 7*   HEMATOCRIT % 44 2 42 0 39 9 54 8* 51 1*   PLATELETS Thousands/uL 149 128* 134* 213  213 258   NEUTROS ABS Thousands/µL 11 51* 9 04* 10 37*  --  19 20*         Results from last 7 days   Lab Units 06/12/22  1221 06/12/22  0610 06/12/22  0006 06/11/22  1809 06/11/22  1506 06/11/22  1053 06/11/22  0949 06/11/22  0506   SODIUM mmol/L 137 140 139 140 140 142 143 142  142   POTASSIUM mmol/L 4 7 5 1 5 1 5 6* 5 2 4 0 4 0 4 3  4 2   CHLORIDE mmol/L 107 106 105 106 106 108 108 108  107   CO2 mmol/L 26 23 22 26 27 28 23 22 22   ANION GAP mmol/L 4 11 12 8 7 6 12 12  13   BUN mg/dL 51* 55* 59* 65* 61* 56* 59* 54*  55*   CREATININE mg/dL 4 23* 4 63* 4 90* 5 55* 5 20* 4 51* 4 37* 3 89*  3 90*   EGFR ml/min/1 73sq m 14 13 12 10 11 13 14 16  16   CALCIUM mg/dL 8 5 8 1* 8 9 7 8* 7 7* 7 3* 7 2* 7 2*  7 1*   CALCIUM, IONIZED mmol/L 1 09* 1 07* 1 14 0 97*  --   --   --  0 94*   MAGNESIUM mg/dL 2 1 2 3 2 2 2 6  --  2 5  --  2 4   PHOSPHORUS mg/dL 5 4* 6 7* 5 8* 5 9*  --   --  4 0 4 7*  4 6*     Results from last 7 days   Lab Units 06/12/22  0610 06/11/22  1506 06/11/22  1053 06/11/22  0506 06/10/22  1915 06/10/22  1027   AST U/L 2,751* 6,320* 6,509* 6,260* 2,717* 350*   ALT U/L 2,301* 2,898* 2,864* 2,624* 893* 195*   ALK PHOS U/L 71 69 58 55 77 82   TOTAL PROTEIN g/dL 5 3* 5 1* 4 6* 4 6* 6 3* 7 5   ALBUMIN g/dL 2 3* 2 4* 2 3* 2 4* 3 2* 4 5   TOTAL BILIRUBIN mg/dL 2 45* 1 76* 1 27* 1 04* 1 12* 0 85   BILIRUBIN DIRECT mg/dL 1 94* 1 19*  --  0 53*  --   --    AMMONIA umol/L  --   --   --   --   --  42     Results from last 7 days   Lab Units 06/11/22  1809 06/10/22  2254 06/10/22  2005 06/10/22  1402 06/10/22  1217 06/10/22  1125 06/10/22  1026   POC GLUCOSE mg/dl 92 118 131 87 126 179* 87     Results from last 7 days   Lab Units 06/12/22  1221 06/12/22  0610 06/12/22  0006 06/11/22  1809 06/11/22  1506 06/11/22  1053 06/11/22  0949 06/11/22  0506 06/11/22  0110 06/10/22  1915 06/10/22  1559 06/10/22  1027   GLUCOSE RANDOM mg/dL 127 130 135 101 108 127 147* 148*  144* 77 119 125 86            Results from last 7 days   Lab Units 06/12/22  0610 06/11/22  1054 06/11/22  0126   PH ART  7 251* 7 350 7 480*   PCO2 ART mm Hg 50 2* 39 7 28 9*   PO2 ART mm Hg 138 2* 134 3* 109 2   HCO3 ART mmol/L 21 6* 21 4* 21 0*   BASE EXC ART mmol/L -6 0 -3 8 -1 2   O2 CONTENT ART mL/dL 21 1 19 9 20 3   O2 HGB, ARTERIAL % 97 0 97 1* 97 0   ABG SOURCE  Line, Arterial Line, Arterial Line, Arterial             Results from last 7 days   Lab Units 06/12/22  0610 06/11/22  1506 06/11/22  0949   CK TOTAL U/L 49,724* 76,496* 82,342*   CK MB INDEX % <1 0 <1 0 <1 0   CK MB ng/mL 182 1* 394 0* 550 5*     Results from last 7 days   Lab Units 06/11/22  1332 06/11/22  1155 06/11/22  0949 06/10/22  2110 06/10/22  1915 06/10/22  1659 06/10/22  1449 06/10/22  1227 06/10/22  1027   HS TNI 0HR ng/L  --   --  11,401*  --  5,388* 4,075*  --   --  485*   HS TNI 2HR ng/L  --  13,495*  --  3,771*  --   --   --  974*  --    HSTNI D2 ng/L  --  -1,463  --  -1,617  --   --   --  489*  --    HS TNI 4HR ng/L 13,017*  --   --   --   --   --  2,102*  --   --    HSTNI D4 ng/L -1,941  --   --   --   --   --  1,617*  --   --          Results from last 7 days   Lab Units 06/12/22  0817 06/10/22  2051   PROTIME seconds 15 2* 14 9*  14 9*   INR  1 25* 1 22*   PTT seconds  --  33  33     Results from last 7 days   Lab Units 06/11/22  0110   TSH 3RD GENERATON uIU/mL 2 900     Results from last 7 days   Lab Units 06/11/22  0506   PROCALCITONIN ng/ml 49 11*     Results from last 7 days   Lab Units 06/12/22  0817 06/11/22  0413 06/11/22  0112 06/10/22  2255 06/10/22  1915 06/10/22  1700 06/10/22  1357   LACTIC ACID mmol/L 2 0 1 8 2 4* 3 2* 2 6* 2 8* 3 3*     Results from last 7 days   Lab Units 06/11/22  1053   HEP B S AG  Non-reactive   HEP C AB  High Reactive*   HEP B C IGM  Non-reactive     Results from last 7 days   Lab Units 06/10/22  1149   CLARITY UA  Cloudy*   COLOR UA  Ambika*   SPEC GRAV UA  >=1 030   PH UA  6 0   GLUCOSE UA mg/dl Negative   KETONES UA mg/dl Negative   BLOOD UA  3+*   PROTEIN UA mg/dl 2+*   NITRITE UA  Negative   BILIRUBIN UA  Negative   UROBILINOGEN UA E U /dl 4 0*   LEUKOCYTES UA  Negative   WBC UA /hpf 1-2   RBC UA /hpf Innumerable*   BACTERIA UA /hpf Innumerable*   EPITHELIAL CELLS WET PREP /hpf Occasional     Results from last 7 days   Lab Units 06/10/22  1155   INFLUENZA A PCR  Negative   INFLUENZA B PCR  Negative   RSV PCR  Negative         Results from last 7 days   Lab Units 06/10/22  1149   AMPH/METH  Positive*   BARBITURATE UR  Negative   BENZODIAZEPINE UR  Negative   COCAINE UR  Positive*   METHADONE URINE  Negative   OPIATE UR  Negative   PCP UR  Negative   THC UR  Positive*     Results from last 7 days   Lab Units 06/10/22  1027   ETHANOL LVL mg/dL <10   ACETAMINOPHEN LVL ug/mL <66*   SALICYLATE LVL mg/dL <5     Results from last 7 days   Lab Units 06/11/22  0949 06/10/22  2109 06/10/22  1113   BLOOD CULTURE   --  No Growth at 24 hrs  No Growth at 24 hrs  No Growth at 24 hrs  No Growth at 24 hrs  SPUTUM CULTURE  2+ Growth of Beta Hemolytic Streptococcus Group B*  --   --    GRAM STAIN RESULT  3+ Polys*  1+ Gram positive cocci in pairs*  Rare Gram negative rods*  No Epithelial cells seen*  --   --        Past Medical History:   Diagnosis Date    Hepatitis C     PTSD (post-traumatic stress disorder)      Present on Admission:   PTSD (post-traumatic stress disorder)      Admitting Diagnosis: AMS (altered mental status) [R41 82]  Age/Sex: 46 y o  male  Admission Orders:    See above note:  Continuous cardiopulmonary monitoring;  Hourly I/O;  Neuro cks q 2 hr;  Vs q 2 hr ;  Daily wgt;   Restraint prn       Scheduled Medications:  heparin (porcine), 5,000 Units, Subcutaneous, Q8H DORON  pantoprazole, 40 mg, Intravenous, Q24H DORON  piperacillin-tazobactam, 3 375 g, Intravenous, Q8H  vancomycin, 1,500 mg, Intravenous, Q24H    ONE TIME AND PRN MEDS ;  IVF        6/10 @  1900  Ca Gluconate IV x2  W/dextrose/insulin  @ 2100  Na bicarb 50 meq IV ;   IVF  Isolyte @  150 ml/hr       dextrose  Continuous IV Infusions:  amiodarone, 1 mg/min, Intravenous, Continuous   Followed by  amiodarone, 0 5 mg/min, Intravenous, Continuous  dexmedetomidine, 0 1-0 7 mcg/kg/hr, Intravenous, Titrated  fentaNYL, 50 mcg/hr, Intravenous, Continuous  norepinephrine, 1-30 mcg/min, Intravenous, Titrated  NxStage K 2/Ca 3, 20,000 mL, Dialysis, Continuous  propofol, 5-50 mcg/kg/min, Intravenous, Titrated      PRN Meds:  fentanyl citrate (PF), 50 mcg, Intravenous, Q1H PRN        IP CONSULT TO CARDIOLOGY  IP CONSULT TO PHARMACY  IP CONSULT TO NEPHROLOGY  IP CONSULT TO GASTROENTEROLOGY    Network Utilization Review Department  ATTENTION: Please call with any questions or concerns to [phone] and carefully listen to the prompts so that you are directed to the right person  All voicemails are confidential   Brenda Ross all requests for admission clinical reviews, approved or denied determinations and any other requests to dedicated fax number below belonging to the campus where the patient is receiving treatment   List of dedicated fax numbers for the Facilities:  [address] DENIALS (Administrative/Medical Necessity) [phone]   [address] (Maternity/NICU/Pediatrics) [phone]   [address]  [address] [address] [address] [address] [address] [place] [address] [place] [address] [phone]

## 2022-06-12 NOTE — PROGRESS NOTES
Daily Progress Note - Critical Care   Juana Hilliard 46 y o  male MRN: [id number]  Unit/Bed#: MICU 04 Encounter: [id number]        ----------------------------------------------------------------------------------------  HPI/24hr events: was febrile last night s/p Tylenol once, resolved     ---------------------------------------------------------------------------------------  SUBJECTIVE  Met with family - son, daughter and gf at bedside 06/11 night; they were very concerned about giving Tylenol as patient was told by  before to avoid tylenol d/t liver dx; confirmed no known allergy including no hx allergy to tylenol; explained indication, risks and benefits and they were agreeable  All their questions answered appropriately and updated on A&P      Review of Systems  Review of systems was unable to be performed secondary to sedated and intubated   ---------------------------------------------------------------------------------------  Assessment and Plan:    Neuro:   · Diagnosis: Acute Encephalopathy   ? Likely due to cocaine & Meth intoxication, possible arrhythmia, hyperkalemia, and questionable if had seizure vs tremors as reported by the witness friend prior to collapsing  ? PLAN: sedated and intubated , on Propofol and Fentanyl  ? RAAS 0-1;BPS score 3, no pain       · Diagnosis: hx PTSD   ? Plan: not on psych medication at home, per family  ? Will continue to monitor       CV:   · Diagnosis: Elevated Troponin ; peaked at [address] on 06/11  · Diagnosis: r/o cardiogenic shock   ? Plan: likely type II supply/demand due to cocaine use  Hyperkalemia  and suspected arrhythmia   ? EKG, initially with peaked T waves; sinus tachy 100 BPM with PVCs,   ? Will continue to trend troponin per protocol, with repeate EKG  ? Bedside echo done overall unremarkable; Xray chest unremarkable  ? Complete Echo: EF 65%, small A septal aneurysm, mild tricuspid reg     ? Cardiology consulted and evaluated; no ischemic workup indicated  ? IV fluids : s/p 3 L boluses and following with infusion ; in/out  ? BP running low requiring levophed in ED & MICU; 06/11 Off Levo   ? Close BP monitoring; on tele       Pulm:  · Diagnosis: failure to protect Airway d/t AMS   ? Plan: intubated ; on volume control 14/450/6/40   ? Repeate ABG improved; Xray chest unremarkable, tube in place; CTAB            GI:   · Diagnosis: Nausea and vomiting  ? Reported per family as recent symptoms over past 1-2 weeks  ? Differential include but not limited to GI infection, d/t drug abuse; electrolyte abnormality, GERD  ? The friend witnessed the collapse noted blood at site of unclear source, GI!   ? Currently intubated; OG tube in place  ? Prophylactic IV Protonix 40 mg QD      :   · Diagnosis: Acute Kidney Injury +/- Rhabdomyolysis (d/t reported tremors & collapse on floor)     · JOÃO likely multifactorial; cardiorenal in context of low BP/arrhythmia, drug abuse/intoxication; baseline Cr 0 9-1 0; Cr on admission 3 23 with BUN 33 ; torres in, producing urine, dark yellow;   · R/o UTI / urosepsis; UA + blood, protein and innumerable bacteria; nitrite and leukocytes negative; leukocytosis and low BP requiring levo  ? Plan: IV fluids ; 3 L boluses + 150 cc/hr after; avoid hypotension/ BP fluctuation; on Levophed  ? Avoid nephrotoxic agents as possible  ? In / Out ; torres in    ? Was started on Zosyn in ED  ? 06/10 Blood cultures no growth 24H & MRSA swab in process  ? continue Zosyn + Vancomycin  ? Recheck BMP and CBC , continue monitor vitals          F/E/N:   · Diagnosis: Hyperkalemia   ? Plan: s/p dextrose , insulin and Ca gluconate  ? K improved down to 5 2; started on CVVHD 06/11 d/t severe oliguria    · Diagnosis:Hypocalcemia    ?  Plan: s/p Ca Gluconate ; resolved     · Diagnosis: Elevated Anion Gap Acidosis; AG 15; HCO3 25; with Delta Delta gradient 7 ; consistent with Metabolic acidosis w pre-exist elevated bicarb   ? 06/10 S/p Sodium Bicarb  ? 06/11 AG closed        Heme/Onc:   · Diagnosis: Leukocytosis; trending down   ? Likely reactive vs infection ; see ID   ? Will recheck CBC         Endo:   · Diagnosis: no active issue; glucose on admission 87 and repeate WNL ;TSH WNL      ID:   · Diagnosis: r/o septic shock ; hx IV drug use, leukocytosis and low BP requiring levo  · UA innumerable bacteria but negative nitrite and leukocytes   ? Blood Culture No growth 24H ; MRSA swab in process   ? Was given 1 dose Zosyn in ED; will continue Zosyn and Vancomycin for now   ? Trend CBC and monitor vitals      MSK/Skin:   Diagnosis: no acute issue ; frequent turn and reposition   Patient appropriate for transfer out of the ICU today?: No  Disposition: Admit to Critical Care   Code Status: Level 1 - Full Code  ---------------------------------------------------------------------------------------  ICU CORE MEASURES    Prophylaxis   VTE Pharmacologic Prophylaxis: Heparin  VTE Mechanical Prophylaxis: sequential compression device  Stress Ulcer Prophylaxis: Pantoprazole IV     ABCDE Protocol (if indicated)  Plan to perform spontaneous awakening trial today? Yes  Plan to perform spontaneous breathing trial today? Yes  Obvious barriers to extubation? Yes  CAM-ICU: Negative    Invasive Devices Review  Invasive Devices  Report    Central Venous Catheter Line  Duration           CVC Central Lines 06/11/22 Triple 1 day          Peripheral Intravenous Line  Duration           Peripheral IV 06/10/22 Right Forearm 1 day    Peripheral IV 06/10/22 Right Hand 1 day          Arterial Line  Duration           Arterial Line 06/11/22 Femoral 1 day          Hemodialysis Catheter  Duration           HD Temporary Double Catheter 1 day          Drain  Duration           NG/OG/Enteral Tube Orogastric 16 Fr Right mouth 1 day    Urethral Catheter Latex 16 Fr  1 day          Airway  Duration           ETT  7 5 mm 1 day              Can any invasive devices be discontinued today? No  ---------------------------------------------------------------------------------------  OBJECTIVE    Vitals   Vitals:    22 0300 22 0302 22 0315 22 0400   BP:       BP Location:       Pulse:  88 88    Resp:       Temp: (!) 96 44 °F (35 8 °C) (!) 96 8 °F (36 °C) (!) 96 8 °F (36 °C) 97 5 °F (36 4 °C)   TempSrc:       SpO2:  100% 100%    Weight:       Height:         Temp (24hrs), Av 3 °F (36 8 °C), Min:95 36 °F (35 2 °C), Max:101 84 °F (38 8 °C)  Current: Temperature: 97 5 °F (36 4 °C)    Respiratory:  SpO2: SpO2: 100 %       Invasive/non-invasive ventilation settings   Respiratory  Report   Lab Data (Last 4 hours)    None         O2/Vent Data (Last 4 hours)    None                Physical Exam  Constitutional:       Comments: Sedated and intubated ; BPS 3 , no pain    HENT:      Head: Normocephalic and atraumatic       Comments: Long beared       Right Ear: External ear normal       Left Ear: External ear normal       Nose: Nose normal       Mouth/Throat:      Mouth: Mucous membranes are dry  Eyes:      General: No scleral icterus      Conjunctiva/sclera: Conjunctivae normal       Pupils: Pupils are equal, round, and reactive to light  Cardiovascular:      Rate and Rhythm: Regular rhythm and rate       Pulses: Normal pulses       Heart sounds: No murmur heard     No gallop  Pulmonary:      Breath sounds: No stridor  No wheezing       Comments: CTAB   Abdominal:      General: Abdomen is flat  Bowel sounds are normal  There is no distension       Palpations: Abdomen is soft  Musculoskeletal:         General: No swelling       Right lower leg: No edema       Left lower leg: No edema  Skin:     Capillary Refill: Capillary refill takes less than 2 seconds     Neurological:     limited d/t sedated and intubated ; PERRL; BPS 3 points, no pain     Laboratory and Diagnostics:  Results from last 7 days   Lab Units 22  1053 22  0506 06/10/22  2051 06/10/22  1027   WBC Thousand/uL 11 27* 13 03* 17 04* 22 00*   HEMOGLOBIN g/dL 14 4 13 9 18 3* 17 7*   HEMATOCRIT % 42 0 39 9 54 8* 51 1*   PLATELETS Thousands/uL 128* 134* 213  213 258   NEUTROS PCT % 81* 80*  --  87*   MONOS PCT % 4 4  --  8     Results from last 7 days   Lab Units 06/12/22  0006 06/11/22  1809 06/11/22  1506 06/11/22  1053 06/11/22  0949 06/11/22  0506 06/11/22  0110 06/10/22  1915 06/10/22  1559 06/10/22  1027   SODIUM mmol/L 139 140 140 142 143 142  142 144 139   < > 138   POTASSIUM mmol/L 5 1 5 6* 5 2 4 0 4 0 4 3  4 2 4 3 7 3*   < > 8 3*   CHLORIDE mmol/L 105 106 106 108 108 108  107 110* 107   < > 98   CO2 mmol/L 22 26 27 28 23 22  22 22 23   < > 25   ANION GAP mmol/L 12 8 7 6 12 12  13 12 9   < > 15*   BUN mg/dL 59* 65* 61* 56* 59* 54*  55* 47* 40*   < > 33*   CREATININE mg/dL 4 90* 5 55* 5 20* 4 51* 4 37* 3 89*  3 90* 3 49* 2 91*   < > 3 23*   CALCIUM mg/dL 8 9 7 8* 7 7* 7 3* 7 2* 7 2*  7 1* 7 6* 7 3*   < > 8 3*   GLUCOSE RANDOM mg/dL 135 101 108 127 147* 148*  144* 77 119   < > 86   ALT U/L  --   --  2,898* 2,864*  --  2,624*  --  893*  --  195*   AST U/L  --   --  6,320* 6,509*  --  6,260*  --  2,717*  --  350*   ALK PHOS U/L  --   --  69 58  --  55  --  77  --  82   ALBUMIN g/dL  --   --  2 4* 2 3*  --  2 4*  --  3 2*  --  4 5   TOTAL BILIRUBIN mg/dL  --   --  1 76* 1 27*  --  1 04*  --  1 12*  --  0 85    < > = values in this interval not displayed       Results from last 7 days   Lab Units 06/12/22  0006 06/11/22  1809 06/11/22  1053 06/11/22  0949 06/11/22  0506 06/11/22  0110 06/10/22  1915   MAGNESIUM mg/dL 2 2 2 6 2 5  --  2 4  --  2 8*   PHOSPHORUS mg/dL 5 8* 5 9*  --  4 0 4 7*  4 6* 4 0 6 5*      Results from last 7 days   Lab Units 06/10/22  2051   INR  1 22*   PTT seconds 33  33          Results from last 7 days   Lab Units 06/11/22  0413 06/11/22  0112 06/10/22  2255 06/10/22  1915 06/10/22  1700 06/10/22  1357 06/10/22  1113   LACTIC ACID mmol/L 1 8 2 4* 3 2* 2 6* 2 8* 3 3* 4 6* ABG:  Results from last 7 days   Lab Units 22  1054   PH ART  7 350   PCO2 ART mm Hg 39 7   PO2 ART mm Hg 134 3*   HCO3 ART mmol/L 21 4*   BASE EXC ART mmol/L -3 8   ABG SOURCE  Line, Arterial     VBG:  Results from last 7 days   Lab Units 22  1054   ABG SOURCE  Line, Arterial     Results from last 7 days   Lab Units 22  0506   PROCALCITONIN ng/ml 49 11*       Micro  Results from last 7 days   Lab Units 06/10/22  2109 06/10/22  1113   BLOOD CULTURE  No Growth at 24 hrs  No Growth at 24 hrs  No Growth at 24 hrs  No Growth at 24 hrs  EK/10 "Sinus tachycardia with occasional and consecutive Premature ventricular complexes  Incomplete right bundle branch block"   Imaging:  I have personally reviewed pertinent reports  Intake and Output  I/O       06/10 0701   0700  0701   0700    I V  (mL/kg) 1842 5 (22 6) 981 8 (12 1)    NG/GT 0 70    IV Piggyback 1050 118 6    Total Intake(mL/kg) 2892 5 (35 4) 1170 4 (14 4)    Urine (mL/kg/hr) 575 107 (0 1)    Emesis/NG output 0 0    Other  625    Total Output 575 732    Net +2317 5 +438 4              UOP: 0 1 ml/hr     Height and Weights   Height: 5' 8" (172 7 cm)  IBW (Ideal Body Weight): 68 4 kg  Body mass index is 27 22 kg/m²    Weight (last 2 days)     Date/Time Weight    22 1550 81 2 (179 01)    22 0720 81 2 (179)    06/10/22 1913 81 6 (179 9)            Nutrition    TF N/A      Active Medications  Scheduled Meds:  Current Facility-Administered Medications   Medication Dose Route Frequency Provider Last Rate    fentaNYL  50 mcg/hr Intravenous Continuous Faby Ortiz DO 50 mcg/hr (22 1750)    fentanyl citrate (PF)  50 mcg Intravenous Q1H PRN Barber Lavender, DO      heparin (porcine)  5,000 Units Subcutaneous Q8H [address] [place], DO      norepinephrine  1-30 mcg/min Intravenous Titrated Joe Aiad, DO Stopped (22)    NxStage K 2/Ca 3  20,000 mL Dialysis Continuous [facility] Rivera Underwood MD      pantoprazole  40 mg Intravenous Q24H [facility] Joe Ballesteros DO      piperacillin-tazobactam  3 375 g Intravenous Q12H Robert Carrasco MD Stopped (06/11/22 2050)    propofol  5-50 mcg/kg/min Intravenous Titrated Phil Sims, DO 10 mcg/kg/min (06/11/22 1904)    vancomycin  15 mg/kg Intravenous Daily PRN Joe Ballesteros DO       Continuous Infusions:  fentaNYL, 50 mcg/hr, Last Rate: 50 mcg/hr (06/11/22 1750)  norepinephrine, 1-30 mcg/min, Last Rate: Stopped (06/12/22 0302)  NxStage K 2/Ca 3, 20,000 mL  propofol, 5-50 mcg/kg/min, Last Rate: 10 mcg/kg/min (06/11/22 1904)      PRN Meds:   fentanyl citrate (PF), 50 mcg, Q1H PRN  vancomycin, 15 mg/kg, Daily PRN        Allergies   No Known Allergies  ---------------------------------------------------------------------------------------  Advance Directive and Living Will:      Power of :    POLST:    ---------------------------------------------------------------------------------------  Care Time Delivered:   No Critical Care time spent     Samanta Willis DO   Internal [facility] record may have been created with voice recognition software  Occasional wrong word or "sound a like" substitutions may have occurred due to the inherent limitations of voice recognition software    Read the chart carefully and recognize, using context, where substitutions have occurred

## 2022-06-12 NOTE — RESPIRATORY THERAPY NOTE
RT Ventilator Management Note  Guera Valerio 46 y o  male MRN: [id number]  [facility] [id number]      Daily Screen         6/12/2022  0432 6/12/2022  0734          Patient safety screen outcome[de-identified] Failed Failed      Not Ready for Weaning due to[de-identified] Underline problem not resolved Hemodynamically unstable                Physical Exam:   Assessment Type: Assess only  General Appearance: Lethargic, Sedated  Respiratory Pattern: Assisted  Chest Assessment: Chest expansion symmetrical  Bilateral Breath Sounds: Diminished, Clear  Suction: ET Tube  O2 Device: vent      Resp Comments: (P) pt placed back on CMV, pt apenic, pt appears comfortable will continue to monitor

## 2022-06-12 NOTE — PROGRESS NOTES
NEPHROLOGY PROGRESS NOTE   Sridevi Cameron 46 y o  male MRN: [id number]  [id number]      ASSESSMENT & PLAN:  1  Acute kidney injury (POA) suspected secondary to ATN the setting of shock as well as rhabdomyolysis  Patient was started on CRRT yesterday  Patient seen and examined this morning on CVVHD, tolerating even UF, continue with 2 K/3 calcium replacement fluid  Recommend to keep 100 cc positive for the next few hours, discussed with critical care team   Follow electrolytes and replace as protocol  Keep map more than 65 mm Hg  Monitor ins and outs    2  Hyperkalemia on admission in the setting of acute renal failure, acidosis, started on CRRT yesterday  Serum potassium improving  3  Rhabdomyolysis, continue with medical treatment, started on CRRT yesterday  Follow CK daily    4  Shock, suspected infection versus hypovolemia versus vaso plegia  Keep map more than 65 mmHg  Inotropics/pressor support as needed    5  Transaminitis suspected secondary to rhabdomyolysis    6  Nontraumatic collapse/unresponsive, cocaine and meth intoxication  Management per critical care team    My plan and recommendation were discussed with critical care team      SUBJECTIVE:  Patient seen and examined on CVVHD, his temporary HD catheter is accessed with good blood flow  Patient remains intubated and sedated, ROS unable to be obtained        OBJECTIVE:  Current Weight: Weight - Scale: 81 9 kg (180 lb 8 9 oz)  Vitals:    06/12/22 0800   BP:    Pulse: 78   Resp:    Temp: 97 52 °F (36 4 °C)   SpO2: 100%       Intake/Output Summary (Last 24 hours) at 6/12/2022 0844  Last data filed at 6/12/2022 0600  Gross per 24 hour   Intake 1143 91 ml   Output 747 ml   Net 396 91 ml     General:  Intubated and sedated, in not acute distress  Eyes:  Eyes are closed  ENT:  ET and OG tube in place  Neck: supple, no JVD  Chest:  Coarse breath sounds bilateral, on ventilator  CVS: distinct S1 & S2, normal rate, regular rhythm  Abdomen: soft, non-tender, non-distended, normoactive bowel sounds  Extremities: no significant edema of both legs  Skin: no rash  Neuro:  Intubated and sedated  Genitourinary Marsh catheter in place    Right IJ temporary HD catheter accessed with good blood flow        Medications:    Current Facility-Administered Medications:     amiodarone (CORDARONE) 900 mg in dextrose 5 % 500 mL infusion, 1 mg/min, Intravenous, Continuous, Last Rate: 33 3 mL/hr at 06/12/22 0836, 1 mg/min at 06/12/22 0836 **FOLLOWED BY** amiodarone (CORDARONE) 900 mg in dextrose 5 % 500 mL infusion, 0 5 mg/min, Intravenous, Continuous, Pal Ingram DO    calcium gluconate 2 g in sodium chloride 0 9% 100 mL (premix), 2 g, Intravenous, Once, Michelle Griffin MD, Last Rate: 100 mL/hr at 06/12/22 0745, 2 g at 06/12/22 0745    fentaNYL 1000 mcg in sodium chloride 0 9% 100mL infusion, 50 mcg/hr, Intravenous, Continuous, Faby Ortiz DO, Last Rate: 5 mL/hr at 06/11/22 1750, 50 mcg/hr at 06/11/22 1750    fentanyl citrate (PF) 100 MCG/2ML 50 mcg, 50 mcg, Intravenous, Q1H PRN, Shanice Peres DO    heparin (porcine) subcutaneous injection 5,000 Units, 5,000 Units, Subcutaneous, Q8H [address], Joe Jeanettead, DO, 5,000 Units at 06/12/22 0641    norepinephrine (LEVOPHED) 4 mg (STANDARD CONCENTRATION) IV in sodium chloride 0 9% 250 mL, 1-30 mcg/min, Intravenous, Titrated, Joe Reidad DO, Last Rate: 15 mL/hr at 06/12/22 0759, 4 mcg/min at 06/12/22 0759    NxStage K 2/Ca 3 dialysis solution (RFP-400) 20,000 mL, 20,000 mL, Dialysis, Continuous, Rhianna Merchant MD, 20,000 mL at 06/12/22 0625    pantoprazole (PROTONIX) injection 40 mg, 40 mg, Intravenous, Q24H [address], Joe Aiad, DO, 40 mg at 06/11/22 0842    piperacillin-tazobactam (ZOSYN) 3 375 g in sodium chloride 0 9 % 100 mL IVPB (EXTENDED-INFUSION), 3 375 g, Intravenous, Q8H, Michelle Griffin MD    propofol (DIPRIVAN) 1000 mg in 100 mL infusion (premix), 5-50 mcg/kg/min, Intravenous, Titrated, Lupe Eis, DO, Last Rate: 1 9 mL/hr at 06/12/22 0757, 4 mcg/kg/min at 06/12/22 0757    vancomycin (VANCOCIN) 1500 mg in sodium chloride 0 9% 250 mL IVPB, 1,500 mg, Intravenous, Q24H, Maria Esther Coyle MD    Invasive Devices:   Urethral Catheter Latex 16 Fr  (Active)   Reasons to continue Urinary Catheter  Accurate I&O assessment in critically ill patients (48 hr  max) 06/11/22 2000   Goal for Removal Voiding trial when ambulation improves 06/11/22 2000   Site Assessment Clean;Skin intact 06/12/22 0400   Marsh Care Done 06/11/22 2200   Collection Container Standard drainage bag 06/12/22 0400   Securement Method Securing device (Describe) 06/12/22 0400   Output (mL) 0 mL 06/12/22 0400       Lab Results:   Results from last 7 days   Lab Units 06/12/22  0610 06/12/22  0006 06/11/22  1809 06/11/22  1506 06/11/22  1053 06/11/22  0949 06/11/22  0506   WBC Thousand/uL 13 06*  --   --   --  11 27*  --  13 03*   HEMOGLOBIN g/dL 15 3  --   --   --  14 4  --  13 9   HEMATOCRIT % 44 2  --   --   --  42 0  --  39 9   PLATELETS Thousands/uL 149  --   --   --  128*  --  134*   SODIUM mmol/L 140 139 140 140 142   < > 142  142   POTASSIUM mmol/L 5 1 5 1 5 6* 5 2 4 0   < > 4 3  4 2   CHLORIDE mmol/L 106 105 106 106 108   < > 108  107   CO2 mmol/L 23 22 26 27 28   < > 22  22   BUN mg/dL 55* 59* 65* 61* 56*   < > 54*  55*   CREATININE mg/dL 4 63* 4 90* 5 55* 5 20* 4 51*   < > 3 89*  3 90*   CALCIUM mg/dL 8 1* 8 9 7 8* 7 7* 7 3*   < > 7 2*  7 1*   MAGNESIUM mg/dL 2 3 2 2 2 6  --  2 5  --  2 4   PHOSPHORUS mg/dL 6 7* 5 8* 5 9*  --   --    < > 4 7*  4 6*   ALK PHOS U/L 71  --   --  69 58  --  55   ALT U/L 2,301*  --   --  2,898* 2,864*  --  2,624*   AST U/L 2,751*  --   --  6,320* 6,509*  --  6,260*    < > = values in this interval not displayed  Previous work up:  See previous notes      Portions of the record may have been created with voice recognition software   Occasional wrong word or "sound a like" substitutions may have occurred due to the inherent limitations of voice recognition software  Read the chart carefully and recognize, using context, where substitutions have occurred  If you have any questions, please contact the dictating provider

## 2022-06-12 NOTE — NUTRITION
06/12/22 0929   Current PO Intake   Current Diet Order NPO   Estimated calorie intake compared to estimated need Not meeting needs at this time d/t NPO status   Recommendations/Interventions   Recommendations to Provider   If unable to extubate and tolerate PO diet within 48hrs and aggressive nutrition measures are desired, consider initiating alternative means of nutrition (EN) via medically appropriate route     TF Recs: Nepro @ 45ml/hr + FWF per MD    (Provides: 1080ml TV, 1944kcal, 87g PRO, 785ml free H2O)     Initiate at 20ml/hr and advance by 10ml every 6hrs till goal rate is reached   Monitor renal labs, electrolytes, and I/O     Patient Nutrition Goals   Goal Nutrition via appropriate route

## 2022-06-12 NOTE — PROGRESS NOTES
Vancomycin IV Pharmacy-to-Dose Consultation    Sridevi Barnes is a 46 y o  male who is currently receiving Vancomycin IV with management by the Pharmacy Consult service  Assessment/Plan:  The patient was reviewed  No signs or symptoms of infusion reactions were documented in the chart  6/10 Bld cx x4: no growth at 24 hours   6/11 MRSA nasal swab: in progress  6/11 Sputum culture: in progress    Started on CVVHD last evening  Random level this morning was therapeutic at 17 8    Based on todays assessment, change current vancomycin (day # 2) to 1500 mg QD with a STAT dose now while on CVVHD, with a plan for trough to be drawn prior to the third dose of this new regimen at 0830 on 6/14  We will continue to follow the patients culture results and clinical progress daily      Jose D Vanessa, PharmD  Emergency Medicine Clinical Pharmacist    or [place]

## 2022-06-13 ENCOUNTER — TELEPHONE (OUTPATIENT)
Dept: GASTROENTEROLOGY | Facility: CLINIC | Age: 52
End: 2022-06-13

## 2022-06-13 PROBLEM — G93.40 ACUTE ENCEPHALOPATHY: Status: ACTIVE | Noted: 2022-06-13

## 2022-06-13 LAB
ANION GAP SERPL CALCULATED.3IONS-SCNC: 5 MMOL/L (ref 4–13)
ANION GAP SERPL CALCULATED.3IONS-SCNC: 8 MMOL/L (ref 4–13)
ANION GAP SERPL CALCULATED.3IONS-SCNC: 8 MMOL/L (ref 4–13)
ANION GAP SERPL CALCULATED.3IONS-SCNC: 9 MMOL/L (ref 4–13)
ATRIAL RATE: 101 BPM
ATRIAL RATE: 201 BPM
BASOPHILS # BLD AUTO: 0.02 THOUSANDS/ΜL (ref 0–0.1)
BASOPHILS NFR BLD AUTO: 0 % (ref 0–1)
BUN SERPL-MCNC: 35 MG/DL (ref 5–25)
BUN SERPL-MCNC: 36 MG/DL (ref 5–25)
BUN SERPL-MCNC: 42 MG/DL (ref 5–25)
BUN SERPL-MCNC: 46 MG/DL (ref 5–25)
CA-I BLD-SCNC: 1.07 MMOL/L (ref 1.12–1.32)
CA-I BLD-SCNC: 1.11 MMOL/L (ref 1.12–1.32)
CA-I BLD-SCNC: 1.13 MMOL/L (ref 1.12–1.32)
CA-I BLD-SCNC: 1.18 MMOL/L (ref 1.12–1.32)
CALCIUM SERPL-MCNC: 7.3 MG/DL (ref 8.3–10.1)
CALCIUM SERPL-MCNC: 8.4 MG/DL (ref 8.3–10.1)
CALCIUM SERPL-MCNC: 8.6 MG/DL (ref 8.3–10.1)
CALCIUM SERPL-MCNC: 9 MG/DL (ref 8.3–10.1)
CHLORIDE SERPL-SCNC: 105 MMOL/L (ref 100–108)
CHLORIDE SERPL-SCNC: 105 MMOL/L (ref 100–108)
CHLORIDE SERPL-SCNC: 106 MMOL/L (ref 100–108)
CHLORIDE SERPL-SCNC: 111 MMOL/L (ref 100–108)
CK MB SERPL-MCNC: 56.6 NG/ML (ref 0–5)
CK MB SERPL-MCNC: <1 % (ref 0–2.5)
CK SERPL-CCNC: 8469 U/L (ref 39–308)
CO2 SERPL-SCNC: 22 MMOL/L (ref 21–32)
CO2 SERPL-SCNC: 23 MMOL/L (ref 21–32)
CO2 SERPL-SCNC: 24 MMOL/L (ref 21–32)
CO2 SERPL-SCNC: 25 MMOL/L (ref 21–32)
CREAT SERPL-MCNC: 2.89 MG/DL (ref 0.6–1.3)
CREAT SERPL-MCNC: 3.18 MG/DL (ref 0.6–1.3)
CREAT SERPL-MCNC: 3.51 MG/DL (ref 0.6–1.3)
CREAT SERPL-MCNC: 3.65 MG/DL (ref 0.6–1.3)
EOSINOPHIL # BLD AUTO: 0.19 THOUSAND/ΜL (ref 0–0.61)
EOSINOPHIL NFR BLD AUTO: 2 % (ref 0–6)
ERYTHROCYTE [DISTWIDTH] IN BLOOD BY AUTOMATED COUNT: 11.9 % (ref 11.6–15.1)
GFR SERPL CREATININE-BSD FRML MDRD: 17 ML/MIN/1.73SQ M
GFR SERPL CREATININE-BSD FRML MDRD: 18 ML/MIN/1.73SQ M
GFR SERPL CREATININE-BSD FRML MDRD: 21 ML/MIN/1.73SQ M
GFR SERPL CREATININE-BSD FRML MDRD: 23 ML/MIN/1.73SQ M
GLUCOSE SERPL-MCNC: 117 MG/DL (ref 65–140)
GLUCOSE SERPL-MCNC: 130 MG/DL (ref 65–140)
GLUCOSE SERPL-MCNC: 134 MG/DL (ref 65–140)
GLUCOSE SERPL-MCNC: 139 MG/DL (ref 65–140)
HCT VFR BLD AUTO: 36.9 % (ref 36.5–49.3)
HGB BLD-MCNC: 13 G/DL (ref 12–17)
IMM GRANULOCYTES # BLD AUTO: 0.04 THOUSAND/UL (ref 0–0.2)
IMM GRANULOCYTES NFR BLD AUTO: 0 % (ref 0–2)
LYMPHOCYTES # BLD AUTO: 0.89 THOUSANDS/ΜL (ref 0.6–4.47)
LYMPHOCYTES NFR BLD AUTO: 9 % (ref 14–44)
MAGNESIUM SERPL-MCNC: 1.7 MG/DL (ref 1.6–2.6)
MAGNESIUM SERPL-MCNC: 2 MG/DL (ref 1.6–2.6)
MAGNESIUM SERPL-MCNC: 2.1 MG/DL (ref 1.6–2.6)
MAGNESIUM SERPL-MCNC: 2.3 MG/DL (ref 1.6–2.6)
MCH RBC QN AUTO: 34.6 PG (ref 26.8–34.3)
MCHC RBC AUTO-ENTMCNC: 35.2 G/DL (ref 31.4–37.4)
MCV RBC AUTO: 98 FL (ref 82–98)
MONOCYTES # BLD AUTO: 0.56 THOUSAND/ΜL (ref 0.17–1.22)
MONOCYTES NFR BLD AUTO: 6 % (ref 4–12)
NEUTROPHILS # BLD AUTO: 8.57 THOUSANDS/ΜL (ref 1.85–7.62)
NEUTS SEG NFR BLD AUTO: 83 % (ref 43–75)
NRBC BLD AUTO-RTO: 0 /100 WBCS
P AXIS: 66 DEGREES
P AXIS: 83 DEGREES
PHOSPHATE SERPL-MCNC: 2.7 MG/DL (ref 2.7–4.5)
PHOSPHATE SERPL-MCNC: 2.8 MG/DL (ref 2.7–4.5)
PHOSPHATE SERPL-MCNC: 3 MG/DL (ref 2.7–4.5)
PHOSPHATE SERPL-MCNC: 3.2 MG/DL (ref 2.7–4.5)
PLATELET # BLD AUTO: 120 THOUSANDS/UL (ref 149–390)
PMV BLD AUTO: 10.8 FL (ref 8.9–12.7)
POTASSIUM SERPL-SCNC: 3.7 MMOL/L (ref 3.5–5.3)
POTASSIUM SERPL-SCNC: 4 MMOL/L (ref 3.5–5.3)
POTASSIUM SERPL-SCNC: 4.2 MMOL/L (ref 3.5–5.3)
POTASSIUM SERPL-SCNC: 4.4 MMOL/L (ref 3.5–5.3)
PR INTERVAL: 130 MS
QRS AXIS: 132 DEGREES
QRS AXIS: 91 DEGREES
QRSD INTERVAL: 108 MS
QRSD INTERVAL: 148 MS
QT INTERVAL: 210 MS
QT INTERVAL: 364 MS
QTC INTERVAL: 384 MS
QTC INTERVAL: 471 MS
RBC # BLD AUTO: 3.76 MILLION/UL (ref 3.88–5.62)
SODIUM SERPL-SCNC: 136 MMOL/L (ref 136–145)
SODIUM SERPL-SCNC: 137 MMOL/L (ref 136–145)
SODIUM SERPL-SCNC: 138 MMOL/L (ref 136–145)
SODIUM SERPL-SCNC: 140 MMOL/L (ref 136–145)
T WAVE AXIS: 29 DEGREES
T WAVE AXIS: 34 DEGREES
VENTRICULAR RATE: 101 BPM
VENTRICULAR RATE: 201 BPM
WBC # BLD AUTO: 10.27 THOUSAND/UL (ref 4.31–10.16)

## 2022-06-13 PROCEDURE — 82550 ASSAY OF CK (CPK): CPT | Performed by: STUDENT IN AN ORGANIZED HEALTH CARE EDUCATION/TRAINING PROGRAM

## 2022-06-13 PROCEDURE — 90945 DIALYSIS ONE EVALUATION: CPT

## 2022-06-13 PROCEDURE — 85025 COMPLETE CBC W/AUTO DIFF WBC: CPT | Performed by: STUDENT IN AN ORGANIZED HEALTH CARE EDUCATION/TRAINING PROGRAM

## 2022-06-13 PROCEDURE — 94760 N-INVAS EAR/PLS OXIMETRY 1: CPT

## 2022-06-13 PROCEDURE — 94003 VENT MGMT INPAT SUBQ DAY: CPT

## 2022-06-13 PROCEDURE — C9113 INJ PANTOPRAZOLE SODIUM, VIA: HCPCS | Performed by: STUDENT IN AN ORGANIZED HEALTH CARE EDUCATION/TRAINING PROGRAM

## 2022-06-13 PROCEDURE — 82330 ASSAY OF CALCIUM: CPT | Performed by: STUDENT IN AN ORGANIZED HEALTH CARE EDUCATION/TRAINING PROGRAM

## 2022-06-13 PROCEDURE — 93010 ELECTROCARDIOGRAM REPORT: CPT | Performed by: INTERNAL MEDICINE

## 2022-06-13 PROCEDURE — 90945 DIALYSIS ONE EVALUATION: CPT | Performed by: INTERNAL MEDICINE

## 2022-06-13 PROCEDURE — 84100 ASSAY OF PHOSPHORUS: CPT | Performed by: STUDENT IN AN ORGANIZED HEALTH CARE EDUCATION/TRAINING PROGRAM

## 2022-06-13 PROCEDURE — 82553 CREATINE MB FRACTION: CPT | Performed by: STUDENT IN AN ORGANIZED HEALTH CARE EDUCATION/TRAINING PROGRAM

## 2022-06-13 PROCEDURE — 99291 CRITICAL CARE FIRST HOUR: CPT | Performed by: INTERNAL MEDICINE

## 2022-06-13 PROCEDURE — 80048 BASIC METABOLIC PNL TOTAL CA: CPT | Performed by: STUDENT IN AN ORGANIZED HEALTH CARE EDUCATION/TRAINING PROGRAM

## 2022-06-13 PROCEDURE — 83735 ASSAY OF MAGNESIUM: CPT | Performed by: STUDENT IN AN ORGANIZED HEALTH CARE EDUCATION/TRAINING PROGRAM

## 2022-06-13 RX ORDER — MAGNESIUM SULFATE 1 G/100ML
1 INJECTION INTRAVENOUS ONCE
Status: COMPLETED | OUTPATIENT
Start: 2022-06-13 | End: 2022-06-13

## 2022-06-13 RX ORDER — FENTANYL CITRATE 50 UG/ML
100 INJECTION, SOLUTION INTRAMUSCULAR; INTRAVENOUS ONCE
Status: COMPLETED | OUTPATIENT
Start: 2022-06-13 | End: 2022-06-13

## 2022-06-13 RX ORDER — CALCIUM GLUCONATE 20 MG/ML
1 INJECTION, SOLUTION INTRAVENOUS ONCE
Status: COMPLETED | OUTPATIENT
Start: 2022-06-13 | End: 2022-06-13

## 2022-06-13 RX ORDER — POTASSIUM CHLORIDE 29.8 MG/ML
40 INJECTION INTRAVENOUS ONCE
Status: COMPLETED | OUTPATIENT
Start: 2022-06-13 | End: 2022-06-13

## 2022-06-13 RX ORDER — CALCIUM GLUCONATE 20 MG/ML
2 INJECTION, SOLUTION INTRAVENOUS ONCE
Status: COMPLETED | OUTPATIENT
Start: 2022-06-13 | End: 2022-06-13

## 2022-06-13 RX ORDER — CEFAZOLIN SODIUM 1 G/50ML
1000 SOLUTION INTRAVENOUS EVERY 8 HOURS
Status: DISCONTINUED | OUTPATIENT
Start: 2022-06-13 | End: 2022-06-14

## 2022-06-13 RX ORDER — SODIUM CHLORIDE, SODIUM GLUCONATE, SODIUM ACETATE, POTASSIUM CHLORIDE, MAGNESIUM CHLORIDE, SODIUM PHOSPHATE, DIBASIC, AND POTASSIUM PHOSPHATE .53; .5; .37; .037; .03; .012; .00082 G/100ML; G/100ML; G/100ML; G/100ML; G/100ML; G/100ML; G/100ML
1000 INJECTION, SOLUTION INTRAVENOUS ONCE
Status: COMPLETED | OUTPATIENT
Start: 2022-06-13 | End: 2022-06-13

## 2022-06-13 RX ADMIN — CEFAZOLIN SODIUM 1000 MG: 1 SOLUTION INTRAVENOUS at 21:00

## 2022-06-13 RX ADMIN — CALCIUM GLUCONATE 1 G: 20 INJECTION, SOLUTION INTRAVENOUS at 02:08

## 2022-06-13 RX ADMIN — Medication 20000 ML: at 10:37

## 2022-06-13 RX ADMIN — DEXMEDETOMIDINE HYDROCHLORIDE 0.7 MCG/KG/HR: 400 INJECTION INTRAVENOUS at 04:16

## 2022-06-13 RX ADMIN — FENTANYL CITRATE 50 MCG: 50 INJECTION INTRAMUSCULAR; INTRAVENOUS at 04:48

## 2022-06-13 RX ADMIN — VANCOMYCIN HYDROCHLORIDE 1500 MG: 10 INJECTION, POWDER, LYOPHILIZED, FOR SOLUTION INTRAVENOUS at 08:46

## 2022-06-13 RX ADMIN — CALCIUM GLUCONATE 2 G: 20 INJECTION, SOLUTION INTRAVENOUS at 13:23

## 2022-06-13 RX ADMIN — POTASSIUM CHLORIDE 40 MEQ: 29.8 INJECTION, SOLUTION INTRAVENOUS at 13:23

## 2022-06-13 RX ADMIN — HEPARIN SODIUM 5000 UNITS: 5000 INJECTION INTRAVENOUS; SUBCUTANEOUS at 21:42

## 2022-06-13 RX ADMIN — CALCIUM GLUCONATE 1 G: 20 INJECTION, SOLUTION INTRAVENOUS at 07:20

## 2022-06-13 RX ADMIN — FENTANYL CITRATE 50 MCG: 50 INJECTION INTRAMUSCULAR; INTRAVENOUS at 02:19

## 2022-06-13 RX ADMIN — MAGNESIUM SULFATE HEPTAHYDRATE 1 G: 1 INJECTION, SOLUTION INTRAVENOUS at 02:54

## 2022-06-13 RX ADMIN — AMIODARONE HYDROCHLORIDE 0.5 MG/MIN: 50 INJECTION, SOLUTION INTRAVENOUS at 10:14

## 2022-06-13 RX ADMIN — HEPARIN SODIUM 5000 UNITS: 5000 INJECTION INTRAVENOUS; SUBCUTANEOUS at 13:37

## 2022-06-13 RX ADMIN — FENTANYL CITRATE 50 MCG: 50 INJECTION INTRAMUSCULAR; INTRAVENOUS at 03:18

## 2022-06-13 RX ADMIN — PIPERACILLIN AND TAZOBACTAM 3.38 G: 36; 4.5 INJECTION, POWDER, FOR SOLUTION INTRAVENOUS at 04:40

## 2022-06-13 RX ADMIN — SODIUM CHLORIDE, SODIUM GLUCONATE, SODIUM ACETATE, POTASSIUM CHLORIDE, MAGNESIUM CHLORIDE, SODIUM PHOSPHATE, DIBASIC, AND POTASSIUM PHOSPHATE 1000 ML: .53; .5; .37; .037; .03; .012; .00082 INJECTION, SOLUTION INTRAVENOUS at 12:02

## 2022-06-13 RX ADMIN — FENTANYL CITRATE 50 MCG: 50 INJECTION INTRAMUSCULAR; INTRAVENOUS at 05:50

## 2022-06-13 RX ADMIN — FENTANYL CITRATE 100 MCG: 0.05 INJECTION, SOLUTION INTRAMUSCULAR; INTRAVENOUS at 06:20

## 2022-06-13 RX ADMIN — Medication 20000 ML: at 01:53

## 2022-06-13 RX ADMIN — FENTANYL CITRATE 50 MCG: 50 INJECTION INTRAMUSCULAR; INTRAVENOUS at 07:04

## 2022-06-13 RX ADMIN — PANTOPRAZOLE SODIUM 40 MG: 40 INJECTION, POWDER, FOR SOLUTION INTRAVENOUS at 08:47

## 2022-06-13 RX ADMIN — CEFAZOLIN SODIUM 1000 MG: 1 SOLUTION INTRAVENOUS at 12:11

## 2022-06-13 NOTE — TELEPHONE ENCOUNTER
Called pt and left message to schedule f/u appt with Dr Lindsey Bush     ----- Message from Kylah Bergeron MA sent at 6/13/2022  6:52 AM EDT -----    ----- Message -----  From: Earl Richard MD  Sent: 6/12/2022   6:03 PM EDT  To: , #    Please arrange outpt f/u with Dr Syd Li for Hep C   [facility]     -[facility]

## 2022-06-13 NOTE — RESPIRATORY THERAPY NOTE
respcare   06/13/22 0932   Respiratory Assessment   Resp Comments pt awake trying to extubate  pt placed on ps of 6  pt extuabted w/o diff to nc pt had cuff leak and no stidor post extubation

## 2022-06-13 NOTE — PROGRESS NOTES
Daily Progress Note - Critical Care   Blanche Quinteros 46 y o  male MRN: [id number]  Unit/Bed#: MICU 04 Encounter: [id number]        ----------------------------------------------------------------------------------------  HPI/24hr events: agitation requiring frequent fentanyl PRN     ---------------------------------------------------------------------------------------  SUBJECTIVE  Intubated, Sedated    Review of Systems   Unable to perform ROS: Mental status change     Review of systems was unable to be performed secondary to sedated and intubated   ---------------------------------------------------------------------------------------  Assessment and Plan:    Neuro:   · Diagnosis: Acute Encephalopathy   ? Likely due to cocaine & Meth intoxication, possible arrhythmia, hyperkalemia, and questionable if had seizure vs tremors as reported by the witness friend prior to collapsing  ? PLAN: sedated and intubated , on Propofol and Fentanyl  ? RAAS 0-1;BPS score 3, no pain       · Diagnosis: hx PTSD   ? Plan: not on psych medication at home, per family  ? Will continue to monitor       CV:   · Diagnosis: Elevated Troponin ; peaked [id number] on 06/11  · Diagnosis: r/o cardiogenic shock   ? Plan: likely type II supply/demand due to cocaine use  Hyperkalemia  and suspected arrhythmia   ? EKG, initially with peaked T waves; sinus tachy 100 BPM with PVCs,   ? Will continue to trend troponin per protocol, with repeate EKG  ? Bedside echo done overall unremarkable; Xray chest unremarkable  ? Complete Echo: EF 65%, small A septal aneurysm, mild tricuspid reg  ? Cardiology consulted and evaluated; no ischemic workup indicated  ? IV fluids : s/p 3 L boluses and following with infusion ; in/out  ? BP running low requiring levophed in ED & MICU; 06/11 Off Levo   ? Close BP monitoring; on tele       Pulm:  · Diagnosis: failure to protect Airway d/t AMS   ? Plan: intubated ; on volume control 14/450/6/40   ?  Repeate ABG improved; Xray chest unremarkable, tube in place; CTAB            GI:   · Diagnosis: Nausea and vomiting  ? Reported per family as recent symptoms over past 1-2 weeks  ? Differential include but not limited to GI infection, d/t drug abuse; electrolyte abnormality, GERD  ? The friend witnessed the collapse noted blood at site of unclear source, GI!   ? Currently intubated; OG tube in place  ? Prophylactic IV Protonix 40 mg QD      :   · Diagnosis: Acute Kidney Injury +/- Rhabdomyolysis (d/t reported tremors & collapse on floor)     · JOÃO likely multifactorial; cardiorenal in context of low BP/arrhythmia, drug abuse/intoxication; baseline Cr 0 9-1 0; Cr on admission 3 23 with BUN 33 ; torres in, producing urine, dark yellow;   · R/o UTI / urosepsis; UA + blood, protein and innumerable bacteria; nitrite and leukocytes negative; leukocytosis and low BP requiring levo  ? Plan: IV fluids ; 3 L boluses + 150 cc/hr after; avoid hypotension/ BP fluctuation; on Levophed  ? Avoid nephrotoxic agents as possible  ? In / Out ; torres in    ? Was started on Zosyn in ED  ? 06/10 Blood cultures no growth 24H & MRSA swab in process  ? continue Zosyn + Vancomycin  ? Recheck BMP and CBC , continue monitor vitals          F/E/N:   · Diagnosis: Hyperkalemia   ? Plan: s/p dextrose , insulin and Ca gluconate  ? K improved down to 5 2; started on CVVHD 06/11 d/t severe oliguria    · Diagnosis:Hypocalcemia    ? Plan: s/p Ca Gluconate ; resolved     · Diagnosis: Elevated Anion Gap Acidosis; AG 15; HCO3 25; with Delta Delta gradient 7 ; consistent with Metabolic acidosis w pre-exist elevated bicarb   ? 06/10 S/p Sodium Bicarb  ? 06/11 AG closed        Heme/Onc:   · Diagnosis: Leukocytosis; trending down   ? Likely reactive vs infection ; see ID   ?  Will recheck CBC         Endo:   · Diagnosis: no active issue; glucose on admission 87 and repeate WNL ;TSH WNL      ID:   · Diagnosis: r/o septic shock ; hx IV drug use, leukocytosis and low BP requiring levo  · UA innumerable bacteria but negative nitrite and leukocytes   ? Blood Culture No growth 24H ; MRSA swab in process   ? Was given 1 dose Zosyn in ED; will continue Zosyn and Vancomycin for now   ? Trend CBC and monitor vitals      MSK/Skin:   Diagnosis: no acute issue ; frequent turn and reposition   Patient appropriate for transfer out of the ICU today?: No  Disposition: Admit to Critical Care   Code Status: Level 1 - Full Code  ---------------------------------------------------------------------------------------  ICU CORE MEASURES    Prophylaxis   VTE Pharmacologic Prophylaxis: Heparin  VTE Mechanical Prophylaxis: sequential compression device  Stress Ulcer Prophylaxis: Pantoprazole IV     ABCDE Protocol (if indicated)  Plan to perform spontaneous awakening trial today? Yes  Plan to perform spontaneous breathing trial today? Yes  Obvious barriers to extubation? Yes  CAM-ICU: Negative    Invasive Devices Review  Invasive Devices  Report    Central Venous Catheter Line  Duration           CVC Central Lines 06/11/22 Triple 2 days          Peripheral Intravenous Line  Duration           Peripheral IV 06/10/22 Right Forearm 2 days    Peripheral IV 06/10/22 Right Hand 2 days          Arterial Line  Duration           Arterial Line 06/11/22 Femoral 2 days          Hemodialysis Catheter  Duration           HD Temporary Double Catheter 2 days          Drain  Duration           NG/OG/Enteral Tube Orogastric 16 Fr Right mouth 2 days    Urethral Catheter Latex 16 Fr  2 days          Airway  Duration           ETT  7 5 mm 2 days              Can any invasive devices be discontinued today?  No  ---------------------------------------------------------------------------------------  OBJECTIVE    Vitals   Vitals:    06/13/22 0400 06/13/22 0430 06/13/22 0600 06/13/22 0700   BP:       BP Location:       Pulse: 64  62 62   Resp:    22   Temp: 97 52 °F (36 4 °C)  97 52 °F (36 4 °C) 97 52 °F (36 4 °C) TempSrc: Bladder   Bladder   SpO2: 100% 100% 100% 100%   Weight:   84 4 kg (186 lb 1 1 oz)    Height:         Temp (24hrs), Av 8 °F (36 6 °C), Min:97 52 °F (36 4 °C), Max:98 6 °F (37 °C)  Current: Temperature: 97 52 °F (36 4 °C)    Respiratory:  SpO2: SpO2: 100 %       Invasive/non-invasive ventilation settings   Respiratory  Report   Lab Data (Last 4 hours)    None         O2/Vent Data (Last 4 hours)       0430          Patient safety screen outcome: Failed                   Physical Exam  Vitals and nursing note reviewed  Constitutional:       Appearance: He is ill-appearing  HENT:      Head: Normocephalic  Cardiovascular:      Pulses: Normal pulses  Pulmonary:      Effort: Pulmonary effort is normal  No respiratory distress  Breath sounds: Normal breath sounds  No wheezing  Abdominal:      Tenderness: There is no abdominal tenderness  Skin:     General: Skin is warm  Constitutional:       Comments: Sedated and intubated ; BPS 3 , no pain    HENT:      Head: Normocephalic and atraumatic       Comments: Long beared       Right Ear: External ear normal       Left Ear: External ear normal       Nose: Nose normal       Mouth/Throat:      Mouth: Mucous membranes are dry  Eyes:      General: No scleral icterus      Conjunctiva/sclera: Conjunctivae normal       Pupils: Pupils are equal, round, and reactive to light  Cardiovascular:      Rate and Rhythm: Regular rhythm and rate       Pulses: Normal pulses       Heart sounds: No murmur heard     No gallop  Pulmonary:      Breath sounds: No stridor  No wheezing       Comments: CTAB   Abdominal:      General: Abdomen is flat  Bowel sounds are normal  There is no distension       Palpations: Abdomen is soft  Musculoskeletal:         General: No swelling       Right lower leg: No edema       Left lower leg: No edema  Skin:     Capillary Refill: Capillary refill takes less than 2 seconds     Neurological:     limited d/t sedated and intubated ; PERRL; BPS 3 points, no pain     Laboratory and Diagnostics:  Results from last 7 days   Lab Units 06/12/22  0610 06/11/22  1053 06/11/22  0506 06/10/22  2051 06/10/22  1027   WBC Thousand/uL 13 06* 11 27* 13 03* 17 04* 22 00*   HEMOGLOBIN g/dL 15 3 14 4 13 9 18 3* 17 7*   HEMATOCRIT % 44 2 42 0 39 9 54 8* 51 1*   PLATELETS Thousands/uL 149 128* 134* 213  213 258   NEUTROS PCT % 88* 81* 80*  --  87*   MONOS PCT % 4 4 4  --  8     Results from last 7 days   Lab Units 06/13/22  0531 06/13/22  0013 06/12/22  1807 06/12/22  1221 06/12/22  0610 06/12/22  0006 06/11/22  1809 06/11/22  1506 06/11/22  1053 06/11/22  0949 06/11/22  0506 06/11/22  0110 06/10/22  1915 06/10/22  1559 06/10/22  1027   SODIUM mmol/L 138 137 138 137 140 139 140 140 142   < > 142  142   < > 139   < > 138   POTASSIUM mmol/L 4 0 4 2 4 4 4 7 5 1 5 1 5 6* 5 2 4 0   < > 4 3  4 2   < > 7 3*   < > 8 3*   CHLORIDE mmol/L 105 106 106 107 106 105 106 106 108   < > 108  107   < > 107   < > 98   CO2 mmol/L 25 22 23 26 23 22 26 27 28   < > 22  22   < > 23   < > 25   ANION GAP mmol/L 8 9 9 4 11 12 8 7 6   < > 12  13   < > 9   < > 15*   BUN mg/dL 42* 46* 48* 51* 55* 59* 65* 61* 56*   < > 54*  55*   < > 40*   < > 33*   CREATININE mg/dL 3 51* 3 65* 3 89* 4 23* 4 63* 4 90* 5 55* 5 20* 4 51*   < > 3 89*  3 90*   < > 2 91*   < > 3 23*   CALCIUM mg/dL 8 4 8 6 8 8 8 5 8 1* 8 9 7 8* 7 7* 7 3*   < > 7 2*  7 1*   < > 7 3*   < > 8 3*   GLUCOSE RANDOM mg/dL 139 134 130 127 130 135 101 108 127   < > 148*  144*   < > 119   < > 86   ALT U/L  --   --   --   --  2,301*  --   --  2,898* 2,864*  --  2,624*  --  893*  --  195*   AST U/L  --   --   --   --  2,751*  --   --  6,320* 6,509*  --  6,260*  --  2,717*  --  350*   ALK PHOS U/L  --   --   --   --  71  --   --  69 58  --  55  --  77  --  82   ALBUMIN g/dL  --   --   --   --  2 3*  --   --  2 4* 2 3*  --  2 4*  --  3 2*  --  4 5   TOTAL BILIRUBIN mg/dL  --   --   --   --  2 45*  --   --  1 76* 1 27*  -- 1 04*  --  1 12*  --  0 85    < > = values in this interval not displayed  Results from last 7 days   Lab Units 22  0531 22  0013 22  1807 22  1221 22  0610 22  0006 22  1809   MAGNESIUM mg/dL 2 3 1 7 2 0 2 1 2 3 2 2 2 6   PHOSPHORUS mg/dL 3 0 3 2 3 7 5 4* 6 7* 5 8* 5 9*      Results from last 7 days   Lab Units 22  0817 06/10/22  2051   INR  1 25* 1 22*   PTT seconds  --  33  33          Results from last 7 days   Lab Units 22  0817 22  0413 22  0112 06/10/22  2255 06/10/22  1915 06/10/22  1700 06/10/22  1357   LACTIC ACID mmol/L 2 0 1 8 2 4* 3 2* 2 6* 2 8* 3 3*     ABG:  Results from last 7 days   Lab Units 22  0610   PH ART  7 251*   PCO2 ART mm Hg 50 2*   PO2 ART mm Hg 138 2*   HCO3 ART mmol/L 21 6*   BASE EXC ART mmol/L -6 0   ABG SOURCE  Line, Arterial     VBG:  Results from last 7 days   Lab Units 22  0610   ABG SOURCE  Line, Arterial     Results from last 7 days   Lab Units 22  0506   PROCALCITONIN ng/ml 49 11*       Micro  Results from last 7 days   Lab Units 22  0949 22  0055 06/10/22  2109 06/10/22  1113   BLOOD CULTURE   --   --  No Growth at 48 hrs  No Growth at 48 hrs  No Growth at 48 hrs  No Growth at 48 hrs  SPUTUM CULTURE  2+ Growth of Beta Hemolytic Streptococcus Group B*  --   --   --    GRAM STAIN RESULT  3+ Polys*  1+ Gram positive cocci in pairs*  Rare Gram negative rods*  No Epithelial cells seen*  --   --   --    MRSA CULTURE ONLY   --  Culture results to follow  --   --        EK/10 "Sinus tachycardia with occasional and consecutive Premature ventricular complexes  Incomplete right bundle branch block"   Imaging:  I have personally reviewed pertinent reports        Intake and Output  I/O       06/10 07 07 0700    I V  (mL/kg) 1842 5 (22 6) 981 8 (12 1)    NG/GT 0 70    IV Piggyback 1050 118 6    Total Intake(mL/kg) 2892 5 (35 4) 1170 4 (14 4) Urine (mL/kg/hr) 575 107 (0 1)    Emesis/NG output 0 0    Other  625    Total Output 575 732    Net +2317 5 +438 4              UOP: 0 1 ml/hr     Height and Weights   Height: 5' 8" (172 7 cm)  IBW (Ideal Body Weight): 68 4 kg  Body mass index is 28 29 kg/m²    Weight (last 2 days)     Date/Time Weight    06/13/22 0600 84 4 (186 07)    06/12/22 0600 81 9 (180 56)    06/11/22 1550 81 2 (179 01)    06/11/22 0720 81 2 (179)            Nutrition    TF N/A      Active Medications  Scheduled Meds:  Current Facility-Administered Medications   Medication Dose Route Frequency Provider Last Rate    amiodarone  0 5 mg/min Intravenous Continuous Pal Ingram, DO 0 5 mg/min (06/12/22 1500)    calcium gluconate  1 g Intravenous Once Faby R Axelband, DO 1 g (06/13/22 0720)    dexmedetomidine  0 1-1 2 mcg/kg/hr Intravenous Titrated Eulalio Koch, DO 0 7 mcg/kg/hr (06/13/22 0727)    fentaNYL  50 mcg/hr Intravenous Continuous Faby R Axelband, DO Stopped (06/12/22 1015)    fentanyl citrate (PF)  50 mcg Intravenous Q1H PRN Mouna Gabriel, DO      heparin (porcine)  5,000 Units Subcutaneous Q8H [address] [place], DO      norepinephrine  1-30 mcg/min Intravenous Titrated [place], DO Stopped (06/13/22 0727)    NxStage K 2/Ca 3  20,000 mL Dialysis Continuous Isabel Hays MD      pantoprazole  40 mg Intravenous Q24H [address] Joe Aiad, DO      piperacillin-tazobactam  3 375 g Intravenous Q8H Radha Cool MD 3 375 g (06/13/22 0440)    propofol  5-50 mcg/kg/min Intravenous Titrated Mouna Gabriel, DO Stopped (06/12/22 1016)    vancomycin  1,500 mg Intravenous Q24H Radha Cool MD Stopped (06/12/22 1100)     Continuous Infusions:  amiodarone, 0 5 mg/min, Last Rate: 0 5 mg/min (06/12/22 1500)  dexmedetomidine, 0 1-1 2 mcg/kg/hr, Last Rate: 0 7 mcg/kg/hr (06/13/22 0727)  fentaNYL, 50 mcg/hr, Last Rate: Stopped (06/12/22 1015)  norepinephrine, 1-30 mcg/min, Last Rate: Stopped (06/13/22 0727)  NxStage K 2/Ca 3, 20,000 mL  propofol, 5-50 mcg/kg/min, Last Rate: Stopped (06/12/22 1016)      PRN Meds:   fentanyl citrate (PF), 50 mcg, Q1H PRN        Allergies   No Known Allergies  ---------------------------------------------------------------------------------------  Advance Directive and Living Will:      Power of :    POLST:    ---------------------------------------------------------------------------------------  Care Time Delivered:   No Critical Care time spent     Ian Davila MD      Portions of the record may have been created with voice recognition software  Occasional wrong word or "sound a like" substitutions may have occurred due to the inherent limitations of voice recognition software    Read the chart carefully and recognize, using context, where substitutions have occurred

## 2022-06-13 NOTE — RESPIRATORY THERAPY NOTE
RT Ventilator Management Note  Cuca Burns 46 y o  male MRN: [id number]  Unit/Bed#: MICU 04 Encounter: [id number]      Daily Screen         6/13/2022  0430 6/13/2022  0754          Patient safety screen outcome[de-identified] Failed Passed (P)       Spont breathing trial % for 30 min: -- Yes (P)       Spont breathing trial outcome[de-identified] -- Failed (P)       Spont breathing trial reason failed: -- RR < 8 bpm (P)       Previous settings resumed: -- Yes (P)                 Physical Exam:   Assessment Type: Assess only  General Appearance: Sedated  Respiratory Pattern: Assisted  Chest Assessment: Chest expansion symmetrical  Bilateral Breath Sounds: Diminished, Clear  O2 Device: vent      Resp Comments: pt remains comfortable on SCMV settings  no changes made at this time  Placed pt on spont  Weaning trial  Pt immediately went apenic times 5 trials  Last prn sedation was around 0700 per RN   Will attempt wean again later this AM

## 2022-06-13 NOTE — PROGRESS NOTES
NEPHROLOGY PROGRESS NOTE   Juana Hilliard 46 y o  male MRN: [id number]  [facility] [id number]  Reason for Consult: JOÃO    ASSESSMENT AND PLAN:  JOÃO POA, baseline creatinine 0 9 to 1 1, isolated values 1 6 in September 2020  -JOÃO suspected to be secondary to ATN due to rhabdomyolysis, shock in the setting of cocaine/methamphetamine toxicity  -urine output remains poor  -patient was seen and examined on CRRT at 8:30 a m  Francoiselizabeth Taylor Tolerating dialysis well  FF 11%  -currently remains off Levophed chest this a m    If continued to remain off vasopressors, may consider transition to HD over next 24 hours  Continue CRRT for now  -currently remains even fluid balance  -UA shows innumerable RBCs, innumerable bacteria, 2+ protein  -CT scan shows no hydronephrosis  -avoid NSAIDs, hypotension  Avoid IV contrast    Hyperkalemia, serum potassium overall continue to improve with most recent potassium 4 0  Currently remains on two K bath on dialysis  Will change to 4K bath  Severe rhabdomyolysis, peak CK level greater than 100,000 now seems to be overall improving to 49,724 yesterday  Continue to monitor daily CK level   -increase dialysate flow 2 2 L/hr    Shock, currently remains off vasopressors at the time of my encounter  Rule out infection, currently remains on empiric antibiotic as per ICU  Blood cultures negative so far   =-echo shows EF 29%, diastolic function normal, normal IVC    VDRF in the setting of severe rhabdomyolysis, shock  Currently on 40% FiO2  Management as per ICU team     Nontraumatic collapses/unresponsive, cocaine/methamphetamine intoxication, management as per ICU  Discussed above plan in detail with ICU team    SUBJECTIVE:  Patient seen and examined at bedside  Tolerating dialysis well  Poor urine output    Remains intubated    OBJECTIVE:  Current Weight: Weight - Scale: 84 4 kg (186 lb 1 1 oz)  Vitals:    06/13/22 0800   BP:    Pulse: 70   Resp:    Temp: 97 88 °F (36 6 °C) SpO2: 100%       Intake/Output Summary (Last 24 hours) at 2022 0902  Last data filed at 2022 0900  Gross per 24 hour   Intake 2104 5 ml   Output 2286 ml   Net -181 5 ml     Wt Readings from Last 3 Encounters:   22 84 4 kg (186 lb 1 1 oz)   06/10/22 79 7 kg (175 lb 11 3 oz)   21 72 6 kg (160 lb)     Temp Readings from Last 3 Encounters:   22 97 88 °F (36 6 °C)   06/10/22 99 1 °F (37 3 °C) (Bladder)   21 98 3 °F (36 8 °C) (Oral)     BP Readings from Last 3 Encounters:   22 (!) 65/51   06/10/22 103/67   21 154/75     Pulse Readings from Last 3 Encounters:   22 70   06/10/22 101   21 (!) 107        Physical Examination:  General:  Lying in bed, intubated   Eyes:  No conjunctival pallor present  ENT:  ET tube present, external examination of ears and nose unremarkable  Neck:  No obvious lymphadenopathy appreciated  Respiratory:  Decreased breath sound at base  CVS:  S1, S2 present  GI:  Soft, nondistended  CNS:  Intubated, does not follow commands at the time of my encounter  Skin:  No new rash  Musculoskeletal:  No obvious new gross deformity noted    Medications:    Current Facility-Administered Medications:     [] amiodarone (CORDARONE) 900 mg in dextrose 5 % 500 mL infusion, 1 mg/min, Intravenous, Continuous, Stopped at 22 1459 **FOLLOWED BY** amiodarone (CORDARONE) 900 mg in dextrose 5 % 500 mL infusion, 0 5 mg/min, Intravenous, Continuous, Pal Ingram DO, Last Rate: 16 7 mL/hr at 22 1500, 0 5 mg/min at 22 1500    dexmedeTOMIDine (Precedex) 400 mcg in sodium chloride 0 9% 100 mL, 0 1-1 2 mcg/kg/hr, Intravenous, Titrated, Eulalio Koch DO, Last Rate: 14 3 mL/hr at 22 0727, 0 7 mcg/kg/hr at 22 0727    fentaNYL 1000 mcg in sodium chloride 0 9% 100mL infusion, 50 mcg/hr, Intravenous, Continuous, Faby Ortiz DO, Stopped at 22 1015    fentanyl citrate (PF) 100 MCG/2ML 50 mcg, 50 mcg, Intravenous, Q1H PRN, Vamsi Chao DO, 50 mcg at 06/13/22 0704    heparin (porcine) subcutaneous injection 5,000 Units, 5,000 Units, Subcutaneous, Q8H [facility], 5,000 Units at 06/12/22 2119    norepinephrine (LEVOPHED) 4 mg (STANDARD CONCENTRATION) IV in sodium chloride 0 9% 250 mL, 1-30 mcg/min, Intravenous, Titrated, Joe Ballesteros DO, Held at 06/13/22 4494    NxStage K 2/Ca 3 dialysis solution (RFP-400) 20,000 mL, 20,000 mL, Dialysis, Continuous, Joy Rodriguez MD, 20,000 mL at 06/13/22 0153    pantoprazole (PROTONIX) injection 40 mg, 40 mg, Intravenous, Q24H [facility], 40 mg at 06/13/22 0847    piperacillin-tazobactam (ZOSYN) 3 375 g in sodium chloride 0 9 % 100 mL IVPB (EXTENDED-INFUSION), 3 375 g, Intravenous, Q8H, Venancio Crandall MD, Last Rate: 25 mL/hr at 06/13/22 0440, 3 375 g at 06/13/22 0440    propofol (DIPRIVAN) 1000 mg in 100 mL infusion (premix), 5-50 mcg/kg/min, Intravenous, Titrated, Vamsi Chao DO, Stopped at 06/12/22 1016    vancomycin (VANCOCIN) 1500 mg in sodium chloride 0 9% 250 mL IVPB, 1,500 mg, Intravenous, Q24H, Venancio Crandall MD, Last Rate: 166 7 mL/hr at 06/13/22 0846, 1,500 mg at 06/13/22 0846    Laboratory Results:  Results from last 7 days   Lab Units 06/13/22  0531 06/13/22  0013 06/12/22  1807 06/12/22  1221 06/12/22  0610 06/12/22  0006 06/11/22  1809 06/11/22  1506 06/11/22  1053 06/11/22  0949 06/11/22  0506 06/11/22  0110 06/10/22  2051 06/10/22  1559 06/10/22  1027   WBC Thousand/uL  --   --   --   --  13 06*  --   --   --  11 27*  --  13 03*  --  17 04*  --  22 00*   HEMOGLOBIN g/dL  --   --   --   --  15 3  --   --   --  14 4  --  13 9  --  18 3*  --  17 7*   HEMATOCRIT %  --   --   --   --  44 2  --   --   --  42 0  --  39 9  --  54 8*  --  51 1*   PLATELETS Thousands/uL  --   --   --   --  149  --   --   --  128*  --  134*  --  213  213  --  258   SODIUM mmol/L 138 137 138 137 140 139 140   < > 142   < > 142  142   < >  --    < > 138 POTASSIUM mmol/L 4 0 4 2 4 4 4 7 5 1 5 1 5 6*   < > 4 0   < > 4 3  4 2   < >  --    < > 8 3*   CHLORIDE mmol/L 105 106 106 107 106 105 106   < > 108   < > 108  107   < >  --    < > 98   CO2 mmol/L 25 22 23 26 23 22 26   < > 28   < > 22  22   < >  --    < > 25   BUN mg/dL 42* 46* 48* 51* 55* 59* 65*   < > 56*   < > 54*  55*   < >  --    < > 33*   CREATININE mg/dL 3 51* 3 65* 3 89* 4 23* 4 63* 4 90* 5 55*   < > 4 51*   < > 3 89*  3 90*   < >  --    < > 3 23*   CALCIUM mg/dL 8 4 8 6 8 8 8 5 8 1* 8 9 7 8*   < > 7 3*   < > 7 2*  7 1*   < >  --    < > 8 3*   MAGNESIUM mg/dL 2 3 1 7 2 0 2 1 2 3 2 2 2 6  --  2 5  --  2 4  --   --    < >  --    PHOSPHORUS mg/dL 3 0 3 2 3 7 5 4* 6 7* 5 8* 5 9*  --   --    < > 4 7*  4 6*   < >  --    < >  --     < > = values in this interval not displayed  US liver doppler only   Final Result by Cm Dhillon MD (06/12 1651)      Normal liver Doppler  Workstation performed: [id number]         XR chest portable ICU   Final Result by Joelle Madrid MD (06/11 7322)      Deep sulcus sign concerning for a small left basilar pneumothorax  Right lateral decubitus or upright radiograph could be obtained for confirmation  The tip of the left subclavian central venous catheter projects at the superior vena cava  The study was marked in [facility] for immediate notification  Workstation performed: [id number]             Portions of the record may have been created with voice recognition software  Occasional wrong word or "sound a like" substitutions may have occurred due to the inherent limitations of voice recognition software  Read the chart carefully and recognize, using context, where substitutions have occurred

## 2022-06-13 NOTE — CONSULTS
Vancomycin IV Pharmacy-to-Dose Consultation    Naty Araya is a 46 y o  male who was receiving Vancomycin IV with management by the Pharmacy Consult service  The patients Vancomycin therapy has been discontinued  Thank you for allowing us to take part in this patient's care  Pharmacy will sign-off now; please call or re-consult if there are any questions          Norma Paiz, PharmD, [address] Clinical Pharmacist  [phone]

## 2022-06-14 ENCOUNTER — APPOINTMENT (INPATIENT)
Dept: DIALYSIS | Facility: HOSPITAL | Age: 52
DRG: 469 | End: 2022-06-14
Payer: COMMERCIAL

## 2022-06-14 PROBLEM — J14 PNEUMONIA DUE TO HAEMOPHILUS INFLUENZAE (HCC): Status: ACTIVE | Noted: 2022-06-14

## 2022-06-14 LAB
ANION GAP SERPL CALCULATED.3IONS-SCNC: 6 MMOL/L (ref 4–13)
ANION GAP SERPL CALCULATED.3IONS-SCNC: 9 MMOL/L (ref 4–13)
BACTERIA SPT RESP CULT: ABNORMAL
BUN SERPL-MCNC: 44 MG/DL (ref 5–25)
BUN SERPL-MCNC: 48 MG/DL (ref 5–25)
CA-I BLD-SCNC: 1.13 MMOL/L (ref 1.12–1.32)
CA-I BLD-SCNC: 1.14 MMOL/L (ref 1.12–1.32)
CALCIUM SERPL-MCNC: 8.5 MG/DL (ref 8.3–10.1)
CALCIUM SERPL-MCNC: 8.5 MG/DL (ref 8.3–10.1)
CHLORIDE SERPL-SCNC: 103 MMOL/L (ref 100–108)
CHLORIDE SERPL-SCNC: 105 MMOL/L (ref 100–108)
CK MB SERPL-MCNC: 51.3 NG/ML (ref 0–5)
CK MB SERPL-MCNC: <1 % (ref 0–2.5)
CK SERPL-CCNC: 7323 U/L (ref 39–308)
CO2 SERPL-SCNC: 23 MMOL/L (ref 21–32)
CO2 SERPL-SCNC: 26 MMOL/L (ref 21–32)
CREAT SERPL-MCNC: 4.1 MG/DL (ref 0.6–1.3)
CREAT SERPL-MCNC: 4.56 MG/DL (ref 0.6–1.3)
ERYTHROCYTE [DISTWIDTH] IN BLOOD BY AUTOMATED COUNT: 11.8 % (ref 11.6–15.1)
GFR SERPL CREATININE-BSD FRML MDRD: 13 ML/MIN/1.73SQ M
GFR SERPL CREATININE-BSD FRML MDRD: 15 ML/MIN/1.73SQ M
GLUCOSE SERPL-MCNC: 109 MG/DL (ref 65–140)
GLUCOSE SERPL-MCNC: 99 MG/DL (ref 65–140)
GRAM STN SPEC: ABNORMAL
HCT VFR BLD AUTO: 35.4 % (ref 36.5–49.3)
HCV RNA SERPL NAA+PROBE-ACNC: NORMAL IU/ML
HCV RNA SERPL NAA+PROBE-LOG IU: 6.51 LOG10 IU/ML
HGB BLD-MCNC: 12.1 G/DL (ref 12–17)
MAGNESIUM SERPL-MCNC: 2.2 MG/DL (ref 1.6–2.6)
MAGNESIUM SERPL-MCNC: 2.2 MG/DL (ref 1.6–2.6)
MCH RBC QN AUTO: 34.2 PG (ref 26.8–34.3)
MCHC RBC AUTO-ENTMCNC: 34.2 G/DL (ref 31.4–37.4)
MCV RBC AUTO: 100 FL (ref 82–98)
MRSA NOSE QL CULT: ABNORMAL
MRSA NOSE QL CULT: ABNORMAL
PHOSPHATE SERPL-MCNC: 2.8 MG/DL (ref 2.7–4.5)
PHOSPHATE SERPL-MCNC: 2.8 MG/DL (ref 2.7–4.5)
POTASSIUM SERPL-SCNC: 3.9 MMOL/L (ref 3.5–5.3)
POTASSIUM SERPL-SCNC: 3.9 MMOL/L (ref 3.5–5.3)
RBC # BLD AUTO: 3.54 MILLION/UL (ref 3.88–5.62)
SODIUM SERPL-SCNC: 135 MMOL/L (ref 136–145)
SODIUM SERPL-SCNC: 137 MMOL/L (ref 136–145)
TEST INFORMATION: NORMAL
WBC # BLD AUTO: 7.69 THOUSAND/UL (ref 4.31–10.16)

## 2022-06-14 PROCEDURE — 90935 HEMODIALYSIS ONE EVALUATION: CPT | Performed by: INTERNAL MEDICINE

## 2022-06-14 PROCEDURE — 83735 ASSAY OF MAGNESIUM: CPT | Performed by: STUDENT IN AN ORGANIZED HEALTH CARE EDUCATION/TRAINING PROGRAM

## 2022-06-14 PROCEDURE — 84100 ASSAY OF PHOSPHORUS: CPT | Performed by: STUDENT IN AN ORGANIZED HEALTH CARE EDUCATION/TRAINING PROGRAM

## 2022-06-14 PROCEDURE — 80048 BASIC METABOLIC PNL TOTAL CA: CPT | Performed by: STUDENT IN AN ORGANIZED HEALTH CARE EDUCATION/TRAINING PROGRAM

## 2022-06-14 PROCEDURE — NC001 PR NO CHARGE: Performed by: INTERNAL MEDICINE

## 2022-06-14 PROCEDURE — 82553 CREATINE MB FRACTION: CPT | Performed by: STUDENT IN AN ORGANIZED HEALTH CARE EDUCATION/TRAINING PROGRAM

## 2022-06-14 PROCEDURE — 99233 SBSQ HOSP IP/OBS HIGH 50: CPT | Performed by: INTERNAL MEDICINE

## 2022-06-14 PROCEDURE — 82330 ASSAY OF CALCIUM: CPT | Performed by: STUDENT IN AN ORGANIZED HEALTH CARE EDUCATION/TRAINING PROGRAM

## 2022-06-14 PROCEDURE — 82550 ASSAY OF CK (CPK): CPT | Performed by: STUDENT IN AN ORGANIZED HEALTH CARE EDUCATION/TRAINING PROGRAM

## 2022-06-14 PROCEDURE — 85027 COMPLETE CBC AUTOMATED: CPT | Performed by: EMERGENCY MEDICINE

## 2022-06-14 RX ORDER — CEFAZOLIN SODIUM 1 G/50ML
1000 SOLUTION INTRAVENOUS EVERY 24 HOURS
Status: DISCONTINUED | OUTPATIENT
Start: 2022-06-14 | End: 2022-06-14

## 2022-06-14 RX ORDER — BENZONATATE 100 MG/1
100 CAPSULE ORAL 3 TIMES DAILY PRN
Status: DISCONTINUED | OUTPATIENT
Start: 2022-06-14 | End: 2022-06-24 | Stop reason: HOSPADM

## 2022-06-14 RX ORDER — CEFUROXIME AXETIL 250 MG/1
500 TABLET ORAL EVERY 24 HOURS
Status: DISCONTINUED | OUTPATIENT
Start: 2022-06-14 | End: 2022-06-18

## 2022-06-14 RX ORDER — AMIODARONE HYDROCHLORIDE 200 MG/1
200 TABLET ORAL
Status: DISCONTINUED | OUTPATIENT
Start: 2022-06-14 | End: 2022-06-24 | Stop reason: HOSPADM

## 2022-06-14 RX ORDER — AMIODARONE HYDROCHLORIDE 200 MG/1
200 TABLET ORAL
Status: DISCONTINUED | OUTPATIENT
Start: 2022-06-29 | End: 2022-06-24 | Stop reason: HOSPADM

## 2022-06-14 RX ADMIN — CEFUROXIME AXETIL 500 MG: 250 TABLET, FILM COATED ORAL at 15:26

## 2022-06-14 RX ADMIN — AMIODARONE HYDROCHLORIDE 200 MG: 200 TABLET ORAL at 17:12

## 2022-06-14 RX ADMIN — HEPARIN SODIUM 5000 UNITS: 5000 INJECTION INTRAVENOUS; SUBCUTANEOUS at 22:04

## 2022-06-14 RX ADMIN — HEPARIN SODIUM 5000 UNITS: 5000 INJECTION INTRAVENOUS; SUBCUTANEOUS at 15:26

## 2022-06-14 RX ADMIN — HEPARIN SODIUM 5000 UNITS: 5000 INJECTION INTRAVENOUS; SUBCUTANEOUS at 05:00

## 2022-06-14 RX ADMIN — CEFAZOLIN SODIUM 1000 MG: 1 SOLUTION INTRAVENOUS at 05:00

## 2022-06-14 RX ADMIN — BENZONATATE 100 MG: 100 CAPSULE ORAL at 23:56

## 2022-06-14 NOTE — ASSESSMENT & PLAN NOTE
2/2 to acute renal failure  Peaked T waves on EKG   Treated with insulin, Ca gluconate, and IVF on arrival  Treated with CVVHD in ICU   6/14- started intermittent HD  Improved    Plan:   · Continue prn HD per nephro  · Monitor BMP

## 2022-06-14 NOTE — PROGRESS NOTES
CRITICAL CARE TRANSFER NOTE     Name: Michelle Wright   Age & Sex: 46 y o  male   MRN: [id number]  [facility]   Encounter: [id number]  Hospital Stay Days: 4    Reason for ICU Admission:   Code Status: Level 1 - Full Code  Condition: stable  Disposition: Patient requires Med/Surg    Accepting Physician: Dr Kevin Garcia    ASSESSMENT/PLAN     Principal Problem:    Acute renal failure [facility])  Active Problems:    PTSD (post-traumatic stress disorder)    Hyperkalemia    IV drug user    [facility] abuse ([facility] )    Unresponsiveness    NSTEMI (non-ST elevated myocardial infarction) ([facility] )    Methamphetamine abuse ([place] [id number] )    Acute encephalopathy    Pneumonia due to Haemophilus influenzae ([facility] )    * Acute renal failure ([facility] )  Assessment & Plan  In the setting of rhabdomyolysis   Required CVVHD  6/14- transitioned to intermittent HD; appreciated nephro following  -monitor CK, Cr, BMP   -monitor I/Os    Pneumonia due to Haemophilus influenzae ([facility] )  Assessment & Plan  Sputum revealing H Flu and Group B strep  -initially received Zosyn and Vanco, will switch to Cefuroxime 500mg x4 days for total of 7 days of Abx    NSTEMI (non-ST elevated myocardial infarction) ([facility] )  Assessment & Plan  Elevated troponin level noted on presentation  -secondary to ARF  -no interventions    IV drug user  Assessment & Plan  Pt has a hx of polysubstance abuse including cocaine, meth, THC  -could be contributing to pt's "found down" event  -educated on importance of cessation     Hyperkalemia  Assessment & Plan  2/2 to acute renal failure   Peaked T waves on EKG   K has improved with CVVHD  6/14- started intermittent HD  Monitor BMP     PTSD (post-traumatic stress disorder)  Assessment & Plan  Not on any medications  Recommend follow up out pt PCP/ psych       VTE Pharmacologic Prophylaxis: Sequential compression device (Venodyne)   VTE Mechanical Prophylaxis: sequential compression device    HOSPITAL COURSE     Pt is a 48yo M with sig PMH of polysubstance/ IVDU abuse, Hep C, PTSD, nicotine abuse,  who presented to [facility] had witnessed collapse while with a friend, deemed shaky and the friend noted that there was some blood unclear source if hemoptysis/hematemisis or other, then patient collapsed and EMS was called who took him to the ED  found to have hyperkalemic K 8 3, troponin 485 > 974, LA 4 6, ABG 7 2, 43 5, 131, 17 0 & U cocaine, Meth and TCH +  Was intubated in ED for airway protection, given Ca Gluconate, Insulin, Dextrose, 2 L Isolyte + 1 L NS +  cc/hr started, BP remained low, was started on Levo, and brought from ED to MICU  On arrival to the MICU, sedated, intubated, ABG improved, LA trending down 2 6, HS Trop trending up, bedside echo and chest xray both unremarkable, on levo 6, propofol 20,  cc/hr, volume control 24/500/6/40, followed commands on holding propofol during exam      During his hospital course, pt received broad spec abx with Vanc and Zosyn for suspected PNA as well as started on CVVHD for ARF, rhabdomyolysis, and hyperkalemia  He did develop short episode of Afib w RVR which is new for pt  Pt started on amio however did not complete amio load; subsequently converted to NSR  No AC started  Pt extubated 6/12 and continued to improve without requiring pressor support  Pt stable to receive iHD (first session 6/14) and was medically stable to transfer to M/S without tele  OBJECTIVE     Vitals:    06/14/22 0940 06/14/22 1000 06/14/22 1030 06/14/22 1100   BP: 151/97 149/99 (!) 137/109 (!) 155/104   BP Location:  Left arm     Pulse: 80 80 80 74   Resp: 22 16 16 14   Temp:       TempSrc:       SpO2:       Weight:       Height:         I/O last 24 hours: In: 2896 8 [P O :345; I V :1947 8; IV ZPPZVDCHL:319]  Out: 1041 [Other:1041]    LABORATORY DATA     Labs: I have personally reviewed pertinent reports        Results from last 7 days   Lab Units 06/14/22  0505 06/13/22  0958 06/12/22  0610 06/11/22  1053   WBC Thousand/uL 7 69 10 27* 13 06* 11 27*   HEMOGLOBIN g/dL 12 1 13 0 15 3 14 4   HEMATOCRIT % 35 4* 36 9 44 2 42 0   PLATELETS Thousands/uL  --  120* 149 128*   NEUTROS PCT %  --  83* 88* 81*   MONOS PCT %  --  6 4 4      Results from last 7 days   Lab Units 06/14/22  0505 06/14/22  0032 06/13/22  1805 06/12/22  1221 06/12/22  0610 06/11/22  1809 06/11/22  1506 06/11/22  1053   POTASSIUM mmol/L 3 9 3 9 4 4   < > 5 1   < > 5 2 4 0   CHLORIDE mmol/L 105 103 105   < > 106   < > 106 108   CO2 mmol/L 26 23 23   < > 23   < > 27 28   BUN mg/dL 48* 44* 36*   < > 55*   < > 61* 56*   CREATININE mg/dL 4 56* 4 10* 3 18*   < > 4 63*   < > 5 20* 4 51*   CALCIUM mg/dL 8 5 8 5 9 0   < > 8 1*   < > 7 7* 7 3*   ALK PHOS U/L  --   --   --   --  71  --  69 58   ALT U/L  --   --   --   --  2,301*  --  2,898* 2,864*   AST U/L  --   --   --   --  2,751*  --  6,320* 6,509*    < > = values in this interval not displayed  Results from last 7 days   Lab Units 06/14/22  0505 06/14/22  0032 06/13/22  1805   MAGNESIUM mg/dL 2 2 2 2 2 1     Results from last 7 days   Lab Units 06/14/22  0505 06/14/22  0032 06/13/22  1805   PHOSPHORUS mg/dL 2 8 2 8 2 7      Results from last 7 days   Lab Units 06/12/22  0817 06/10/22  2051   INR  1 25* 1 22*   PTT seconds  --  33  33     Results from last 7 days   Lab Units 06/12/22  0817   LACTIC ACID mmol/L 2 0         Micro:  Lab Results   Component Value Date    BLOODCX No Growth at 72 hrs  06/10/2022    BLOODCX No Growth at 72 hrs  06/10/2022    BLOODCX No Growth at 72 hrs  06/10/2022    BLOODCX No Growth at 72 hrs  06/10/2022    SPUTUMCULTUR 2+ Growth of Beta Hemolytic Streptococcus Group B (A) 06/11/2022    SPUTUMCULTUR 2 colonies Haemophilus influenzae (AA) 06/11/2022    SPUTUMCULTUR 2+ Growth of  06/11/2022     IMAGING & DIAGNOSTIC TESTING     Imaging: I have personally reviewed pertinent reports  CT chest abdomen pelvis wo contrast    Result Date: 6/10/2022  Impression: 1   Limited examination demonstrating no evidence of intrathoracic, intra-abdominal or intrapelvic traumatic injury 2  There is nonspecific perihilar and upper lobe airspace opacities, could represent mild pulmonary edema versus aspiration  3  No pneumothorax, hemothorax or mediastinal air 4  Tiny amount of intravascular and cardiac air, likely introduced through an IV catheter Workstation performed: [id number]     XR chest 1 view portable    Result Date: 6/10/2022  Impression: Endotracheal tube tip is 5 5 cm above the jesika Workstation performed: [id number]     CT head without contrast    Result Date: 6/10/2022  Impression: No acute intracranial abnormality  Bilateral facial subcutaneous air present  Please refer to cervical spine CT report for more specific detail Workstation performed: [id number]     CT spine cervical wo contrast    Result Date: 6/10/2022  Impression: No cervical spine fracture or traumatic malalignment  Subcutaneous emphysema seen in the neck  Given that the CT of the chest abdomen and pelvis demonstrates no evidence of pneumothorax, mediastinal or paraesophageal air, this most likely is related to air introduced through an IV catheter  Also correlate for any penetrating injury that may have allowed generalized subcutaneous air to occur  The examination demonstrates a significant  finding and was documented as such in [facility] for liaison and referring practitioner immediate notification  Workstation performed: [id number]     XR chest portable ICU    Result Date: 6/11/2022  Impression: Deep sulcus sign concerning for a small left basilar pneumothorax  Right lateral decubitus or upright radiograph could be obtained for confirmation  The tip of the left subclavian central venous catheter projects at the superior vena cava  The study was marked in [facility] for immediate notification  Workstation performed: [id number]     US liver doppler only    Result Date: 6/12/2022  Impression: Normal liver Doppler  Workstation performed: [id number]     EKG, Pathology, and Other Studies: I have personally reviewed pertinent reports       ALLERGIES   No Known Allergies    MEDICATIONS     Current Facility-Administered Medications   Medication Dose Route Frequency Provider Last Rate    amiodarone  200 mg Oral TID With Meals Thiago Lagunas MD      Followed by   Deb Weaver ON 6/29/2022] amiodarone  200 mg Oral Daily With Refugstephanie Lemus MD      cefuroxime  500 mg Oral Q24H Thiago Lagunas MD      heparin (porcine)  5,000 Units Subcutaneous [facility] Kiko Larry, DO             ==      Thiago Lagunas MD  Chief Resident, PGY-3  Internal Medicine Residency  [facility]

## 2022-06-14 NOTE — ASSESSMENT & PLAN NOTE
Pt has a hx of polysubstance abuse including cocaine, meth, THC  Could be contributing to pt's "found down" event      Plan:  · Continue to encourage cessation

## 2022-06-14 NOTE — PLAN OF CARE
Problem: MOBILITY - ADULT  Goal: Maintain or return to baseline ADL function  Description: INTERVENTIONS:  -  Assess patient's ability to carry out ADLs; assess patient's baseline for ADL function and identify physical deficits which impact ability to perform ADLs (bathing, care of mouth/teeth, toileting, grooming, dressing, etc )  - Assess/evaluate cause of self-care deficits   - Assess range of motion  - Assess patient's mobility; develop plan if impaired  - Assess patient's need for assistive devices and provide as appropriate  - Encourage maximum independence but intervene and supervise when necessary  - Involve family in performance of ADLs  - Assess for home care needs following discharge   - Consider OT consult to assist with ADL evaluation and planning for discharge  - Provide patient education as appropriate  Outcome: Progressing  Goal: Maintains/Returns to pre admission functional level  Description: INTERVENTIONS:  - Perform BMAT or MOVE assessment daily    - Set and communicate daily mobility goal to care team and patient/family/caregiver  - Collaborate with rehabilitation services on mobility goals if consulted  - Perform Range of Motion 3 times a day  - Reposition patient every 2 hours    - Dangle patient 3 times a day  - Stand patient 3 times a day  - Ambulate patient 3 times a day  - Out of bed to chair 3 times a day   - Out of bed for meals 3 times a day  - Out of bed for toileting  - Record patient progress and toleration of activity level   Outcome: Progressing     Problem: Prexisting or High Potential for Compromised Skin Integrity  Goal: Skin integrity is maintained or improved  Description: INTERVENTIONS:  - Identify patients at risk for skin breakdown  - Assess and monitor skin integrity  - Assess and monitor nutrition and hydration status  - Monitor labs   - Assess for incontinence   - Turn and reposition patient  - Assist with mobility/ambulation  - Relieve pressure over bony prominences  - Avoid friction and shearing  - Provide appropriate hygiene as needed including keeping skin clean and dry  - Evaluate need for skin moisturizer/barrier cream  - Collaborate with interdisciplinary team   - Patient/family teaching  - Consider wound care consult   Outcome: Progressing     Problem: PAIN - ADULT  Goal: Verbalizes/displays adequate comfort level or baseline comfort level  Description: Interventions:  - Encourage patient to monitor pain and request assistance  - Assess pain using appropriate pain scale  - Administer analgesics based on type and severity of pain and evaluate response  - Implement non-pharmacological measures as appropriate and evaluate response  - Consider cultural and social influences on pain and pain management  - Notify physician/advanced practitioner if interventions unsuccessful or patient reports new pain  Outcome: Progressing     Problem: INFECTION - ADULT  Goal: Absence or prevention of progression during hospitalization  Description: INTERVENTIONS:  - Assess and monitor for signs and symptoms of infection  - Monitor lab/diagnostic results  - Monitor all insertion sites, i e  indwelling lines, tubes, and drains  - Monitor endotracheal if appropriate and nasal secretions for changes in amount and color  - [place] appropriate cooling/warming therapies per order  - Administer medications as ordered  - Instruct and encourage patient and family to use good hand hygiene technique  - Identify and instruct in appropriate isolation precautions for identified infection/condition  Outcome: Progressing     Problem: SAFETY ADULT  Goal: Maintain or return to baseline ADL function  Description: INTERVENTIONS:  -  Assess patient's ability to carry out ADLs; assess patient's baseline for ADL function and identify physical deficits which impact ability to perform ADLs (bathing, care of mouth/teeth, toileting, grooming, dressing, etc )  - Assess/evaluate cause of self-care deficits - Assess range of motion  - Assess patient's mobility; develop plan if impaired  - Assess patient's need for assistive devices and provide as appropriate  - Encourage maximum independence but intervene and supervise when necessary  - Involve family in performance of ADLs  - Assess for home care needs following discharge   - Consider OT consult to assist with ADL evaluation and planning for discharge  - Provide patient education as appropriate  Outcome: Progressing  Goal: Maintains/Returns to pre admission functional level  Description: INTERVENTIONS:  - Perform BMAT or MOVE assessment daily    - Set and communicate daily mobility goal to care team and patient/family/caregiver  - Collaborate with rehabilitation services on mobility goals if consulted  - Perform Range of Motion 3 times a day  - Reposition patient every 2 hours    - Dangle patient 3 times a day  - Stand patient 3 times a day  - Ambulate patient 3 times a day  - Out of bed to chair 3 times a day   - Out of bed for meals 3 times a day  - Out of bed for toileting  - Record patient progress and toleration of activity level   Outcome: Progressing  Goal: Patient will remain free of falls  Description: INTERVENTIONS:  - Educate patient/family on patient safety including physical limitations  - Instruct patient to call for assistance with activity   - Consult OT/PT to assist with strengthening/mobility   - Keep Call bell within reach  - Keep bed low and locked with side rails adjusted as appropriate  - Keep care items and personal belongings within reach  - Initiate and maintain comfort rounds  - Make Fall Risk Sign visible to staff  - Offer Toileting every 2 Hours, in advance of need  - Initiate/Maintain bed alarm  - Obtain necessary fall risk management equipment: bed alarm  - Apply yellow socks and bracelet for high fall risk patients  - Consider moving patient to room near nurses station  Outcome: Progressing     Problem: DISCHARGE PLANNING  Goal: Discharge to home or other facility with appropriate resources  Description: INTERVENTIONS:  - Identify barriers to discharge w/patient and caregiver  - Arrange for needed discharge resources and transportation as appropriate  - Identify discharge learning needs (meds, wound care, etc )  - Arrange for interpretive services to assist at discharge as needed  - Refer to Case Management Department for coordinating discharge planning if the patient needs post-hospital services based on physician/advanced practitioner order or complex needs related to functional status, cognitive ability, or social support system  Outcome: Progressing     Problem: Knowledge Deficit  Goal: Patient/family/caregiver demonstrates understanding of disease process, treatment plan, medications, and discharge instructions  Description: Complete learning assessment and assess knowledge base  Interventions:  - Provide teaching at level of understanding  - Provide teaching via preferred learning methods  Outcome: Progressing     Problem: Nutrition/Hydration-ADULT  Goal: Nutrient/Hydration intake appropriate for improving, restoring or maintaining nutritional needs  Description: Monitor and assess patient's nutrition/hydration status for malnutrition  Collaborate with interdisciplinary team and initiate plan and interventions as ordered  Monitor patient's weight and dietary intake as ordered or per policy  Utilize nutrition screening tool and intervene as necessary  Determine patient's food preferences and provide high-protein, high-caloric foods as appropriate       INTERVENTIONS:  - Monitor oral intake, urinary output, labs, and treatment plans  - Assess nutrition and hydration status and recommend course of action  - Evaluate amount of meals eaten  - Assist patient with eating if necessary   - Allow adequate time for meals  - Recommend/ encourage appropriate diets, oral nutritional supplements, and vitamin/mineral supplements  - Order, calculate, and assess calorie counts as needed  - Recommend, monitor, and adjust tube feedings and TPN/PPN based on assessed needs  - Assess need for intravenous fluids  - Provide specific nutrition/hydration education as appropriate  - Include patient/family/caregiver in decisions related to nutrition  Outcome: Progressing     Problem: SAFETY,RESTRAINT: NV/NON-SELF DESTRUCTIVE BEHAVIOR  Goal: Remains free of harm/injury (restraint for non violent/non self-detsructive behavior)  Description: INTERVENTIONS:  - Instruct patient/family regarding restraint use   - Assess and monitor physiologic and psychological status   - Provide interventions and comfort measures to meet assessed patient needs   - Identify and implement measures to help patient regain control  - Assess readiness for release of restraint   Outcome: Progressing  Goal: Returns to optimal restraint-free functioning  Description: INTERVENTIONS:  - Assess the patient's behavior and symptoms that indicate continued need for restraint  - Identify and implement measures to help patient regain control  - Assess readiness for release of restraint   Outcome: Progressing     Problem: Potential for Falls  Goal: Patient will remain free of falls  Description: INTERVENTIONS:  - Educate patient/family on patient safety including physical limitations  - Instruct patient to call for assistance with activity   - Consult OT/PT to assist with strengthening/mobility   - Keep Call bell within reach  - Keep bed low and locked with side rails adjusted as appropriate  - Keep care items and personal belongings within reach  - Initiate and maintain comfort rounds  - Make Fall Risk Sign visible to staff  - Offer Toileting every 2 Hours, in advance of need  - Initiate/Maintain bed alarm  - Obtain necessary fall risk management equipment: bed alarm  - Apply yellow socks and bracelet for high fall risk patients  - Consider moving patient to room near nurses station  Outcome: Progressing

## 2022-06-14 NOTE — ASSESSMENT & PLAN NOTE
2/2 to acute renal failure   Peaked T waves on EKG   K has improved with CVVHD  6/14- started intermittent HD  Monitor BMP

## 2022-06-14 NOTE — HEMODIALYSIS
Post-Dialysis RN Treatment Note    Blood Pressure:  Pre-151/97  mm/Hg  Post-150/82 mmHg   EDW- TBD   Weight:  Pre-82 1 kg   Post-81 kg   Mode of weight measurement: bed scale    Volume Removed-1000 ml    Treatment duration-240 minutes    NS given- NO   Treatment shortened?  NO   Medications given during Rx- NA   Estimated Kt/V- 1 13   Access type: R IJ catheter    Access Issues: None noted    Report called to primary nurse- Christiano Schmid RN

## 2022-06-14 NOTE — ASSESSMENT & PLAN NOTE
Sputum revealing H Flu and Group B strep  -initially received Zosyn and Vanco, will switch to Cefuroxime 500mg x4 days for total of 7 days of Abx

## 2022-06-14 NOTE — PROGRESS NOTES
INTERNAL MEDICINE RESIDENCY TRANSFER ACCEPTANCE NOTE     Name: Fiorella Stern   Age & Sex: 46 y o  male   MRN: [id number]  Unit/Bed#: -01   Encounter: [id number]  Hospital Stay Days: 4    Accepting team: SOD Team A  Code Status: Level 1 - Full Code  Disposition: Patient requires Med/Surg    ASSESSMENT/PLAN     Principal Problem:    Acute renal failure ([facility] )  Active Problems:    PTSD (post-traumatic stress disorder)    Hyperkalemia    IV drug user    Cocaine abuse ([place] [address] )    Unresponsiveness    NSTEMI (non-ST elevated myocardial infarction) ([facility] )    Methamphetamine abuse ([place] [address] )    Acute encephalopathy    Pneumonia due to Haemophilus influenzae ([facility] )      Pneumonia due to Haemophilus influenzae ([place] [address] )  Assessment & Plan  Sputum revealing H Flu and Group B strep  -initially received Zosyn and Vanco, will switch to Cefuroxime 500mg x4 days for total of 7 days of Abx    NSTEMI (non-ST elevated myocardial infarction) ([place] [address] )  Assessment & Plan  Elevated troponin level noted on presentation  -secondary to ARF  -no interventions    IV drug user  Assessment & Plan  Pt has a hx of polysubstance abuse including cocaine, meth, THC  -could be contributing to pt's "found down" event  -educated on importance of cessation     Hyperkalemia  Assessment & Plan  2/2 to acute renal failure   Peaked T waves on EKG   K has improved with CVVHD  6/14- started intermittent HD  Monitor BMP     PTSD (post-traumatic stress disorder)  Assessment & Plan  Not on any medications  Recommend follow up out pt PCP/ psych    * Acute renal failure ([place] [address] )  Assessment & Plan  In the setting of rhabdomyolysis   Required CVVHD  6/14- transitioned to intermittent HD; appreciated nephro following  -monitor CK, Cr, BMP   -monitor I/Os      VTE Pharmacologic Prophylaxis: Heparin  VTE Mechanical Prophylaxis: sequential compression device    HOSPITAL COURSE     HPI and Hospital course as per Dr Agustina Martins MD: "Pt is a 50yo M with sig PMH of polysubstance/ IVDU abuse, Hep C, PTSD, nicotine abuse,  who presented to [facility] had witnessed collapse while with a friend, deemed shaky and the friend noted that there was some blood unclear source if hemoptysis/hematemisis or other, then patient collapsed and EMS was called who took him to the ED  found to have hyperkalemic K 8 3, troponin 485 > 974, LA 4 6, ABG 7 2, 43 5, 131, 17 0 & U cocaine, Meth and TCH +  Was intubated in ED for airway protection, given Ca Gluconate, Insulin, Dextrose, 2 L Isolyte + 1 L NS +  cc/hr started, BP remained low, was started on Levo, and brought from ED to MICU        On arrival to the MICU, sedated, intubated, ABG improved, LA trending down 2 6, HS Trop trending up, bedside echo and chest xray both unremarkable, on levo 6, propofol 20,  cc/hr, volume control 24/500/6/40, followed commands on holding propofol during exam       During his hospital course, pt received broad spec abx with Vanc and Zosyn for suspected PNA as well as started on CVVHD for ARF, rhabdomyolysis, and hyperkalemia  He did develop short episode of Afib w RVR which is new for pt  Pt started on amio however did not complete amio load; subsequently converted to NSR  No AC started       Pt extubated 6/12 and continued to improve without requiring pressor support      Pt stable to receive iHD (first session 6/14) and was medically stable to transfer to M/S without tele "    SUBJECTIVE     Patient was seen and examined at bedside while undergoing HD  Overall the patient was feeling well  His only complaint was a continued mild cough  Otherwise he had no other acute complaints  OBJECTIVE     Vitals:    06/14/22 1345 06/14/22 1453 06/14/22 1522 06/14/22 1805   BP: 150/82 149/91 156/98    BP Location:       Pulse: 80 83 82 89   Resp: 16      Temp:  98 5 °F (36 9 °C) 97 5 °F (36 4 °C) 99 8 °F (37 7 °C)   TempSrc:       SpO2:  96% 100% 95%   Weight:       Height:         I/O last 24 hours:   In: 3196 8 [P O :345; I V :2247 8; IV [id number]:118]  Out: 5308 [Other:2541]    Physical Exam  Constitutional:       Appearance: He is well-developed  He is ill-appearing  HENT:      Head: Normocephalic and atraumatic  Nose: Nose normal    Eyes:      General:         Right eye: No discharge  Left eye: No discharge  Conjunctiva/sclera: Conjunctivae normal       Pupils: Pupils are equal, round, and reactive to light  Neck:      Thyroid: No thyromegaly  Cardiovascular:      Rate and Rhythm: Normal rate and regular rhythm  Heart sounds: Normal heart sounds  No murmur heard  No friction rub  No gallop  Pulmonary:      Effort: Pulmonary effort is normal  No respiratory distress  Breath sounds: Normal breath sounds  No stridor  No wheezing or rales  Chest:      Chest wall: No tenderness  Abdominal:      General: Bowel sounds are normal  There is no distension  Palpations: Abdomen is soft  There is no mass  Tenderness: There is no abdominal tenderness  There is no guarding or rebound  Musculoskeletal:         General: No tenderness or deformity  Right lower leg: No edema  Left lower leg: No edema  Lymphadenopathy:      Cervical: No cervical adenopathy  Skin:     General: Skin is warm and dry  Neurological:      Mental Status: He is alert and oriented to person, place, and time  Psychiatric:         Mood and Affect: Mood normal          Behavior: Behavior normal          Thought Content: Thought content normal          Judgment: Judgment normal        LABORATORY DATA     Labs: I have personally reviewed pertinent reports      Results from last 7 days   Lab Units 06/14/22  0505 06/13/22  0958 06/12/22  0610 06/11/22  1053   WBC Thousand/uL 7 69 10 27* 13 06* 11 27*   HEMOGLOBIN g/dL 12 1 13 0 15 3 14 4   HEMATOCRIT % 35 4* 36 9 44 2 42 0   PLATELETS Thousands/uL  --  120* 149 128*   NEUTROS PCT %  --  83* 88* 81*   MONOS PCT %  --  6 4 4      Results from last 7 days   Lab Units 06/14/22  0505 06/14/22  0032 06/13/22  1805 06/12/22  1221 06/12/22  0610 06/11/22  1809 06/11/22  1506 06/11/22  1053   POTASSIUM mmol/L 3 9 3 9 4 4   < > 5 1   < > 5 2 4 0   CHLORIDE mmol/L 105 103 105   < > 106   < > 106 108   CO2 mmol/L 26 23 23   < > 23   < > 27 28   BUN mg/dL 48* 44* 36*   < > 55*   < > 61* 56*   CREATININE mg/dL 4 56* 4 10* 3 18*   < > 4 63*   < > 5 20* 4 51*   CALCIUM mg/dL 8 5 8 5 9 0   < > 8 1*   < > 7 7* 7 3*   ALK PHOS U/L  --   --   --   --  71  --  69 58   ALT U/L  --   --   --   --  2,301*  --  2,898* 2,864*   AST U/L  --   --   --   --  2,751*  --  6,320* 6,509*    < > = values in this interval not displayed  Results from last 7 days   Lab Units 06/14/22  0505 06/14/22  0032 06/13/22  1805   MAGNESIUM mg/dL 2 2 2 2 2 1     Results from last 7 days   Lab Units 06/14/22  0505 06/14/22  0032 06/13/22  1805   PHOSPHORUS mg/dL 2 8 2 8 2 7      Results from last 7 days   Lab Units 06/12/22  0817 06/10/22  2051   INR  1 25* 1 22*   PTT seconds  --  33  33     Results from last 7 days   Lab Units 06/12/22  0817   LACTIC ACID mmol/L 2 0         Micro:  Lab Results   Component Value Date    BLOODCX No Growth at 72 hrs  06/10/2022    BLOODCX No Growth at 72 hrs  06/10/2022    BLOODCX No Growth After 4 Days  06/10/2022    BLOODCX No Growth After 4 Days  06/10/2022    SPUTUMCULTUR 2+ Growth of Beta Hemolytic Streptococcus Group B (A) 06/11/2022    SPUTUMCULTUR 2 colonies Haemophilus influenzae (AA) 06/11/2022    SPUTUMCULTUR 2+ Growth of  06/11/2022     IMAGING & DIAGNOSTIC TESTING     Imaging: I have personally reviewed pertinent reports  CT chest abdomen pelvis wo contrast    Result Date: 6/10/2022  Impression: 1  Limited examination demonstrating no evidence of intrathoracic, intra-abdominal or intrapelvic traumatic injury 2  There is nonspecific perihilar and upper lobe airspace opacities, could represent mild pulmonary edema versus aspiration   3  No pneumothorax, hemothorax or mediastinal air 4  Tiny amount of intravascular and cardiac air, likely introduced through an IV catheter Workstation performed: [id number]     XR chest 1 view portable    Result Date: 6/10/2022  Impression: Endotracheal tube tip is 5 5 cm above the jesika Workstation performed: [id number]     CT head without contrast    Result Date: 6/10/2022  Impression: No acute intracranial abnormality  Bilateral facial subcutaneous air present  Please refer to cervical spine CT report for more specific detail Workstation performed: [id number]     CT spine cervical wo contrast    Result Date: 6/10/2022  Impression: No cervical spine fracture or traumatic malalignment  Subcutaneous emphysema seen in the neck  Given that the CT of the chest abdomen and pelvis demonstrates no evidence of pneumothorax, mediastinal or paraesophageal air, this most likely is related to air introduced through an IV catheter  Also correlate for any penetrating injury that may have allowed generalized subcutaneous air to occur  The examination demonstrates a significant  finding and was documented as such in [facility] for liaison and referring practitioner immediate notification  Workstation performed: [id number]     XR chest portable ICU    Result Date: 6/11/2022  Impression: Deep sulcus sign concerning for a small left basilar pneumothorax  Right lateral decubitus or upright radiograph could be obtained for confirmation  The tip of the left subclavian central venous catheter projects at the superior vena cava  The study was marked in [facility] for immediate notification  Workstation performed: [id number]     US liver doppler only    Result Date: 6/12/2022  Impression: Normal liver Doppler  Workstation performed: [id number]     EKG, Pathology, and Other Studies: I have personally reviewed pertinent reports       ALLERGIES   No Known Allergies  MEDICATIONS     Current Facility-Administered Medications   Medication Dose Route Frequency Provider Last Rate    amiodarone  200 mg Oral TID With Meals Santy Hester MD      Followed by   Terence Phillips ON 6/29/2022] amiodarone  200 mg Oral Daily With Breakfast Santy Hester MD      benzonatate  100 mg Oral TID PRN Darvin Camarillo MD      cefuroxime  500 mg Oral Q24H Santy Hester MD      heparin (porcine)  5,000 Units Subcutaneous [facility] Santy Hester MD          benzonatate, 100 mg, TID PRN        Portions of the record may have been created with voice recognition software  Occasional wrong word or "sound a like" substitutions may have occurred due to the inherent limitations of voice recognition software    Read the chart carefully and recognize, using context, where substitutions have occurred     ==  Santy Hester MD  [address]  Internal Medicine Residency PGY-2

## 2022-06-14 NOTE — PLAN OF CARE
1000 ml as tolerated, 4 hrs, 4K bath  Problem: METABOLIC, FLUID AND ELECTROLYTES - ADULT  Goal: Electrolytes maintained within normal limits  Description: INTERVENTIONS:  - Monitor labs and assess patient for signs and symptoms of electrolyte imbalances  - Administer electrolyte replacement as ordered  - Monitor response to electrolyte replacements, including repeat lab results as appropriate  - Instruct patient on fluid and nutrition as appropriate  Outcome: Progressing  Goal: Fluid balance maintained  Description: INTERVENTIONS:  - Monitor labs   - Monitor I/O and WT  - Instruct patient on fluid and nutrition as appropriate  - Assess for signs & symptoms of volume excess or deficit  Outcome: Progressing

## 2022-06-14 NOTE — ASSESSMENT & PLAN NOTE
Pt has a hx of polysubstance abuse including cocaine, meth, THC  -could be contributing to pt's "found down" event  -educated on importance of cessation

## 2022-06-14 NOTE — PROGRESS NOTES
Daily Progress Note - Critical Care   Sridevi Barnes 46 y o  male MRN: [id number]  Unit/Bed#: MICU 04 Encounter: [id number]        ----------------------------------------------------------------------------------------  HPI/24hr events: none    ---------------------------------------------------------------------------------------  SUBJECTIVE  Feels well  RA  Review of Systems   All other systems reviewed and are negative  Review of systems was unable to be performed secondary to sedated and intubated   ---------------------------------------------------------------------------------------  Assessment and Plan:    Neuro:   · Diagnosis: Acute Encephalopathy   ? Likely due to cocaine & Meth intoxication, possible arrhythmia, hyperkalemia, and questionable if had seizure vs tremors as reported by the witness friend prior to collapsing  ? Extubated 6/13/22       · Diagnosis: hx PTSD   ? Plan: not on psych medication at home, per family  ? Will continue to monitor       CV:   · Diagnosis: Elevated Troponin ; peaked at [address] on 06/11  · Diagnosis: r/o cardiogenic shock   ? Plan: likely type II supply/demand due to cocaine use  Hyperkalemia  and suspected arrhythmia   ? EKG, initially with peaked T waves; sinus tachy 100 BPM with PVCs,   ? Will continue to trend troponin per protocol, with repeate EKG  ? Bedside echo done overall unremarkable; Xray chest unremarkable  ? Complete Echo: EF 65%, small A septal aneurysm, mild tricuspid reg  ? Cardiology consulted and evaluated; no ischemic workup indicated  ? IV fluids : s/p 3 L boluses and following with infusion ; in/out  ? BP running low requiring levophed in ED & MICU; 06/11 Off Levo   ? BP remains stable off of levo  ? Amiodarone infusion switched to oral      Pulm:  · Diagnosis: failure to protect Airway d/t AMS   ? Extubated 6/13     Multifocal Pneumonia  ?  6/13- Switched zosyn/Vanco to cefazolin initially for group B strep however sputum cultures also showing H  Flu (resistant to ampicillin  Will switch to cefuroxime 500mg Q24 x 4 more days starting 6/14  GI:   · Diagnosis: Nausea and vomiting  ? Reported per family as recent symptoms over past 1-2 weeks  ? Differential include but not limited to GI infection, d/t drug abuse; electrolyte abnormality, GERD  ? The friend witnessed the collapse noted blood at site of unclear source, GI!   ? Currently intubated; OG tube in place  ? Prophylactic IV Protonix 40 mg QD      :   · Diagnosis: Acute Kidney Injury +/- Rhabdomyolysis (d/t reported tremors & collapse on floor)     · JÃOO likely multifactorial; cardiorenal in context of low BP/arrhythmia, drug abuse/intoxication; baseline Cr 0 9-1 0; Cr on admission 3 23 with BUN 33 ; torres in, producing urine, dark yellow;   · R/o UTI / urosepsis; UA + blood, protein and innumerable bacteria; nitrite and leukocytes negative; leukocytosis and low BP requiring levo, now 6/13 currently off  ? Started on CVVHD  ? 6/14- start intermittent HD, monitor for renal recovery     ? Plan: IV fluids ; 3 L boluses + 150 cc/hr after; avoid hypotension/ BP fluctuation; on Levophed  ? Avoid nephrotoxic agents as possible  ? In / Out ; torres in    ? Was started on Zosyn in ED  ? 06/10 Blood cultures no growth 24H & MRSA +   ? 6/13- Switched zosyn/Vanco to cefazolin initially however cultures also showing H  Flu (resistant to ampicillin  Will switch to cefuroxime 500mg Q24 x 4 more days starting 6/14    ? Recheck BMP and CBC , continue monitor vitals          F/E/N:   · Diagnosis: Hyperkalemia   ? Plan: s/p dextrose , insulin and Ca gluconate  ? K improved down to 5 2; started on CVVHD 06/11 d/t severe oliguria  ? 6/14- start intermittent HD, monitor for renal recovery    · Diagnosis:Hypocalcemia    ?  Plan: s/p Ca Gluconate ; resolved     · Diagnosis: Elevated Anion Gap Acidosis; AG 15; HCO3 25; with Delta Delta gradient 7 ; consistent with Metabolic acidosis w pre-exist elevated bicarb   ? 06/10 S/p Sodium Bicarb  ? 06/11 AG closed        Heme/Onc:   · Diagnosis: Leukocytosis; trending down   ? Likely reactive vs infection ; see ID   ? Will recheck CBC         Endo:   · Diagnosis: no active issue; glucose on admission 87 and repeate WNL ;TSH WNL      ID:   · Diagnosis: r/o septic shock ; hx IV drug use, leukocytosis and low BP requiring levo  · UA innumerable bacteria but negative nitrite and leukocytes   ? Blood Culture No growth 24H ; MRSA swab in process   ? Was given 1 dose Zosyn in ED  ? Switched zosyn/Vanco to cefazolin based on cultures for group B strep  Can switch to PO amoxicillin now that he is tolerating PO  ? Trend CBC and monitor vitals      MSK/Skin:   Diagnosis: no acute issue ; frequent turn and reposition   Patient appropriate for transfer out of the ICU today?: No  Disposition: Admit to Critical Care   Code Status: Level 1 - Full Code  ---------------------------------------------------------------------------------------  ICU CORE MEASURES    Prophylaxis   VTE Pharmacologic Prophylaxis: Heparin  VTE Mechanical Prophylaxis: sequential compression device  Stress Ulcer Prophylaxis: Pantoprazole IV     ABCDE Protocol (if indicated)  Plan to perform spontaneous awakening trial today? Yes  Plan to perform spontaneous breathing trial today? Yes  Obvious barriers to extubation? Yes  CAM-ICU: Negative    Invasive Devices Review  Invasive Devices  Report    Central Venous Catheter Line  Duration           CVC Central Lines 06/11/22 Triple 3 days          Peripheral Intravenous Line  Duration           Peripheral IV 06/14/22 Left;Proximal;Ventral (anterior) Forearm <1 day    Peripheral IV 06/14/22 Left;Ventral (anterior) Forearm <1 day    Peripheral IV 06/14/22 Right;Ventral (anterior) Forearm <1 day          Hemodialysis Catheter  Duration           HD Temporary Double Catheter 3 days              Can any invasive devices be discontinued today? No  ---------------------------------------------------------------------------------------  OBJECTIVE    Vitals   Vitals:    22 0300 22 0400 22 0500 22 0600   BP: 114/73 119/70 138/86 135/86   BP Location:  Left arm     Pulse: 86 84 82 80   Resp: 17 (!) 24 (!) 24 22   Temp:  97 9 °F (36 6 °C)     TempSrc:  Oral     SpO2: 95% 95% 97% 96%   Weight:    82 1 kg (181 lb)   Height:         Temp (24hrs), Av 8 °F (36 6 °C), Min:97 5 °F (36 4 °C), Max:98 °F (36 7 °C)  Current: Temperature: 97 9 °F (36 6 °C)    Respiratory:  SpO2: SpO2: 96 %       Invasive/non-invasive ventilation settings   Respiratory  Report   Lab Data (Last 4 hours)    None         O2/Vent Data (Last 4 hours)    None                Physical Exam  Vitals and nursing note reviewed  Constitutional:       Appearance: Normal appearance  He is ill-appearing  HENT:      Head: Normocephalic and atraumatic  Eyes:      Conjunctiva/sclera: Conjunctivae normal    Cardiovascular:      Rate and Rhythm: Bradycardia present  Pulses: Normal pulses  Heart sounds: Normal heart sounds  Pulmonary:      Effort: Pulmonary effort is normal  No respiratory distress  Breath sounds: Normal breath sounds  No wheezing  Abdominal:      General: Bowel sounds are normal  There is no distension  Tenderness: There is no abdominal tenderness  Skin:     General: Skin is warm  Neurological:      Mental Status: He is alert and oriented to person, place, and time  Psychiatric:         Behavior: Behavior normal          Thought Content: Thought content normal          Judgment: Judgment normal        Constitutional:       Comments: Sedated and intubated ; BPS 3 , no pain    HENT:      Head: Normocephalic and atraumatic       Comments: Long beared       Right Ear: External ear normal       Left Ear: External ear normal       Nose: Nose normal       Mouth/Throat:      Mouth: Mucous membranes are dry     Eyes:      General: No scleral icterus      Conjunctiva/sclera: Conjunctivae normal       Pupils: Pupils are equal, round, and reactive to light  Cardiovascular:      Rate and Rhythm: Regular rhythm and rate       Pulses: Normal pulses       Heart sounds: No murmur heard     No gallop  Pulmonary:      Breath sounds: No stridor  No wheezing       Comments: CTAB   Abdominal:      General: Abdomen is flat  Bowel sounds are normal  There is no distension       Palpations: Abdomen is soft  Musculoskeletal:         General: No swelling       Right lower leg: No edema       Left lower leg: No edema  Skin:     Capillary Refill: Capillary refill takes less than 2 seconds     Neurological:     limited d/t sedated and intubated ; PERRL; BPS 3 points, no pain     Laboratory and Diagnostics:  Results from last 7 days   Lab Units 06/14/22  0505 06/13/22  0958 06/12/22  0610 06/11/22  1053 06/11/22  0506 06/10/22  2051 06/10/22  1027   WBC Thousand/uL 7 69 10 27* 13 06* 11 27* 13 03* 17 04* 22 00*   HEMOGLOBIN g/dL 12 1 13 0 15 3 14 4 13 9 18 3* 17 7*   HEMATOCRIT % 35 4* 36 9 44 2 42 0 39 9 54 8* 51 1*   PLATELETS Thousands/uL  --  120* 149 128* 134* 213  213 258   NEUTROS PCT %  --  83* 88* 81* 80*  --  87*   MONOS PCT %  --  6 4 4 4  --  8     Results from last 7 days   Lab Units 06/14/22  0505 06/14/22  0032 06/13/22  1805 06/13/22  1228 06/13/22  0531 06/13/22  0013 06/12/22  1807 06/12/22  1221 06/12/22  0610 06/11/22  1809 06/11/22  1506 06/11/22  1053 06/11/22  0949 06/11/22  0506 06/11/22  0110 06/10/22  1915 06/10/22  1559 06/10/22  1027   SODIUM mmol/L 137 135* 136 140 138 137 138   < > 140   < > 140 142   < > 142  142   < > 139   < > 138   POTASSIUM mmol/L 3 9 3 9 4 4 3 7 4 0 4 2 4 4   < > 5 1   < > 5 2 4 0   < > 4 3  4 2   < > 7 3*   < > 8 3*   CHLORIDE mmol/L 105 103 105 111* 105 106 106   < > 106   < > 106 108   < > 108  107   < > 107   < > 98   CO2 mmol/L 26 23 23 24 25 22 23   < > 23   < > 27 28   < > 22  22   < > 23   < > 25 ANION GAP mmol/L 6 9 8 5 8 9 9   < > 11   < > 7 6   < > 12  13   < > 9   < > 15*   BUN mg/dL 48* 44* 36* 35* 42* 46* 48*   < > 55*   < > 61* 56*   < > 54*  55*   < > 40*   < > 33*   CREATININE mg/dL 4 56* 4 10* 3 18* 2 89* 3 51* 3 65* 3 89*   < > 4 63*   < > 5 20* 4 51*   < > 3 89*  3 90*   < > 2 91*   < > 3 23*   CALCIUM mg/dL 8 5 8 5 9 0 7 3* 8 4 8 6 8 8   < > 8 1*   < > 7 7* 7 3*   < > 7 2*  7 1*   < > 7 3*   < > 8 3*   GLUCOSE RANDOM mg/dL 99 109 130 117 139 134 130   < > 130   < > 108 127   < > 148*  144*   < > 119   < > 86   ALT U/L  --   --   --   --   --   --   --   --  2,301*  --  2,898* 2,864*  --  2,624*  --  893*  --  195*   AST U/L  --   --   --   --   --   --   --   --  2,751*  --  6,320* 6,509*  --  6,260*  --  2,717*  --  350*   ALK PHOS U/L  --   --   --   --   --   --   --   --  71  --  69 58  --  55  --  77  --  82   ALBUMIN g/dL  --   --   --   --   --   --   --   --  2 3*  --  2 4* 2 3*  --  2 4*  --  3 2*  --  4 5   TOTAL BILIRUBIN mg/dL  --   --   --   --   --   --   --   --  2 45*  --  1 76* 1 27*  --  1 04*  --  1 12*  --  0 85    < > = values in this interval not displayed       Results from last 7 days   Lab Units 06/14/22  0505 06/14/22  0032 06/13/22  1805 06/13/22  1228 06/13/22  0531 06/13/22  0013 06/12/22  1807   MAGNESIUM mg/dL 2 2 2 2 2 1 2 0 2 3 1 7 2 0   PHOSPHORUS mg/dL 2 8 2 8 2 7 2 8 3 0 3 2 3 7      Results from last 7 days   Lab Units 06/12/22  0817 06/10/22  2051   INR  1 25* 1 22*   PTT seconds  --  33  33          Results from last 7 days   Lab Units 06/12/22  0817 06/11/22  0413 06/11/22  0112 06/10/22  2255 06/10/22  1915 06/10/22  1700 06/10/22  1357   LACTIC ACID mmol/L 2 0 1 8 2 4* 3 2* 2 6* 2 8* 3 3*     ABG:  Results from last 7 days   Lab Units 06/12/22  0610   PH ART  7 251*   PCO2 ART mm Hg 50 2*   PO2 ART mm Hg 138 2*   HCO3 ART mmol/L 21 6*   BASE EXC ART mmol/L -6 0   ABG SOURCE  Line, Arterial     VBG:  Results from last 7 days   Lab Units 22  0610   ABG SOURCE  Line, Arterial     Results from last 7 days   Lab Units 22  0506   PROCALCITONIN ng/ml 49 11*       Micro  Results from last 7 days   Lab Units 22  0949 22  0055 06/10/22  2109 06/10/22  1113   BLOOD CULTURE   --   --  No Growth at 72 hrs  No Growth at 72 hrs  No Growth at 72 hrs  No Growth at 72 hrs  SPUTUM CULTURE  2+ Growth of Beta Hemolytic Streptococcus Group B*  2 colonies Haemophilus influenzae*  2+ Growth of   --   --   --    GRAM STAIN RESULT  3+ Polys*  1+ Gram positive cocci in pairs*  Rare Gram negative rods*  No Epithelial cells seen*  --   --   --    MRSA CULTURE ONLY   --  Methicillin Resistant Staphylococcus aureus isolated*  This patient requires contact isolation precautions per [facility] law  Contact precautions are not required in [place] for nasal surveillance cultures  --   --        EK/10 "Sinus tachycardia with occasional and consecutive Premature ventricular complexes  Incomplete right bundle branch block"   Imaging:  I have personally reviewed pertinent reports  Intake and Output  I/O       06/10 07 07 07 07    I V  (mL/kg) 1842 5 (22 6) 981 8 (12 1)    NG/GT 0 70    IV Piggyback 1050 118 6    Total Intake(mL/kg) 2892 5 (35 4) 1170 4 (14 4)    Urine (mL/kg/hr) 575 107 (0 1)    Emesis/NG output 0 0    Other  625    Total Output 575 732    Net +2317 5 +438 4              UOP: 0 1 ml/hr     Height and Weights   Height: 5' 8" (172 7 cm)  IBW (Ideal Body Weight): 68 4 kg  Body mass index is 27 52 kg/m²    Weight (last 2 days)     Date/Time Weight    22 0600 82 1 (181)    22 0600 84 4 (186 07)    22 06 81 9 (180 56)            Nutrition       Diet Orders   (From admission, onward)             Start     Ordered    22 1241  Diet Renal; Potassium 2 GM; No; No  Diet effective now        References:    Nutrtion Support Algorithm Enteral vs  Parenteral   Question Answer Comment   Diet Type Renal    Renal Potassium 2 GM    Should patient have a fluid restriction? No    Should patient have a protein modifier? No    RD to adjust diet per protocol? Yes        06/13/22 1241              TF N/A      Active Medications  Scheduled Meds:  Current Facility-Administered Medications   Medication Dose Route Frequency Provider Last Rate    amiodarone  0 5 mg/min Intravenous Continuous Pal Ingram, DO 0 5 mg/min (06/13/22 1014)    cefazolin  1,000 mg Intravenous Q8H Pal Ingram, DO Stopped (06/14/22 0600)    heparin (porcine)  5,000 Units Subcutaneous [facility], DO      norepinephrine  1-30 mcg/min Intravenous Titrated Sukumar Palm MD Stopped (06/13/22 1340)    NxStage K 4/Ca 3  20,000 mL Dialysis Continuous Erasmo Hernandez MD       Continuous Infusions:  amiodarone, 0 5 mg/min, Last Rate: 0 5 mg/min (06/13/22 1014)  norepinephrine, 1-30 mcg/min, Last Rate: Stopped (06/13/22 1340)  NxStage K 4/Ca 3, 20,000 mL      PRN Meds:        Allergies   No Known Allergies  ---------------------------------------------------------------------------------------  Advance Directive and Living Will:      Power of :    POLST:    ---------------------------------------------------------------------------------------  Care Time Delivered:   No Critical Care time spent     Sukumar Palm MD      Portions of the record may have been created with voice recognition software  Occasional wrong word or "sound a like" substitutions may have occurred due to the inherent limitations of voice recognition software    Read the chart carefully and recognize, using context, where substitutions have occurred

## 2022-06-14 NOTE — ASSESSMENT & PLAN NOTE
In the setting of rhabdomyolysis   Required CVVHD  6/14- transitioned to intermittent HD  6/24 - still oliguric, per pt producing increased amounts of urine lighter in color    Plan:   · Continue to monitor mental status around HD timing   · Nephro following, appreciate recommendations  · T/Th/Sat schedule  · PermCath placed 6/21  · Monitor CK, Cr, BMP   · Monitor I/Os  · Better dose dialysis to reduce BUN<50  · Outpatient HD is set up, plan for discharge after HD 6/24

## 2022-06-14 NOTE — ASSESSMENT & PLAN NOTE
Sputum revealing H Flu and Group B strep  Initially received Zosyn and Vanco, switched to cefuroxime  6/17 CXR: Persistent bibasilar opacities likely due to atelectasis  No evidence for pneumothorax  WBC 6/17 up to 14, no other obvious source for infection at this time      Plan:   · IS   · Completed course of Cefuroxime 500mg x4 days for total of 7 days of Abx   · Continue to trend fever curve and WBC

## 2022-06-14 NOTE — ASSESSMENT & PLAN NOTE
Elevated troponin level noted on presentation  2/2 ARF, no intervention needed  Due to patients history of cocaine/meth use, patient may be predisposed to increased risk for coronary atherosclerosis and may benefit from an ischemic evaluation prior to discharge    - Troponins now wnl  - Plan for nuclear cardiac test as outpatient

## 2022-06-14 NOTE — PROGRESS NOTES
NEPHROLOGY PROGRESS NOTE   Juancho Da Silva 46 y o  male MRN: [id number]  [facility]: [id number]  Reason for Consult: JOÃO    ASSESSMENT AND PLAN:  JOÃO POA, baseline creatinine 0 9 to 1 1, isolated values 1 6 in September 2020  -JOÃO suspected to be secondary to ATN due to rhabdomyolysis, shock in the setting of cocaine/methamphetamine toxicity  -urine output remains poor  -patient was started on CRRT on 6/11/22 which was discontinued on 6/13/22  -now remains off vasopressors  Will plan for I HD today  Reassess tomorrow for additional need for dialysis  May consider TTS schedule if continued to require dialysis  -UA shows innumerable RBCs, innumerable bacteria, 2+ protein  -CT scan shows no hydronephrosis  -avoid NSAIDs, hypotension  Avoid IV contrast  -continued to require dialysis, will need eventual PermCath placement  I saw and examined patient during hemodialysis treatment at 9:47 AM on 6/14/2022  The patient was receiving hemodialysis for treatment of JOÃO  I also reviewed vital signs, intake and output, lab results and recent events, and agree with dialysis order  Tolerating HD  BP acceptable     Hyperkalemia, resolved with dialysis       Severe rhabdomyolysis, peak CK level greater than 100,000 now seems to be overall improving to 7323 today  Continue to monitor daily CK level   -dialysis today     Shock, now remains off vasopressors since yesterday  Rule out infection, currently remains on empiric antibiotic as per ICU  Blood cultures negative so far   =-echo shows EF 91%, diastolic function normal, normal IVC     VDRF, was extubated on 6/13/22  Currently remains on room air at the time of my encounter       Nontraumatic collapses/unresponsive, cocaine/methamphetamine intoxication, management as per ICU      Discussed above plan in detail with ICU team    SUBJECTIVE:  Patient seen and examined at bedside  Patient was extubated yesterday    Currently remains on room air at the time of my encounter  Remains off vasopressors since yesterday afternoon  CRRT was discontinued last night      OBJECTIVE:  Current Weight: Weight - Scale: 82 1 kg (181 lb)  Vitals:    22 0900   BP: 146/87   Pulse: 80   Resp: 21   Temp:    SpO2: 94%       Intake/Output Summary (Last 24 hours) at 2022 0945  Last data filed at 2022 0800  Gross per 24 hour   Intake 2507 77 ml   Output 883 ml   Net 1624 77 ml     Wt Readings from Last 3 Encounters:   22 82 1 kg (181 lb)   06/10/22 79 7 kg (175 lb 11 3 oz)   21 72 6 kg (160 lb)     Temp Readings from Last 3 Encounters:   22 97 9 °F (36 6 °C) (Oral)   06/10/22 99 1 °F (37 3 °C) (Bladder)   21 98 3 °F (36 8 °C) (Oral)     BP Readings from Last 3 Encounters:   22 146/87   06/10/22 103/67   21 154/75     Pulse Readings from Last 3 Encounters:   22 80   06/10/22 101   21 (!) 107        Physical Examination:  General:  Lying in bed, no acute distress   Eyes:  No conjunctival pallor present  ENT:  External examination of ears and nose unremarkable  Neck:  No obvious lymphadenopathy appreciated  Respiratory:  Decreased breath sound at bases  CVS:  S1, S2 present  GI:  Soft, nondistended  CNS:  Active, alert, oriented x2 at the time of encounter  Skin:  No new rash  Musculoskeletal:  No obvious new gross deformity noted    Medications:    Current Facility-Administered Medications:     [] amiodarone (CORDARONE) 900 mg in dextrose 5 % 500 mL infusion, 1 mg/min, Intravenous, Continuous, Stopped at 22 1459 **FOLLOWED BY** amiodarone (CORDARONE) 900 mg in dextrose 5 % 500 mL infusion, 0 5 mg/min, Intravenous, Continuous, Pal Ingram DO, Last Rate: 16 7 mL/hr at 22 1014, 0 5 mg/min at 22 1014    ceFAZolin (ANCEF) IVPB (premix in dextrose) 1,000 mg 50 mL, 1,000 mg, Intravenous, Q24H, Manny Harper MD    heparin (porcine) subcutaneous injection 5,000 Units, 5,000 Units, Subcutaneous, Q8H [address], Joe Favian DO, 5,000 Units at 06/14/22 0500    norepinephrine (LEVOPHED) 4 mg (STANDARD CONCENTRATION) IV in sodium chloride 0 9% 250 mL, 1-30 mcg/min, Intravenous, Titrated, Thiago Lagunas MD, Stopped at 06/13/22 1340    NxStage K 4/Ca 3 dialysis solution (RFP-401) 20,000 mL, 20,000 mL, Dialysis, Continuous, Erasmo Hernandez MD, 20,000 mL at 06/13/22 1037    Laboratory Results:  Results from last 7 days   Lab Units 06/14/22  0505 06/14/22  0032 06/13/22  1805 06/13/22  1228 06/13/22  0958 06/13/22  0531 06/13/22  0013 06/12/22  1807 06/12/22  1221 06/12/22  0610 06/11/22  1506 06/11/22  1053 06/11/22  0949 06/11/22  0506 06/11/22  0110 06/10/22  2051 06/10/22  1559 06/10/22  1027   WBC Thousand/uL 7 69  --   --   --  10 27*  --   --   --   --  13 06*  --  11 27*  --  13 03*  --  17 04*  --  22 00*   HEMOGLOBIN g/dL 12 1  --   --   --  13 0  --   --   --   --  15 3  --  14 4  --  13 9  --  18 3*  --  17 7*   HEMATOCRIT % 35 4*  --   --   --  36 9  --   --   --   --  44 2  --  42 0  --  39 9  --  54 8*  --  51 1*   PLATELETS Thousands/uL  --   --   --   --  120*  --   --   --   --  149  --  128*  --  134*  --  213  213  --  258   SODIUM mmol/L 137 135* 136 140  --  138 137 138   < > 140   < > 142   < > 142  142   < >  --    < > 138   POTASSIUM mmol/L 3 9 3 9 4 4 3 7  --  4 0 4 2 4 4   < > 5 1   < > 4 0   < > 4 3  4 2   < >  --    < > 8 3*   CHLORIDE mmol/L 105 103 105 111*  --  105 106 106   < > 106   < > 108   < > 108  107   < >  --    < > 98   CO2 mmol/L 26 23 23 24  --  25 22 23   < > 23   < > 28   < > 22  22   < >  --    < > 25   BUN mg/dL 48* 44* 36* 35*  --  42* 46* 48*   < > 55*   < > 56*   < > 54*  55*   < >  --    < > 33*   CREATININE mg/dL 4 56* 4 10* 3 18* 2 89*  --  3 51* 3 65* 3 89*   < > 4 63*   < > 4 51*   < > 3 89*  3 90*   < >  --    < > 3 23*   CALCIUM mg/dL 8 5 8 5 9 0 7 3*  --  8 4 8 6 8 8   < > 8 1*   < > 7 3*   < > 7 2*  7 1*   < >  --    < > 8 3*   MAGNESIUM mg/dL 2 2 2 2 2 1 2 0  --  2 3 1 7 2 0   < > 2 3   < > 2 5  --  2 4  --   --    < >  --    PHOSPHORUS mg/dL 2 8 2 8 2 7 2 8  --  3 0 3 2 3 7   < > 6 7*   < >  --    < > 4 7*  4 6*   < >  --    < >  --     < > = values in this interval not displayed  US liver doppler only   Final Result by Jeancarlos Jordan MD (06/12 1651)      Normal liver Doppler  Workstation performed: [id number]         XR chest portable ICU   Final Result by Anel Guzmán MD (06/11 4786)      Deep sulcus sign concerning for a small left basilar pneumothorax  Right lateral decubitus or upright radiograph could be obtained for confirmation  The tip of the left subclavian central venous catheter projects at the superior vena cava  The study was marked in [facility] for immediate notification  Workstation performed: [id number]             Portions of the record may have been created with voice recognition software  Occasional wrong word or "sound a like" substitutions may have occurred due to the inherent limitations of voice recognition software  Read the chart carefully and recognize, using context, where substitutions have occurred

## 2022-06-14 NOTE — ASSESSMENT & PLAN NOTE
In the setting of rhabdomyolysis   Required CVVHD  6/14- transitioned to intermittent HD; appreciated nephro following  -monitor CK, Cr, BMP   -monitor I/Os

## 2022-06-15 PROBLEM — I48.91 ATRIAL FIBRILLATION (HCC): Status: ACTIVE | Noted: 2022-06-15

## 2022-06-15 LAB
ANION GAP SERPL CALCULATED.3IONS-SCNC: 6 MMOL/L (ref 4–13)
ARTERIAL PATENCY WRIST A: YES
BACTERIA BLD CULT: NORMAL
BACTERIA BLD CULT: NORMAL
BASE EX.OXY STD BLDV CALC-SCNC: 95.5 % (ref 60–80)
BASE EXCESS BLDA CALC-SCNC: 0.1 MMOL/L
BASE EXCESS BLDV CALC-SCNC: -0.2 MMOL/L
BASOPHILS # BLD AUTO: 0.02 THOUSANDS/ΜL (ref 0–0.1)
BASOPHILS NFR BLD AUTO: 0 % (ref 0–1)
BUN SERPL-MCNC: 42 MG/DL (ref 5–25)
CALCIUM SERPL-MCNC: 8.1 MG/DL (ref 8.3–10.1)
CHLORIDE SERPL-SCNC: 101 MMOL/L (ref 100–108)
CK MB SERPL-MCNC: 28.7 NG/ML (ref 0–5)
CK MB SERPL-MCNC: <1 % (ref 0–2.5)
CK SERPL-CCNC: 5761 U/L (ref 39–308)
CO2 SERPL-SCNC: 27 MMOL/L (ref 21–32)
CREAT SERPL-MCNC: 5.38 MG/DL (ref 0.6–1.3)
EOSINOPHIL # BLD AUTO: 0.12 THOUSAND/ΜL (ref 0–0.61)
EOSINOPHIL NFR BLD AUTO: 2 % (ref 0–6)
ERYTHROCYTE [DISTWIDTH] IN BLOOD BY AUTOMATED COUNT: 11.9 % (ref 11.6–15.1)
GFR SERPL CREATININE-BSD FRML MDRD: 11 ML/MIN/1.73SQ M
GLUCOSE SERPL-MCNC: 167 MG/DL (ref 65–140)
GLUCOSE SERPL-MCNC: 99 MG/DL (ref 65–140)
HCO3 BLDA-SCNC: 23.8 MMOL/L (ref 22–28)
HCO3 BLDV-SCNC: 23.5 MMOL/L (ref 24–30)
HCT VFR BLD AUTO: 40.1 % (ref 36.5–49.3)
HGB BLD-MCNC: 14.4 G/DL (ref 12–17)
IMM GRANULOCYTES # BLD AUTO: 0.13 THOUSAND/UL (ref 0–0.2)
IMM GRANULOCYTES NFR BLD AUTO: 2 % (ref 0–2)
LYMPHOCYTES # BLD AUTO: 0.61 THOUSANDS/ΜL (ref 0.6–4.47)
LYMPHOCYTES NFR BLD AUTO: 8 % (ref 14–44)
MCH RBC QN AUTO: 35 PG (ref 26.8–34.3)
MCHC RBC AUTO-ENTMCNC: 35.9 G/DL (ref 31.4–37.4)
MCV RBC AUTO: 98 FL (ref 82–98)
MONOCYTES # BLD AUTO: 0.7 THOUSAND/ΜL (ref 0.17–1.22)
MONOCYTES NFR BLD AUTO: 9 % (ref 4–12)
NEUTROPHILS # BLD AUTO: 6.51 THOUSANDS/ΜL (ref 1.85–7.62)
NEUTS SEG NFR BLD AUTO: 79 % (ref 43–75)
NON VENT ROOM AIR: 21 %
NRBC BLD AUTO-RTO: 0 /100 WBCS
O2 CT BLDA-SCNC: 18.3 ML/DL (ref 16–23)
O2 CT BLDV-SCNC: 20 ML/DL
OXYHGB MFR BLDA: 95.8 % (ref 94–97)
PCO2 BLDA: 35.9 MM HG (ref 36–44)
PCO2 BLDV: 35.9 MM HG (ref 42–50)
PH BLDA: 7.44 [PH] (ref 7.35–7.45)
PH BLDV: 7.43 [PH] (ref 7.3–7.4)
PLATELET # BLD AUTO: 95 THOUSANDS/UL (ref 149–390)
PMV BLD AUTO: 11.3 FL (ref 8.9–12.7)
PO2 BLDA: 94.1 MM HG (ref 75–129)
PO2 BLDV: 109.4 MM HG (ref 35–45)
POTASSIUM SERPL-SCNC: 4 MMOL/L (ref 3.5–5.3)
RBC # BLD AUTO: 4.11 MILLION/UL (ref 3.88–5.62)
SODIUM SERPL-SCNC: 134 MMOL/L (ref 136–145)
SPECIMEN SOURCE: ABNORMAL
WBC # BLD AUTO: 8.09 THOUSAND/UL (ref 4.31–10.16)

## 2022-06-15 PROCEDURE — 82805 BLOOD GASES W/O2 SATURATION: CPT

## 2022-06-15 PROCEDURE — 82553 CREATINE MB FRACTION: CPT | Performed by: STUDENT IN AN ORGANIZED HEALTH CARE EDUCATION/TRAINING PROGRAM

## 2022-06-15 PROCEDURE — 85025 COMPLETE CBC W/AUTO DIFF WBC: CPT | Performed by: STUDENT IN AN ORGANIZED HEALTH CARE EDUCATION/TRAINING PROGRAM

## 2022-06-15 PROCEDURE — 99232 SBSQ HOSP IP/OBS MODERATE 35: CPT | Performed by: INTERNAL MEDICINE

## 2022-06-15 PROCEDURE — 80048 BASIC METABOLIC PNL TOTAL CA: CPT | Performed by: STUDENT IN AN ORGANIZED HEALTH CARE EDUCATION/TRAINING PROGRAM

## 2022-06-15 PROCEDURE — 82805 BLOOD GASES W/O2 SATURATION: CPT | Performed by: STUDENT IN AN ORGANIZED HEALTH CARE EDUCATION/TRAINING PROGRAM

## 2022-06-15 PROCEDURE — 82948 REAGENT STRIP/BLOOD GLUCOSE: CPT

## 2022-06-15 PROCEDURE — 82550 ASSAY OF CK (CPK): CPT | Performed by: STUDENT IN AN ORGANIZED HEALTH CARE EDUCATION/TRAINING PROGRAM

## 2022-06-15 PROCEDURE — 99233 SBSQ HOSP IP/OBS HIGH 50: CPT | Performed by: INTERNAL MEDICINE

## 2022-06-15 RX ORDER — FUROSEMIDE 10 MG/ML
40 INJECTION INTRAMUSCULAR; INTRAVENOUS ONCE
Status: DISCONTINUED | OUTPATIENT
Start: 2022-06-15 | End: 2022-06-15

## 2022-06-15 RX ORDER — SODIUM CHLORIDE, SODIUM GLUCONATE, SODIUM ACETATE, POTASSIUM CHLORIDE, MAGNESIUM CHLORIDE, SODIUM PHOSPHATE, DIBASIC, AND POTASSIUM PHOSPHATE .53; .5; .37; .037; .03; .012; .00082 G/100ML; G/100ML; G/100ML; G/100ML; G/100ML; G/100ML; G/100ML
100 INJECTION, SOLUTION INTRAVENOUS CONTINUOUS
Status: DISCONTINUED | OUTPATIENT
Start: 2022-06-15 | End: 2022-06-15

## 2022-06-15 RX ORDER — FUROSEMIDE 10 MG/ML
80 INJECTION INTRAMUSCULAR; INTRAVENOUS ONCE
Status: COMPLETED | OUTPATIENT
Start: 2022-06-15 | End: 2022-06-15

## 2022-06-15 RX ADMIN — SODIUM CHLORIDE, SODIUM GLUCONATE, SODIUM ACETATE, POTASSIUM CHLORIDE, MAGNESIUM CHLORIDE, SODIUM PHOSPHATE, DIBASIC, AND POTASSIUM PHOSPHATE 100 ML/HR: .53; .5; .37; .037; .03; .012; .00082 INJECTION, SOLUTION INTRAVENOUS at 13:32

## 2022-06-15 RX ADMIN — CEFUROXIME AXETIL 500 MG: 250 TABLET, FILM COATED ORAL at 13:32

## 2022-06-15 RX ADMIN — HEPARIN SODIUM 5000 UNITS: 5000 INJECTION INTRAVENOUS; SUBCUTANEOUS at 06:02

## 2022-06-15 RX ADMIN — AMIODARONE HYDROCHLORIDE 200 MG: 200 TABLET ORAL at 16:24

## 2022-06-15 RX ADMIN — HEPARIN SODIUM 5000 UNITS: 5000 INJECTION INTRAVENOUS; SUBCUTANEOUS at 22:04

## 2022-06-15 RX ADMIN — HEPARIN SODIUM 5000 UNITS: 5000 INJECTION INTRAVENOUS; SUBCUTANEOUS at 16:24

## 2022-06-15 RX ADMIN — AMIODARONE HYDROCHLORIDE 200 MG: 200 TABLET ORAL at 09:02

## 2022-06-15 RX ADMIN — FUROSEMIDE 80 MG: 10 INJECTION, SOLUTION INTRAMUSCULAR; INTRAVENOUS at 16:24

## 2022-06-15 RX ADMIN — AMIODARONE HYDROCHLORIDE 200 MG: 200 TABLET ORAL at 13:31

## 2022-06-15 NOTE — PLAN OF CARE
Problem: MOBILITY - ADULT  Goal: Maintain or return to baseline ADL function  Description: INTERVENTIONS:  -  Assess patient's ability to carry out ADLs; assess patient's baseline for ADL function and identify physical deficits which impact ability to perform ADLs (bathing, care of mouth/teeth, toileting, grooming, dressing, etc )  - Assess/evaluate cause of self-care deficits   - Assess range of motion  - Assess patient's mobility; develop plan if impaired  - Assess patient's need for assistive devices and provide as appropriate  - Encourage maximum independence but intervene and supervise when necessary  - Involve family in performance of ADLs  - Assess for home care needs following discharge   - Consider OT consult to assist with ADL evaluation and planning for discharge  - Provide patient education as appropriate  Outcome: Progressing  Goal: Maintains/Returns to pre admission functional level  Description: INTERVENTIONS:  - Perform BMAT or MOVE assessment daily    - Set and communicate daily mobility goal to care team and patient/family/caregiver     - Collaborate with rehabilitation services on mobility goals if consulted  - Out of bed for toileting  - Record patient progress and toleration of activity level   Outcome: Progressing     Problem: Prexisting or High Potential for Compromised Skin Integrity  Goal: Skin integrity is maintained or improved  Description: INTERVENTIONS:  - Identify patients at risk for skin breakdown  - Assess and monitor skin integrity  - Assess and monitor nutrition and hydration status  - Monitor labs   - Assess for incontinence   - Turn and reposition patient  - Assist with mobility/ambulation  - Relieve pressure over bony prominences  - Avoid friction and shearing  - Provide appropriate hygiene as needed including keeping skin clean and dry  - Evaluate need for skin moisturizer/barrier cream  - Collaborate with interdisciplinary team   - Patient/family teaching  - Consider wound care consult   Outcome: Progressing     Problem: PAIN - ADULT  Goal: Verbalizes/displays adequate comfort level or baseline comfort level  Description: Interventions:  - Encourage patient to monitor pain and request assistance  - Assess pain using appropriate pain scale  - Administer analgesics based on type and severity of pain and evaluate response  - Implement non-pharmacological measures as appropriate and evaluate response  - Consider cultural and social influences on pain and pain management  - Notify physician/advanced practitioner if interventions unsuccessful or patient reports new pain  Outcome: Progressing     Problem: INFECTION - ADULT  Goal: Absence or prevention of progression during hospitalization  Description: INTERVENTIONS:  - Assess and monitor for signs and symptoms of infection  - Monitor lab/diagnostic results  - Monitor all insertion sites, i e  indwelling lines, tubes, and drains  - Monitor endotracheal if appropriate and nasal secretions for changes in amount and color  - [place] appropriate cooling/warming therapies per order  - Administer medications as ordered  - Instruct and encourage patient and family to use good hand hygiene technique  - Identify and instruct in appropriate isolation precautions for identified infection/condition  Outcome: Progressing     Problem: SAFETY ADULT  Goal: Maintain or return to baseline ADL function  Description: INTERVENTIONS:  -  Assess patient's ability to carry out ADLs; assess patient's baseline for ADL function and identify physical deficits which impact ability to perform ADLs (bathing, care of mouth/teeth, toileting, grooming, dressing, etc )  - Assess/evaluate cause of self-care deficits   - Assess range of motion  - Assess patient's mobility; develop plan if impaired  - Assess patient's need for assistive devices and provide as appropriate  - Encourage maximum independence but intervene and supervise when necessary  - Involve family in performance of ADLs  - Assess for home care needs following discharge   - Consider OT consult to assist with ADL evaluation and planning for discharge  - Provide patient education as appropriate  Outcome: Progressing  Goal: Maintains/Returns to pre admission functional level  Description: INTERVENTIONS:  - Perform BMAT or MOVE assessment daily    - Set and communicate daily mobility goal to care team and patient/family/caregiver     - Collaborate with rehabilitation services on mobility goals if consulted  - Out of bed for toileting  - Record patient progress and toleration of activity level   Outcome: Progressing  Goal: Patient will remain free of falls  Description: INTERVENTIONS:  - Educate patient/family on patient safety including physical limitations  - Instruct patient to call for assistance with activity   - Consult OT/PT to assist with strengthening/mobility   - Keep Call bell within reach  - Keep bed low and locked with side rails adjusted as appropriate  - Keep care items and personal belongings within reach  - Initiate and maintain comfort rounds  - Make Fall Risk Sign visible to staff  - Apply yellow socks and bracelet for high fall risk patients  - Consider moving patient to room near nurses station  Outcome: Progressing     Problem: DISCHARGE PLANNING  Goal: Discharge to home or other facility with appropriate resources  Description: INTERVENTIONS:  - Identify barriers to discharge w/patient and caregiver  - Arrange for needed discharge resources and transportation as appropriate  - Identify discharge learning needs (meds, wound care, etc )  - Arrange for interpretive services to assist at discharge as needed  - Refer to Case Management Department for coordinating discharge planning if the patient needs post-hospital services based on physician/advanced practitioner order or complex needs related to functional status, cognitive ability, or social support system  Outcome: Progressing     Problem: Knowledge Deficit  Goal: Patient/family/caregiver demonstrates understanding of disease process, treatment plan, medications, and discharge instructions  Description: Complete learning assessment and assess knowledge base  Interventions:  - Provide teaching at level of understanding  - Provide teaching via preferred learning methods  Outcome: Progressing     Problem: Nutrition/Hydration-ADULT  Goal: Nutrient/Hydration intake appropriate for improving, restoring or maintaining nutritional needs  Description: Monitor and assess patient's nutrition/hydration status for malnutrition  Collaborate with interdisciplinary team and initiate plan and interventions as ordered  Monitor patient's weight and dietary intake as ordered or per policy  Utilize nutrition screening tool and intervene as necessary  Determine patient's food preferences and provide high-protein, high-caloric foods as appropriate       INTERVENTIONS:  - Monitor oral intake, urinary output, labs, and treatment plans  - Assess nutrition and hydration status and recommend course of action  - Evaluate amount of meals eaten  - Assist patient with eating if necessary   - Allow adequate time for meals  - Recommend/ encourage appropriate diets, oral nutritional supplements, and vitamin/mineral supplements  - Order, calculate, and assess calorie counts as needed  - Recommend, monitor, and adjust tube feedings and TPN/PPN based on assessed needs  - Assess need for intravenous fluids  - Provide specific nutrition/hydration education as appropriate  - Include patient/family/caregiver in decisions related to nutrition  Outcome: Progressing     Problem: SAFETY,RESTRAINT: NV/NON-SELF DESTRUCTIVE BEHAVIOR  Goal: Remains free of harm/injury (restraint for non violent/non self-detsructive behavior)  Description: INTERVENTIONS:  - Instruct patient/family regarding restraint use   - Assess and monitor physiologic and psychological status   - Provide interventions and comfort measures to meet assessed patient needs   - Identify and implement measures to help patient regain control  - Assess readiness for release of restraint   Outcome: Progressing  Goal: Returns to optimal restraint-free functioning  Description: INTERVENTIONS:  - Assess the patient's behavior and symptoms that indicate continued need for restraint  - Identify and implement measures to help patient regain control  - Assess readiness for release of restraint   Outcome: Progressing     Problem: Potential for Falls  Goal: Patient will remain free of falls  Description: INTERVENTIONS:  - Educate patient/family on patient safety including physical limitations  - Instruct patient to call for assistance with activity   - Consult OT/PT to assist with strengthening/mobility   - Keep Call bell within reach  - Keep bed low and locked with side rails adjusted as appropriate  - Keep care items and personal belongings within reach  - Initiate and maintain comfort rounds  - Make Fall Risk Sign visible to staff  - Apply yellow socks and bracelet for high fall risk patients  - Consider moving patient to room near nurses station  Outcome: Progressing     Problem: METABOLIC, FLUID AND ELECTROLYTES - ADULT  Goal: Electrolytes maintained within normal limits  Description: INTERVENTIONS:  - Monitor labs and assess patient for signs and symptoms of electrolyte imbalances  - Administer electrolyte replacement as ordered  - Monitor response to electrolyte replacements, including repeat lab results as appropriate  - Instruct patient on fluid and nutrition as appropriate  Outcome: Progressing  Goal: Fluid balance maintained  Description: INTERVENTIONS:  - Monitor labs   - Monitor I/O and WT  - Instruct patient on fluid and nutrition as appropriate  - Assess for signs & symptoms of volume excess or deficit  Outcome: Progressing

## 2022-06-15 NOTE — NURSING NOTE
01:40 TT resident that patient is drenched in sweat, extremities are cool, vs stable, pt has been tachycardiac at times going into the 120s  Pt has no complaints of pain, nausea, or ill feeling  Resident came to bedside to assess the patient

## 2022-06-15 NOTE — ASSESSMENT & PLAN NOTE
Pt intermittently having AF with RVR, new on this admission  Loaded with amio in ICU       Plan:   · Continue amiodarone 200mg tid  · Discontinue on 6/28 after 14 day course  · Monitor hemodynamics

## 2022-06-15 NOTE — PROGRESS NOTES
INTERNAL MEDICINE RESIDENCY PROGRESS NOTE     Name: Niki Ramírez   Age & Sex: 46 y o  male   MRN: [id number]  Unit/Bed#: -01   Encounter: [id number]  Team: SOD Team A    PATIENT INFORMATION     Name: Niki Ramírez   Age & Sex: 46 y o  male   MRN: [id number]  Hospital Stay Days: 5    ASSESSMENT/PLAN     Principal Problem:    Acute renal failure ([facility] )  Active Problems:    Pneumonia due to Haemophilus influenzae ([facility] [address] )    Atrial fibrillation (H[facility])    Hyperkalemia    PTSD (post-traumatic stress disorder)    IV drug user    Cocaine abuse ([facility] [address] )    Unresponsiveness    NSTEMI (non-ST elevated myocardial infarction) [facility]    Methamphetamine abuse ([facility] )    Acute encephalopathy      * Acute renal failure ([facility] )  Assessment & Plan  In the setting of rhabdomyolysis   Required CVVHD  6/14- transitioned to intermittent HD  Currently not making any urine  Cr today 4 56    Plan:   Nephro following, appreciate recommendations  Will get HD tomorrow  Start IVF at 100cc/hr isolyte today  Trial 80mg IV lasix today after fluids to see if pt responds, d/c fluids if no urine production  Monitor CK, Cr, BMP   Monitor I/Os    Atrial fibrillation ([facility] [address] )  Assessment & Plan  Pt intermittently having AF with RVR, new on this admission  Loaded with amio in ICU  Plan:   Continue amiodarone 200mg tid  Transition to 200mg daily on 6/29  Monitor on tele   Monitor hemodynamics     Pneumonia due to Haemophilus influenzae [facility])  Assessment & Plan  Sputum revealing H Flu and Group B strep  Initially received Zosyn and Vanco, switched to cefuroxime  Plan:   Continue Cefuroxime 500mg x4 days for total of 7 days of Abx (day 2/4)    Hyperkalemia  Assessment & Plan  2/2 to acute renal failure  Peaked T waves on EKG   Treated with insulin, Ca gluconate, and IVF on arrival  Treated with CVVHD in ICU   6/14- started intermittent HD  Improved, K today 3 9    Plan:   Continue prn HD per nephro  Monitor BMP     NSTEMI (non-ST elevated myocardial infarction) [facility])  Assessment & Plan  Elevated troponin level noted on presentation  2/2 ARF, no intervention needed  Troponins now wnl    IV drug user  Assessment & Plan  Pt has a hx of polysubstance abuse including cocaine, meth, THC  Could be contributing to pt's "found down" event  Plan:  Continue to encourage cessation    PTSD (post-traumatic stress disorder)  Assessment & Plan  Not on any medications  Recommend follow up out pt PCP/ psych      Disposition: requires stabilization of ARF and antibiotic treatment for pneumonia      SUBJECTIVE     Patient seen and examined  Overnight pt was diaphoretic but afebrile and with normal white count  He had episodes of AF w RVR which self-resolved after a few minutes  Today pt was seen  He was lethargic and not able to hold conversation, able to mumble answers to questions  No overt complaints  OBJECTIVE     Vitals:    22 2306 22 2317 06/15/22 0134 06/15/22 0707   BP: (!) 147/107  131/90 (!) 153/108   BP Location:       Pulse: (!) 118 (!) 115 (!) 106    Resp:       Temp: (!) 96 7 °F (35 9 °C) 98 7 °F (37 1 °C) 98 4 °F (36 9 °C) 98 °F (36 7 °C)   TempSrc:       SpO2: 97% 98% 97%    Weight:       Height:          Temperature:   Temp (24hrs), Av 3 °F (36 8 °C), Min:96 7 °F (35 9 °C), Max:99 8 °F (37 7 °C)    Temperature: 98 °F (36 7 °C)  Intake & Output:  I/O        0701   0700  0701  06/15 0700 06/15 0701   0700    P  O   585     I V  (mL/kg) 1714 4 (20 9) 533 4 (6 5)     NG/GT       IV Piggyback 604      Total Intake(mL/kg) 2318 4 (28 2) 1118 4 (13 6)     Urine (mL/kg/hr)       Emesis/NG output       Other 1041 1500     Total Output 1041 1500     Net +1277 4 -381 6                Weights:   IBW (Ideal Body Weight): 68 4 kg    Body mass index is 27 52 kg/m²    Weight (last 2 days)     Date/Time Weight    22 82 1 (181)    22 06 84 4 (186 07)        Physical Exam  Constitutional:       General: He is not in acute distress  Appearance: He is diaphoretic  Comments: Lethargic    HENT:      Head: Normocephalic and atraumatic  Right Ear: External ear normal       Left Ear: External ear normal       Nose: Nose normal       Mouth/Throat:      Mouth: Mucous membranes are moist    Eyes:      Conjunctiva/sclera: Conjunctivae normal    Cardiovascular:      Rate and Rhythm: Normal rate and regular rhythm  Pulses: Normal pulses  Heart sounds: Normal heart sounds  Pulmonary:      Effort: Pulmonary effort is normal  No respiratory distress  Comments: Decreased breath sounds at right base  Abdominal:      General: Abdomen is flat  Bowel sounds are normal       Palpations: Abdomen is soft  Musculoskeletal:      Right lower leg: No edema  Left lower leg: No edema  Skin:     Comments: Cool extremities   Neurological:      Comments: Lethargic        LABORATORY DATA     Labs: I have personally reviewed pertinent reports    Results from last 7 days   Lab Units 06/14/22  0505 06/13/22  0958 06/12/22  0610 06/11/22  1053   WBC Thousand/uL 7 69 10 27* 13 06* 11 27*   HEMOGLOBIN g/dL 12 1 13 0 15 3 14 4   HEMATOCRIT % 35 4* 36 9 44 2 42 0   PLATELETS Thousands/uL  --  120* 149 128*   NEUTROS PCT %  --  83* 88* 81*   MONOS PCT %  --  6 4 4      Results from last 7 days   Lab Units 06/14/22  0505 06/14/22  0032 06/13/22  1805 06/12/22  1221 06/12/22  0610 06/11/22  1809 06/11/22  1506 06/11/22  1053   POTASSIUM mmol/L 3 9 3 9 4 4   < > 5 1   < > 5 2 4 0   CHLORIDE mmol/L 105 103 105   < > 106   < > 106 108   CO2 mmol/L 26 23 23   < > 23   < > 27 28   BUN mg/dL 48* 44* 36*   < > 55*   < > 61* 56*   CREATININE mg/dL 4 56* 4 10* 3 18*   < > 4 63*   < > 5 20* 4 51*   CALCIUM mg/dL 8 5 8 5 9 0   < > 8 1*   < > 7 7* 7 3*   ALK PHOS U/L  --   --   --   --  71  --  69 58   ALT U/L  --   --   --   --  2,301*  --  2,898* 2,864*   AST U/L  --   --   --   --  2,751*  --  6,320* 6,509*    < > = values in this interval not displayed  Results from last 7 days   Lab Units 06/14/22  0505 06/14/22  0032 06/13/22  1805   MAGNESIUM mg/dL 2 2 2 2 2 1     Results from last 7 days   Lab Units 06/14/22  0505 06/14/22  0032 06/13/22  1805   PHOSPHORUS mg/dL 2 8 2 8 2 7      Results from last 7 days   Lab Units 06/12/22  0817 06/10/22  2051   INR  1 25* 1 22*   PTT seconds  --  33  33     Results from last 7 days   Lab Units 06/12/22  0817   LACTIC ACID mmol/L 2 0           IMAGING & DIAGNOSTIC TESTING     Radiology Results: I have personally reviewed pertinent reports  CT chest abdomen pelvis wo contrast    Result Date: 6/10/2022  Impression: 1  Limited examination demonstrating no evidence of intrathoracic, intra-abdominal or intrapelvic traumatic injury 2  There is nonspecific perihilar and upper lobe airspace opacities, could represent mild pulmonary edema versus aspiration  3  No pneumothorax, hemothorax or mediastinal air 4  Tiny amount of intravascular and cardiac air, likely introduced through an IV catheter Workstation performed: [id number]     XR chest 1 view portable    Result Date: 6/10/2022  Impression: Endotracheal tube tip is 5 5 cm above the jesika Workstation performed: [id number]     CT head without contrast    Result Date: 6/10/2022  Impression: No acute intracranial abnormality  Bilateral facial subcutaneous air present  Please refer to cervical spine CT report for more specific detail Workstation performed: [id number]     CT spine cervical wo contrast    Result Date: 6/10/2022  Impression: No cervical spine fracture or traumatic malalignment  Subcutaneous emphysema seen in the neck  Given that the CT of the chest abdomen and pelvis demonstrates no evidence of pneumothorax, mediastinal or paraesophageal air, this most likely is related to air introduced through an IV catheter  Also correlate for any penetrating injury that may have allowed generalized subcutaneous air to occur   The examination demonstrates a significant  finding and was documented as such in [facility] for liaison and referring practitioner immediate notification  Workstation performed: [id number]     XR chest portable ICU    Result Date: 6/11/2022  Impression: Deep sulcus sign concerning for a small left basilar pneumothorax  Right lateral decubitus or upright radiograph could be obtained for confirmation  The tip of the left subclavian central venous catheter projects at the superior vena cava  The study was marked in [facility] for immediate notification  Workstation performed: [id number]     US liver doppler only    Result Date: 6/12/2022  Impression: Normal liver Doppler  Workstation performed: [id number]     Other Diagnostic Testing: I have personally reviewed pertinent reports  ACTIVE MEDICATIONS     Current Facility-Administered Medications   Medication Dose Route Frequency    amiodarone tablet 200 mg  200 mg Oral TID With Meals    Followed by   Sally Osullivan ON 6/29/2022] amiodarone tablet 200 mg  200 mg Oral Daily With Breakfast    benzonatate (TESSALON PERLES) capsule 100 mg  100 mg Oral TID PRN    cefuroxime (CEFTIN) tablet 500 mg  500 mg Oral Q24H    furosemide (LASIX) injection 80 mg  80 mg Intravenous Once    heparin (porcine) subcutaneous injection 5,000 Units  5,000 Units Subcutaneous Q8H [address]    multi-electrolyte (PLASMALYTE-A/ISOLYTE-S PH 7 4) IV solution  100 mL/hr Intravenous Continuous       VTE Pharmacologic Prophylaxis: Heparin  VTE Mechanical Prophylaxis: sequential compression device    Portions of the record may have been created with voice recognition software  Occasional wrong word or "sound a like" substitutions may have occurred due to the inherent limitations of voice recognition software    Read the chart carefully and recognize, using context, where substitutions have occurred   ==  [address]

## 2022-06-15 NOTE — UTILIZATION REVIEW
Continued Stay Review    Date: 6/15                         Current Patient Class: Inpatient  Current Level of Care: Med Surg    HPI:51 y o  male initially admitted on 6/10    Assessment/Plan:   Progress notes;  Creat today 4 56  Currently not making any urine  Start IVFs @ 100 ml/hr today  Trial Iv Lasix 80 mg today after fluids to see if pt responds , d/c fluids if no urine production  Plan for HD tomorrow 6/16  Continue amiodarone 200mg tid  Tele monitoring  Improved, K today 3 9  Treated with CVVHD in ICU  Overnight pt was diaphoretic but afebrile and with normal white count  Lethargic and not able to hold conversation  Per Nephrology; Plan for HD tomorrow 6/16         Vital Signs:   06/15/22 07:07:38 98 °F (36 7 °C) -- -- 153/108   Abnormal  123 -- -- -- -- -- -- --   06/15/22 01:34:54 98 4 °F (36 9 °C) 106 Abnormal  -- 131/90 104 -- -- 97 % -- -- -- --   06/14/22 23:17:37 98 7 °F (37 1 °C) 115 Abnormal  -- -- -- -- -- 98 % --          Pertinent Labs/Diagnostic Results:   Results from last 7 days   Lab Units 06/10/22  1155   SARS-COV-2  Negative     Results from last 7 days   Lab Units 06/14/22  0505 06/13/22  0958 06/12/22  0610 06/11/22  1053 06/11/22  0506   WBC Thousand/uL 7 69 10 27* 13 06* 11 27* 13 03*   HEMOGLOBIN g/dL 12 1 13 0 15 3 14 4 13 9   HEMATOCRIT % 35 4* 36 9 44 2 42 0 39 9   PLATELETS Thousands/uL  --  120* 149 128* 134*   NEUTROS ABS Thousands/µL  --  8 57* 11 51* 9 04* 10 37*         Results from last 7 days   Lab Units 06/15/22  1315 06/14/22  0505 06/14/22  0032 06/13/22  1805 06/13/22  1228 06/13/22  0531   SODIUM mmol/L 134* 137 135* 136 140 138   POTASSIUM mmol/L 4 0 3 9 3 9 4 4 3 7 4 0   CHLORIDE mmol/L 101 105 103 105 111* 105   CO2 mmol/L 27 26 23 23 24 25   ANION GAP mmol/L 6 6 9 8 5 8   BUN mg/dL 42* 48* 44* 36* 35* 42*   CREATININE mg/dL 5 38* 4 56* 4 10* 3 18* 2 89* 3 51*   EGFR ml/min/1 73sq m 11 13 15 21 23 18   CALCIUM mg/dL 8 1* 8 5 8 5 9 0 7 3* 8 4   CALCIUM, IONIZED mmol/L  --  1 13 1 14 1 18 1 07* 1 13   MAGNESIUM mg/dL  --  2 2 2 2 2 1 2 0 2 3   PHOSPHORUS mg/dL  --  2 8 2 8 2 7 2 8 3 0     Results from last 7 days   Lab Units 06/12/22  0610 06/11/22  1506 06/11/22  1053 06/11/22  0506 06/10/22  1915 06/10/22  1027   AST U/L 2,751* 6,320* 6,509* 6,260* 2,717* 350*   ALT U/L 2,301* 2,898* 2,864* 2,624* 893* 195*   ALK PHOS U/L 71 69 58 55 77 82   TOTAL PROTEIN g/dL 5 3* 5 1* 4 6* 4 6* 6 3* 7 5   ALBUMIN g/dL 2 3* 2 4* 2 3* 2 4* 3 2* 4 5   TOTAL BILIRUBIN mg/dL 2 45* 1 76* 1 27* 1 04* 1 12* 0 85   BILIRUBIN DIRECT mg/dL 1 94* 1 19*  --  0 53*  --   --    AMMONIA umol/L  --   --   --   --   --  42     Results from last 7 days   Lab Units 06/15/22  0133 06/11/22  1809 06/10/22  2254 06/10/22  2005 06/10/22  1402 06/10/22  1217 06/10/22  1125 06/10/22  1026   POC GLUCOSE mg/dl 99 92 118 131 87 126 179* 87     Results from last 7 days   Lab Units 06/15/22  1315 06/14/22  0505 06/14/22  0032 06/13/22  1805 06/13/22  1228 06/13/22  0531 06/13/22  0013 06/12/22  1807 06/12/22  1221 06/12/22  0610 06/12/22  0006 06/11/22  1809   GLUCOSE RANDOM mg/dL 167* 99 109 130 117 139 134 130 127 130 135 101       Results from last 7 days   Lab Units 06/15/22  1359 06/12/22  0610 06/11/22  1054   PH ART  7 440 7 251* 7 350   PCO2 ART mm Hg 35 9* 50 2* 39 7   PO2 ART mm Hg 94 1 138 2* 134 3*   HCO3 ART mmol/L 23 8 21 6* 21 4*   BASE EXC ART mmol/L 0 1 -6 0 -3 8   O2 CONTENT ART mL/dL 18 3 21 1 19 9   O2 HGB, ARTERIAL % 95 8 97 0 97 1*   ABG SOURCE  Radial, Right Line, Arterial Line, Arterial     Results from last 7 days   Lab Units 06/15/22  1315   PH CHILANGO  7 434*   PCO2 CHILANGO mm Hg 35 9*   PO2 CHILANGO mm Hg 109 4*   HCO3 CHILANGO mmol/L 23 5*   BASE EXC CHILANGO mmol/L -0 2   O2 CONTENT CHILANGO ml/dL 20 0   O2 HGB, VENOUS % 95 5*         Results from last 7 days   Lab Units 06/15/22  1315 06/14/22  0505 06/13/22  1005 06/12/22  0610   CK TOTAL U/L 5,761* 7,323* 8,469* 49,724*   CK MB INDEX %  --  <1 0 <1 0 <1 0   CK MB ng/mL  --  51 3* 56 6* 182 1*     Results from last 7 days   Lab Units 06/11/22  1332 06/11/22  1155 06/11/22  0949 06/10/22  2110 06/10/22  1915 06/10/22  1659 06/10/22  1449 06/10/22  1227 06/10/22  1027   HS TNI 0HR ng/L  --   --  14,958*  --  5,388* 4,075*  --   --  485*   HS TNI 2HR ng/L  --  13,495*  --  3,771*  --   --   --  974*  --    HSTNI D2 ng/L  --  -1,463  --  -1,617  --   --   --  489*  --    HS TNI 4HR ng/L 13,017*  --   --   --   --   --  2,102*  --   --    HSTNI D4 ng/L -1,941  --   --   --   --   --  1,617*  --   --          Results from last 7 days   Lab Units 06/12/22  0817 06/10/22  2051   PROTIME seconds 15 2* 14 9*  14 9*   INR  1 25* 1 22*   PTT seconds  --  33  33     Results from last 7 days   Lab Units 06/11/22  0110   TSH 3RD GENERATON uIU/mL 2 900     Results from last 7 days   Lab Units 06/11/22  0506   PROCALCITONIN ng/ml 49 11*     Results from last 7 days   Lab Units 06/12/22  0817 06/11/22  0413 06/11/22  0112 06/10/22  2255 06/10/22  1915   LACTIC ACID mmol/L 2 0 1 8 2 4* 3 2* 2 6*                     Results from last 7 days   Lab Units 06/11/22  1053   HEP B S AG  Non-reactive   HEP C AB  High Reactive*   HEP B C IGM  Non-reactive                     Results from last 7 days   Lab Units 06/10/22  1149   CLARITY UA  Cloudy*   COLOR UA  Ambika*   SPEC GRAV UA  >=1 030   PH UA  6 0   GLUCOSE UA mg/dl Negative   KETONES UA mg/dl Negative   BLOOD UA  3+*   PROTEIN UA mg/dl 2+*   NITRITE UA  Negative   BILIRUBIN UA  Negative   UROBILINOGEN UA E U /dl 4 0*   LEUKOCYTES UA  Negative   WBC UA /hpf 1-2   RBC UA /hpf Innumerable*   BACTERIA UA /hpf Innumerable*   EPITHELIAL CELLS WET PREP /hpf Occasional     Results from last 7 days   Lab Units 06/10/22  1155   INFLUENZA A PCR  Negative   INFLUENZA B PCR  Negative   RSV PCR  Negative         Results from last 7 days   Lab Units 06/10/22  1149   AMPH/METH  Positive*   BARBITURATE UR  Negative   BENZODIAZEPINE UR  Negative   COCAINE UR Positive*   METHADONE URINE  Negative   OPIATE UR  Negative   PCP UR  Negative   THC UR  Positive*     Results from last 7 days   Lab Units 06/10/22  1027   ETHANOL LVL mg/dL <10   ACETAMINOPHEN LVL ug/mL <20*   SALICYLATE LVL mg/dL <5                 Results from last 7 days   Lab Units 06/11/22  0949 06/10/22  2109 06/10/22  1113   BLOOD CULTURE   --  No Growth After 4 Days  No Growth After 4 Days  No Growth After 4 Days  No Growth After 4 Days  SPUTUM CULTURE  2+ Growth of Beta Hemolytic Streptococcus Group B*  2 colonies Haemophilus influenzae*  2+ Growth of   --   --    GRAM STAIN RESULT  3+ Polys*  1+ Gram positive cocci in pairs*  Rare Gram negative rods*  No Epithelial cells seen*  --   --        Medications:   Scheduled Medications:  amiodarone, 200 mg, Oral, TID With Meals   Followed by  John Osman ON 6/29/2022] amiodarone, 200 mg, Oral, Daily With Breakfast  cefuroxime, 500 mg, Oral, Q24H  furosemide, 80 mg, Intravenous, Once  heparin (porcine), 5,000 Units, Subcutaneous, Q8H [address]      Continuous IV Infusions:  multi-electrolyte, 100 mL/hr, Intravenous, Continuous      PRN Meds:  benzonatate, 100 mg, Oral, TID PRN        Discharge Plan:D    Network Utilization Review Department  ATTENTION: Please call with any questions or concerns to [phone] and carefully listen to the prompts so that you are directed to the right person  All voicemails are confidential   [employer] all requests for admission clinical reviews, approved or denied determinations and any other requests to dedicated fax number below belonging to the campus where the patient is receiving treatment   List of dedicated fax numbers for the Facilities:  [address] DENIALS (Administrative/Medical Necessity) [phone]   [address] (Maternity/NICU/Pediatrics) [phone]   [address] [place] [address] [address] [facility] [place] [phone]   [address] [id number] [place] [address] [facility] [address] [place] [address] [phone]

## 2022-06-15 NOTE — PLAN OF CARE
Problem: MOBILITY - ADULT  Goal: Maintain or return to baseline ADL function  Description: INTERVENTIONS:  -  Assess patient's ability to carry out ADLs; assess patient's baseline for ADL function and identify physical deficits which impact ability to perform ADLs (bathing, care of mouth/teeth, toileting, grooming, dressing, etc )  - Assess/evaluate cause of self-care deficits   - Assess range of motion  - Assess patient's mobility; develop plan if impaired  - Assess patient's need for assistive devices and provide as appropriate  - Encourage maximum independence but intervene and supervise when necessary  - Involve family in performance of ADLs  - Assess for home care needs following discharge   - Consider OT consult to assist with ADL evaluation and planning for discharge  - Provide patient education as appropriate  Outcome: Progressing  Goal: Maintains/Returns to pre admission functional level  Description: INTERVENTIONS:  - Perform BMAT or MOVE assessment daily    - Set and communicate daily mobility goal to care team and patient/family/caregiver  - Collaborate with rehabilitation services on mobility goals if consulted  - Perform Range of Motion 3 times a day  - Reposition patient every 3 hours    - Dangle patient 3 times a day  - Stand patient 3 times a day  - Ambulate patient 3 times a day  - Out of bed to chair 3 times a day   - Out of bed for meals 3 times a day  - Out of bed for toileting  - Record patient progress and toleration of activity level   Outcome: Progressing     Problem: Prexisting or High Potential for Compromised Skin Integrity  Goal: Skin integrity is maintained or improved  Description: INTERVENTIONS:  - Identify patients at risk for skin breakdown  - Assess and monitor skin integrity  - Assess and monitor nutrition and hydration status  - Monitor labs   - Assess for incontinence   - Turn and reposition patient  - Assist with mobility/ambulation  - Relieve pressure over bony prominences  - Avoid friction and shearing  - Provide appropriate hygiene as needed including keeping skin clean and dry  - Evaluate need for skin moisturizer/barrier cream  - Collaborate with interdisciplinary team   - Patient/family teaching  - Consider wound care consult   Outcome: Progressing     Problem: PAIN - ADULT  Goal: Verbalizes/displays adequate comfort level or baseline comfort level  Description: Interventions:  - Encourage patient to monitor pain and request assistance  - Assess pain using appropriate pain scale  - Administer analgesics based on type and severity of pain and evaluate response  - Implement non-pharmacological measures as appropriate and evaluate response  - Consider cultural and social influences on pain and pain management  - Notify physician/advanced practitioner if interventions unsuccessful or patient reports new pain  Outcome: Progressing     Problem: INFECTION - ADULT  Goal: Absence or prevention of progression during hospitalization  Description: INTERVENTIONS:  - Assess and monitor for signs and symptoms of infection  - Monitor lab/diagnostic results  - Monitor all insertion sites, i e  indwelling lines, tubes, and drains  - Monitor endotracheal if appropriate and nasal secretions for changes in amount and color  - [place] appropriate cooling/warming therapies per order  - Administer medications as ordered  - Instruct and encourage patient and family to use good hand hygiene technique  - Identify and instruct in appropriate isolation precautions for identified infection/condition  Outcome: Progressing     Problem: SAFETY ADULT  Goal: Maintain or return to baseline ADL function  Description: INTERVENTIONS:  -  Assess patient's ability to carry out ADLs; assess patient's baseline for ADL function and identify physical deficits which impact ability to perform ADLs (bathing, care of mouth/teeth, toileting, grooming, dressing, etc )  - Assess/evaluate cause of self-care deficits - Assess range of motion  - Assess patient's mobility; develop plan if impaired  - Assess patient's need for assistive devices and provide as appropriate  - Encourage maximum independence but intervene and supervise when necessary  - Involve family in performance of ADLs  - Assess for home care needs following discharge   - Consider OT consult to assist with ADL evaluation and planning for discharge  - Provide patient education as appropriate  Outcome: Progressing  Goal: Maintains/Returns to pre admission functional level  Description: INTERVENTIONS:  - Perform BMAT or MOVE assessment daily    - Set and communicate daily mobility goal to care team and patient/family/caregiver  - Collaborate with rehabilitation services on mobility goals if consulted  - Perform Range of Motion 3 times a day  - Reposition patient every 3 hours    - Dangle patient 3 times a day  - Stand patient 3 times a day  - Ambulate patient 3 times a day  - Out of bed to chair 3 times a day   - Out of bed for meals 3 times a day  - Out of bed for toileting  - Record patient progress and toleration of activity level   Outcome: Progressing  Goal: Patient will remain free of falls  Description: INTERVENTIONS:  - Educate patient/family on patient safety including physical limitations  - Instruct patient to call for assistance with activity   - Consult OT/PT to assist with strengthening/mobility   - Keep Call bell within reach  - Keep bed low and locked with side rails adjusted as appropriate  - Keep care items and personal belongings within reach  - Initiate and maintain comfort rounds  - Make Fall Risk Sign visible to staff  - Offer Toileting every 3 Hours, in advance of need  - Initiate/Maintain bed alarm  - Obtain necessary fall risk management equipment: non skid socks  - Apply yellow socks and bracelet for high fall risk patients  - Consider moving patient to room near nurses station  Outcome: Progressing     Problem: DISCHARGE PLANNING  Goal: Discharge to home or other facility with appropriate resources  Description: INTERVENTIONS:  - Identify barriers to discharge w/patient and caregiver  - Arrange for needed discharge resources and transportation as appropriate  - Identify discharge learning needs (meds, wound care, etc )  - Arrange for interpretive services to assist at discharge as needed  - Refer to Case Management Department for coordinating discharge planning if the patient needs post-hospital services based on physician/advanced practitioner order or complex needs related to functional status, cognitive ability, or social support system  Outcome: Progressing     Problem: Knowledge Deficit  Goal: Patient/family/caregiver demonstrates understanding of disease process, treatment plan, medications, and discharge instructions  Description: Complete learning assessment and assess knowledge base  Interventions:  - Provide teaching at level of understanding  - Provide teaching via preferred learning methods  Outcome: Progressing     Problem: Nutrition/Hydration-ADULT  Goal: Nutrient/Hydration intake appropriate for improving, restoring or maintaining nutritional needs  Description: Monitor and assess patient's nutrition/hydration status for malnutrition  Collaborate with interdisciplinary team and initiate plan and interventions as ordered  Monitor patient's weight and dietary intake as ordered or per policy  Utilize nutrition screening tool and intervene as necessary  Determine patient's food preferences and provide high-protein, high-caloric foods as appropriate       INTERVENTIONS:  - Monitor oral intake, urinary output, labs, and treatment plans  - Assess nutrition and hydration status and recommend course of action  - Evaluate amount of meals eaten  - Assist patient with eating if necessary   - Allow adequate time for meals  - Recommend/ encourage appropriate diets, oral nutritional supplements, and vitamin/mineral supplements  - Order, calculate, and assess calorie counts as needed  - Recommend, monitor, and adjust tube feedings and TPN/PPN based on assessed needs  - Assess need for intravenous fluids  - Provide specific nutrition/hydration education as appropriate  - Include patient/family/caregiver in decisions related to nutrition  Outcome: Progressing     Problem: SAFETY,RESTRAINT: NV/NON-SELF DESTRUCTIVE BEHAVIOR  Goal: Remains free of harm/injury (restraint for non violent/non self-detsructive behavior)  Description: INTERVENTIONS:  - Instruct patient/family regarding restraint use   - Assess and monitor physiologic and psychological status   - Provide interventions and comfort measures to meet assessed patient needs   - Identify and implement measures to help patient regain control  - Assess readiness for release of restraint   Outcome: Progressing  Goal: Returns to optimal restraint-free functioning  Description: INTERVENTIONS:  - Assess the patient's behavior and symptoms that indicate continued need for restraint  - Identify and implement measures to help patient regain control  - Assess readiness for release of restraint   Outcome: Progressing     Problem: Potential for Falls  Goal: Patient will remain free of falls  Description: INTERVENTIONS:  - Educate patient/family on patient safety including physical limitations  - Instruct patient to call for assistance with activity   - Consult OT/PT to assist with strengthening/mobility   - Keep Call bell within reach  - Keep bed low and locked with side rails adjusted as appropriate  - Keep care items and personal belongings within reach  - Initiate and maintain comfort rounds  - Make Fall Risk Sign visible to staff  - Offer Toileting every 3 Hours, in advance of need  - Initiate/Maintain bed alarm  - Obtain necessary fall risk management equipment: non skid socks   - Apply yellow socks and bracelet for high fall risk patients  - Consider moving patient to room near nurses station  Outcome: Progressing Problem: METABOLIC, FLUID AND ELECTROLYTES - ADULT  Goal: Electrolytes maintained within normal limits  Description: INTERVENTIONS:  - Monitor labs and assess patient for signs and symptoms of electrolyte imbalances  - Administer electrolyte replacement as ordered  - Monitor response to electrolyte replacements, including repeat lab results as appropriate  - Instruct patient on fluid and nutrition as appropriate  Outcome: Progressing  Goal: Fluid balance maintained  Description: INTERVENTIONS:  - Monitor labs   - Monitor I/O and WT  - Instruct patient on fluid and nutrition as appropriate  - Assess for signs & symptoms of volume excess or deficit  Outcome: Progressing

## 2022-06-15 NOTE — QUICK NOTE
Patient's family contact, daughter Jaime Glez (who is a CNA),  was reached by phone and updated with the current state of the patient's treatment and plan going forward  All of the family contact's questions were addressed and answered  Plans to visit around 4:30 pm today

## 2022-06-15 NOTE — PROGRESS NOTES
NEPHROLOGY PROGRESS NOTE   Cuca Burns 46 y o  male MRN: [id number]  Unit/Bed#: -01 Encounter: [id number]  Reason for Consult: JOÃO    ASSESSMENT AND PLAN:  JOÃO POA, baseline creatinine 0 9 to 1 1, isolated values 1 6 in September 2020  -JOÃO suspected to be secondary to ATN due to rhabdomyolysis, shock in the setting of cocaine/methamphetamine toxicity  -urine output remains poor  -patient was started on CRRT on 6/11/22 which was discontinued on 6/13/22  Status post I HD yesterday tolerated well   -continue to remain off vasopressors    -no labs available as patient is very hard stick  Follow results for BMP, CK level if able to get labs  -will tentatively plan for HD tomorrow and keep patient on TTS schedule   -very poor urine output, monitor for renal recovery  -UA shows innumerable RBCs, innumerable bacteria, 2+ protein  -CT scan shows no hydronephrosis  -avoid NSAIDs, hypotension   Avoid IV contrast  -continued to require dialysis, will need eventual PermCath placement      Severe rhabdomyolysis, peak CK level greater than 100,000 now seems to be overall improving to 7323 yesterday  CK level to follow from today, unable to get labs due to hard stick   Continue to monitor daily CK level   -plan for HD tomorrow     Shock, continue to remain off vasopressors   Concern for pneumonia, status post IV antibiotic now remains on p o  For total of seven days of antibiotic  Blood cultures negative so far  -echo shows EF 58%, diastolic function normal, normal IVC     VDRF, was extubated on 6/13/22  Currently remains on room air at the time of my encounter  Lethargic, encephalopathy,? Etiology  Management as per primary team   Consider blood gas      Nontraumatic collapses/unresponsive prior to admission, cocaine/methamphetamine intoxication, management as per primary team     Discussed above plan in detail with primary team resident  SUBJECTIVE:  Patient seen and examined at bedside    Patient remains overall very sleepy and does not answer most questions    OBJECTIVE:  Current Weight: Weight - Scale: 82 1 kg (181 lb)  Vitals:    06/15/22 0707   BP: (!) 153/108   Pulse:    Resp:    Temp: 98 °F (36 7 °C)   SpO2:        Intake/Output Summary (Last 24 hours) at 6/15/2022 1307  Last data filed at 6/14/2022 2301  Gross per 24 hour   Intake 540 ml   Output 1500 ml   Net -960 ml     Wt Readings from Last 3 Encounters:   06/14/22 82 1 kg (181 lb)   06/10/22 79 7 kg (175 lb 11 3 oz)   08/25/21 72 6 kg (160 lb)     Temp Readings from Last 3 Encounters:   06/15/22 98 °F (36 7 °C)   06/10/22 99 1 °F (37 3 °C) (Bladder)   08/25/21 98 3 °F (36 8 °C) (Oral)     BP Readings from Last 3 Encounters:   06/15/22 (!) 153/108   06/10/22 103/67   08/25/21 154/75     Pulse Readings from Last 3 Encounters:   06/15/22 (!) 106   06/10/22 101   08/25/21 (!) 107        Physical Examination:  General:  Lying in bed, no acute distress   Eyes:  No conjunctival pallor present  ENT:  External examination of ears and nose unremarkable  Neck:  No obvious lymphadenopathy appreciated  Respiratory:  Bilateral air entry present  CVS:  S1, S2 present  GI:  Soft, nondistended  CNS:  Sleepy, lethargic, was able to tell me his name, does not answer other questions  Skin:  No new rash  Musculoskeletal:  No obvious new gross deformed    Medications:    Current Facility-Administered Medications:     amiodarone tablet 200 mg, 200 mg, Oral, TID With Meals, 200 mg at 06/15/22 0902 **FOLLOWED BY** [START ON 6/29/2022] amiodarone tablet 200 mg, 200 mg, Oral, Daily With Breakfast, William Hayden MD    benzonatate (TESSALON PERLES) capsule 100 mg, 100 mg, Oral, TID PRN, Ruth Mcelroy MD, 100 mg at 06/14/22 5246    cefuroxime (CEFTIN) tablet 500 mg, 500 mg, Oral, Q24H, William Hayden MD, 500 mg at 06/14/22 1526    furosemide (LASIX) injection 40 mg, 40 mg, Intravenous, Once, Sparkle Moore DO    heparin (porcine) subcutaneous injection 5,000 Units, 5,000 Units, Subcutaneous, Q8H [address], Chaim Faith MD, 5,000 Units at 06/15/22 0602    multi-electrolyte (PLASMALYTE-A/ISOLYTE-S PH 7 4) IV solution, 100 mL/hr, Intravenous, Continuous, Sparkle Moore,     Laboratory Results:  Results from last 7 days   Lab Units 06/14/22  0505 06/14/22  0032 06/13/22  1805 06/13/22  1228 06/13/22  0958 06/13/22  0531 06/13/22  0013 06/12/22  1807 06/12/22  1221 06/12/22  0610 06/11/22  1506 06/11/22  1053 06/11/22  0949 06/11/22  0506 06/11/22  0110 06/10/22  2051 06/10/22  1559 06/10/22  1027   WBC Thousand/uL 7 69  --   --   --  10 27*  --   --   --   --  13 06*  --  11 27*  --  13 03*  --  17 04*  --  22 00*   HEMOGLOBIN g/dL 12 1  --   --   --  13 0  --   --   --   --  15 3  --  14 4  --  13 9  --  18 3*  --  17 7*   HEMATOCRIT % 35 4*  --   --   --  36 9  --   --   --   --  44 2  --  42 0  --  39 9  --  54 8*  --  51 1*   PLATELETS Thousands/uL  --   --   --   --  120*  --   --   --   --  149  --  128*  --  134*  --  213  213  --  258   SODIUM mmol/L 137 135* 136 140  --  138 137 138   < > 140   < > 142   < > 142  142   < >  --    < > 138   POTASSIUM mmol/L 3 9 3 9 4 4 3 7  --  4 0 4 2 4 4   < > 5 1   < > 4 0   < > 4 3  4 2   < >  --    < > 8 3*   CHLORIDE mmol/L 105 103 105 111*  --  105 106 106   < > 106   < > 108   < > 108  107   < >  --    < > 98   CO2 mmol/L 26 23 23 24  --  25 22 23   < > 23   < > 28   < > 22  22   < >  --    < > 25   BUN mg/dL 48* 44* 36* 35*  --  42* 46* 48*   < > 55*   < > 56*   < > 54*  55*   < >  --    < > 33*   CREATININE mg/dL 4 56* 4 10* 3 18* 2 89*  --  3 51* 3 65* 3 89*   < > 4 63*   < > 4 51*   < > 3 89*  3 90*   < >  --    < > 3 23*   CALCIUM mg/dL 8 5 8 5 9 0 7 3*  --  8 4 8 6 8 8   < > 8 1*   < > 7 3*   < > 7 2*  7 1*   < >  --    < > 8 3*   MAGNESIUM mg/dL 2 2 2 2 2 1 2 0  --  2 3 1 7 2 0   < > 2 3   < > 2 5  --  2 4  --   --    < >  --    PHOSPHORUS mg/dL 2 8 2 8 2 7 2 8  --  3 0 3 2 3 7   < > 6 7*   < >  --    < > 4 7*  4 6*   < > --    < >  --     < > = values in this interval not displayed  US liver doppler only   Final Result by Randal Mccann MD (06/12 1651)      Normal liver Doppler  Workstation performed: [id number]         XR chest portable ICU   Final Result by Melyssa Ojeda MD (06/11 1519)      Deep sulcus sign concerning for a small left basilar pneumothorax  Right lateral decubitus or upright radiograph could be obtained for confirmation  The tip of the left subclavian central venous catheter projects at the superior vena cava  The study was marked in [facility] for immediate notification  Workstation performed: [id number]             Portions of the record may have been created with voice recognition software  Occasional wrong word or "sound a like" substitutions may have occurred due to the inherent limitations of voice recognition software  Read the chart carefully and recognize, using context, where substitutions have occurred

## 2022-06-15 NOTE — QUICK NOTE
Was alerted by nurse via [facility] Text that patient was "drenched in sweat " Patient's vital signs at this time were stable  Patient was afebrile with temp of 98 4, Pulse of 106, /90, and SpO2 was 97% on room air  I examined patient at bedside with senior resident Dallin Still,   Patient did appear to be sweating, though he had just been given a bath  Patient also had cool/clammy extremities, with pulses intact  Heart sounded regular rate and tachycardic, and patient was without dyspnea  Examination at my encounter does not appear to differ from previous examinations and information gathered at sign-out for patient  Patient denied any fever, chills, and palpitations  At end of examination, patient's heart rate increased to 140 on monitor  As I immediately went back to examine patient at that time, heart was already regular but tachy in 100s  Heart rate shortly thereafter decreased to 80s  Suspect patient may be having bouts of Atrial Fibrillation with RVR, as has been previously documented  Patient recently had amiodarone gtt discontinued and was transitioned to oral amiodarone  Bouts of Afib with RVR may explain patients diaphoresis and cool/clammy extremities  Further more, patient has known pneumonia and is being treated with antibiotics  Will not initiate any further infectious workup at this time as patient remains afebrile  No intervention was needed for patient's suspected Afib with RVR as it immediately resolved without intervention  Will continue to monitor patient's vitals throughout the night, with particular attention to fever and low threshold to initiate infectious workup if indicated  Plan was communicated to patient's nurse

## 2022-06-16 ENCOUNTER — APPOINTMENT (INPATIENT)
Dept: DIALYSIS | Facility: HOSPITAL | Age: 52
DRG: 469 | End: 2022-06-16
Payer: COMMERCIAL

## 2022-06-16 LAB
ALBUMIN SERPL BCP-MCNC: 2.1 G/DL (ref 3.5–5)
ALP SERPL-CCNC: 140 U/L (ref 46–116)
ALT SERPL W P-5'-P-CCNC: 206 U/L (ref 12–78)
ANION GAP SERPL CALCULATED.3IONS-SCNC: 8 MMOL/L (ref 4–13)
AST SERPL W P-5'-P-CCNC: 510 U/L (ref 5–45)
BACTERIA BLD CULT: NORMAL
BACTERIA BLD CULT: NORMAL
BASOPHILS # BLD AUTO: 0.03 THOUSANDS/ΜL (ref 0–0.1)
BASOPHILS NFR BLD AUTO: 0 % (ref 0–1)
BILIRUB DIRECT SERPL-MCNC: 0.77 MG/DL (ref 0–0.2)
BILIRUB SERPL-MCNC: 1.24 MG/DL (ref 0.2–1)
BUN SERPL-MCNC: 59 MG/DL (ref 5–25)
CALCIUM SERPL-MCNC: 7.7 MG/DL (ref 8.3–10.1)
CHLORIDE SERPL-SCNC: 97 MMOL/L (ref 100–108)
CK MB SERPL-MCNC: 20.7 NG/ML (ref 0–5)
CK MB SERPL-MCNC: <1 % (ref 0–2.5)
CK SERPL-CCNC: 3825 U/L (ref 39–308)
CO2 SERPL-SCNC: 26 MMOL/L (ref 21–32)
CREAT SERPL-MCNC: 7.08 MG/DL (ref 0.6–1.3)
EOSINOPHIL # BLD AUTO: 0.15 THOUSAND/ΜL (ref 0–0.61)
EOSINOPHIL NFR BLD AUTO: 1 % (ref 0–6)
ERYTHROCYTE [DISTWIDTH] IN BLOOD BY AUTOMATED COUNT: 12.2 % (ref 11.6–15.1)
GFR SERPL CREATININE-BSD FRML MDRD: 8 ML/MIN/1.73SQ M
GLUCOSE SERPL-MCNC: 209 MG/DL (ref 65–140)
HCT VFR BLD AUTO: 39.5 % (ref 36.5–49.3)
HGB BLD-MCNC: 14.2 G/DL (ref 12–17)
IMM GRANULOCYTES # BLD AUTO: 0.14 THOUSAND/UL (ref 0–0.2)
IMM GRANULOCYTES NFR BLD AUTO: 1 % (ref 0–2)
INR PPP: 0.93 (ref 0.84–1.19)
LYMPHOCYTES # BLD AUTO: 0.81 THOUSANDS/ΜL (ref 0.6–4.47)
LYMPHOCYTES NFR BLD AUTO: 7 % (ref 14–44)
MCH RBC QN AUTO: 34.5 PG (ref 26.8–34.3)
MCHC RBC AUTO-ENTMCNC: 35.9 G/DL (ref 31.4–37.4)
MCV RBC AUTO: 96 FL (ref 82–98)
MONOCYTES # BLD AUTO: 0.83 THOUSAND/ΜL (ref 0.17–1.22)
MONOCYTES NFR BLD AUTO: 7 % (ref 4–12)
NEUTROPHILS # BLD AUTO: 9.46 THOUSANDS/ΜL (ref 1.85–7.62)
NEUTS SEG NFR BLD AUTO: 84 % (ref 43–75)
NRBC BLD AUTO-RTO: 0 /100 WBCS
PLATELET # BLD AUTO: 102 THOUSANDS/UL (ref 149–390)
PMV BLD AUTO: 10.7 FL (ref 8.9–12.7)
POTASSIUM SERPL-SCNC: 3.6 MMOL/L (ref 3.5–5.3)
PROT SERPL-MCNC: 5.3 G/DL (ref 6.4–8.2)
PROTHROMBIN TIME: 12.1 SECONDS (ref 11.6–14.5)
RBC # BLD AUTO: 4.11 MILLION/UL (ref 3.88–5.62)
SODIUM SERPL-SCNC: 131 MMOL/L (ref 136–145)
WBC # BLD AUTO: 11.42 THOUSAND/UL (ref 4.31–10.16)

## 2022-06-16 PROCEDURE — 80076 HEPATIC FUNCTION PANEL: CPT | Performed by: STUDENT IN AN ORGANIZED HEALTH CARE EDUCATION/TRAINING PROGRAM

## 2022-06-16 PROCEDURE — 99232 SBSQ HOSP IP/OBS MODERATE 35: CPT | Performed by: INTERNAL MEDICINE

## 2022-06-16 PROCEDURE — 85025 COMPLETE CBC W/AUTO DIFF WBC: CPT | Performed by: STUDENT IN AN ORGANIZED HEALTH CARE EDUCATION/TRAINING PROGRAM

## 2022-06-16 PROCEDURE — 82550 ASSAY OF CK (CPK): CPT | Performed by: INTERNAL MEDICINE

## 2022-06-16 PROCEDURE — 80048 BASIC METABOLIC PNL TOTAL CA: CPT | Performed by: INTERNAL MEDICINE

## 2022-06-16 PROCEDURE — 85610 PROTHROMBIN TIME: CPT | Performed by: STUDENT IN AN ORGANIZED HEALTH CARE EDUCATION/TRAINING PROGRAM

## 2022-06-16 PROCEDURE — 82553 CREATINE MB FRACTION: CPT | Performed by: INTERNAL MEDICINE

## 2022-06-16 RX ORDER — GUAIFENESIN 100 MG/5ML
200 SOLUTION ORAL ONCE
Status: COMPLETED | OUTPATIENT
Start: 2022-06-16 | End: 2022-06-16

## 2022-06-16 RX ADMIN — AMIODARONE HYDROCHLORIDE 200 MG: 200 TABLET ORAL at 11:24

## 2022-06-16 RX ADMIN — AMIODARONE HYDROCHLORIDE 200 MG: 200 TABLET ORAL at 07:27

## 2022-06-16 RX ADMIN — GUAIFENESIN 200 MG: 200 SOLUTION ORAL at 14:13

## 2022-06-16 RX ADMIN — HEPARIN SODIUM 5000 UNITS: 5000 INJECTION INTRAVENOUS; SUBCUTANEOUS at 21:41

## 2022-06-16 RX ADMIN — AMIODARONE HYDROCHLORIDE 200 MG: 200 TABLET ORAL at 17:03

## 2022-06-16 RX ADMIN — BENZONATATE 100 MG: 100 CAPSULE ORAL at 10:45

## 2022-06-16 RX ADMIN — HEPARIN SODIUM 5000 UNITS: 5000 INJECTION INTRAVENOUS; SUBCUTANEOUS at 15:27

## 2022-06-16 RX ADMIN — BENZONATATE 100 MG: 100 CAPSULE ORAL at 17:03

## 2022-06-16 RX ADMIN — HEPARIN SODIUM 5000 UNITS: 5000 INJECTION INTRAVENOUS; SUBCUTANEOUS at 05:27

## 2022-06-16 RX ADMIN — CEFUROXIME AXETIL 500 MG: 250 TABLET, FILM COATED ORAL at 15:27

## 2022-06-16 NOTE — PROGRESS NOTES
INTERNAL MEDICINE RESIDENCY PROGRESS NOTE     Name: Jose Ramon Starr   Age & Sex: 46 y o  male   MRN: [id number]  Unit/Bed#: [facility] 806-01   Encounter: [id number]  Team: SOD Team A    PATIENT INFORMATION     Name: Jose Ramon Starr   Age & Sex: 46 y o  male   MRN: [id number]  Hospital Stay Days: 6    ASSESSMENT/PLAN     Principal Problem:    Acute renal failure ([facility] )  Active Problems:    Pneumonia due to Haemophilus influenzae ([facility] )    Atrial fibrillation ([facility] )    Hyperkalemia    PTSD (post-traumatic stress disorder)    IV drug user    Cocaine abuse ([facility] )    Unresponsiveness    NSTEMI (non-ST elevated myocardial infarction) ([facility] )    Methamphetamine abuse ([place] [address] )    Acute encephalopathy      * Acute renal failure ([facility] )  Assessment & Plan  In the setting of rhabdomyolysis   Required CVVHD  6/14- transitioned to intermittent HD  Currently not making any urine, BP elevated over last 2 days   Cr today 5 38  AMS and lethargy last few days    Plan:   Nephro following, appreciate recommendations  Will get HD 6/16  Monitor mental status after HD, consider further workup if not improved  Fluids d/c'd after inadequate urine output  Monitor CK, Cr, BMP   Monitor I/Os  Monitor BP after HD, consider starting antihypertensive if BP not improved with HD     Atrial fibrillation ([facility] )  Assessment & Plan  Pt intermittently having AF with RVR, new on this admission  Loaded with amio in ICU  Plan:   Continue amiodarone 200mg tid  Transition to 200mg daily on 6/29  Monitor on tele   Monitor hemodynamics     Pneumonia due to Haemophilus influenzae [facility])  Assessment & Plan  Sputum revealing H Flu and Group B strep  Initially received Zosyn and Vanco, switched to cefuroxime  Plan:   Continue Cefuroxime 500mg x4 days for total of 7 days of Abx (day 3/4)    Hyperkalemia  Assessment & Plan  2/2 to acute renal failure  Peaked T waves on EKG   Treated with insulin, Ca gluconate, and IVF on arrival  Treated with CVVHD in ICU   6/14- started intermittent HD  Improved, K today 4 0    Plan:   Continue prn HD per nephro  Monitor BMP     NSTEMI (non-ST elevated myocardial infarction) [facility])  Assessment & Plan  Elevated troponin level noted on presentation  2/2 ARF, no intervention needed  Troponins now wnl    IV drug user  Assessment & Plan  Pt has a hx of polysubstance abuse including cocaine, meth, THC  Could be contributing to pt's "found down" event  Plan:  Continue to encourage cessation    PTSD (post-traumatic stress disorder)  Assessment & Plan  Not on any medications  Recommend follow up out pt PCP/ psych      Disposition: Remains inpatient for stabilization of ARF 2/2 rhabdomyolysis and antibiotic treatment for pneumonia due to Haemophilus influenzae  SUBJECTIVE     Patient seen and examined  Episodes of asymptomatic sinus tachycardia last night around 11pm  Mostly unresponsive this morning  Groans in response to questions and is lethargic  No overt complaints  OBJECTIVE     Vitals:    22 1130 22 1200 22 1230 22 1300   BP: (!) 145/101 130/83 (!) 145/112    BP Location:       Pulse: 91 84 88    Resp: 15 16 15 16   Temp:       TempSrc:       SpO2:       Weight:       Height:          Temperature:   Temp (24hrs), Av 8 °F (36 6 °C), Min:96 8 °F (36 °C), Max:98 1 °F (36 7 °C)    Temperature: 97 9 °F (36 6 °C)  Intake & Output:  I/O        0701  /15 0700 06/15 0701  /16 0700 /16 0701  / 0700    P  O  585  0    I V  (mL/kg) 533 4 (6 5)  200 (2 4)    IV Piggyback       Total Intake(mL/kg) 1118 4 (13 6)  200 (2 4)    Urine (mL/kg/hr)  10 (0)     Other 1500      Total Output 1500 10     Net -381 6 -10 +200           Unmeasured Urine Occurrence  1 x     Unmeasured Stool Occurrence  1 x         Weights:   IBW (Ideal Body Weight): 68 4 kg    Body mass index is 27 52 kg/m²  Weight (last 2 days)     Date/Time Weight    22 0600 82 1 (181)        Physical Exam  Vitals reviewed     Constitutional: Appearance: He is diaphoretic  HENT:      Head: Normocephalic and atraumatic  Nose: No rhinorrhea  Eyes:      General: No scleral icterus  Cardiovascular:      Rate and Rhythm: Normal rate and regular rhythm  Pulses: Normal pulses  Heart sounds: Normal heart sounds  No murmur heard  No friction rub  No gallop  Pulmonary:      Effort: Pulmonary effort is normal  No respiratory distress  Breath sounds: Normal breath sounds  No wheezing, rhonchi or rales  Abdominal:      General: Bowel sounds are normal  There is no distension  Palpations: Abdomen is soft  Tenderness: There is abdominal tenderness in the right upper quadrant  There is no guarding  Musculoskeletal:      Right lower leg: No edema  Left lower leg: No edema  Skin:     General: Skin is cool and moist       Comments: Cool extremities   Neurological:      Mental Status: He is lethargic  Comments: Lethargic, AMS       LABORATORY DATA     Labs: I have personally reviewed pertinent reports    Results from last 7 days   Lab Units 06/16/22  1008 06/15/22  1315 06/14/22  0505 06/13/22  0958   WBC Thousand/uL 11 42* 8 09 7 69 10 27*   HEMOGLOBIN g/dL 14 2 14 4 12 1 13 0   HEMATOCRIT % 39 5 40 1 35 4* 36 9   PLATELETS Thousands/uL 102* 95*  --  120*   NEUTROS PCT % 84* 79*  --  83*   MONOS PCT % 7 9  --  6      Results from last 7 days   Lab Units 06/16/22  1008 06/15/22  1315 06/14/22  0505 06/12/22  1221 06/12/22  0610 06/11/22  1809 06/11/22  1506   POTASSIUM mmol/L 3 6 4 0 3 9   < > 5 1   < > 5 2   CHLORIDE mmol/L 97* 101 105   < > 106   < > 106   CO2 mmol/L 26 27 26   < > 23   < > 27   BUN mg/dL 59* 42* 48*   < > 55*   < > 61*   CREATININE mg/dL 7 08* 5 38* 4 56*   < > 4 63*   < > 5 20*   CALCIUM mg/dL 7 7* 8 1* 8 5   < > 8 1*   < > 7 7*   ALK PHOS U/L 140*  --   --   --  71  --  69   ALT U/L 206*  --   --   --  2,301*  --  2,898*   AST U/L 510*  --   --   --  2,751*  --  6,320*    < > = values in this interval not displayed  Results from last 7 days   Lab Units 06/14/22  0505 06/14/22  0032 06/13/22  1805   MAGNESIUM mg/dL 2 2 2 2 2 1     Results from last 7 days   Lab Units 06/14/22  0505 06/14/22  0032 06/13/22  1805   PHOSPHORUS mg/dL 2 8 2 8 2 7      Results from last 7 days   Lab Units 06/12/22  0817 06/10/22  2051   INR  1 25* 1 22*   PTT seconds  --  33  33     Results from last 7 days   Lab Units 06/12/22  0817   LACTIC ACID mmol/L 2 0           IMAGING & DIAGNOSTIC TESTING     Radiology Results: I have personally reviewed pertinent reports  CT chest abdomen pelvis wo contrast    Result Date: 6/10/2022  Impression: 1  Limited examination demonstrating no evidence of intrathoracic, intra-abdominal or intrapelvic traumatic injury 2  There is nonspecific perihilar and upper lobe airspace opacities, could represent mild pulmonary edema versus aspiration  3  No pneumothorax, hemothorax or mediastinal air 4  Tiny amount of intravascular and cardiac air, likely introduced through an IV catheter Workstation performed: [id number]     XR chest 1 view portable    Result Date: 6/10/2022  Impression: Endotracheal tube tip is 5 5 cm above the jesika Workstation performed: [id number]     CT head without contrast    Result Date: 6/10/2022  Impression: No acute intracranial abnormality  Bilateral facial subcutaneous air present  Please refer to cervical spine CT report for more specific detail Workstation performed: [id number]     CT spine cervical wo contrast    Result Date: 6/10/2022  Impression: No cervical spine fracture or traumatic malalignment  Subcutaneous emphysema seen in the neck  Given that the CT of the chest abdomen and pelvis demonstrates no evidence of pneumothorax, mediastinal or paraesophageal air, this most likely is related to air introduced through an IV catheter  Also correlate for any penetrating injury that may have allowed generalized subcutaneous air to occur   The examination demonstrates a significant  finding and was documented as such in [facility] for liaison and referring practitioner immediate notification  Workstation performed: [id number]     XR chest portable ICU    Result Date: 6/11/2022  Impression: Deep sulcus sign concerning for a small left basilar pneumothorax  Right lateral decubitus or upright radiograph could be obtained for confirmation  The tip of the left subclavian central venous catheter projects at the superior vena cava  The study was marked in [facility] for immediate notification  Workstation performed: [id number]     US liver doppler only    Result Date: 6/12/2022  Impression: Normal liver Doppler  Workstation performed: [id number]     Other Diagnostic Testing: I have personally reviewed pertinent reports  ACTIVE MEDICATIONS     Current Facility-Administered Medications   Medication Dose Route Frequency    amiodarone tablet 200 mg  200 mg Oral TID With Meals    Followed by   Yo Reyna ON 6/29/2022] amiodarone tablet 200 mg  200 mg Oral Daily With Breakfast    benzonatate (TESSALON PERLES) capsule 100 mg  100 mg Oral TID PRN    cefuroxime (CEFTIN) tablet 500 mg  500 mg Oral Q24H    guaiFENesin (ROBITUSSIN) oral solution 200 mg  200 mg Oral Once    heparin (porcine) subcutaneous injection 5,000 Units  5,000 Units Subcutaneous Q8H [facility]       VTE Pharmacologic Prophylaxis: Heparin  VTE Mechanical Prophylaxis: sequential compression device    Portions of the record may have been created with voice recognition software  Occasional wrong word or "sound a like" substitutions may have occurred due to the inherent limitations of voice recognition software    Read the chart carefully and recognize, using context, where substitutions have occurred   ==  [address]

## 2022-06-16 NOTE — OCCUPATIONAL THERAPY NOTE
Occupational Therapy CX        Patient Name: Isabel Stevenson  GCMRG'M Date: 6/16/2022 06/16/22 1026   OT Last Visit   OT Visit Date 06/16/22   Note Type   Note type Evaluation; Cancelled Session   Cancel Reasons Patient off floor/test   Additional Comments Chart reviewed  Attempted to see pt  Pt off the floor at , will cont to follow to see as able       Stephanie Ervin MS, OTR/L

## 2022-06-16 NOTE — PLAN OF CARE
Problem: MOBILITY - ADULT  Goal: Maintain or return to baseline ADL function  Description: INTERVENTIONS:  -  Assess patient's ability to carry out ADLs; assess patient's baseline for ADL function and identify physical deficits which impact ability to perform ADLs (bathing, care of mouth/teeth, toileting, grooming, dressing, etc )  - Assess/evaluate cause of self-care deficits   - Assess range of motion  - Assess patient's mobility; develop plan if impaired  - Assess patient's need for assistive devices and provide as appropriate  - Encourage maximum independence but intervene and supervise when necessary  - Involve family in performance of ADLs  - Assess for home care needs following discharge   - Consider OT consult to assist with ADL evaluation and planning for discharge  - Provide patient education as appropriate  Outcome: Progressing  Goal: Maintains/Returns to pre admission functional level  Description: INTERVENTIONS:  - Perform BMAT or MOVE assessment daily    - Set and communicate daily mobility goal to care team and patient/family/caregiver  - Collaborate with rehabilitation services on mobility goals if consulted  - Reposition patient every 2 hours    - Stand patient 2 times a day  - Out of bed to chair 3 times a day   - Out of bed for toileting  - Record patient progress and toleration of activity level   Outcome: Progressing     Problem: Prexisting or High Potential for Compromised Skin Integrity  Goal: Skin integrity is maintained or improved  Description: INTERVENTIONS:  - Identify patients at risk for skin breakdown  - Assess and monitor skin integrity  - Assess and monitor nutrition and hydration status  - Monitor labs   - Assess for incontinence   - Turn and reposition patient  - Assist with mobility/ambulation  - Relieve pressure over bony prominences  - Avoid friction and shearing  - Provide appropriate hygiene as needed including keeping skin clean and dry  - Evaluate need for skin moisturizer/barrier cream  - Collaborate with interdisciplinary team   - Patient/family teaching  - Consider wound care consult   Outcome: Progressing     Problem: PAIN - ADULT  Goal: Verbalizes/displays adequate comfort level or baseline comfort level  Description: Interventions:  - Encourage patient to monitor pain and request assistance  - Assess pain using appropriate pain scale  - Administer analgesics based on type and severity of pain and evaluate response  - Implement non-pharmacological measures as appropriate and evaluate response  - Consider cultural and social influences on pain and pain management  - Notify physician/advanced practitioner if interventions unsuccessful or patient reports new pain  Outcome: Progressing     Problem: INFECTION - ADULT  Goal: Absence or prevention of progression during hospitalization  Description: INTERVENTIONS:  - Assess and monitor for signs and symptoms of infection  - Monitor lab/diagnostic results  - Monitor all insertion sites, i e  indwelling lines, tubes, and drains  - Monitor endotracheal if appropriate and nasal secretions for changes in amount and color  - [place] appropriate cooling/warming therapies per order  - Administer medications as ordered  - Instruct and encourage patient and family to use good hand hygiene technique  - Identify and instruct in appropriate isolation precautions for identified infection/condition  Outcome: Progressing     Problem: SAFETY ADULT  Goal: Maintain or return to baseline ADL function  Description: INTERVENTIONS:  -  Assess patient's ability to carry out ADLs; assess patient's baseline for ADL function and identify physical deficits which impact ability to perform ADLs (bathing, care of mouth/teeth, toileting, grooming, dressing, etc )  - Assess/evaluate cause of self-care deficits   - Assess range of motion  - Assess patient's mobility; develop plan if impaired  - Assess patient's need for assistive devices and provide as appropriate  - Encourage maximum independence but intervene and supervise when necessary  - Involve family in performance of ADLs  - Assess for home care needs following discharge   - Consider OT consult to assist with ADL evaluation and planning for discharge  - Provide patient education as appropriate  Outcome: Progressing  Goal: Maintains/Returns to pre admission functional level  Description: INTERVENTIONS:  - Perform BMAT or MOVE assessment daily    - Set and communicate daily mobility goal to care team and patient/family/caregiver  - Collaborate with rehabilitation services on mobility goals if consulted  - Reposition patient every 2 hours    - Stand patient 2 times a day  - Out of bed to chair 3 times a day   - Out of bed for toileting  - Record patient progress and toleration of activity level   Outcome: Progressing  Goal: Patient will remain free of falls  Description: INTERVENTIONS:  - Educate patient/family on patient safety including physical limitations  - Instruct patient to call for assistance with activity   - Consult OT/PT to assist with strengthening/mobility   - Keep Call bell within reach  - Keep bed low and locked with side rails adjusted as appropriate  - Keep care items and personal belongings within reach  - Initiate and maintain comfort rounds  - Apply yellow socks and bracelet for high fall risk patients  - Consider moving patient to room near nurses station  Outcome: Progressing     Problem: DISCHARGE PLANNING  Goal: Discharge to home or other facility with appropriate resources  Description: INTERVENTIONS:  - Identify barriers to discharge w/patient and caregiver  - Arrange for needed discharge resources and transportation as appropriate  - Identify discharge learning needs (meds, wound care, etc )  - Arrange for interpretive services to assist at discharge as needed  - Refer to Case Management Department for coordinating discharge planning if the patient needs post-hospital services based on physician/advanced practitioner order or complex needs related to functional status, cognitive ability, or social support system  Outcome: Progressing     Problem: Knowledge Deficit  Goal: Patient/family/caregiver demonstrates understanding of disease process, treatment plan, medications, and discharge instructions  Description: Complete learning assessment and assess knowledge base  Interventions:  - Provide teaching at level of understanding  - Provide teaching via preferred learning methods  Outcome: Progressing     Problem: Nutrition/Hydration-ADULT  Goal: Nutrient/Hydration intake appropriate for improving, restoring or maintaining nutritional needs  Description: Monitor and assess patient's nutrition/hydration status for malnutrition  Collaborate with interdisciplinary team and initiate plan and interventions as ordered  Monitor patient's weight and dietary intake as ordered or per policy  Utilize nutrition screening tool and intervene as necessary  Determine patient's food preferences and provide high-protein, high-caloric foods as appropriate       INTERVENTIONS:  - Monitor oral intake, urinary output, labs, and treatment plans  - Assess nutrition and hydration status and recommend course of action  - Evaluate amount of meals eaten  - Assist patient with eating if necessary   - Allow adequate time for meals  - Recommend/ encourage appropriate diets, oral nutritional supplements, and vitamin/mineral supplements  - Order, calculate, and assess calorie counts as needed  - Recommend, monitor, and adjust tube feedings and TPN/PPN based on assessed needs  - Assess need for intravenous fluids  - Provide specific nutrition/hydration education as appropriate  - Include patient/family/caregiver in decisions related to nutrition  Outcome: Progressing     Problem: Potential for Falls  Goal: Patient will remain free of falls  Description: INTERVENTIONS:  - Educate patient/family on patient safety including physical limitations  - Instruct patient to call for assistance with activity   - Consult OT/PT to assist with strengthening/mobility   - Keep Call bell within reach  - Keep bed low and locked with side rails adjusted as appropriate  - Keep care items and personal belongings within reach  - Initiate and maintain comfort rounds  - Apply yellow socks and bracelet for high fall risk patients  - Consider moving patient to room near nurses station  Outcome: Progressing     Problem: METABOLIC, FLUID AND ELECTROLYTES - ADULT  Goal: Electrolytes maintained within normal limits  Description: INTERVENTIONS:  - Monitor labs and assess patient for signs and symptoms of electrolyte imbalances  - Administer electrolyte replacement as ordered  - Monitor response to electrolyte replacements, including repeat lab results as appropriate  - Instruct patient on fluid and nutrition as appropriate  Outcome: Progressing  Goal: Fluid balance maintained  Description: INTERVENTIONS:  - Monitor labs   - Monitor I/O and WT  - Instruct patient on fluid and nutrition as appropriate  - Assess for signs & symptoms of volume excess or deficit  Outcome: Progressing

## 2022-06-16 NOTE — PROGRESS NOTES
[address] [facility] NOTE   Blanche Quinteros 46 y o  male MRN: [id number]  Unit/Bed#: [facility] 806-01 Encounter: [id number]  Reason for Consult: ESRD    ASSESSMENT and PLAN:    53-year-old male with a past medical history of atrial fibrillation, recent H flu pneumonia, substance abuse, NSTEMI who initially presents after being found down  Nephrology is on board for acute kidney injury  Patient was initially intubated  Was initially requiring continuous renal replacement therapy  1) acute kidney injury    -prior creatinine baseline 0 9-1 1 mg/dL with values as high as 1 6 mg/dL in September 2020  -etiology-ATN/rhabdomyolysis/shock/drug abuse  -urinalysis with innumerable RBC, no will bacteria, 2+ protein  -CT scan without hydronephrosis  -initially required CRRT on 06/11 and discontinued on 06/13  -transition to intermittent hemodialysis on 06/14  -access-temporary dialysis catheter  -pre dialysis creatinine June 16th still rising  Continue dialysis    Plan   -patient seen and examined on dialysis  Ultrafiltration reviewed the patient has gained over 4 kg  Attempting to avoid over ultrafiltration    Sodium 38 bicarbonate 35 F 180 filter  -to note patient has temporary dialysis catheter will need to be converted to tunneled dialysis catheter if the patient continues to require dialysis (likely early next week)  -monitor for renal recovery  -monitor strict I/O  -avoid hypotension  -give guaifenesin 1 time due to coughing continuous on dialysis which is affecting blood flow rates with the catheter    2) electrolytes    -initial hyperkalemia improved with dialysis    -hyponatremia-monitor with ultrafiltration    3) acid/base-stable    -stable    4) rhabdomyolysis    -initially required volume expansion and furosemide to force diuresis  -CPK level-initial level above 100,000 --> improving to 3800 on June 16    5) shock-initially required vasopressors    -was initially on empiric antibiotics and required vasopressors initially  -treated for possible pneumonia    6) drug abuse-per primary team    7) transaminitis-improving    SUBJECTIVE / 24H INTERVAL HISTORY:    Patient seen and examined while on dialysis  Denies complaints initially  But subsequently was called to see the patient due to coughing which is preventing adequate blood flows due to dialysis catheter right  Patient states the coughing is chronic  Otherwise denies  Shortness of breath     Blood pressure is controlled  Ultrafiltration goal 3 L   Urine output oliguric    OBJECTIVE:  Current Weight: Weight - Scale: 82 1 kg (181 lb)  Vitals:    06/16/22 1030 06/16/22 1100 06/16/22 1130 06/16/22 1200   BP: (!) 163/117 146/88 (!) 145/101    BP Location:       Pulse: 74 70 91    Resp: 15 16 15 16   Temp:       TempSrc:       SpO2:       Weight:       Height:           Intake/Output Summary (Last 24 hours) at 6/16/2022 1207  Last data filed at 6/16/2022 1010  Gross per 24 hour   Intake 200 ml   Output 10 ml   Net 190 ml     General: NAD  Skin: no rash  Eyes: anicteric sclera  ENT: moist mucous membrane  Neck: supple  Chest: CTA b/l, no ronchii, no wheeze, no rubs, no rales  CVS: s1s2, no murmur, no gallop, no rub  Abdomen: soft, nontender, nl sounds  Extremities: trace edema LE b/l  : no torres  Neuro: AAOX3  Psych: normal affect  -temporary dialysis catheter right IJ    Medications:    Current Facility-Administered Medications:     amiodarone tablet 200 mg, 200 mg, Oral, TID With Meals, 200 mg at 06/16/22 1124 **FOLLOWED BY** [START ON 6/29/2022] amiodarone tablet 200 mg, 200 mg, Oral, Daily With Breakfast, Charla Lee MD    benzonatate (TESSALON PERLES) capsule 100 mg, 100 mg, Oral, TID PRN, Becky Sims MD, 100 mg at 06/16/22 1045    cefuroxime (CEFTIN) tablet 500 mg, 500 mg, Oral, Q24H, Charla Lee MD, 500 mg at 06/15/22 1332    guaiFENesin (ROBITUSSIN) oral solution 200 mg, 200 mg, Oral, Once, Ruchi Benz MD    heparin (porcine) subcutaneous injection 5,000 Units, 5,000 Units, Subcutaneous, Q8H [address], Lina Yates MD, 5,000 Units at 06/16/22 0527    Laboratory Results:  Results from last 7 days   Lab Units 06/16/22  1008 06/15/22  1315 06/14/22  0505 06/14/22  0032 06/13/22  1805 06/13/22  1228 06/13/22  0958 06/13/22  0531 06/13/22  0013 06/12/22  1807 06/12/22  1221 06/12/22  0610 06/11/22  1506 06/11/22  1053 06/11/22  0949 06/11/22  0506 06/11/22  0110 06/10/22  2051   WBC Thousand/uL 11 42* 8 09 7 69  --   --   --  10 27*  --   --   --   --  13 06*  --  11 27*  --  13 03*  --  17 04*   HEMOGLOBIN g/dL 14 2 14 4 12 1  --   --   --  13 0  --   --   --   --  15 3  --  14 4  --  13 9  --  18 3*   HEMATOCRIT % 39 5 40 1 35 4*  --   --   --  36 9  --   --   --   --  44 2  --  42 0  --  39 9  --  54 8*   PLATELETS Thousands/uL 102* 95*  --   --   --   --  120*  --   --   --   --  149  --  128*  --  134*  --  213  213   POTASSIUM mmol/L 3 6 4 0 3 9 3 9 4 4 3 7  --  4 0 4 2 4 4   < > 5 1   < > 4 0   < > 4 3  4 2   < >  --    CHLORIDE mmol/L 97* 101 105 103 105 111*  --  105 106 106   < > 106   < > 108   < > 108  107   < >  --    CO2 mmol/L 26 27 26 23 23 24  --  25 22 23   < > 23   < > 28   < > 22  22   < >  --    BUN mg/dL 59* 42* 48* 44* 36* 35*  --  42* 46* 48*   < > 55*   < > 56*   < > 54*  55*   < >  --    CREATININE mg/dL 7 08* 5 38* 4 56* 4 10* 3 18* 2 89*  --  3 51* 3 65* 3 89*   < > 4 63*   < > 4 51*   < > 3 89*  3 90*   < >  --    CALCIUM mg/dL 7 7* 8 1* 8 5 8 5 9 0 7 3*  --  8 4 8 6 8 8   < > 8 1*   < > 7 3*   < > 7 2*  7 1*   < >  --    MAGNESIUM mg/dL  --   --  2 2 2 2 2 1 2 0  --  2 3 1 7 2 0   < > 2 3   < > 2 5  --  2 4  --   --    PHOSPHORUS mg/dL  --   --  2 8 2 8 2 7 2 8  --  3 0 3 2 3 7   < > 6 7*   < >  --    < > 4 7*  4 6*   < >  --     < > = values in this interval not displayed

## 2022-06-16 NOTE — PLAN OF CARE
Problem: MOBILITY - ADULT  Goal: Maintain or return to baseline ADL function  Description: INTERVENTIONS:  -  Assess patient's ability to carry out ADLs; assess patient's baseline for ADL function and identify physical deficits which impact ability to perform ADLs (bathing, care of mouth/teeth, toileting, grooming, dressing, etc )  - Assess/evaluate cause of self-care deficits   - Assess range of motion  - Assess patient's mobility; develop plan if impaired  - Assess patient's need for assistive devices and provide as appropriate  - Encourage maximum independence but intervene and supervise when necessary  - Involve family in performance of ADLs  - Assess for home care needs following discharge   - Consider OT consult to assist with ADL evaluation and planning for discharge  - Provide patient education as appropriate  Outcome: Progressing  Goal: Maintains/Returns to pre admission functional level  Description: INTERVENTIONS:  - Perform BMAT or MOVE assessment daily    - Set and communicate daily mobility goal to care team and patient/family/caregiver     - Collaborate with rehabilitation services on mobility goals if consulted  Problem: Prexisting or High Potential for Compromised Skin Integrity  Goal: Skin integrity is maintained or improved  Description: INTERVENTIONS:  - Identify patients at risk for skin breakdown  - Assess and monitor skin integrity  - Assess and monitor nutrition and hydration status  - Monitor labs   - Assess for incontinence   - Turn and reposition patient  - Assist with mobility/ambulation  - Relieve pressure over bony prominences  - Avoid friction and shearing  - Provide appropriate hygiene as needed including keeping skin clean and dry  - Evaluate need for skin moisturizer/barrier cream  - Collaborate with interdisciplinary team   - Patient/family teaching  - Consider wound care consult   Outcome: Progressing     Problem: PAIN - ADULT  Goal: Verbalizes/displays adequate comfort level or baseline comfort level  Description: Interventions:  - Encourage patient to monitor pain and request assistance  - Assess pain using appropriate pain scale  - Administer analgesics based on type and severity of pain and evaluate response  - Implement non-pharmacological measures as appropriate and evaluate response  - Consider cultural and social influences on pain and pain management  - Notify physician/advanced practitioner if interventions unsuccessful or patient reports new pain  Outcome: Progressing     Problem: INFECTION - ADULT  Goal: Absence or prevention of progression during hospitalization  Description: INTERVENTIONS:  - Assess and monitor for signs and symptoms of infection  - Monitor lab/diagnostic results  - Monitor all insertion sites, i e  indwelling lines, tubes, and drains  - Monitor endotracheal if appropriate and nasal secretions for changes in amount and color  - [place] appropriate cooling/warming therapies per order  - Administer medications as ordered  - Instruct and encourage patient and family to use good hand hygiene technique  - Identify and instruct in appropriate isolation precautions for identified infection/condition  Outcome: Progressing     Problem: SAFETY ADULT  Goal: Maintain or return to baseline ADL function  Description: INTERVENTIONS:  -  Assess patient's ability to carry out ADLs; assess patient's baseline for ADL function and identify physical deficits which impact ability to perform ADLs (bathing, care of mouth/teeth, toileting, grooming, dressing, etc )  - Assess/evaluate cause of self-care deficits   - Assess range of motion  - Assess patient's mobility; develop plan if impaired  - Assess patient's need for assistive devices and provide as appropriate  - Encourage maximum independence but intervene and supervise when necessary  - Involve family in performance of ADLs  - Assess for home care needs following discharge   - Consider OT consult to assist with ADL evaluation and planning for discharge  - Provide patient education as appropriate  Outcome: Progressing  Goal: Maintains/Returns to pre admission functional level  Description: INTERVENTIONS:  - Perform BMAT or MOVE assessment daily    - Set and communicate daily mobility goal to care team and patient/family/caregiver  - Collaborate with rehabilitation services on mobility goals if consulted  Problem: DISCHARGE PLANNING  Goal: Discharge to home or other facility with appropriate resources  Description: INTERVENTIONS:  - Identify barriers to discharge w/patient and caregiver  - Arrange for needed discharge resources and transportation as appropriate  - Identify discharge learning needs (meds, wound care, etc )  - Arrange for interpretive services to assist at discharge as needed  - Refer to Case Management Department for coordinating discharge planning if the patient needs post-hospital services based on physician/advanced practitioner order or complex needs related to functional status, cognitive ability, or social support system  Outcome: Progressing     Problem: Knowledge Deficit  Goal: Patient/family/caregiver demonstrates understanding of disease process, treatment plan, medications, and discharge instructions  Description: Complete learning assessment and assess knowledge base  Interventions:  - Provide teaching at level of understanding  - Provide teaching via preferred learning methods  Outcome: Progressing     Problem: Nutrition/Hydration-ADULT  Goal: Nutrient/Hydration intake appropriate for improving, restoring or maintaining nutritional needs  Description: Monitor and assess patient's nutrition/hydration status for malnutrition  Collaborate with interdisciplinary team and initiate plan and interventions as ordered  Monitor patient's weight and dietary intake as ordered or per policy  Utilize nutrition screening tool and intervene as necessary   Determine patient's food preferences and provide high-protein, high-caloric foods as appropriate       INTERVENTIONS:  - Monitor oral intake, urinary output, labs, and treatment plans  - Assess nutrition and hydration status and recommend course of action  - Evaluate amount of meals eaten  - Assist patient with eating if necessary   - Allow adequate time for meals  - Recommend/ encourage appropriate diets, oral nutritional supplements, and vitamin/mineral supplements  - Order, calculate, and assess calorie counts as needed  - Recommend, monitor, and adjust tube feedings and TPN/PPN based on assessed needs  - Assess need for intravenous fluids  - Provide specific nutrition/hydration education as appropriate  - Include patient/family/caregiver in decisions related to nutrition  Outcome: Progressing     Problem: SAFETY,RESTRAINT: NV/NON-SELF DESTRUCTIVE BEHAVIOR  Goal: Remains free of harm/injury (restraint for non violent/non self-detsructive behavior)  Description: INTERVENTIONS:  - Instruct patient/family regarding restraint use   - Assess and monitor physiologic and psychological status   - Provide interventions and comfort measures to meet assessed patient needs   - Identify and implement measures to help patient regain control  - Assess readiness for release of restraint   Outcome: Progressing  Goal: Returns to optimal restraint-free functioning  Description: INTERVENTIONS:  - Assess the patient's behavior and symptoms that indicate continued need for restraint  - Identify and implement measures to help patient regain control  - Assess readiness for release of restraint   Outcome: Progressing     Problem: Potential for Falls  Goal: Patient will remain free of falls  Description: INTERVENTIONS:  - Educate patient/family on patient safety including physical limitations  - Instruct patient to call for assistance with activity   - Consult OT/PT to assist with strengthening/mobility   - Keep Call bell within reach  - Keep bed low and locked with side rails adjusted as appropriate  - Keep care items and personal belongings within reach  - Initiate and maintain comfort rounds  - Make Fall Risk Sign visible to staff  Problem: METABOLIC, FLUID AND ELECTROLYTES - ADULT  Goal: Electrolytes maintained within normal limits  Description: INTERVENTIONS:  - Monitor labs and assess patient for signs and symptoms of electrolyte imbalances  - Administer electrolyte replacement as ordered  - Monitor response to electrolyte replacements, including repeat lab results as appropriate  - Instruct patient on fluid and nutrition as appropriate  Outcome: Progressing  Goal: Fluid balance maintained  Description: INTERVENTIONS:  - Monitor labs   - Monitor I/O and WT  - Instruct patient on fluid and nutrition as appropriate  - Assess for signs & symptoms of volume excess or deficit  Outcome: Progressing     - Apply yellow socks and bracelet for high fall risk patients  - Consider moving patient to room near nurses station  Outcome: Progressing     - Out of bed for toileting  - Record patient progress and toleration of activity level   Outcome: Progressing  Goal: Patient will remain free of falls  Description: INTERVENTIONS:  - Educate patient/family on patient safety including physical limitations  - Instruct patient to call for assistance with activity   - Consult OT/PT to assist with strengthening/mobility   - Keep Call bell within reach  - Keep bed low and locked with side rails adjusted as appropriate  - Keep care items and personal belongings within reach  - Initiate and maintain comfort rounds  - Make Fall Risk Sign visible to staff  - Apply yellow socks and bracelet for high fall risk patients  - Consider moving patient to room near nurses station  Outcome: Progressing     - Out of bed for toileting  - Record patient progress and toleration of activity level   Outcome: Progressing

## 2022-06-16 NOTE — PLAN OF CARE
Problem: MOBILITY - ADULT  Goal: Maintain or return to baseline ADL function  Description: INTERVENTIONS:  -  Assess patient's ability to carry out ADLs; assess patient's baseline for ADL function and identify physical deficits which impact ability to perform ADLs (bathing, care of mouth/teeth, toileting, grooming, dressing, etc )  - Assess/evaluate cause of self-care deficits   - Assess range of motion  - Assess patient's mobility; develop plan if impaired  - Assess patient's need for assistive devices and provide as appropriate  - Encourage maximum independence but intervene and supervise when necessary  - Involve family in performance of ADLs  - Assess for home care needs following discharge   - Consider OT consult to assist with ADL evaluation and planning for discharge  - Provide patient education as appropriate  6/16/2022 0130 by Nguyen Reyna RN  Outcome: Progressing  6/16/2022 0116 by Nguyen Reyna RN  Outcome: Progressing  Goal: Maintains/Returns to pre admission functional level  Description: INTERVENTIONS:  - Perform BMAT or MOVE assessment daily    - Set and communicate daily mobility goal to care team and patient/family/caregiver  - Collaborate with rehabilitation services on mobility goals if consulted  - Reposition patient every 2 hours    - Stand patient 2 times a day  - Out of bed to chair 3 times a day   - Out of bed for toileting  - Record patient progress and toleration of activity level   6/16/2022 0130 by Nguyen Reyna RN  Outcome: Progressing  6/16/2022 0116 by Nguyen Reyna RN  Outcome: Progressing     Problem: Prexisting or High Potential for Compromised Skin Integrity  Goal: Skin integrity is maintained or improved  Description: INTERVENTIONS:  - Identify patients at risk for skin breakdown  - Assess and monitor skin integrity  - Assess and monitor nutrition and hydration status  - Monitor labs   - Assess for incontinence   - Turn and reposition patient  - Assist with mobility/ambulation  - Relieve pressure over bony prominences  - Avoid friction and shearing  - Provide appropriate hygiene as needed including keeping skin clean and dry  - Evaluate need for skin moisturizer/barrier cream  - Collaborate with interdisciplinary team   - Patient/family teaching  - Consider wound care consult   6/16/2022 0130 by Tucker Lynn RN  Outcome: Progressing  6/16/2022 0116 by Tucker Lynn RN  Outcome: Progressing     Problem: PAIN - ADULT  Goal: Verbalizes/displays adequate comfort level or baseline comfort level  Description: Interventions:  - Encourage patient to monitor pain and request assistance  - Assess pain using appropriate pain scale  - Administer analgesics based on type and severity of pain and evaluate response  - Implement non-pharmacological measures as appropriate and evaluate response  - Consider cultural and social influences on pain and pain management  - Notify physician/advanced practitioner if interventions unsuccessful or patient reports new pain  6/16/2022 0130 by Tucker Lynn RN  Outcome: Progressing  6/16/2022 0116 by Tucker Lynn RN  Outcome: Progressing     Problem: INFECTION - ADULT  Goal: Absence or prevention of progression during hospitalization  Description: INTERVENTIONS:  - Assess and monitor for signs and symptoms of infection  - Monitor lab/diagnostic results  - Monitor all insertion sites, i e  indwelling lines, tubes, and drains  - Monitor endotracheal if appropriate and nasal secretions for changes in amount and color  - [place] appropriate cooling/warming therapies per order  - Administer medications as ordered  - Instruct and encourage patient and family to use good hand hygiene technique  - Identify and instruct in appropriate isolation precautions for identified infection/condition  6/16/2022 0130 by Tucker Lynn RN  Outcome: Progressing  6/16/2022 0116 by Tucker Lynn RN  Outcome: Progressing     Problem: SAFETY ADULT  Goal: Maintain or return to baseline ADL function  Description: INTERVENTIONS:  -  Assess patient's ability to carry out ADLs; assess patient's baseline for ADL function and identify physical deficits which impact ability to perform ADLs (bathing, care of mouth/teeth, toileting, grooming, dressing, etc )  - Assess/evaluate cause of self-care deficits   - Assess range of motion  - Assess patient's mobility; develop plan if impaired  - Assess patient's need for assistive devices and provide as appropriate  - Encourage maximum independence but intervene and supervise when necessary  - Involve family in performance of ADLs  - Assess for home care needs following discharge   - Consider OT consult to assist with ADL evaluation and planning for discharge  - Provide patient education as appropriate  6/16/2022 0130 by Tru Peres RN  Outcome: Progressing  6/16/2022 0116 by Tru Peres RN  Outcome: Progressing  Goal: Maintains/Returns to pre admission functional level  Description: INTERVENTIONS:  - Perform BMAT or MOVE assessment daily    - Set and communicate daily mobility goal to care team and patient/family/caregiver  - Collaborate with rehabilitation services on mobility goals if consulted  - Reposition patient every 2 hours    - Stand patient 2 times a day  - Out of bed to chair 3 times a day   - Out of bed for toileting  - Record patient progress and toleration of activity level   6/16/2022 0130 by Tru Peers RN  Outcome: Progressing  6/16/2022 0116 by Tru Peres RN  Outcome: Progressing  Goal: Patient will remain free of falls  Description: INTERVENTIONS:  - Educate patient/family on patient safety including physical limitations  - Instruct patient to call for assistance with activity   - Consult OT/PT to assist with strengthening/mobility   - Keep Call bell within reach  - Keep bed low and locked with side rails adjusted as appropriate  - Keep care items and personal belongings within reach  - Initiate and maintain comfort rounds  - Make Fall Risk Sign visible to staff  Problem: DISCHARGE PLANNING  Goal: Discharge to home or other facility with appropriate resources  Description: INTERVENTIONS:  - Identify barriers to discharge w/patient and caregiver  - Arrange for needed discharge resources and transportation as appropriate  - Identify discharge learning needs (meds, wound care, etc )  - Arrange for interpretive services to assist at discharge as needed  - Refer to Case Management Department for coordinating discharge planning if the patient needs post-hospital services based on physician/advanced practitioner order or complex needs related to functional status, cognitive ability, or social support system  6/16/2022 0130 by Leonel Andrade RN  Outcome: Progressing  6/16/2022 0116 by Leonel Andrade RN  Outcome: Progressing     Problem: Knowledge Deficit  Goal: Patient/family/caregiver demonstrates understanding of disease process, treatment plan, medications, and discharge instructions  Description: Complete learning assessment and assess knowledge base  Interventions:  - Provide teaching at level of understanding  - Provide teaching via preferred learning methods  6/16/2022 0130 by Leonel Andrade RN  Outcome: Progressing  6/16/2022 0116 by Leonel Andrade RN  Outcome: Progressing     Problem: Nutrition/Hydration-ADULT  Goal: Nutrient/Hydration intake appropriate for improving, restoring or maintaining nutritional needs  Description: Monitor and assess patient's nutrition/hydration status for malnutrition  Collaborate with interdisciplinary team and initiate plan and interventions as ordered  Monitor patient's weight and dietary intake as ordered or per policy  Utilize nutrition screening tool and intervene as necessary  Determine patient's food preferences and provide high-protein, high-caloric foods as appropriate       INTERVENTIONS:  - Monitor oral intake, urinary output, labs, and treatment plans  - Assess nutrition and hydration status and recommend course of action  - Evaluate amount of meals eaten  - Assist patient with eating if necessary   - Allow adequate time for meals  - Recommend/ encourage appropriate diets, oral nutritional supplements, and vitamin/mineral supplements  - Order, calculate, and assess calorie counts as needed  - Recommend, monitor, and adjust tube feedings and TPN/PPN based on assessed needs  - Assess need for intravenous fluids  - Provide specific nutrition/hydration education as appropriate  - Include patient/family/caregiver in decisions related to nutrition  6/16/2022 0130 by Qamar Recinos RN  Outcome: Progressing  6/16/2022 0116 by Qamar Recinos RN  Outcome: Progressing     Problem: SAFETY,RESTRAINT: NV/NON-SELF DESTRUCTIVE BEHAVIOR  Goal: Remains free of harm/injury (restraint for non violent/non self-detsructive behavior)  Description: INTERVENTIONS:  - Instruct patient/family regarding restraint use   - Assess and monitor physiologic and psychological status   - Provide interventions and comfort measures to meet assessed patient needs   - Identify and implement measures to help patient regain control  - Assess readiness for release of restraint   6/16/2022 0130 by Qamar Recinos RN  Outcome: Completed  6/16/2022 0116 by Qamar Recinos RN  Outcome: Progressing  Goal: Returns to optimal restraint-free functioning  Description: INTERVENTIONS:  - Assess the patient's behavior and symptoms that indicate continued need for restraint  - Identify and implement measures to help patient regain control  - Assess readiness for release of restraint   6/16/2022 0130 by Qamar Recinos RN  Outcome: Completed  6/16/2022 0116 by Qamar Recinos RN  Outcome: Progressing     Problem: Potential for Falls  Goal: Patient will remain free of falls  Description: INTERVENTIONS:  - Educate patient/family on patient safety including physical limitations  - Instruct patient to call for assistance with activity   - Consult OT/PT to assist with strengthening/mobility   - Keep Call bell within reach  - Keep bed low and locked with side rails adjusted as appropriate  - Keep care items and personal belongings within reach  - Initiate and maintain comfort rounds  - Make Fall Risk Sign visible to staff  - Apply yellow socks and bracelet for high fall risk patients  - Consider moving patient to room near nurses station  6/16/2022 0130 by Leonel Andrade RN  Outcome: Progressing  6/16/2022 0116 by Leonel Anrdade RN  Outcome: Progressing     Problem: METABOLIC, FLUID AND ELECTROLYTES - ADULT  Goal: Electrolytes maintained within normal limits  Description: INTERVENTIONS:  - Monitor labs and assess patient for signs and symptoms of electrolyte imbalances  - Administer electrolyte replacement as ordered  - Monitor response to electrolyte replacements, including repeat lab results as appropriate  - Instruct patient on fluid and nutrition as appropriate  6/16/2022 0130 by Leonel Andrade RN  Outcome: Progressing  6/16/2022 0116 by Leonel Andrade RN  Outcome: Progressing  Goal: Fluid balance maintained  Description: INTERVENTIONS:  - Monitor labs   - Monitor I/O and WT  - Instruct patient on fluid and nutrition as appropriate  - Assess for signs & symptoms of volume excess or deficit  6/16/2022 0130 by Leonel Andrade RN  Outcome: Progressing  6/16/2022 0116 by Leonel Andrade RN  Outcome: Progressing     - Apply yellow socks and bracelet for high fall risk patients  - Consider moving patient to room near nurses station  6/16/2022 0130 by Leonel Andrade RN  Outcome: Progressing  6/16/2022 0116 by Leonel Andrade RN  Outcome: Progressing

## 2022-06-16 NOTE — PHYSICAL THERAPY NOTE
Physical Therapy Cancellation Note    Patient's Name: Tonja Camacho       06/16/22 0776   Note Type   Note type Cancelled Session; Evaluation   Cancel Reasons Patient off floor/test   Additional Comments Chart reviewed, pt heading off floor to HD  Will follow for PT evaluation 6/17  Thank you       Abhi Barnes, PT, DPT, G[facility]

## 2022-06-16 NOTE — QUICK NOTE
Patient's daughter Emiliana Rush was reached by phone and updated with the current state of the patient's treatment and plan going forward  All of the family contact's questions were addressed and answered

## 2022-06-16 NOTE — PLAN OF CARE
Started patient on hemodialysis treatment with UF goal of 3L net x 4 hours x 3K bath for serum k+ of 4 0 on 6/15/22  Post-Dialysis RN Treatment Note    Blood Pressure:  Pre 176/120 mm/Hg  Post 148/97 mmHg   EDW  TBD kg    Weight:  Pre 85 4 kg   Post 83 kg   Mode of weight measurement: Standing Scale   Volume Removed  2,534 ml    Treatment duration 230 minutes    NS given  No    Treatment shortened? Yes, describe: patient was coughing non-stop afftecting the temporary catheter's performance  Medications given during Rx Tessalon 100mg PO, Amiodarone 200mg PO and Robitussin 200mg PO   Estimated Kt/V  1 14   Access type: Temporary HD catheter   Access Issues: Yes, describe: catheter is highly sensitive to head movement and cough, the patient was coughing non-stop the entire treatment      Report called to primary nurse   Yes, Shayla Vallejo RN      Problem: METABOLIC, FLUID AND ELECTROLYTES - ADULT  Goal: Electrolytes maintained within normal limits  Description: INTERVENTIONS:  - Monitor labs and assess patient for signs and symptoms of electrolyte imbalances  - Administer electrolyte replacement as ordered  - Monitor response to electrolyte replacements, including repeat lab results as appropriate  - Instruct patient on fluid and nutrition as appropriate  Outcome: Progressing  Goal: Fluid balance maintained  Description: INTERVENTIONS:  - Monitor labs   - Monitor I/O and WT  - Instruct patient on fluid and nutrition as appropriate  - Assess for signs & symptoms of volume excess or deficit  Outcome: Progressing

## 2022-06-17 ENCOUNTER — APPOINTMENT (INPATIENT)
Dept: RADIOLOGY | Facility: HOSPITAL | Age: 52
DRG: 469 | End: 2022-06-17
Payer: COMMERCIAL

## 2022-06-17 LAB
ANION GAP SERPL CALCULATED.3IONS-SCNC: 7 MMOL/L (ref 4–13)
BASOPHILS # BLD AUTO: 0.03 THOUSANDS/ΜL (ref 0–0.1)
BASOPHILS NFR BLD AUTO: 0 % (ref 0–1)
BUN SERPL-MCNC: 48 MG/DL (ref 5–25)
CALCIUM SERPL-MCNC: 7.9 MG/DL (ref 8.3–10.1)
CHLORIDE SERPL-SCNC: 97 MMOL/L (ref 100–108)
CK MB SERPL-MCNC: 14.5 NG/ML (ref 0–5)
CK MB SERPL-MCNC: <1 % (ref 0–2.5)
CK SERPL-CCNC: 2545 U/L (ref 39–308)
CO2 SERPL-SCNC: 28 MMOL/L (ref 21–32)
CREAT SERPL-MCNC: 6.04 MG/DL (ref 0.6–1.3)
EOSINOPHIL # BLD AUTO: 0.23 THOUSAND/ΜL (ref 0–0.61)
EOSINOPHIL NFR BLD AUTO: 2 % (ref 0–6)
ERYTHROCYTE [DISTWIDTH] IN BLOOD BY AUTOMATED COUNT: 12.5 % (ref 11.6–15.1)
GFR SERPL CREATININE-BSD FRML MDRD: 9 ML/MIN/1.73SQ M
GLUCOSE SERPL-MCNC: 106 MG/DL (ref 65–140)
GLUCOSE SERPL-MCNC: 117 MG/DL (ref 65–140)
HCT VFR BLD AUTO: 41.8 % (ref 36.5–49.3)
HGB BLD-MCNC: 15 G/DL (ref 12–17)
IMM GRANULOCYTES # BLD AUTO: 0.22 THOUSAND/UL (ref 0–0.2)
IMM GRANULOCYTES NFR BLD AUTO: 2 % (ref 0–2)
LYMPHOCYTES # BLD AUTO: 1.19 THOUSANDS/ΜL (ref 0.6–4.47)
LYMPHOCYTES NFR BLD AUTO: 9 % (ref 14–44)
MCH RBC QN AUTO: 35.1 PG (ref 26.8–34.3)
MCHC RBC AUTO-ENTMCNC: 35.9 G/DL (ref 31.4–37.4)
MCV RBC AUTO: 98 FL (ref 82–98)
MONOCYTES # BLD AUTO: 1.39 THOUSAND/ΜL (ref 0.17–1.22)
MONOCYTES NFR BLD AUTO: 10 % (ref 4–12)
NEUTROPHILS # BLD AUTO: 10.96 THOUSANDS/ΜL (ref 1.85–7.62)
NEUTS SEG NFR BLD AUTO: 77 % (ref 43–75)
NRBC BLD AUTO-RTO: 0 /100 WBCS
PLATELET # BLD AUTO: 96 THOUSANDS/UL (ref 149–390)
PMV BLD AUTO: 11.8 FL (ref 8.9–12.7)
POTASSIUM SERPL-SCNC: 4 MMOL/L (ref 3.5–5.3)
RBC # BLD AUTO: 4.27 MILLION/UL (ref 3.88–5.62)
SODIUM SERPL-SCNC: 132 MMOL/L (ref 136–145)
WBC # BLD AUTO: 14.02 THOUSAND/UL (ref 4.31–10.16)

## 2022-06-17 PROCEDURE — 82948 REAGENT STRIP/BLOOD GLUCOSE: CPT

## 2022-06-17 PROCEDURE — 99232 SBSQ HOSP IP/OBS MODERATE 35: CPT | Performed by: INTERNAL MEDICINE

## 2022-06-17 PROCEDURE — 71045 X-RAY EXAM CHEST 1 VIEW: CPT

## 2022-06-17 PROCEDURE — 80048 BASIC METABOLIC PNL TOTAL CA: CPT | Performed by: INTERNAL MEDICINE

## 2022-06-17 PROCEDURE — 82550 ASSAY OF CK (CPK): CPT | Performed by: INTERNAL MEDICINE

## 2022-06-17 PROCEDURE — 82553 CREATINE MB FRACTION: CPT | Performed by: INTERNAL MEDICINE

## 2022-06-17 PROCEDURE — 85025 COMPLETE CBC W/AUTO DIFF WBC: CPT | Performed by: STUDENT IN AN ORGANIZED HEALTH CARE EDUCATION/TRAINING PROGRAM

## 2022-06-17 PROCEDURE — 97167 OT EVAL HIGH COMPLEX 60 MIN: CPT

## 2022-06-17 RX ORDER — HYDRALAZINE HYDROCHLORIDE 20 MG/ML
5 INJECTION INTRAMUSCULAR; INTRAVENOUS EVERY 6 HOURS PRN
Status: DISCONTINUED | OUTPATIENT
Start: 2022-06-17 | End: 2022-06-24 | Stop reason: HOSPADM

## 2022-06-17 RX ORDER — AMLODIPINE BESYLATE 5 MG/1
5 TABLET ORAL DAILY
Status: DISCONTINUED | OUTPATIENT
Start: 2022-06-17 | End: 2022-06-20

## 2022-06-17 RX ADMIN — AMIODARONE HYDROCHLORIDE 200 MG: 200 TABLET ORAL at 16:16

## 2022-06-17 RX ADMIN — AMIODARONE HYDROCHLORIDE 200 MG: 200 TABLET ORAL at 08:23

## 2022-06-17 RX ADMIN — AMIODARONE HYDROCHLORIDE 200 MG: 200 TABLET ORAL at 11:05

## 2022-06-17 RX ADMIN — HEPARIN SODIUM 5000 UNITS: 5000 INJECTION INTRAVENOUS; SUBCUTANEOUS at 21:41

## 2022-06-17 RX ADMIN — AMLODIPINE BESYLATE 5 MG: 5 TABLET ORAL at 11:05

## 2022-06-17 RX ADMIN — HEPARIN SODIUM 5000 UNITS: 5000 INJECTION INTRAVENOUS; SUBCUTANEOUS at 05:04

## 2022-06-17 RX ADMIN — HEPARIN SODIUM 5000 UNITS: 5000 INJECTION INTRAVENOUS; SUBCUTANEOUS at 13:59

## 2022-06-17 RX ADMIN — CEFUROXIME AXETIL 500 MG: 250 TABLET, FILM COATED ORAL at 13:59

## 2022-06-17 NOTE — PLAN OF CARE
Problem: OCCUPATIONAL THERAPY ADULT  Goal: Performs self-care activities at highest level of function for planned discharge setting  See evaluation for individualized goals  Description: Treatment Interventions: ADL retraining, Functional transfer training, Endurance training, UE strengthening/ROM, Cognitive reorientation, Patient/family training, Equipment evaluation/education, Compensatory technique education, Energy conservation, Activityengagement          See flowsheet documentation for full assessment, interventions and recommendations  Note: Limitation: Decreased ADL status, Decreased UE ROM, Decreased UE strength, Decreased Safe judgement during ADL, Decreased cognition, Decreased endurance, Decreased self-care trans, Decreased high-level ADLs  Prognosis: Fair  Assessment: Pt is a 47 yo Male who presented to [facility] on 6/10/2022 with hyperkalemia and non-traumatic collapse  Pt with temporary HS catheter  Pt with diagnosis of ARF, IV drug user, Afib, PNA, and hyperkalemia Pt  has a past medical history of Hepatitis C and PTSD (post-traumatic stress disorder)  Pt greeted bedside for OT evaluation on 6/17/2022  Pt lives with his figarret in a 3rd floor Hotel room with 3 flights of stairs to enter v  Elevator  PTA, Pt I with ADLs/IADLs/no AD/ +/works FT  Pt with supportive celeer and fiance able to provide some assistance upon DC  Pt's fiance also works as a   Pt demonstrating the following occupational deficits: min A with UB/LB ADLs, min A with bed mobility, min A with functional transfers, and min A with functional mobility with RW  Limitations that impact functional performance include decreased ADL status, decreased UE ROM, decreased UE strength, decreased safe judgement during ADLs, decreased cognition, decreased endurance, decreased self care transfers, decreased high level ADLs and pain   Occupational performance areas to address ADL retraining, functional transfer training, UE strengthening/ROM, endurance training, cognitive reorientation, Pt/caregiver education, equipment evaluation/education, compensatory technique education, energy conservation and activity engagement   Pt would benefit from continued skilled OT services while in hospital to maximize independence with ADLs  Will continue to follow Pt's progress  Pt would benefit from post acute rehabilitation services v  HHOT (pending progress) upon DC to maximize safety and independence with ADLs and functional tasks of choice  OT Discharge Recommendation: Post acute rehabilitation services (Kiana Barbosa PENDING PROGRESS)  OT - OK to Discharge:  Yes

## 2022-06-17 NOTE — OCCUPATIONAL THERAPY NOTE
Occupational Therapy Evaluation     Patient Name: Sridevi Barnes  EZNRE'J Date: 6/17/2022  Problem List  Principal Problem:    Acute renal failure ([facility] )  Active Problems:    PTSD (post-traumatic stress disorder)    Hyperkalemia    IV drug user    Cocaine abuse ([facility] )    Unresponsiveness    NSTEMI (non-ST elevated myocardial infarction) ([facility] )    Methamphetamine abuse ([facility] )    Acute encephalopathy    Pneumonia due to Haemophilus influenzae ([facility] )    Atrial fibrillation [facility])    Past Medical History  Past Medical History:   Diagnosis Date    Hepatitis C     PTSD (post-traumatic stress disorder)      Past Surgical History  Past Surgical History:   Procedure Laterality Date    APPENDECTOMY               06/17/22 1316   OT Last Visit   OT Visit Date 06/17/22   Note Type   Note type Evaluation   Restrictions/Precautions   Weight Bearing Precautions Per Order No   Other Precautions Pain; Fall Risk;Bed Alarm; Chair Alarm   Pain Assessment   Pain Assessment Tool 0-10   Pain Score No Pain  (c/o of discomfort on buttocks with toileting)   Home Living   Type of Home Other (Comment)  [facility]- 3rd floor)   Home Layout One level;Elevator;Stairs to enter with rails   Bathroom Shower/Tub Tub/shower unit   Bathroom Toilet Standard   Bathroom Equipment   (denies)   Home Equipment   (denies)   Additional Comments Pt lives with his fiance in a 3rd floor Hotel room with 3 flights of stairs to enter v  elevator  Prior Function   Level of Wright Independent with ADLs and functional mobility   Lives With Significant other  (Fiance)   Receives Help From Family   ADL Assistance Independent   IADLs Independent   Falls in the last 6 months 1 to 4  (x1 leading to [employer])   Vocational Full time employment  ()   Comments PTA, Pt I with ADLs/IADLs/no AD/ +/works FT  Pt with supportive toby and aparna able to provide some assistance upon DC  Pt's aparna also works as a      Lifestyle   Autonomy I with ADLs/IADLs/no AD/ +/works FT   Reciprocal Relationships Supportive daughter and fiance   Service to Others Works FT- drives [employer]   [address] Enjoys watching and playing sports   Psychosocial   Psychosocial (WDL) X   Patient Behaviors/Mood Flat affect; Cooperative   ADL   Where Assessed Sitting at sink   Eating Assistance 7  [place] Deficit Setup; Increased time to complete;Grab bar use;Bedside commode  (+BM- RN aware)   Functional Assistance 4  Minimal Assistance   Functional Deficit Increased time to complete;Supervision/safety;Setup;Verbal cueing   Bed Mobility   Supine to Sit 4  Minimal assistance   Additional items Assist x 1; Increased time required;Verbal cues;LE management; Bedrails;HOB elevated   Sit to Supine Unable to assess   Additional Comments Pt greeted supine in bed and left OOB in recliner chair at end of session  All needs met, call bell nearby, and bed alarm engaged  Transfers   Sit to Stand 4  Minimal assistance   Additional items Assist x 1; Increased time required;Verbal cues   Stand to Sit 4  Minimal assistance   Additional items Assist x 1; Increased time required;Verbal cues   Toilet transfer 4  Minimal assistance   Additional items Assist x 1; Increased time required;Standard toilet   Additional Comments with RW  Pt requires VCs/TCs for safety and technique  Functional Mobility   Functional Mobility 4  Minimal assistance   Additional Comments Min Ax1 with RW- Short household distances     Additional items Rolling walker   Balance   Static Sitting Fair   Dynamic Sitting Fair   Static Standing Fair -   Dynamic Standing Poor +   Ambulatory Poor +   Activity Tolerance   Activity Tolerance Patient limited by fatigue   Nurse Made Aware RN cleared/updated  RUE Assessment   RUE Assessment WFL   LUE Assessment   LUE Assessment WFL   Hand Function   Gross Motor Coordination Functional   Fine Motor Coordination Functional   Sensation   Light Touch No apparent deficits   Cognition   Overall Cognitive Status WFL   Arousal/Participation Alert   Attention Within functional limits   Orientation Level Oriented X4   Memory Decreased recall of recent events;Decreased recall of precautions   Following Commands Follows one step commands with increased time or repetition   Comments Pt with flat affect and requires increased time for processing/response  Pt with limited insight and judgement during OT session and decrease safety awareness  Pt requires VCs/TCs for safety/technique  Assessment   Limitation Decreased ADL status; Decreased UE ROM; Decreased UE strength;Decreased Safe judgement during ADL;Decreased cognition;Decreased endurance;Decreased self-care trans;Decreased high-level ADLs   Prognosis Fair   Assessment Pt is a 45 yo Male who presented to [facility] on 6/10/2022 with hyperkalemia and non-traumatic collapse  Pt with temporary HS catheter  Pt with diagnosis of ARF, IV drug user, Afib, PNA, and hyperkalemia Pt  has a past medical history of Hepatitis C and PTSD (post-traumatic stress disorder)  Pt greeted bedside for OT evaluation on 6/17/2022  Pt lives with his aparna in a 3rd floor Hotel room with 3 flights of stairs to enter v  Elevator  PTA, Pt I with ADLs/IADLs/no AD/ +/works FT  Pt with supportive celeer and fiance able to provide some assistance upon DC  Pt's aparna also works as a   Pt demonstrating the following occupational deficits: min A with UB/LB ADLs, min A with bed mobility, min A with functional transfers, and min A with functional mobility with RW   Limitations that impact functional performance include decreased ADL status, decreased UE ROM, decreased UE strength, decreased safe judgement during ADLs, decreased cognition, decreased endurance, decreased self care transfers, decreased high level ADLs and pain  Occupational performance areas to address ADL retraining, functional transfer training, UE strengthening/ROM, endurance training, cognitive reorientation, Pt/caregiver education, equipment evaluation/education, compensatory technique education, energy conservation and activity engagement   Pt would benefit from continued skilled OT services while in hospital to maximize independence with ADLs  Will continue to follow Pt's progress  Pt would benefit from post acute rehabilitation services v  HHOT (pending progress) upon DC to maximize safety and independence with ADLs and functional tasks of choice  Goals   Patient Goals To go to the bathroom  LTG Time Frame 10-14   Long Term Goal #1 See goals listed below  Plan   Treatment Interventions ADL retraining;Functional transfer training; Endurance training;UE strengthening/ROM; Cognitive reorientation;Patient/family training;Equipment evaluation/education; Compensatory technique education; Energy conservation; Activityengagement   Goal Expiration Date 07/01/22   OT Frequency 3-5x/wk   Recommendation   OT Discharge Recommendation Post acute rehabilitation services  (Chris Gay PENDING PROGRESS)   OT - OK to Discharge Yes   Additional Comments  The patient's raw score on the AM-PAC Daily Activity inpatient short form is 19, standardized score is 40 22, greater than 39 4  Patients at this level are likely to benefit from discharge to home  Please refer to the recommendation of the Occupational Therapist for safe discharge planning     AM-PAC Daily Activity Inpatient   Lower Body Dressing 3   Bathing 3   Toileting 3   Upper Body Dressing 3   Grooming 3   Eating 4   Daily Activity Raw Score 19   Daily Activity Standardized Score (Calc for Raw Score >=11) 40 22   AM-PAC Applied Cognition Inpatient   Following a Speech/Presentation 3 Understanding Ordinary Conversation 4   Taking Medications 3   Remembering Where Things Are Placed or Put Away 3   Remembering List of 4-5 Errands 3   Taking Care of Complicated Tasks 3   Applied Cognition Raw Score 19   Applied Cognition Standardized Score 39 77       Goals:  Pt will complete UB ADLs with I in order to maximize participation with ADLs  Pt will complete LB ADLs with I in order to maximize safety with ADLs  Pt will complete toileting routine (transfer, hygiene, and clothing management) with I in order to return to prior level of function  Pt will complete bed mobility with I in order to maximize participation with ADLs  Pt will complete functional transfers at I level in order to increase participation with ADLs  Pt will increase dynamic standing balance to F+ in order to increase safety with ADLs  Pt will increase standing tolerance x10 min in order to increase participation with ADLs  Pt will complete functional mobility with AD PRN for item retrieval task at Tere level in order to increase participation with ADLs  Pt will complete IADL tasks/simulation of IADLs tasks with I in order to return to PLOF  Pt will demonstrate G energy conservation techniques with ADLs/IADLs in order to reduce the risk of falls  Pt will be attentive 100% of the time for ongoing functional/formal cognitive assessment to assist with safe dc planning prn        Zainab George MS, OTR/L

## 2022-06-17 NOTE — PLAN OF CARE
Problem: MOBILITY - ADULT  Goal: Maintain or return to baseline ADL function  Description: INTERVENTIONS:  -  Assess patient's ability to carry out ADLs; assess patient's baseline for ADL function and identify physical deficits which impact ability to perform ADLs (bathing, care of mouth/teeth, toileting, grooming, dressing, etc )  - Assess/evaluate cause of self-care deficits   - Assess range of motion  - Assess patient's mobility; develop plan if impaired  - Assess patient's need for assistive devices and provide as appropriate  - Encourage maximum independence but intervene and supervise when necessary  - Involve family in performance of ADLs  - Assess for home care needs following discharge   - Consider OT consult to assist with ADL evaluation and planning for discharge  - Provide patient education as appropriate  Outcome: Progressing  Goal: Maintains/Returns to pre admission functional level  Description: INTERVENTIONS:  - Perform BMAT or MOVE assessment daily    - Set and communicate daily mobility goal to care team and patient/family/caregiver  - Collaborate with rehabilitation services on mobility goals if consulted  - Reposition patient every 2 hours    - Stand patient 2 times a day  - Out of bed to chair 3 times a day   - Out of bed for toileting  - Record patient progress and toleration of activity level   Outcome: Progressing     Problem: Prexisting or High Potential for Compromised Skin Integrity  Goal: Skin integrity is maintained or improved  Description: INTERVENTIONS:  - Identify patients at risk for skin breakdown  - Assess and monitor skin integrity  - Assess and monitor nutrition and hydration status  - Monitor labs   - Assess for incontinence   - Turn and reposition patient  - Assist with mobility/ambulation  - Relieve pressure over bony prominences  - Avoid friction and shearing  - Provide appropriate hygiene as needed including keeping skin clean and dry  - Evaluate need for skin moisturizer/barrier cream  - Collaborate with interdisciplinary team   - Patient/family teaching  - Consider wound care consult   Outcome: Progressing     Problem: PAIN - ADULT  Goal: Verbalizes/displays adequate comfort level or baseline comfort level  Description: Interventions:  - Encourage patient to monitor pain and request assistance  - Assess pain using appropriate pain scale  - Administer analgesics based on type and severity of pain and evaluate response  - Implement non-pharmacological measures as appropriate and evaluate response  - Consider cultural and social influences on pain and pain management  - Notify physician/advanced practitioner if interventions unsuccessful or patient reports new pain  Outcome: Progressing     Problem: INFECTION - ADULT  Goal: Absence or prevention of progression during hospitalization  Description: INTERVENTIONS:  - Assess and monitor for signs and symptoms of infection  - Monitor lab/diagnostic results  - Monitor all insertion sites, i e  indwelling lines, tubes, and drains  - Monitor endotracheal if appropriate and nasal secretions for changes in amount and color  - Jackson appropriate cooling/warming therapies per order  - Administer medications as ordered  - Instruct and encourage patient and family to use good hand hygiene technique  - Identify and instruct in appropriate isolation precautions for identified infection/condition  Outcome: Progressing     Problem: SAFETY ADULT  Goal: Maintain or return to baseline ADL function  Description: INTERVENTIONS:  -  Assess patient's ability to carry out ADLs; assess patient's baseline for ADL function and identify physical deficits which impact ability to perform ADLs (bathing, care of mouth/teeth, toileting, grooming, dressing, etc )  - Assess/evaluate cause of self-care deficits   - Assess range of motion  - Assess patient's mobility; develop plan if impaired  - Assess patient's need for assistive devices and provide as appropriate  - Encourage maximum independence but intervene and supervise when necessary  - Involve family in performance of ADLs  - Assess for home care needs following discharge   - Consider OT consult to assist with ADL evaluation and planning for discharge  - Provide patient education as appropriate  Outcome: Progressing  Goal: Maintains/Returns to pre admission functional level  Description: INTERVENTIONS:  - Perform BMAT or MOVE assessment daily    - Set and communicate daily mobility goal to care team and patient/family/caregiver  - Collaborate with rehabilitation services on mobility goals if consulted  - Reposition patient every 2 hours    - Stand patient 2 times a day  - Out of bed to chair 3 times a day   - Out of bed for toileting  - Record patient progress and toleration of activity level   Outcome: Progressing  Goal: Patient will remain free of falls  Description: INTERVENTIONS:  - Educate patient/family on patient safety including physical limitations  - Instruct patient to call for assistance with activity   - Consult OT/PT to assist with strengthening/mobility   - Keep Call bell within reach  - Keep bed low and locked with side rails adjusted as appropriate  - Keep care items and personal belongings within reach  - Initiate and maintain comfort rounds  - Make Fall Risk Sign visible to staff  Problem: DISCHARGE PLANNING  Goal: Discharge to home or other facility with appropriate resources  Description: INTERVENTIONS:  - Identify barriers to discharge w/patient and caregiver  - Arrange for needed discharge resources and transportation as appropriate  - Identify discharge learning needs (meds, wound care, etc )  - Arrange for interpretive services to assist at discharge as needed  - Refer to Case Management Department for coordinating discharge planning if the patient needs post-hospital services based on physician/advanced practitioner order or complex needs related to functional status, cognitive ability, or social support system  Outcome: Progressing     Problem: Knowledge Deficit  Goal: Patient/family/caregiver demonstrates understanding of disease process, treatment plan, medications, and discharge instructions  Description: Complete learning assessment and assess knowledge base  Interventions:  - Provide teaching at level of understanding  - Provide teaching via preferred learning methods  Outcome: Progressing     Problem: Nutrition/Hydration-ADULT  Goal: Nutrient/Hydration intake appropriate for improving, restoring or maintaining nutritional needs  Description: Monitor and assess patient's nutrition/hydration status for malnutrition  Collaborate with interdisciplinary team and initiate plan and interventions as ordered  Monitor patient's weight and dietary intake as ordered or per policy  Utilize nutrition screening tool and intervene as necessary  Determine patient's food preferences and provide high-protein, high-caloric foods as appropriate       INTERVENTIONS:  - Monitor oral intake, urinary output, labs, and treatment plans  - Assess nutrition and hydration status and recommend course of action  - Evaluate amount of meals eaten  - Assist patient with eating if necessary   - Allow adequate time for meals  - Recommend/ encourage appropriate diets, oral nutritional supplements, and vitamin/mineral supplements  - Order, calculate, and assess calorie counts as needed  - Recommend, monitor, and adjust tube feedings and TPN/PPN based on assessed needs  - Assess need for intravenous fluids  - Provide specific nutrition/hydration education as appropriate  - Include patient/family/caregiver in decisions related to nutrition  Outcome: Progressing     Problem: Potential for Falls  Goal: Patient will remain free of falls  Description: INTERVENTIONS:  - Educate patient/family on patient safety including physical limitations  - Instruct patient to call for assistance with activity   - Consult OT/PT to assist with strengthening/mobility   - Keep Call bell within reach  - Keep bed low and locked with side rails adjusted as appropriate  - Keep care items and personal belongings within reach  - Initiate and maintain comfort rounds  - Make Fall Risk Sign visible to staff  Problem: METABOLIC, FLUID AND ELECTROLYTES - ADULT  Goal: Electrolytes maintained within normal limits  Description: INTERVENTIONS:  - Monitor labs and assess patient for signs and symptoms of electrolyte imbalances  - Administer electrolyte replacement as ordered  - Monitor response to electrolyte replacements, including repeat lab results as appropriate  - Instruct patient on fluid and nutrition as appropriate  Outcome: Progressing  Goal: Fluid balance maintained  Description: INTERVENTIONS:  - Monitor labs   - Monitor I/O and WT  - Instruct patient on fluid and nutrition as appropriate  - Assess for signs & symptoms of volume excess or deficit  Outcome: Progressing     - Apply yellow socks and bracelet for high fall risk patients  - Consider moving patient to room near nurses station  Outcome: Progressing     - Apply yellow socks and bracelet for high fall risk patients  - Consider moving patient to room near nurses station  Outcome: Progressing

## 2022-06-17 NOTE — PLAN OF CARE
Problem: MOBILITY - ADULT  Goal: Maintain or return to baseline ADL function  Description: INTERVENTIONS:  -  Assess patient's ability to carry out ADLs; assess patient's baseline for ADL function and identify physical deficits which impact ability to perform ADLs (bathing, care of mouth/teeth, toileting, grooming, dressing, etc )  - Assess/evaluate cause of self-care deficits   - Assess range of motion  - Assess patient's mobility; develop plan if impaired  - Assess patient's need for assistive devices and provide as appropriate  - Encourage maximum independence but intervene and supervise when necessary  - Involve family in performance of ADLs  - Assess for home care needs following discharge   - Consider OT consult to assist with ADL evaluation and planning for discharge  - Provide patient education as appropriate  Outcome: Progressing  Goal: Maintains/Returns to pre admission functional level  Description: INTERVENTIONS:  - Perform BMAT or MOVE assessment daily    - Set and communicate daily mobility goal to care team and patient/family/caregiver  - Collaborate with rehabilitation services on mobility goals if consulted  - Reposition patient every 2 hours    - Stand patient 2 times a day  - Out of bed to chair 3 times a day   - Out of bed for toileting  - Record patient progress and toleration of activity level   Outcome: Progressing     Problem: Prexisting or High Potential for Compromised Skin Integrity  Goal: Skin integrity is maintained or improved  Description: INTERVENTIONS:  - Identify patients at risk for skin breakdown  - Assess and monitor skin integrity  - Assess and monitor nutrition and hydration status  - Monitor labs   - Assess for incontinence   - Turn and reposition patient  - Assist with mobility/ambulation  - Relieve pressure over bony prominences  - Avoid friction and shearing  - Provide appropriate hygiene as needed including keeping skin clean and dry  - Evaluate need for skin moisturizer/barrier cream  - Collaborate with interdisciplinary team   - Patient/family teaching  - Consider wound care consult   Outcome: Progressing     Problem: PAIN - ADULT  Goal: Verbalizes/displays adequate comfort level or baseline comfort level  Description: Interventions:  - Encourage patient to monitor pain and request assistance  - Assess pain using appropriate pain scale  - Administer analgesics based on type and severity of pain and evaluate response  - Implement non-pharmacological measures as appropriate and evaluate response  - Consider cultural and social influences on pain and pain management  - Notify physician/advanced practitioner if interventions unsuccessful or patient reports new pain  Outcome: Progressing     Problem: INFECTION - ADULT  Goal: Absence or prevention of progression during hospitalization  Description: INTERVENTIONS:  - Assess and monitor for signs and symptoms of infection  - Monitor lab/diagnostic results  - Monitor all insertion sites, i e  indwelling lines, tubes, and drains  - Monitor endotracheal if appropriate and nasal secretions for changes in amount and color  - Wilder appropriate cooling/warming therapies per order  - Administer medications as ordered  - Instruct and encourage patient and family to use good hand hygiene technique  - Identify and instruct in appropriate isolation precautions for identified infection/condition  Outcome: Progressing     Problem: SAFETY ADULT  Goal: Maintain or return to baseline ADL function  Description: INTERVENTIONS:  -  Assess patient's ability to carry out ADLs; assess patient's baseline for ADL function and identify physical deficits which impact ability to perform ADLs (bathing, care of mouth/teeth, toileting, grooming, dressing, etc )  - Assess/evaluate cause of self-care deficits   - Assess range of motion  - Assess patient's mobility; develop plan if impaired  - Assess patient's need for assistive devices and provide as appropriate  - Encourage maximum independence but intervene and supervise when necessary  - Involve family in performance of ADLs  - Assess for home care needs following discharge   - Consider OT consult to assist with ADL evaluation and planning for discharge  - Provide patient education as appropriate  Outcome: Progressing  Goal: Maintains/Returns to pre admission functional level  Description: INTERVENTIONS:  - Perform BMAT or MOVE assessment daily    - Set and communicate daily mobility goal to care team and patient/family/caregiver  - Collaborate with rehabilitation services on mobility goals if consulted  - Reposition patient every 2 hours    - Stand patient 2 times a day  - Out of bed to chair 3 times a day   - Out of bed for toileting  - Record patient progress and toleration of activity level   Outcome: Progressing  Goal: Patient will remain free of falls  Description: INTERVENTIONS:  - Educate patient/family on patient safety including physical limitations  - Instruct patient to call for assistance with activity   - Consult OT/PT to assist with strengthening/mobility   - Keep Call bell within reach  - Keep bed low and locked with side rails adjusted as appropriate  - Keep care items and personal belongings within reach  - Initiate and maintain comfort rounds  - Make Fall Risk Sign visible to staff  - Offer Toileting every 2 Hours, in advance of need  - Initiate/Maintain alarm  - Obtain necessary fall risk management equipment  - Apply yellow socks and bracelet for high fall risk patients  - Consider moving patient to room near nurses station  Outcome: Progressing

## 2022-06-17 NOTE — PROGRESS NOTES
INTERNAL MEDICINE RESIDENCY PROGRESS NOTE     Name: Oniel Null   Age & Sex: 46 y o  male   MRN: [id number]  Unit/Bed#: [facility] 806-01   Encounter: [id number]  Team: SOD Team A    PATIENT INFORMATION     Name: Oniel Null   Age & Sex: 46 y o  male   MRN: [id number]  Hospital Stay Days: 7    ASSESSMENT/PLAN     Principal Problem:    Acute renal failure ([facility] [address] )  Active Problems:    Pneumonia due to Haemophilus influenzae ([facility] [address] )    Atrial fibrillation ([facility] [address] )    Hyperkalemia    PTSD (post-traumatic stress disorder)    IV drug user    Cocaine abuse ([facility] [address] )    Unresponsiveness    NSTEMI (non-ST elevated myocardial infarction) ([facility] [address] )    Methamphetamine abuse ([facility] [address] )    Acute encephalopathy      * [facility]  Assessment & Plan  In the setting of rhabdomyolysis   Required CVVHD  6/14- transitioned to intermittent HD  Still oliguric, per pt producing small amounts of dark urine but unmeasured by nursing  BP elevated over last 2 days   Cr today 6 04  AMS and lethargy improved after HD    Plan:   Continue to monitor mental status around HD timing   BP still elevated after HD  Starting amlodipine 5 mg qd, PRN hydralazine  Nephro following, appreciate recommendations  Will get HD 6/18  Fluids d/c'd after inadequate urine output  Monitor CK, Cr, BMP   Monitor I/Os    Atrial fibrillation ([facility] [address] )  Assessment & Plan  Pt intermittently having AF with RVR, new on this admission  Loaded with amio in ICU  Plan:   Continue amiodarone 200mg tid  Transition to 200mg daily on 6/29  Monitor on tele   Monitor hemodynamics     Pneumonia due to Haemophilus influenzae [facility])  Assessment & Plan  Sputum revealing H Flu and Group B strep  Initially received Zosyn and Vanco, switched to cefuroxime  6/17 CXR: Persistent bibasilar opacities likely due to atelectasis  No evidence for pneumothorax    WBC 6/17 up to 14    Plan:   IS   Continue Cefuroxime 500mg x4 days for total of 7 days of Abx (day 4/4)  Will recheck CBC in am, consider giving additional doses of abx if not improved    Hyperkalemia  Assessment & Plan  2/2 to acute renal failure  Peaked T waves on EKG  Treated with insulin, Ca gluconate, and IVF on arrival  Treated with CVVHD in ICU   - started intermittent HD  Improved, K today 4 0    Plan:   Continue prn HD per nephro  Monitor BMP     NSTEMI (non-ST elevated myocardial infarction) [facility])  Assessment & Plan  Elevated troponin level noted on presentation  2/2 ARF, no intervention needed  Troponins now wnl    IV drug user  Assessment & Plan  Pt has a hx of polysubstance abuse including cocaine, meth, THC  Could be contributing to pt's "found down" event  Plan:  Continue to encourage cessation    PTSD (post-traumatic stress disorder)  Assessment & Plan  Not on any medications  Recommend follow up out pt PCP/ psych      Disposition: Remains inpatient for hemodialysis and abx treatment  SUBJECTIVE     Patient seen and examined  No acute events overnight  More alert today and able to hold short conversation  Only complaint is sleepiness  Patient does have persistent nonproductive, painless cough  He also endorses episodes of sweating  Patient has urinated twice today, dark in color, but the amount was not collected for I/O recording, so the importance of urine collection was reinforced  Denies fevevrs, chills, nausea, vomiting, chest pain, abdominal pain  OBJECTIVE     Vitals:    22 1927 22 2225 22 0727 22 1102   BP: 140/92 152/94 159/99 149/89   BP Location:       Pulse: 86 89 75 78   Resp: 18  16 14   Temp: 98 2 °F (36 8 °C) 98 °F (36 7 °C) (!) 97 °F (36 1 °C) (!) 97 1 °F (36 2 °C)   TempSrc:       SpO2: 98% 97% 98% 97%   Weight:       Height:          Temperature:   Temp (24hrs), Av 6 °F (36 4 °C), Min:97 °F (36 1 °C), Max:98 2 °F (36 8 °C)    Temperature: (!) 97 1 °F (36 2 °C)  Intake & Output:  I/O       06/15 0701   0700  0701   0700  0701   0700    P  O   240 120 I V  (mL/kg)  500 (6 1)     Total Intake(mL/kg)  740 (9) 120 (1 5)    Urine (mL/kg/hr) 10 (0) 20 (0)     Other  3034     Total Output 10 3054     Net -10 -2314 +120           Unmeasured Urine Occurrence 1 x 1 x     Unmeasured Stool Occurrence 1 x 1 x         Weights:   IBW (Ideal Body Weight): 68 4 kg    Body mass index is 27 52 kg/m²  Weight (last 2 days)     None        Physical Exam  Vitals reviewed  Constitutional:       General: He is not in acute distress  Appearance: He is diaphoretic (less then prior)  HENT:      Head: Normocephalic and atraumatic  Nose: No rhinorrhea  Mouth/Throat:      Mouth: Mucous membranes are moist    Eyes:      General: No scleral icterus  Cardiovascular:      Rate and Rhythm: Normal rate and regular rhythm  Pulses: Normal pulses  Heart sounds: Normal heart sounds  No murmur heard  No friction rub  No gallop  Pulmonary:      Effort: Pulmonary effort is normal  No respiratory distress  Breath sounds: Normal breath sounds  No wheezing, rhonchi or rales  Abdominal:      General: Bowel sounds are normal  There is no distension  Palpations: Abdomen is soft  Tenderness: There is no abdominal tenderness  There is no guarding  Musculoskeletal:      Right lower leg: No edema  Left lower leg: No edema  Skin:     Coloration: Skin is not jaundiced  Comments: Cool extremities   Neurological:      Mental Status: He is alert and oriented to person, place, and time  Psychiatric:         Mood and Affect: Mood normal          Behavior: Behavior normal        LABORATORY DATA     Labs: I have personally reviewed pertinent reports    Results from last 7 days   Lab Units 06/17/22  0431 06/16/22  1008 06/15/22  1315   WBC Thousand/uL 14 02* 11 42* 8 09   HEMOGLOBIN g/dL 15 0 14 2 14 4   HEMATOCRIT % 41 8 39 5 40 1   PLATELETS Thousands/uL 96* 102* 95*   NEUTROS PCT % 77* 84* 79*   MONOS PCT % 10 7 9      Results from last 7 days   Lab Units 06/17/22  0431 06/16/22  1008 06/15/22  1315 06/12/22  1221 06/12/22  0610 06/11/22  1809 06/11/22  1506   POTASSIUM mmol/L 4 0 3 6 4 0   < > 5 1   < > 5 2   CHLORIDE mmol/L 97* 97* 101   < > 106   < > 106   CO2 mmol/L 28 26 27   < > 23   < > 27   BUN mg/dL 48* 59* 42*   < > 55*   < > 61*   CREATININE mg/dL 6 04* 7 08* 5 38*   < > 4 63*   < > 5 20*   CALCIUM mg/dL 7 9* 7 7* 8 1*   < > 8 1*   < > 7 7*   ALK PHOS U/L  --  140*  --   --  71  --  69   ALT U/L  --  206*  --   --  2,301*  --  2,898*   AST U/L  --  510*  --   --  2,751*  --  6,320*    < > = values in this interval not displayed  Results from last 7 days   Lab Units 06/14/22  0505 06/14/22  0032 06/13/22  1805   MAGNESIUM mg/dL 2 2 2 2 2 1     Results from last 7 days   Lab Units 06/14/22  0505 06/14/22  0032 06/13/22  1805   PHOSPHORUS mg/dL 2 8 2 8 2 7      Results from last 7 days   Lab Units 06/16/22  1317 06/12/22  0817 06/10/22  2051   INR  0 93 1 25* 1 22*   PTT seconds  --   --  33  33     Results from last 7 days   Lab Units 06/12/22  0817   LACTIC ACID mmol/L 2 0           IMAGING & DIAGNOSTIC TESTING     Radiology Results: I have personally reviewed pertinent reports  CT chest abdomen pelvis wo contrast    Result Date: 6/10/2022  Impression: 1  Limited examination demonstrating no evidence of intrathoracic, intra-abdominal or intrapelvic traumatic injury 2  There is nonspecific perihilar and upper lobe airspace opacities, could represent mild pulmonary edema versus aspiration  3  No pneumothorax, hemothorax or mediastinal air 4  Tiny amount of intravascular and cardiac air, likely introduced through an IV catheter Workstation performed: [id number]     XR chest 1 view portable    Result Date: 6/10/2022  Impression: Endotracheal tube tip is 5 5 cm above the jesika Workstation performed: [id number]     CT head without contrast    Result Date: 6/10/2022  Impression: No acute intracranial abnormality    Bilateral facial subcutaneous air present  Please refer to cervical spine CT report for more specific detail Workstation performed: [id number]     CT spine cervical wo contrast    Result Date: 6/10/2022  Impression: No cervical spine fracture or traumatic malalignment  Subcutaneous emphysema seen in the neck  Given that the CT of the chest abdomen and pelvis demonstrates no evidence of pneumothorax, mediastinal or paraesophageal air, this most likely is related to air introduced through an IV catheter  Also correlate for any penetrating injury that may have allowed generalized subcutaneous air to occur  The examination demonstrates a significant  finding and was documented as such in [facility] for liaison and referring practitioner immediate notification  Workstation performed: [id number]     XR chest portable ICU    Result Date: 6/11/2022  Impression: Deep sulcus sign concerning for a small left basilar pneumothorax  Right lateral decubitus or upright radiograph could be obtained for confirmation  The tip of the left subclavian central venous catheter projects at the superior vena cava  The study was marked in [facility] for immediate notification  Workstation performed: [id number]     US liver doppler only    Result Date: 6/12/2022  Impression: Normal liver Doppler  Workstation performed: [id number]     Other Diagnostic Testing: I have personally reviewed pertinent reports      ACTIVE MEDICATIONS     Current Facility-Administered Medications   Medication Dose Route Frequency    amiodarone tablet 200 mg  200 mg Oral TID With Meals    Followed by   Devere Agent ON 6/29/2022] amiodarone tablet 200 mg  200 mg Oral Daily With Breakfast    amLODIPine (NORVASC) tablet 5 mg  5 mg Oral Daily    benzonatate (TESSALON PERLES) capsule 100 mg  100 mg Oral TID PRN    cefuroxime (CEFTIN) tablet 500 mg  500 mg Oral Q24H    heparin (porcine) subcutaneous injection 5,000 Units  5,000 Units Subcutaneous Q8H [facility]    hydrALAZINE (APRESOLINE) injection 5 mg  5 mg Intravenous Q6H PRN       VTE Pharmacologic Prophylaxis: Heparin  VTE Mechanical Prophylaxis: sequential compression device    Portions of the record may have been created with voice recognition software  Occasional wrong word or "sound a like" substitutions may have occurred due to the inherent limitations of voice recognition software    Read the chart carefully and recognize, using context, where substitutions have occurred   ==  [address]

## 2022-06-17 NOTE — QUICK NOTE
Patient's daughter, Aniket Davidson, was reached by phone for an update  All questions were answered to her satisfaction

## 2022-06-17 NOTE — PROGRESS NOTES
[address] [facility] ER NOTE   Fiorella Puls 46 y o  male MRN: [id number]  Unit/Bed#: [facility] 806-01 Encounter: [id number]  Reason for Consult: JOÃO    ASSESSMENT and PLAN:    61-year-old male with a past medical history of atrial fibrillation, recent H flu pneumonia, substance abuse, NSTEMI who initially presents after being found down  Nephrology is on board for acute kidney injury  Patient was initially intubated  Was initially requiring continuous renal replacement therapy     1) acute kidney injury     -prior creatinine baseline 0 9-1 1 mg/dL with values as high as 1 6 mg/dL in September 2020  -etiology-ATN/rhabdomyolysis/shock/drug abuse  -urinalysis with innumerable RBC, no will bacteria, 2+ protein  -CT scan without hydronephrosis  -initially required CRRT on 06/11 and discontinued on 06/13  -transition to intermittent hemodialysis on 06/14  -access-temporary dialysis catheter  -pre dialysis creatinine June 16th still rising  Continue dialysis  -June 17-no urgent indication for dialysis  Creatinine 6 04   Electrolytes are relatively stable  CPK level is improving to 2500       Plan   -check ANCA level also (given risk of ANCA vasculitis due to cocaine that can be levimasole mixed at times)  -next dialysis Saturday   -keep NPO starting Monday at midnight case the patient needs conversion to tunneled dialysis catheter on Monday  -to note patient has temporary dialysis catheter will need to be converted to tunneled dialysis catheter if the patient continues to require dialysis (likely early next week)  Patient is aware  -monitor for renal recovery  -monitor strict I/O  -avoid hypotension  -primary team is started amlodipine    Please avoid hypotension   -I have reviewed with primary team attending     2) electrolytes     -initial hyperkalemia improved with dialysis     -hyponatremia-monitor with ultrafiltration     3) acid/base-stable     -stable     4) rhabdomyolysis     -initially required volume expansion and furosemide to force diuresis  -CPK level-initial level above 100,000 --> improving to 3800 on June 16     5) shock-initially required vasopressors     -was initially on empiric antibiotics and required vasopressors initially  -treated for possible pneumonia     6) drug abuse-per primary team     7) transaminitis-improving       SUBJECTIVE / 24H INTERVAL HISTORY:    Blood pressure is 735-242 systolic  Afebrile  On room air  Urine output does not appear to be improving but the patient states that he feels as if he is urinating more a by day      OBJECTIVE:  Current Weight: Weight - Scale: 82 1 kg (181 lb)  Vitals:    06/16/22 1601 06/16/22 1927 06/16/22 2225 06/17/22 0727   BP: 139/93 140/92 152/94 159/99   BP Location:       Pulse: 91 86 89 75   Resp: 20 18 16   Temp: 98 °F (36 7 °C) 98 2 °F (36 8 °C) 98 °F (36 7 °C) (!) 97 °F (36 1 °C)   TempSrc:       SpO2: 98% 98% 97% 98%   Weight:       Height:           Intake/Output Summary (Last 24 hours) at 6/17/2022 1021  Last data filed at 6/17/2022 0830  Gross per 24 hour   Intake 660 ml   Output 3054 ml   Net -2394 ml     General: NAD, appears chronically ill-appearing  Skin: no rash  Eyes: anicteric sclera  ENT: moist mucous membrane, poor dentition  Neck: supple  Chest: CTA b/l, no ronchii, no wheeze, no rubs, no rales  CVS: s1s2, no murmur, no gallop, no rub  Abdomen: soft, nontender, nl sounds  Extremities: no edema LE b/l  : no torres  Neuro: AAOX3  Psych: normal affect  -temporary dialysis catheter site covered    Medications:    Current Facility-Administered Medications:     amiodarone tablet 200 mg, 200 mg, Oral, TID With Meals, 200 mg at 06/17/22 0823 **FOLLOWED BY** [START ON 6/29/2022] amiodarone tablet 200 mg, 200 mg, Oral, Daily With Breakfast, Ashley Rodriguez MD    amLODIPine (NORVASC) tablet 5 mg, 5 mg, Oral, Daily, Terri Cortes MD    benzonatate (TESSALON PERLES) capsule 100 mg, 100 mg, Oral, TID PRN, Jolie Dennis MD, 100 mg at 06/16/22 1703    cefuroxime (CEFTIN) tablet 500 mg, 500 mg, Oral, Q24H, Liz Ordoñez MD, 500 mg at 06/16/22 1527    heparin (porcine) subcutaneous injection 5,000 Units, 5,000 Units, Subcutaneous, Q8H [address], Chuy Villaseñor MD, 5,000 Units at 06/17/22 0504    hydrALAZINE (APRESOLINE) injection 5 mg, 5 mg, Intravenous, Q6H PRN, Barber Holm DO    Laboratory Results:  Results from last 7 days   Lab Units 06/17/22  0431 06/16/22  1008 06/15/22  1315 06/14/22  0505 06/14/22  0032 06/13/22  1805 06/13/22  1228 06/13/22  0958 06/13/22  0531 06/13/22  0013 06/12/22  1807 06/12/22  1221 06/12/22  0610 06/11/22  1506 06/11/22  1053 06/11/22  0949 06/11/22  0506   WBC Thousand/uL 14 02* 11 42* 8 09 7 69  --   --   --  10 27*  --   --   --   --  13 06*  --  11 27*  --  13 03*   HEMOGLOBIN g/dL 15 0 14 2 14 4 12 1  --   --   --  13 0  --   --   --   --  15 3  --  14 4  --  13 9   HEMATOCRIT % 41 8 39 5 40 1 35 4*  --   --   --  36 9  --   --   --   --  44 2  --  42 0  --  39 9   PLATELETS Thousands/uL 96* 102* 95*  --   --   --   --  120*  --   --   --   --  149  --  128*  --  134*   POTASSIUM mmol/L 4 0 3 6 4 0 3 9 3 9 4 4 3 7  --  4 0 4 2 4 4   < > 5 1   < > 4 0   < > 4 3  4 2   CHLORIDE mmol/L 97* 97* 101 105 103 105 111*  --  105 106 106   < > 106   < > 108   < > 108  107   CO2 mmol/L 28 26 27 26 23 23 24  --  25 22 23   < > 23   < > 28   < > 22  22   BUN mg/dL 48* 59* 42* 48* 44* 36* 35*  --  42* 46* 48*   < > 55*   < > 56*   < > 54*  55*   CREATININE mg/dL 6 04* 7 08* 5 38* 4 56* 4 10* 3 18* 2 89*  --  3 51* 3 65* 3 89*   < > 4 63*   < > 4 51*   < > 3 89*  3 90*   CALCIUM mg/dL 7 9* 7 7* 8 1* 8 5 8 5 9 0 7 3*  --  8 4 8 6 8 8   < > 8 1*   < > 7 3*   < > 7 2*  7 1*   MAGNESIUM mg/dL  --   --   --  2 2 2 2 2 1 2 0  --  2 3 1 7 2 0   < > 2 3   < > 2 5  --  2 4   PHOSPHORUS mg/dL  --   --   --  2 8 2 8 2 7 2 8  --  3 0 3 2 3 7   < > 6 7*   < >  --    < > 4 7*  4 6*    < > = values in this interval not displayed

## 2022-06-18 ENCOUNTER — APPOINTMENT (INPATIENT)
Dept: DIALYSIS | Facility: HOSPITAL | Age: 52
DRG: 469 | End: 2022-06-18
Payer: COMMERCIAL

## 2022-06-18 LAB
ANION GAP SERPL CALCULATED.3IONS-SCNC: 10 MMOL/L (ref 4–13)
BASOPHILS # BLD AUTO: 0.06 THOUSANDS/ΜL (ref 0–0.1)
BASOPHILS NFR BLD AUTO: 0 % (ref 0–1)
BUN SERPL-MCNC: 74 MG/DL (ref 5–25)
CALCIUM SERPL-MCNC: 8 MG/DL (ref 8.3–10.1)
CHLORIDE SERPL-SCNC: 97 MMOL/L (ref 100–108)
CO2 SERPL-SCNC: 22 MMOL/L (ref 21–32)
CREAT SERPL-MCNC: 7.73 MG/DL (ref 0.6–1.3)
EOSINOPHIL # BLD AUTO: 0.28 THOUSAND/ΜL (ref 0–0.61)
EOSINOPHIL NFR BLD AUTO: 2 % (ref 0–6)
ERYTHROCYTE [DISTWIDTH] IN BLOOD BY AUTOMATED COUNT: 12.2 % (ref 11.6–15.1)
GFR SERPL CREATININE-BSD FRML MDRD: 7 ML/MIN/1.73SQ M
GLUCOSE SERPL-MCNC: 134 MG/DL (ref 65–140)
HCT VFR BLD AUTO: 39.4 % (ref 36.5–49.3)
HGB BLD-MCNC: 13.9 G/DL (ref 12–17)
IMM GRANULOCYTES # BLD AUTO: 0.26 THOUSAND/UL (ref 0–0.2)
IMM GRANULOCYTES NFR BLD AUTO: 2 % (ref 0–2)
LYMPHOCYTES # BLD AUTO: 1.16 THOUSANDS/ΜL (ref 0.6–4.47)
LYMPHOCYTES NFR BLD AUTO: 8 % (ref 14–44)
MCH RBC QN AUTO: 34.2 PG (ref 26.8–34.3)
MCHC RBC AUTO-ENTMCNC: 35.3 G/DL (ref 31.4–37.4)
MCV RBC AUTO: 97 FL (ref 82–98)
MONOCYTES # BLD AUTO: 1.28 THOUSAND/ΜL (ref 0.17–1.22)
MONOCYTES NFR BLD AUTO: 9 % (ref 4–12)
NEUTROPHILS # BLD AUTO: 11.11 THOUSANDS/ΜL (ref 1.85–7.62)
NEUTS SEG NFR BLD AUTO: 79 % (ref 43–75)
NRBC BLD AUTO-RTO: 0 /100 WBCS
PLATELET # BLD AUTO: 121 THOUSANDS/UL (ref 149–390)
PMV BLD AUTO: 11.9 FL (ref 8.9–12.7)
POTASSIUM SERPL-SCNC: 4.2 MMOL/L (ref 3.5–5.3)
RBC # BLD AUTO: 4.07 MILLION/UL (ref 3.88–5.62)
SODIUM SERPL-SCNC: 129 MMOL/L (ref 136–145)
WBC # BLD AUTO: 14.15 THOUSAND/UL (ref 4.31–10.16)

## 2022-06-18 PROCEDURE — 99232 SBSQ HOSP IP/OBS MODERATE 35: CPT | Performed by: INTERNAL MEDICINE

## 2022-06-18 PROCEDURE — 86037 ANCA TITER EACH ANTIBODY: CPT | Performed by: INTERNAL MEDICINE

## 2022-06-18 PROCEDURE — 85025 COMPLETE CBC W/AUTO DIFF WBC: CPT | Performed by: STUDENT IN AN ORGANIZED HEALTH CARE EDUCATION/TRAINING PROGRAM

## 2022-06-18 PROCEDURE — 83520 IMMUNOASSAY QUANT NOS NONAB: CPT | Performed by: INTERNAL MEDICINE

## 2022-06-18 PROCEDURE — 80048 BASIC METABOLIC PNL TOTAL CA: CPT | Performed by: INTERNAL MEDICINE

## 2022-06-18 RX ORDER — NICOTINE 21 MG/24HR
21 PATCH, TRANSDERMAL 24 HOURS TRANSDERMAL DAILY
Status: DISCONTINUED | OUTPATIENT
Start: 2022-06-18 | End: 2022-06-24 | Stop reason: HOSPADM

## 2022-06-18 RX ADMIN — HEPARIN SODIUM 5000 UNITS: 5000 INJECTION INTRAVENOUS; SUBCUTANEOUS at 22:45

## 2022-06-18 RX ADMIN — AMIODARONE HYDROCHLORIDE 200 MG: 200 TABLET ORAL at 16:17

## 2022-06-18 RX ADMIN — BENZONATATE 100 MG: 100 CAPSULE ORAL at 09:03

## 2022-06-18 RX ADMIN — AMLODIPINE BESYLATE 5 MG: 5 TABLET ORAL at 11:56

## 2022-06-18 RX ADMIN — HEPARIN SODIUM 5000 UNITS: 5000 INJECTION INTRAVENOUS; SUBCUTANEOUS at 05:40

## 2022-06-18 RX ADMIN — NICOTINE 21 MG: 21 PATCH, EXTENDED RELEASE TRANSDERMAL at 17:04

## 2022-06-18 RX ADMIN — HEPARIN SODIUM 5000 UNITS: 5000 INJECTION INTRAVENOUS; SUBCUTANEOUS at 14:14

## 2022-06-18 RX ADMIN — AMIODARONE HYDROCHLORIDE 200 MG: 200 TABLET ORAL at 11:57

## 2022-06-18 NOTE — PROGRESS NOTES
INTERNAL MEDICINE RESIDENCY PROGRESS NOTE     Name: Zakia Blanco   Age & Sex: 46 y o  male   MRN: [id number]  Unit/Bed#: [facility] 806-01   Encounter: [id number]  Team: SOD Team A    PATIENT INFORMATION     Name: Zakia Blanco   Age & Sex: 46 y o  male   MRN: [id number]  Hospital Stay Days: 8    ASSESSMENT/PLAN     Principal Problem:    Acute renal failure ([facility] )  Active Problems:    Pneumonia due to Haemophilus influenzae ([facility] )    Atrial fibrillation ([facility] )    Hyperkalemia    Hypertension    PTSD (post-traumatic stress disorder)    IV drug user    Cocaine abuse ([facility] )    Unresponsiveness    NSTEMI (non-ST elevated myocardial infarction) ([facility] )    Methamphetamine abuse ([facility] )    Acute encephalopathy      * Acute renal failure ([facility] )  Assessment & Plan  In the setting of rhabdomyolysis   Required CVVHD  6/14- transitioned to intermittent HD  Still oliguric, per pt producing small amounts of dark urine but unmeasured by nursing  Cr today 7 73  AMS and lethargy again this am    Plan:   Continue to monitor mental status around HD timing   Nephro following, appreciate recommendations  HD today   Fluids d/c'd after inadequate urine output  Monitor CK, Cr, BMP   Monitor I/Os    Atrial fibrillation ([facility] )  Assessment & Plan  Pt intermittently having AF with RVR, new on this admission  Loaded with amio in ICU  Plan:   Continue amiodarone 200mg tid  Transition to 200mg daily on 6/29  Monitor on tele   Monitor hemodynamics     Pneumonia due to Haemophilus influenzae [facility])  Assessment & Plan  Sputum revealing H Flu and Group B strep  Initially received Zosyn and Vanco, switched to cefuroxime  6/17 CXR: Persistent bibasilar opacities likely due to atelectasis  No evidence for pneumothorax    WBC 6/17 up to 14, no other obvious source for infection at this time    Plan:   IS   Completed course of Cefuroxime 500mg x4 days for total of 7 days of Abx   Continue to trend fever curve and WBC    Hyperkalemia  Assessment & Plan  2/2 to acute renal failure  Peaked T waves on EKG  Treated with insulin, Ca gluconate, and IVF on arrival  Treated with CVVHD in ICU   - started intermittent HD  Improved, K today 4 2    Plan:   Continue prn HD per nephro  Monitor BMP     NSTEMI (non-ST elevated myocardial infarction) [facility])  Assessment & Plan  Elevated troponin level noted on presentation  2/2 ARF, no intervention needed  Troponins now wnl    IV drug user  Assessment & Plan  Pt has a hx of polysubstance abuse including cocaine, meth, THC  Could be contributing to pt's "found down" event  Plan:  Continue to encourage cessation    PTSD (post-traumatic stress disorder)  Assessment & Plan  Not on any medications  Recommend follow up out pt PCP/ psych    Hypertension  Assessment & Plan  Pt with elevated BP readings while in hospital, not improved following dialysis  Plan:   Started amlodipine 5mg daily   Prn hydralazine   Continue to monitor vitals around HD timing       Disposition: continuing to monitor for renal recovery and receiving intermittent HD, pt to get permanent HD cath placed Monday     SUBJECTIVE     Patient seen and examined  No acute events overnight  Pt seen today while in dialysis  He was lethargic and minimally responsive to questioning  No overt complaints  OBJECTIVE     Vitals:    22 0830 22 0900 22 0930 22 1000   BP: (!) 158/115 (!) 149/111 116/84 (!) 148/101   BP Location:       Pulse: 88 98 86 92   Resp: 15 16 15 16   Temp:       TempSrc:       SpO2:       Weight:       Height:          Temperature:   Temp (24hrs), Av 2 °F (36 2 °C), Min:96 8 °F (36 °C), Max:97 6 °F (36 4 °C)    Temperature: 97 6 °F (36 4 °C)  Intake & Output:  I/O        0701   0700  0701   0700  0701   0700    P  O  240 120     I V  (mL/kg) 500 (6 1)      Total Intake(mL/kg) 740 (9) 120 (1 5)     Urine (mL/kg/hr) 20 (0)      Other 3034      Total Output 3054      Net -2314 +120 Unmeasured Urine Occurrence 1 x      Unmeasured Stool Occurrence 1 x          Weights:   IBW (Ideal Body Weight): 68 4 kg    Body mass index is 27 52 kg/m²  Weight (last 2 days)     None        Physical Exam  Constitutional:       General: He is not in acute distress  Appearance: He is diaphoretic  Comments: Lethargic    HENT:      Head: Normocephalic and atraumatic  Right Ear: External ear normal       Left Ear: External ear normal       Nose: Nose normal       Mouth/Throat:      Pharynx: Oropharynx is clear  Eyes:      Conjunctiva/sclera: Conjunctivae normal    Cardiovascular:      Rate and Rhythm: Normal rate and regular rhythm  Pulmonary:      Effort: Pulmonary effort is normal  No respiratory distress  Comments: Decreased breath sounds at right base   Abdominal:      General: Abdomen is flat  Bowel sounds are normal       Palpations: Abdomen is soft  Musculoskeletal:      Right lower leg: No edema  Left lower leg: No edema  Skin:     General: Skin is warm  Neurological:      Comments: Lethargic        LABORATORY DATA     Labs: I have personally reviewed pertinent reports    Results from last 7 days   Lab Units 06/18/22  0437 06/17/22  0431 06/16/22  1008   WBC Thousand/uL 14 15* 14 02* 11 42*   HEMOGLOBIN g/dL 13 9 15 0 14 2   HEMATOCRIT % 39 4 41 8 39 5   PLATELETS Thousands/uL 121* 96* 102*   NEUTROS PCT % 79* 77* 84*   MONOS PCT % 9 10 7      Results from last 7 days   Lab Units 06/18/22  0437 06/17/22  0431 06/16/22  1008 06/12/22  1221 06/12/22  0610 06/11/22  1809 06/11/22  1506   POTASSIUM mmol/L 4 2 4 0 3 6   < > 5 1   < > 5 2   CHLORIDE mmol/L 97* 97* 97*   < > 106   < > 106   CO2 mmol/L 22 28 26   < > 23   < > 27   BUN mg/dL 74* 48* 59*   < > 55*   < > 61*   CREATININE mg/dL 7 73* 6 04* 7 08*   < > 4 63*   < > 5 20*   CALCIUM mg/dL 8 0* 7 9* 7 7*   < > 8 1*   < > 7 7*   ALK PHOS U/L  --   --  140*  --  71  --  69   ALT U/L  --   --  206*  --  2,301*  --  2,898*   AST U/L  --   --  510*  --  2,751*  --  6,320*    < > = values in this interval not displayed  Results from last 7 days   Lab Units 06/14/22  0505 06/14/22  0032 06/13/22  1805   MAGNESIUM mg/dL 2 2 2 2 2 1     Results from last 7 days   Lab Units 06/14/22  0505 06/14/22  0032 06/13/22  1805   PHOSPHORUS mg/dL 2 8 2 8 2 7      Results from last 7 days   Lab Units 06/16/22  1317 06/12/22  0817   INR  0 93 1 25*     Results from last 7 days   Lab Units 06/12/22  0817   LACTIC ACID mmol/L 2 0           IMAGING & DIAGNOSTIC TESTING     Radiology Results: I have personally reviewed pertinent reports  CT chest abdomen pelvis wo contrast    Result Date: 6/10/2022  Impression: 1  Limited examination demonstrating no evidence of intrathoracic, intra-abdominal or intrapelvic traumatic injury 2  There is nonspecific perihilar and upper lobe airspace opacities, could represent mild pulmonary edema versus aspiration  3  No pneumothorax, hemothorax or mediastinal air 4  Tiny amount of intravascular and cardiac air, likely introduced through an IV catheter Workstation performed: [id number]     XR chest 1 view portable    Result Date: 6/10/2022  Impression: Endotracheal tube tip is 5 5 cm above the jesika Workstation performed: [id number]     CT head without contrast    Result Date: 6/10/2022  Impression: No acute intracranial abnormality  Bilateral facial subcutaneous air present  Please refer to cervical spine CT report for more specific detail Workstation performed: [id number]     CT spine cervical wo contrast    Result Date: 6/10/2022  Impression: No cervical spine fracture or traumatic malalignment  Subcutaneous emphysema seen in the neck  Given that the CT of the chest abdomen and pelvis demonstrates no evidence of pneumothorax, mediastinal or paraesophageal air, this most likely is related to air introduced through an IV catheter    Also correlate for any penetrating injury that may have allowed generalized subcutaneous air to occur  The examination demonstrates a significant  finding and was documented as such in [facility] for liaison and referring practitioner immediate notification  Workstation performed: [id number]     XR chest portable ICU    Result Date: 6/11/2022  Impression: Deep sulcus sign concerning for a small left basilar pneumothorax  Right lateral decubitus or upright radiograph could be obtained for confirmation  The tip of the left subclavian central venous catheter projects at the superior vena cava  The study was marked in [facility] for immediate notification  Workstation performed: [id number]     US liver doppler only    Result Date: 6/12/2022  Impression: Normal liver Doppler  Workstation performed: [id number]     Other Diagnostic Testing: I have personally reviewed pertinent reports  ACTIVE MEDICATIONS     Current Facility-Administered Medications   Medication Dose Route Frequency    amiodarone tablet 200 mg  200 mg Oral TID With Meals    Followed by   Deb Weaver ON 6/29/2022] amiodarone tablet 200 mg  200 mg Oral Daily With Breakfast    amLODIPine (NORVASC) tablet 5 mg  5 mg Oral Daily    benzonatate (TESSALON PERLES) capsule 100 mg  100 mg Oral TID PRN    cefuroxime (CEFTIN) tablet 500 mg  500 mg Oral Q24H    heparin (porcine) subcutaneous injection 5,000 Units  5,000 Units Subcutaneous Q8H [address]    hydrALAZINE (APRESOLINE) injection 5 mg  5 mg Intravenous Q6H PRN       VTE Pharmacologic Prophylaxis: Heparin  VTE Mechanical Prophylaxis: sequential compression device    Portions of the record may have been created with voice recognition software  Occasional wrong word or "sound a like" substitutions may have occurred due to the inherent limitations of voice recognition software    Read the chart carefully and recognize, using context, where substitutions have occurred   ==  [address]

## 2022-06-18 NOTE — PLAN OF CARE
Problem: MOBILITY - ADULT  Goal: Maintain or return to baseline ADL function  Description: INTERVENTIONS:  -  Assess patient's ability to carry out ADLs; assess patient's baseline for ADL function and identify physical deficits which impact ability to perform ADLs (bathing, care of mouth/teeth, toileting, grooming, dressing, etc )  - Assess/evaluate cause of self-care deficits   - Assess range of motion  - Assess patient's mobility; develop plan if impaired  - Assess patient's need for assistive devices and provide as appropriate  - Encourage maximum independence but intervene and supervise when necessary  - Involve family in performance of ADLs  - Assess for home care needs following discharge   - Consider OT consult to assist with ADL evaluation and planning for discharge  - Provide patient education as appropriate  Outcome: Progressing  Goal: Maintains/Returns to pre admission functional level  Description: INTERVENTIONS:  - Perform BMAT or MOVE assessment daily    - Set and communicate daily mobility goal to care team and patient/family/caregiver  - Collaborate with rehabilitation services on mobility goals if consulted  - Reposition patient every 2 hours    - Stand patient 2 times a day  - Out of bed to chair 3 times a day   - Out of bed for toileting  - Record patient progress and toleration of activity level   Outcome: Progressing     Problem: Prexisting or High Potential for Compromised Skin Integrity  Goal: Skin integrity is maintained or improved  Description: INTERVENTIONS:  - Identify patients at risk for skin breakdown  - Assess and monitor skin integrity  - Assess and monitor nutrition and hydration status  - Monitor labs   - Assess for incontinence   - Turn and reposition patient  - Assist with mobility/ambulation  - Relieve pressure over bony prominences  - Avoid friction and shearing  - Provide appropriate hygiene as needed including keeping skin clean and dry  - Evaluate need for skin moisturizer/barrier cream  - Collaborate with interdisciplinary team   - Patient/family teaching  - Consider wound care consult   Outcome: Progressing     Problem: PAIN - ADULT  Goal: Verbalizes/displays adequate comfort level or baseline comfort level  Description: Interventions:  - Encourage patient to monitor pain and request assistance  - Assess pain using appropriate pain scale  - Administer analgesics based on type and severity of pain and evaluate response  - Implement non-pharmacological measures as appropriate and evaluate response  - Consider cultural and social influences on pain and pain management  - Notify physician/advanced practitioner if interventions unsuccessful or patient reports new pain  Outcome: Progressing     Problem: Nutrition/Hydration-ADULT  Goal: Nutrient/Hydration intake appropriate for improving, restoring or maintaining nutritional needs  Description: Monitor and assess patient's nutrition/hydration status for malnutrition  Collaborate with interdisciplinary team and initiate plan and interventions as ordered  Monitor patient's weight and dietary intake as ordered or per policy  Utilize nutrition screening tool and intervene as necessary  Determine patient's food preferences and provide high-protein, high-caloric foods as appropriate       INTERVENTIONS:  - Monitor oral intake, urinary output, labs, and treatment plans  - Assess nutrition and hydration status and recommend course of action  - Evaluate amount of meals eaten  - Assist patient with eating if necessary   - Allow adequate time for meals  - Recommend/ encourage appropriate diets, oral nutritional supplements, and vitamin/mineral supplements  - Order, calculate, and assess calorie counts as needed  - Recommend, monitor, and adjust tube feedings and TPN/PPN based on assessed needs  - Assess need for intravenous fluids  - Provide specific nutrition/hydration education as appropriate  - Include patient/family/caregiver in decisions related to nutrition  Outcome: Progressing     Problem: METABOLIC, FLUID AND ELECTROLYTES - ADULT  Goal: Electrolytes maintained within normal limits  Description: INTERVENTIONS:  - Monitor labs and assess patient for signs and symptoms of electrolyte imbalances  - Administer electrolyte replacement as ordered  - Monitor response to electrolyte replacements, including repeat lab results as appropriate  - Instruct patient on fluid and nutrition as appropriate  Outcome: Progressing  Goal: Fluid balance maintained  Description: INTERVENTIONS:  - Monitor labs   - Monitor I/O and WT  - Instruct patient on fluid and nutrition as appropriate  - Assess for signs & symptoms of volume excess or deficit  Outcome: Progressing

## 2022-06-18 NOTE — PROGRESS NOTES
NEPHROLOGY PROGRESS NOTE   Omari Montague 46 y o  male MRN: [id number]  Unit/Bed#: [facility] 806-01 Encounter: [id number]  Reason for Consult: JOÃO    ASSESSMENT AND PLAN:  JOÃO POA, baseline creatinine 0 9 to 1 1, isolated values 1 6 in September 2020  -JOÃO suspected to be secondary to ATN due to rhabdomyolysis, shock in the setting of cocaine/methamphetamine toxicity  -patient was started on CRRT on 6/11/22 which was discontinued on 6/13/22  -now transition to I HD, remains on TTS schedule   -poor urine output, continue to monitor for renal recovery  -initial CT showed no hydronephrosis  -eventual consider for PermCath placement    I saw and examined patient during hemodialysis treatment at 10:23 AM on 6/18/2022  The patient was receiving hemodialysis for treatment of JOÃO  I also reviewed vital signs, intake and output, lab results and recent events, and agree with dialysis order  Tolerating HD  BP acceptable    Access, currently has temporary HD catheter, no significant urine output  Consider PermCath placement early next week  -no episodes of fever, mild leukocytosis noted  Blood culture negative from 6/10/22    Severe rhabdomyolysis, peak CK level greater than 100,000 now seems to be overall improving 2545  -continue to trend    Hyponatremia  -continue to monitor with dialysis, strict fluid restriction 1 2 L per day recommended  Initial shock required vasopressors now remains off  Initial concern for pneumonia currently on p o  Antibiotic completing total seven days of course    Initial VDRF, status post extubated  Now remains on room air at the time of my encounter    Cocaine/methamphetamine intoxication, initial UA showing hematuria along with multiple bacteria  ANCA panel to follow-up  Hypertension, continue amlodipine  Monitor blood pressure with dialysis  If BP remains persistently above goal, consider increasing amlodipine to 5 mg b i d  SUBJECTIVE:  Patient seen and examined at bedside  Patient is intermittent cough but denies any nausea, vomiting or chest pain    OBJECTIVE:  Current Weight: Weight - Scale: 82 1 kg (181 lb)  Vitals:    06/18/22 1000   BP: (!) 148/101   Pulse: 92   Resp: 16   Temp:    SpO2:        Intake/Output Summary (Last 24 hours) at 6/18/2022 1018  Last data filed at 6/18/2022 0815  Gross per 24 hour   Intake 200 ml   Output --   Net 200 ml     Wt Readings from Last 3 Encounters:   06/14/22 82 1 kg (181 lb)   06/10/22 79 7 kg (175 lb 11 3 oz)   08/25/21 72 6 kg (160 lb)     Temp Readings from Last 3 Encounters:   06/18/22 97 6 °F (36 4 °C) (Oral)   06/10/22 99 1 °F (37 3 °C) (Bladder)   08/25/21 98 3 °F (36 8 °C) (Oral)     BP Readings from Last 3 Encounters:   06/18/22 (!) 148/101   06/10/22 103/67   08/25/21 154/75     Pulse Readings from Last 3 Encounters:   06/18/22 92   06/10/22 101   08/25/21 (!) 107        Physical Examination:  General:  Sitting in chair, no acute distress   Eyes:  No conjunctival pallor present  ENT:  External examination of ears and nose unremarkable  Neck:  No obvious lymphadenopathy appreciated  Respiratory:  Bilateral air entry present  CVS:  S1, S2 present  GI:  Soft, nondistended  CNS:  Active alert oriented x3  Skin:  No new rash  Musculoskeletal:  No obvious new gross deformity noted    Medications:    Current Facility-Administered Medications:     amiodarone tablet 200 mg, 200 mg, Oral, TID With Meals, 200 mg at 06/17/22 1616 **FOLLOWED BY** [START ON 6/29/2022] amiodarone tablet 200 mg, 200 mg, Oral, Daily With Breakfast, Nella Marsh MD    amLODIPine (NORVASC) tablet 5 mg, 5 mg, Oral, Daily, Jewels Faith MD, 5 mg at 06/17/22 1105    benzonatate (TESSALON PERLES) capsule 100 mg, 100 mg, Oral, TID PRN, Jewels Faith MD, 100 mg at 06/18/22 0903    cefuroxime (CEFTIN) tablet 500 mg, 500 mg, Oral, Q24H, Jewels Faith MD, 500 mg at 06/17/22 1359    heparin (porcine) subcutaneous injection 5,000 Units, 5,000 Units, Subcutaneous, Q8H [facility], Blu Crawford MD, 5,000 Units at 06/18/22 0540    hydrALAZINE (APRESOLINE) injection 5 mg, 5 mg, Intravenous, Q6H PRN, Cheko Holm, DO    Laboratory Results:  Results from last 7 days   Lab Units 06/18/22  0437 06/17/22  0431 06/16/22  1008 06/15/22  1315 06/14/22  0505 06/14/22  0032 06/13/22  1805 06/13/22  1228 06/13/22  0958 06/13/22  0531 06/13/22  0013 06/12/22  1807 06/12/22  1221 06/12/22  0610 06/11/22  1506 06/11/22  1053   WBC Thousand/uL 14 15* 14 02* 11 42* 8 09 7 69  --   --   --  10 27*  --   --   --   --  13 06*  --  11 27*   HEMOGLOBIN g/dL 13 9 15 0 14 2 14 4 12 1  --   --   --  13 0  --   --   --   --  15 3  --  14 4   HEMATOCRIT % 39 4 41 8 39 5 40 1 35 4*  --   --   --  36 9  --   --   --   --  44 2  --  42 0   PLATELETS Thousands/uL 121* 96* 102* 95*  --   --   --   --  120*  --   --   --   --  149  --  128*   SODIUM mmol/L 129* 132* 131* 134* 137 135* 136 140  --  138 137 138   < > 140   < > 142   POTASSIUM mmol/L 4 2 4 0 3 6 4 0 3 9 3 9 4 4 3 7  --  4 0 4 2 4 4   < > 5 1   < > 4 0   CHLORIDE mmol/L 97* 97* 97* 101 105 103 105 111*  --  105 106 106   < > 106   < > 108   CO2 mmol/L 22 28 26 27 26 23 23 24  --  25 22 23   < > 23   < > 28   BUN mg/dL 74* 48* 59* 42* 48* 44* 36* 35*  --  42* 46* 48*   < > 55*   < > 56*   CREATININE mg/dL 7 73* 6 04* 7 08* 5 38* 4 56* 4 10* 3 18* 2 89*  --  3 51* 3 65* 3 89*   < > 4 63*   < > 4 51*   CALCIUM mg/dL 8 0* 7 9* 7 7* 8 1* 8 5 8 5 9 0 7 3*  --  8 4 8 6 8 8   < > 8 1*   < > 7 3*   MAGNESIUM mg/dL  --   --   --   --  2 2 2 2 2 1 2 0  --  2 3 1 7 2 0   < > 2 3   < > 2 5   PHOSPHORUS mg/dL  --   --   --   --  2 8 2 8 2 7 2 8  --  3 0 3 2 3 7   < > 6 7*   < >  --     < > = values in this interval not displayed  XR chest portable   Final Result by Ike Escoto MD (06/17 5206)      Status post extubation  Persistent bibasilar opacities likely due to atelectasis  No evidence for pneumothorax                    Workstation performed: [id number]         US liver doppler only   Final Result by Ondina Burger MD (06/12 9591)      Normal liver Doppler  Workstation performed: [id number]         XR chest portable ICU   Final Result by Eliceo Rivera MD (06/11 6657)      Deep sulcus sign concerning for a small left basilar pneumothorax  Right lateral decubitus or upright radiograph could be obtained for confirmation  The tip of the left subclavian central venous catheter projects at the superior vena cava  The study was marked in [facility] for immediate notification  Workstation performed: [id number]             Portions of the record may have been created with voice recognition software  Occasional wrong word or "sound a like" substitutions may have occurred due to the inherent limitations of voice recognition software  Read the chart carefully and recognize, using context, where substitutions have occurred

## 2022-06-18 NOTE — HEMODIALYSIS
Post-Dialysis RN Treatment Note    Blood Pressure:  Pre 160/114 mm/Hg  Post 137/102 mmHg   EDW  TBD kg    Weight:  Pre 86 9 kg   Post 84 0 kg   Mode of weight measurement: Standing Scale   Volume Removed  2900 ml    Treatment duration 180 minutes    NS given  No    Treatment shortened?  Yes, describe: frequest machine alarming due to patient coughing   Medications given during Rx Not Applicable   Estimated Kt/V  0 94   Access type: Temporary HD catheter   Access Issues: Yes, describe: positional, frequest alarming    Report called to primary nurse   Yes / Prema Gutierrez RN

## 2022-06-18 NOTE — PHYSICAL THERAPY NOTE
Physical Therapy Cancellation Note             06/18/22 1026   PT Last Visit   PT Visit Date 06/18/22   Note Type   Note type Evaluation   Cancel Reasons Patient off floor/test       PT orders received, chart review complete  At this time patient is currently off floor at dialysis  Will continue to follow as able      Jake Forrest, PT

## 2022-06-18 NOTE — ASSESSMENT & PLAN NOTE
Pt with elevated BP readings while in hospital, not improved following dialysis   Not improved on amlodipine 5mg daily    Plan:   · Increased amlodipine to 10mg daily   · Prn hydralazine   · Continue to monitor vitals around HD timing

## 2022-06-19 PROCEDURE — 99232 SBSQ HOSP IP/OBS MODERATE 35: CPT | Performed by: INTERNAL MEDICINE

## 2022-06-19 PROCEDURE — 97162 PT EVAL MOD COMPLEX 30 MIN: CPT

## 2022-06-19 PROCEDURE — NC001 PR NO CHARGE: Performed by: INTERNAL MEDICINE

## 2022-06-19 RX ORDER — LIDOCAINE 50 MG/G
1 PATCH TOPICAL DAILY PRN
Status: DISCONTINUED | OUTPATIENT
Start: 2022-06-19 | End: 2022-06-24 | Stop reason: HOSPADM

## 2022-06-19 RX ORDER — CALCIUM CARBONATE 200(500)MG
500 TABLET,CHEWABLE ORAL 3 TIMES DAILY PRN
Status: DISCONTINUED | OUTPATIENT
Start: 2022-06-19 | End: 2022-06-24 | Stop reason: HOSPADM

## 2022-06-19 RX ORDER — LIDOCAINE 50 MG/G
1 PATCH TOPICAL DAILY
Status: DISCONTINUED | OUTPATIENT
Start: 2022-06-20 | End: 2022-06-19

## 2022-06-19 RX ADMIN — AMIODARONE HYDROCHLORIDE 200 MG: 200 TABLET ORAL at 08:32

## 2022-06-19 RX ADMIN — HEPARIN SODIUM 5000 UNITS: 5000 INJECTION INTRAVENOUS; SUBCUTANEOUS at 22:17

## 2022-06-19 RX ADMIN — NICOTINE 21 MG: 21 PATCH, EXTENDED RELEASE TRANSDERMAL at 08:33

## 2022-06-19 RX ADMIN — CALCIUM CARBONATE (ANTACID) CHEW TAB 500 MG 500 MG: 500 CHEW TAB at 13:52

## 2022-06-19 RX ADMIN — AMIODARONE HYDROCHLORIDE 200 MG: 200 TABLET ORAL at 17:13

## 2022-06-19 RX ADMIN — HEPARIN SODIUM 5000 UNITS: 5000 INJECTION INTRAVENOUS; SUBCUTANEOUS at 13:51

## 2022-06-19 RX ADMIN — LIDOCAINE 5% 1 PATCH: 700 PATCH TOPICAL at 22:00

## 2022-06-19 RX ADMIN — HEPARIN SODIUM 5000 UNITS: 5000 INJECTION INTRAVENOUS; SUBCUTANEOUS at 05:23

## 2022-06-19 RX ADMIN — AMIODARONE HYDROCHLORIDE 200 MG: 200 TABLET ORAL at 11:37

## 2022-06-19 RX ADMIN — AMLODIPINE BESYLATE 5 MG: 5 TABLET ORAL at 08:33

## 2022-06-19 NOTE — PLAN OF CARE
Problem: PHYSICAL THERAPY ADULT  Goal: Performs mobility at highest level of function for planned discharge setting  See evaluation for individualized goals  Description: Treatment/Interventions: ADL retraining, Functional transfer training, LE strengthening/ROM, Elevations, Therapeutic exercise, Endurance training, Patient/family training, Equipment eval/education, Bed mobility, Gait training, Spoke to nursing, OT, Continued evaluation  Equipment Recommended: Tuan Williamson       See flowsheet documentation for full assessment, interventions and recommendations  Note: Prognosis: Good  Problem List: Impaired balance, Decreased coordination, Impaired judgement, Decreased safety awareness, Decreased endurance, Decreased strength  Assessment: Pt seen for moderate complexity PT evaluation  Pt with active PT eval/treat orders  Pt is a 46 y o  male who was admitted to [facility] on 6/10/2022  Pt's current dx/ problem list include: acute renal failure  Comorbidities affecting pt's physical performance at time of assessment are as follows: HTN, atrial fibrillation, IV drug user, NSTEMI, pneumonia, hyperkalemia  Personal factors affecting pt at time of initial examination include: steps to enter environment, limited home support, past experience, inability to perform IADLs, inability to perform ADLs  Due to acute medical issues, ongoing medical workup for primary dx; pain, fall risk, decreased activity tolerance compared to baseline, increased assistance needed from caregiver at current time, multiple lines, decline in overall functional mobility status; health management issues; note unstable clinical picture (mocerate complexity)  At baseline, pt resides with his fiance in a hotel 3rd floor with either an elevator or stairs, and was independent with ADLs/ IADLs  Prior to hospital admission pt was working full time and I with ADLs/IADLs   Currently, upon initial examination, pt  is requiring supervision for all mobility tasks as patient was very impulsive during today's session and demonstrated poor insight into safety awarness  PT to continue to follow and assess functional transfers and ambulation as appropriate and able  Currently, pt presents functioning below baseline and with overall mobility deficits 2* to: decreased LE strength/AROM; limited flexibility;  generalized weakness/ deconditioning; decreased endurance; decreased activity tolerance; impaired balance; gait deviations  Pt currently at increased risk for falls  At the end of evaluation, pt was left supine with all needs in reach  Pt would benefit from continued skilled PT services while in hospital to address remaining limitations and maximize functional potential  The patient's AM-PAC Basic Mobility Inpatient Short Form Raw Score is 22  A Raw score of greater than 16 suggests the patient may benefit from discharge to home  Please also refer to the recommendation of the Physical Therapist for safe discharge planning  Barriers to Discharge: Inaccessible home environment        PT Discharge Recommendation: Home with home health rehabilitation          See flowsheet documentation for full assessment

## 2022-06-19 NOTE — PROGRESS NOTES
INTERNAL MEDICINE RESIDENCY PROGRESS NOTE     Name: Tonja Camacho   Age & Sex: 46 y o  male   MRN: [id number]  Unit/Bed#: [facility] 806-01   Encounter: [id number]  Team: SOD Team A    PATIENT INFORMATION     Name: Tonja Camacho   Age & Sex: 46 y o  male   MRN: [id number]  Hospital Stay Days: 9    ASSESSMENT/PLAN     Principal Problem:    Acute renal failure ([facility] )  Active Problems:    Hypertension    Atrial fibrillation ([facility] )    PTSD (post-traumatic stress disorder)    Hyperkalemia    IV drug user    Cocaine abuse ([facility] )    Unresponsiveness    NSTEMI (non-ST elevated myocardial infarction) ([facility] )    Methamphetamine abuse ([facility] )    Acute encephalopathy    Pneumonia due to Haemophilus influenzae ([facility] )      * Acute renal failure ([facility] )  Assessment & Plan  In the setting of rhabdomyolysis   Required CVVHD  6/14- transitioned to intermittent HD  Still oliguric, per pt producing small amounts of dark urine but unmeasured by nursing    Plan:   Continue to monitor mental status around HD timing   Nephro following, appreciate recommendations  T/Th/Sat schedule  Tentative PermCath placement 6/20  NPO at midnight 6/20  Monitor CK, Cr, BMP   Monitor I/Os    Hypertension  Assessment & Plan  Pt with elevated BP readings while in hospital, not improved following dialysis  Plan:   Continue amlodipine 5mg daily   Prn hydralazine   Continue to monitor vitals around HD timing     Atrial fibrillation ([facility] )  Assessment & Plan  Pt intermittently having AF with RVR, new on this admission  Loaded with amio in ICU  Plan:   Continue amiodarone 200mg tid  Transition to 200mg daily on 6/29  Monitor on tele   Monitor hemodynamics     Pneumonia due to Haemophilus influenzae [facility])  Assessment & Plan  Sputum revealing H Flu and Group B strep  Initially received Zosyn and Vanco, switched to cefuroxime  6/17 CXR: Persistent bibasilar opacities likely due to atelectasis  No evidence for pneumothorax    WBC 6/17 up to 14, no other obvious source for infection at this time  Plan:   IS   Completed course of Cefuroxime 500mg x4 days for total of 7 days of Abx   Continue to trend fever curve and WBC    NSTEMI (non-ST elevated myocardial infarction) [facility])  Assessment & Plan  Elevated troponin level noted on presentation  2/2 ARF, no intervention needed  Troponins now wnl    IV drug user  Assessment & Plan  Pt has a hx of polysubstance abuse including cocaine, meth, THC  Could be contributing to pt's "found down" event  Plan:  Continue to encourage cessation    Hyperkalemia  Assessment & Plan  2/ to acute renal failure  Peaked T waves on EKG  Treated with insulin, Ca gluconate, and IVF on arrival  Treated with CVVHD in ICU   - started intermittent HD  Improved    Plan:   Continue prn HD per nephro  Monitor BMP     PTSD (post-traumatic stress disorder)  Assessment & Plan  Not on any medications  Recommend follow up out pt PCP/ psych      Disposition:  Remains inpatient for dialysis    SUBJECTIVE     Patient seen and examined  No acute events overnight  No acute complaints  States he has urinated a little but not at baseline amount  Eating and drinking without difficulty  Denies fever, chills, chest pain, shortness of breath, nausea, abdominal pain, headache, dysuria  OBJECTIVE     Vitals:    22 2224 22 0530 22 0530 22 0720   BP: 170/98 (!) 153/102 (!) 153/102 151/94   BP Location:       Pulse:    88   Resp: 21   16   Temp: (!) 96 4 °F (35 8 °C) (!) 96 2 °F (35 7 °C) (!) 96 2 °F (35 7 °C) (!) 96 6 °F (35 9 °C)   TempSrc:       SpO2:    100%   Weight:       Height:          Temperature:   Temp (24hrs), Av 9 °F (36 1 °C), Min:96 2 °F (35 7 °C), Max:98 2 °F (36 8 °C)    Temperature: (!) 96 6 °F (35 9 °C)  Intake & Output:  I/O        0701   0700  0701   0700  07 0700    P  O  120      I V  (mL/kg)  500 (6 1)     Total Intake(mL/kg) 120 (1 5) 500 (6 1)     Urine (mL/kg/hr)       Other  3300 Total Output  3300     Net +120 -2800                Weights:   IBW (Ideal Body Weight): 68 4 kg    Body mass index is 27 52 kg/m²  Weight (last 2 days)     None        Physical Exam  Vitals reviewed  Constitutional:       General: He is not in acute distress  HENT:      Head: Normocephalic and atraumatic  Nose: No rhinorrhea  Eyes:      General: No scleral icterus  Cardiovascular:      Rate and Rhythm: Normal rate and regular rhythm  Pulses: Normal pulses  Heart sounds: Normal heart sounds  No murmur heard  No friction rub  No gallop  Pulmonary:      Effort: Pulmonary effort is normal  No respiratory distress  Breath sounds: Normal breath sounds  No wheezing, rhonchi or rales  Abdominal:      General: Bowel sounds are normal  There is no distension  Palpations: Abdomen is soft  Tenderness: There is no abdominal tenderness  There is no guarding  Musculoskeletal:      Right lower leg: No edema  Left lower leg: No edema  Skin:     General: Skin is warm and dry  Coloration: Skin is not jaundiced  Neurological:      Mental Status: He is alert and oriented to person, place, and time  Comments: CN 2-12 grossly intact, moves all 4 extremities, no dysarthria   Psychiatric:         Behavior: Behavior normal        LABORATORY DATA     Labs: I have personally reviewed pertinent reports    Results from last 7 days   Lab Units 06/18/22  0437 06/17/22  0431 06/16/22  1008   WBC Thousand/uL 14 15* 14 02* 11 42*   HEMOGLOBIN g/dL 13 9 15 0 14 2   HEMATOCRIT % 39 4 41 8 39 5   PLATELETS Thousands/uL 121* 96* 102*   NEUTROS PCT % 79* 77* 84*   MONOS PCT % 9 10 7      Results from last 7 days   Lab Units 06/18/22  0437 06/17/22  0431 06/16/22  1008   POTASSIUM mmol/L 4 2 4 0 3 6   CHLORIDE mmol/L 97* 97* 97*   CO2 mmol/L 22 28 26   BUN mg/dL 74* 48* 59*   CREATININE mg/dL 7 73* 6 04* 7 08*   CALCIUM mg/dL 8 0* 7 9* 7 7*   ALK PHOS U/L  --   --  140*   ALT U/L  --   -- 206*   AST U/L  --   --  510*     Results from last 7 days   Lab Units 06/14/22  0505 06/14/22  0032 06/13/22  1805   MAGNESIUM mg/dL 2 2 2 2 2 1     Results from last 7 days   Lab Units 06/14/22  0505 06/14/22  0032 06/13/22  1805   PHOSPHORUS mg/dL 2 8 2 8 2 7      Results from last 7 days   Lab Units 06/16/22  1317   INR  0 93               IMAGING & DIAGNOSTIC TESTING     Radiology Results: I have personally reviewed pertinent reports  CT chest abdomen pelvis wo contrast    Result Date: 6/10/2022  Impression: 1  Limited examination demonstrating no evidence of intrathoracic, intra-abdominal or intrapelvic traumatic injury 2  There is nonspecific perihilar and upper lobe airspace opacities, could represent mild pulmonary edema versus aspiration  3  No pneumothorax, hemothorax or mediastinal air 4  Tiny amount of intravascular and cardiac air, likely introduced through an IV catheter Workstation performed: [id number]     XR chest 1 view portable    Result Date: 6/10/2022  Impression: Endotracheal tube tip is 5 5 cm above the jesika Workstation performed: [id number]     CT head without contrast    Result Date: 6/10/2022  Impression: No acute intracranial abnormality  Bilateral facial subcutaneous air present  Please refer to cervical spine CT report for more specific detail Workstation performed: [id number]     CT spine cervical wo contrast    Result Date: 6/10/2022  Impression: No cervical spine fracture or traumatic malalignment  Subcutaneous emphysema seen in the neck  Given that the CT of the chest abdomen and pelvis demonstrates no evidence of pneumothorax, mediastinal or paraesophageal air, this most likely is related to air introduced through an IV catheter  Also correlate for any penetrating injury that may have allowed generalized subcutaneous air to occur   The examination demonstrates a significant  finding and was documented as such in [facility] for liaison and referring practitioner immediate notification  Workstation performed: [id number]     XR chest portable ICU    Result Date: 6/11/2022  Impression: Deep sulcus sign concerning for a small left basilar pneumothorax  Right lateral decubitus or upright radiograph could be obtained for confirmation  The tip of the left subclavian central venous catheter projects at the superior vena cava  The study was marked in [facility] for immediate notification  Workstation performed: [id number]     US liver doppler only    Result Date: 6/12/2022  Impression: Normal liver Doppler  Workstation performed: [id number]     Other Diagnostic Testing: I have personally reviewed pertinent reports  ACTIVE MEDICATIONS     Current Facility-Administered Medications   Medication Dose Route Frequency    amiodarone tablet 200 mg  200 mg Oral TID With Meals    Followed by   Rigoberto Tinoco ON 6/29/2022] amiodarone tablet 200 mg  200 mg Oral Daily With Breakfast    amLODIPine (NORVASC) tablet 5 mg  5 mg Oral Daily    benzonatate (TESSALON PERLES) capsule 100 mg  100 mg Oral TID PRN    heparin (porcine) subcutaneous injection 5,000 Units  5,000 Units Subcutaneous Q8H [address]    hydrALAZINE (APRESOLINE) injection 5 mg  5 mg Intravenous Q6H PRN    nicotine (NICODERM CQ) 21 mg/24 hr TD 24 hr patch 21 mg  21 mg Transdermal Daily       VTE Pharmacologic Prophylaxis: Heparin  VTE Mechanical Prophylaxis: SCD    Portions of the record may have been created with voice recognition software  Occasional wrong word or "sound a like" substitutions may have occurred due to the inherent limitations of voice recognition software    Read the chart carefully and recognize, using context, where substitutions have occurred   ==  Jermaine Denton DO  [address]  Internal Medicine Residency PGY-1

## 2022-06-19 NOTE — PLAN OF CARE
Problem: Prexisting or High Potential for Compromised Skin Integrity  Goal: Skin integrity is maintained or improved  Description: INTERVENTIONS:  - Identify patients at risk for skin breakdown  - Assess and monitor skin integrity  - Assess and monitor nutrition and hydration status  - Monitor labs   - Assess for incontinence   - Turn and reposition patient  - Assist with mobility/ambulation  - Relieve pressure over bony prominences  - Avoid friction and shearing  - Provide appropriate hygiene as needed including keeping skin clean and dry  - Evaluate need for skin moisturizer/barrier cream  - Collaborate with interdisciplinary team   - Patient/family teaching  - Consider wound care consult   Outcome: Progressing     Problem: PAIN - ADULT  Goal: Verbalizes/displays adequate comfort level or baseline comfort level  Description: Interventions:  - Encourage patient to monitor pain and request assistance  - Assess pain using appropriate pain scale  - Administer analgesics based on type and severity of pain and evaluate response  - Implement non-pharmacological measures as appropriate and evaluate response  - Consider cultural and social influences on pain and pain management  - Notify physician/advanced practitioner if interventions unsuccessful or patient reports new pain  Outcome: Progressing     Problem: INFECTION - ADULT  Goal: Absence or prevention of progression during hospitalization  Description: INTERVENTIONS:  - Assess and monitor for signs and symptoms of infection  - Monitor lab/diagnostic results  - Monitor all insertion sites, i e  indwelling lines, tubes, and drains  - Monitor endotracheal if appropriate and nasal secretions for changes in amount and color  - [place] appropriate cooling/warming therapies per order  - Administer medications as ordered  - Instruct and encourage patient and family to use good hand hygiene technique  - Identify and instruct in appropriate isolation precautions for identified infection/condition  Outcome: Progressing     Problem: SAFETY ADULT  Goal: Patient will remain free of falls  Description: INTERVENTIONS:  - Educate patient/family on patient safety including physical limitations  - Instruct patient to call for assistance with activity   - Consult OT/PT to assist with strengthening/mobility   - Keep Call bell within reach  - Keep bed low and locked with side rails adjusted as appropriate  - Keep care items and personal belongings within reach  - Initiate and maintain comfort rounds  - Make Fall Risk Sign visible to staff  - Apply yellow socks and bracelet for high fall risk patients  - Consider moving patient to room near nurses station  Outcome: Progressing     Problem: DISCHARGE PLANNING  Goal: Discharge to home or other facility with appropriate resources  Description: INTERVENTIONS:  - Identify barriers to discharge w/patient and caregiver  - Arrange for needed discharge resources and transportation as appropriate  - Identify discharge learning needs (meds, wound care, etc )  - Arrange for interpretive services to assist at discharge as needed  - Refer to Case Management Department for coordinating discharge planning if the patient needs post-hospital services based on physician/advanced practitioner order or complex needs related to functional status, cognitive ability, or social support system  Outcome: Progressing     Problem: Knowledge Deficit  Goal: Patient/family/caregiver demonstrates understanding of disease process, treatment plan, medications, and discharge instructions  Description: Complete learning assessment and assess knowledge base    Interventions:  - Provide teaching at level of understanding  - Provide teaching via preferred learning methods  Outcome: Progressing     Problem: Nutrition/Hydration-ADULT  Goal: Nutrient/Hydration intake appropriate for improving, restoring or maintaining nutritional needs  Description: Monitor and assess patient's nutrition/hydration status for malnutrition  Collaborate with interdisciplinary team and initiate plan and interventions as ordered  Monitor patient's weight and dietary intake as ordered or per policy  Utilize nutrition screening tool and intervene as necessary  Determine patient's food preferences and provide high-protein, high-caloric foods as appropriate       INTERVENTIONS:  - Monitor oral intake, urinary output, labs, and treatment plans  - Assess nutrition and hydration status and recommend course of action  - Evaluate amount of meals eaten  - Assist patient with eating if necessary   - Allow adequate time for meals  - Recommend/ encourage appropriate diets, oral nutritional supplements, and vitamin/mineral supplements  - Order, calculate, and assess calorie counts as needed  - Recommend, monitor, and adjust tube feedings and TPN/PPN based on assessed needs  - Assess need for intravenous fluids  - Provide specific nutrition/hydration education as appropriate  - Include patient/family/caregiver in decisions related to nutrition  Outcome: Progressing     Problem: Potential for Falls  Goal: Patient will remain free of falls  Description: INTERVENTIONS:  - Educate patient/family on patient safety including physical limitations  - Instruct patient to call for assistance with activity   - Consult OT/PT to assist with strengthening/mobility   - Keep Call bell within reach  - Keep bed low and locked with side rails adjusted as appropriate  - Keep care items and personal belongings within reach  - Initiate and maintain comfort rounds  - Make Fall Risk Sign visible to staff  - Apply yellow socks and bracelet for high fall risk patients  - Consider moving patient to room near nurses station  Outcome: Progressing     Problem: METABOLIC, FLUID AND ELECTROLYTES - ADULT  Goal: Electrolytes maintained within normal limits  Description: INTERVENTIONS:  - Monitor labs and assess patient for signs and symptoms of electrolyte imbalances  - Administer electrolyte replacement as ordered  - Monitor response to electrolyte replacements, including repeat lab results as appropriate  - Instruct patient on fluid and nutrition as appropriate  Outcome: Progressing  Goal: Fluid balance maintained  Description: INTERVENTIONS:  - Monitor labs   - Monitor I/O and WT  - Instruct patient on fluid and nutrition as appropriate  - Assess for signs & symptoms of volume excess or deficit  Outcome: Progressing

## 2022-06-19 NOTE — PHYSICAL THERAPY NOTE
Physical Therapy Evaluation     Patient's Name: Blanche Quinteros    Admitting Diagnosis  AMS (altered mental status) [R41 82]    Problem List  Patient Active Problem List   Diagnosis    Chest pain    Hypertension    PTSD (post-traumatic stress disorder)    History of substance abuse ([facility] )    Hyperkalemia    IV drug user    Cocaine abuse ([facility] )    Unresponsiveness    Acute renal failure (HCC)    NSTEMI (non-ST elevated myocardial infarction) ([facility] )    Methamphetamine abuse ([place] [address] )    Acute encephalopathy    Pneumonia due to Haemophilus influenzae ([place] [address] )    Atrial fibrillation [facility])       Past Medical History  Past Medical History:   Diagnosis Date    Hepatitis C     PTSD (post-traumatic stress disorder)        Past Surgical History  Past Surgical History:   Procedure Laterality Date    APPENDECTOMY          06/19/22 0945   PT Last Visit   PT Visit Date 06/19/22   Note Type   Note type Evaluation   Pain Assessment   Pain Assessment Tool 0-10   Pain Score No Pain   Restrictions/Precautions   Weight Bearing Precautions Per Order No   Other Precautions Fall Risk;Multiple lines   Home Living   Type of Home Other (Comment)  [employer] 3rd floor)   Home Layout Elevator  (3 flight of stairs)   Bathroom Shower/Tub Walk-in shower   Bathroom Toilet Standard   Additional Comments   (Pt lives in hotel with fiance, 3 fights of stairs vs elevator)   Prior Function   Level of [place] Independent with ADLs and functional mobility   Lives With Significant other   [facility] [address] in the last 6 months 1 to 4  (1 fall leading to this admission)   Vocational Full time employment  ()   General   Family/Caregiver Present No   Cognition   Arousal/Participation Cooperative   Attention Within functional limits   Orientation Level Oriented X4   Following Commands Follows one step commands with increased time or repetition   RLE Assessment   RLE Assessment WFL   LLE Assessment   LLE Assessment WFL   Bed Mobility   Supine to Sit 7  Independent   Additional items Impulsive   Sit to Supine 7  Independent   Additional items Impulsive   Transfers   Sit to Stand 7  Independent   Additional items Impulsive   Stand to Sit 7  Independent   Additional items Increased time required   Ambulation/Elevation   Gait pattern Step through pattern;Scissoring   Gait Assistance 5  Supervision   Additional items   (CGA)   Assistive Device None   Distance 150'   Stair Management Assistance 5  Supervision   Stair Management Technique Step to pattern; One rail L   Number of Stairs 6   Ambulation/Elevation Additional Comments During ambulation pt required frequent cuing to slow down to prevent scissoring  Balance   Static Sitting Good   Dynamic Sitting Fair +   Static Standing Fair   Dynamic Standing Fair   Ambulatory Fair   Activity Tolerance   Activity Tolerance Patient tolerated treatment well   Nurse Made Aware RN cleared pt for PT eval   Assessment   Prognosis Good   Problem List Impaired balance;Decreased coordination; Impaired judgement;Decreased safety awareness;Decreased endurance;Decreased strength   Assessment Pt seen for moderate complexity PT evaluation  Pt with active PT eval/treat orders  Pt is a 46 y o  male who was admitted to [facility] on 6/10/2022  Pt's current dx/ problem list include: acute renal failure  Comorbidities affecting pt's physical performance at time of assessment are as follows: HTN, atrial fibrillation, IV drug user, NSTEMI, pneumonia, hyperkalemia  Personal factors affecting pt at time of initial examination include: steps to enter environment, limited home support, past experience, inability to perform IADLs, inability to perform ADLs   Due to acute medical issues, ongoing medical workup for primary dx; pain, fall risk, decreased activity tolerance compared to baseline, increased assistance needed from caregiver at current time, multiple lines, decline in overall functional mobility status; health management issues; note unstable clinical picture (mocerate complexity)  At baseline, pt resides with his fiance in a hotel 3rd floor with either an elevator or stairs, and was independent with ADLs/ IADLs  Prior to hospital admission pt was working full time and I with ADLs/IADLs  Currently, upon initial examination, pt  is requiring supervision for all mobility tasks as patient was very impulsive during today's session and demonstrated poor insight into safety awarness  PT to continue to follow and assess functional transfers and ambulation as appropriate and able  Currently, pt presents functioning below baseline and with overall mobility deficits 2* to: decreased LE strength/AROM; limited flexibility;  generalized weakness/ deconditioning; decreased endurance; decreased activity tolerance; impaired balance; gait deviations  Pt currently at increased risk for falls  At the end of evaluation, pt was left supine with all needs in reach  Pt would benefit from continued skilled PT services while in hospital to address remaining limitations and maximize functional potential  The patient's AM-PAC Basic Mobility Inpatient Short Form Raw Score is 22  A Raw score of greater than 16 suggests the patient may benefit from discharge to home  Please also refer to the recommendation of the Physical Therapist for safe discharge planning  Barriers to Discharge Inaccessible home environment   Goals   Patient Goals To get up   STG Expiration Date 06/29/22   Short Term Goal #1 STG 1  Pt will be able to perform bed mobility with mod I in order to improve overall functional mobility and assist in safe d/c  STG 2  Pt will be able to perform functional transfer with mod I in order to improve overall functional mobility and assist in safe d/c  STG 3   Pt will be able to ambulate at least 300 feet with least restrictive device with mod I A in order to improve overall functional mobility and assist in safe d/c  STG 4  Pt will improve sitting/standing static/dynamic balance 1 grade in order to improve functional mobility and assist in safe d/c  STG 5  Pt will improve LE strength by one grade in order to improve functional mobility and assist in safe d/c  STG 6  Pt will negotiate at least 1 step at mod I level A in order to improve overall funcitonal mobility and assist in safe d/c   Plan   Treatment/Interventions ADL retraining;Functional transfer training;LE strengthening/ROM; Elevations; Therapeutic exercise; Endurance training;Patient/family training;Equipment eval/education; Bed mobility;Gait training;Spoke to nursing;OT;Continued evaluation   PT Frequency 2-3x/wk   Recommendation   PT Discharge Recommendation Home with home health rehabilitation   Equipment Recommended [address] Mobility Inpatient   Turning in Bed Without Bedrails 4   Lying on Back to Sitting on Edge of Flat Bed 4   Moving Bed to Chair 4   Standing Up From Chair 4   Walk in Room 3   Climb 3-5 Stairs 3   Basic Mobility Inpatient Raw Score 22   Basic Mobility Standardized Score 47 4   Highest Level Of Mobility   JH-HLM Goal 7: Walk 25 feet or more   JH-HLM Achieved 7: Walk 25 feet or more           Silvia Quintero, PT

## 2022-06-19 NOTE — PROGRESS NOTES
NEPHROLOGY PROGRESS NOTE   Isabel Stevenson 46 y o  male MRN: [id number]  Unit/Bed#: [facility] 806-01 Encounter: [id number]  Reason for Consult: JOÃO    ASSESSMENT AND PLAN:  JOÃO POA, baseline creatinine 0 9 to 1 1, isolated values 1 6 in September 2020  -JOÃO suspected to be secondary to ATN due to rhabdomyolysis, shock in the setting of cocaine/methamphetamine toxicity  -patient was started on CRRT on 6/11/22 which was discontinued on 6/13/22  -now transition to I HD, remains on TTS schedule   -poor urine output, continue to monitor for renal recovery  -check bladder scan for PVR  -initial CT showed no hydronephrosis  -eventual consider for PermCath placement     Access, currently has temporary HD catheter, no significant urine output    -no episodes of fever, mild leukocytosis noted  Blood culture negative from 6/10/22  -if okay with primary team, consult IR for PermCath placement      Severe rhabdomyolysis, peak CK level greater than 100,000 now seems to be overall improving 2545  -continue to trend     Hyponatremia  -continue to monitor with dialysis, strict fluid restriction 1 2 L per day recommended      Initial shock required vasopressors now remains off  Initial concern for pneumonia, status post antibiotic      Initial VDRF, status post extubated  Now remains on room air at the time of my encounter     Cocaine/methamphetamine intoxication, initial UA showing hematuria along with multiple bacteria  ANCA panel to follow-up      Hypertension, continue amlodipine  BP remains above goal, increase amlodipine to 5 mg b i d     Discussed above plan in detail with primary team resident  SUBJECTIVE:  Patient seen and examined at bedside  Denies chest pain, nausea, vomiting      OBJECTIVE:  Current Weight: Weight - Scale: 82 1 kg (181 lb)  Vitals:    06/19/22 0720   BP: 151/94   Pulse: 88   Resp: 16   Temp: (!) 96 6 °F (35 9 °C)   SpO2: 100%     No intake or output data in the 24 hours ending 06/19/22 1133  Wt Readings from Last 3 Encounters:   06/14/22 82 1 kg (181 lb)   06/10/22 79 7 kg (175 lb 11 3 oz)   08/25/21 72 6 kg (160 lb)     Temp Readings from Last 3 Encounters:   06/19/22 (!) 96 6 °F (35 9 °C)   06/10/22 99 1 °F (37 3 °C) (Bladder)   08/25/21 98 3 °F (36 8 °C) (Oral)     BP Readings from Last 3 Encounters:   06/19/22 151/94   06/10/22 103/67   08/25/21 154/75     Pulse Readings from Last 3 Encounters:   06/19/22 88   06/10/22 101   08/25/21 (!) 107        Physical Examination:  General:  Lying in bed, no acute distress   Eyes:  Mild conjunctival pallor present  ENT:  External examination of ears and nose unremarkable  Neck:  No obvious lymphadenopathy appreciated  Respiratory:  Bilateral air entry present  CVS:  S1, S2 present  GI:  Soft, nondistended  CNS:  Active alert oriented  Skin:  No new rash  Musculoskeletal:  No obvious new gross deformity noted    Medications:    Current Facility-Administered Medications:     amiodarone tablet 200 mg, 200 mg, Oral, TID With Meals, 200 mg at 06/19/22 0832 **FOLLOWED BY** [START ON 6/29/2022] amiodarone tablet 200 mg, 200 mg, Oral, Daily With Breakfast, Abelino Gonzales MD    amLODIPine (NORVASC) tablet 5 mg, 5 mg, Oral, Daily, [address], MD, 5 mg at 06/19/22 2659    benzonatate (TESSALON PERLES) capsule 100 mg, 100 mg, Oral, TID PRN, [address], MD, 100 mg at 06/18/22 0903    heparin (porcine) subcutaneous injection 5,000 Units, 5,000 Units, Subcutaneous, Q8H [facility], Abelino Gonzales MD, 5,000 Units at 06/19/22 0523    hydrALAZINE (APRESOLINE) injection 5 mg, 5 mg, Intravenous, Q6H PRN, Amrita Holm DO    nicotine (NICODERM CQ) 21 mg/24 hr TD 24 hr patch 21 mg, 21 mg, Transdermal, Daily, [address], MD, 21 mg at 06/19/22 1049    Laboratory Results:  Results from last 7 days   Lab Units 06/18/22  0437 06/17/22  0431 06/16/22  1008 06/15/22  1315 06/14/22  0505 06/14/22  0032 06/13/22  1805 06/13/22  1228 06/13/22  4819 06/13/22  0531 06/13/22  0013 06/12/22  1807   WBC Thousand/uL 14 15* 14 02* 11 42* 8 09 7 69  --   --   --  10 27*  --   --   --    HEMOGLOBIN g/dL 13 9 15 0 14 2 14 4 12 1  --   --   --  13 0  --   --   --    HEMATOCRIT % 39 4 41 8 39 5 40 1 35 4*  --   --   --  36 9  --   --   --    PLATELETS Thousands/uL 121* 96* 102* 95*  --   --   --   --  120*  --   --   --    SODIUM mmol/L 129* 132* 131* 134* 137 135* 136 140  --  138 137 138   POTASSIUM mmol/L 4 2 4 0 3 6 4 0 3 9 3 9 4 4 3 7  --  4 0 4 2 4 4   CHLORIDE mmol/L 97* 97* 97* 101 105 103 105 111*  --  105 106 106   CO2 mmol/L 22 28 26 27 26 23 23 24  --  25 22 23   BUN mg/dL 74* 48* 59* 42* 48* 44* 36* 35*  --  42* 46* 48*   CREATININE mg/dL 7 73* 6 04* 7 08* 5 38* 4 56* 4 10* 3 18* 2 89*  --  3 51* 3 65* 3 89*   CALCIUM mg/dL 8 0* 7 9* 7 7* 8 1* 8 5 8 5 9 0 7 3*  --  8 4 8 6 8 8   MAGNESIUM mg/dL  --   --   --   --  2 2 2 2 2 1 2 0  --  2 3 1 7 2 0   PHOSPHORUS mg/dL  --   --   --   --  2 8 2 8 2 7 2 8  --  3 0 3 2 3 7       XR chest portable   Final Result by Lucy Stephenson MD (06/17 1008)      Status post extubation  Persistent bibasilar opacities likely due to atelectasis  No evidence for pneumothorax  Workstation performed: [id number]         US liver doppler only   Final Result by Mikel Ferro MD (06/12 5099)      Normal liver Doppler  Workstation performed: [id number]         XR chest portable ICU   Final Result by Lisa Lua MD (06/11 9458)      Deep sulcus sign concerning for a small left basilar pneumothorax  Right lateral decubitus or upright radiograph could be obtained for confirmation  The tip of the left subclavian central venous catheter projects at the superior vena cava  The study was marked in [facility] for immediate notification  Workstation performed: [id number]             Portions of the record may have been created with voice recognition software   Occasional wrong word or "sound a like" substitutions may have occurred due to the inherent limitations of voice recognition software  Read the chart carefully and recognize, using context, where substitutions have occurred

## 2022-06-19 NOTE — TELEMEDICINE
e-Consult (IPC)  - Interventional Radiology  Isabel Stevenson 46 y o  male MRN: [id number]  Unit/Bed#: [facility] 806-01 Encounter: [id number]          Interventional Radiology has been consulted to evaluate Isabel Stevenson      IP Consult to IR  Consult performed by: Kaykay Ingram MD  Consult ordered by: Cas Rosa MD        06/19/22    Assessment/Recommendation:     46year old male with non resolving JOÃO, temp line in place  Plan for tunneled dialysis catheter placement  Total time spent in review of data, discussion with requesting provider and rendering advice was 15 min  Thank you for allowing Interventional Radiology to participate in the care of Isabel Stevenson  Please don't hesitate to call or TigerText us with any questions       Kaykay Ingram MD

## 2022-06-19 NOTE — QUICK NOTE
Around 4pm, received a call from RN saying patient wanted to leave AMA  Upon evaluation at bedside, patient adamantly expressed wishes to leave hospital and to take out HD cath from his neck  Patient stated that he had " financial business to take care of" and had to leave the hospital  Patient was able to verbalize the reason for his illness and the consequences of leaving the hospital      During this exchange, patient's significant other arrived to visit patient and communicated with him that she would like him to stay and receive his treatment until patient is well  Patient expressed feeling anxious about not being in the hospital and not being able to go "outside for a cigarette"  Patient was agreeable to stay inpatient and receive treatment, with nicotine patch and ambulating around the floor

## 2022-06-20 PROBLEM — R77.8 ELEVATED TROPONIN: Status: ACTIVE | Noted: 2022-06-11

## 2022-06-20 PROBLEM — E87.5 HYPERKALEMIA: Status: RESOLVED | Noted: 2022-06-10 | Resolved: 2022-06-20

## 2022-06-20 LAB
ANION GAP SERPL CALCULATED.3IONS-SCNC: 9 MMOL/L (ref 4–13)
BASOPHILS # BLD AUTO: 0.04 THOUSANDS/ΜL (ref 0–0.1)
BASOPHILS NFR BLD AUTO: 0 % (ref 0–1)
BUN SERPL-MCNC: 79 MG/DL (ref 5–25)
CALCIUM SERPL-MCNC: 7.8 MG/DL (ref 8.3–10.1)
CHLORIDE SERPL-SCNC: 93 MMOL/L (ref 100–108)
CK MB SERPL-MCNC: 1.9 % (ref 0–2.5)
CK MB SERPL-MCNC: 14.4 NG/ML (ref 0–5)
CK SERPL-CCNC: 777 U/L (ref 39–308)
CO2 SERPL-SCNC: 24 MMOL/L (ref 21–32)
CREAT SERPL-MCNC: 9.87 MG/DL (ref 0.6–1.3)
EOSINOPHIL # BLD AUTO: 0.18 THOUSAND/ΜL (ref 0–0.61)
EOSINOPHIL NFR BLD AUTO: 1 % (ref 0–6)
ERYTHROCYTE [DISTWIDTH] IN BLOOD BY AUTOMATED COUNT: 12.7 % (ref 11.6–15.1)
GFR SERPL CREATININE-BSD FRML MDRD: 5 ML/MIN/1.73SQ M
GLUCOSE SERPL-MCNC: 113 MG/DL (ref 65–140)
HCT VFR BLD AUTO: 36.1 % (ref 36.5–49.3)
HGB BLD-MCNC: 13.1 G/DL (ref 12–17)
IMM GRANULOCYTES # BLD AUTO: 0.21 THOUSAND/UL (ref 0–0.2)
IMM GRANULOCYTES NFR BLD AUTO: 2 % (ref 0–2)
LYMPHOCYTES # BLD AUTO: 1.03 THOUSANDS/ΜL (ref 0.6–4.47)
LYMPHOCYTES NFR BLD AUTO: 7 % (ref 14–44)
MCH RBC QN AUTO: 34.7 PG (ref 26.8–34.3)
MCHC RBC AUTO-ENTMCNC: 36.3 G/DL (ref 31.4–37.4)
MCV RBC AUTO: 96 FL (ref 82–98)
MONOCYTES # BLD AUTO: 1.42 THOUSAND/ΜL (ref 0.17–1.22)
MONOCYTES NFR BLD AUTO: 10 % (ref 4–12)
NEUTROPHILS # BLD AUTO: 11.29 THOUSANDS/ΜL (ref 1.85–7.62)
NEUTS SEG NFR BLD AUTO: 80 % (ref 43–75)
NRBC BLD AUTO-RTO: 0 /100 WBCS
PLATELET # BLD AUTO: 166 THOUSANDS/UL (ref 149–390)
PMV BLD AUTO: 11.2 FL (ref 8.9–12.7)
POTASSIUM SERPL-SCNC: 5.3 MMOL/L (ref 3.5–5.3)
RBC # BLD AUTO: 3.77 MILLION/UL (ref 3.88–5.62)
SODIUM SERPL-SCNC: 126 MMOL/L (ref 136–145)
WBC # BLD AUTO: 14.17 THOUSAND/UL (ref 4.31–10.16)

## 2022-06-20 PROCEDURE — 85025 COMPLETE CBC W/AUTO DIFF WBC: CPT | Performed by: STUDENT IN AN ORGANIZED HEALTH CARE EDUCATION/TRAINING PROGRAM

## 2022-06-20 PROCEDURE — 99232 SBSQ HOSP IP/OBS MODERATE 35: CPT | Performed by: INTERNAL MEDICINE

## 2022-06-20 PROCEDURE — 82550 ASSAY OF CK (CPK): CPT | Performed by: INTERNAL MEDICINE

## 2022-06-20 PROCEDURE — 97535 SELF CARE MNGMENT TRAINING: CPT

## 2022-06-20 PROCEDURE — 80048 BASIC METABOLIC PNL TOTAL CA: CPT | Performed by: INTERNAL MEDICINE

## 2022-06-20 PROCEDURE — 99232 SBSQ HOSP IP/OBS MODERATE 35: CPT | Performed by: HOSPITALIST

## 2022-06-20 PROCEDURE — 82553 CREATINE MB FRACTION: CPT | Performed by: INTERNAL MEDICINE

## 2022-06-20 RX ORDER — AMLODIPINE BESYLATE 10 MG/1
10 TABLET ORAL DAILY
Status: DISCONTINUED | OUTPATIENT
Start: 2022-06-21 | End: 2022-06-24 | Stop reason: HOSPADM

## 2022-06-20 RX ORDER — ONDANSETRON 2 MG/ML
4 INJECTION INTRAMUSCULAR; INTRAVENOUS ONCE
Status: DISCONTINUED | OUTPATIENT
Start: 2022-06-20 | End: 2022-06-23

## 2022-06-20 RX ADMIN — AMLODIPINE BESYLATE 5 MG: 5 TABLET ORAL at 08:18

## 2022-06-20 RX ADMIN — HEPARIN SODIUM 5000 UNITS: 5000 INJECTION INTRAVENOUS; SUBCUTANEOUS at 21:48

## 2022-06-20 RX ADMIN — HEPARIN SODIUM 5000 UNITS: 5000 INJECTION INTRAVENOUS; SUBCUTANEOUS at 13:26

## 2022-06-20 RX ADMIN — NICOTINE 21 MG: 21 PATCH, EXTENDED RELEASE TRANSDERMAL at 08:18

## 2022-06-20 RX ADMIN — HEPARIN SODIUM 5000 UNITS: 5000 INJECTION INTRAVENOUS; SUBCUTANEOUS at 06:09

## 2022-06-20 RX ADMIN — AMIODARONE HYDROCHLORIDE 200 MG: 200 TABLET ORAL at 08:18

## 2022-06-20 RX ADMIN — AMIODARONE HYDROCHLORIDE 200 MG: 200 TABLET ORAL at 13:26

## 2022-06-20 RX ADMIN — LIDOCAINE 5% 1 PATCH: 700 PATCH TOPICAL at 08:27

## 2022-06-20 RX ADMIN — AMIODARONE HYDROCHLORIDE 200 MG: 200 TABLET ORAL at 16:32

## 2022-06-20 NOTE — CASE MANAGEMENT
Case Management Discharge Planning Note    Patient name General Lucho  Location [address] 806/PPHP 834-98 MRN [id number]  : 1970 Date 2022       Current Admission Date: 6/10/2022  Current Admission Diagnosis:Acute renal failure [facility])   Patient Active Problem List    Diagnosis Date Noted    [facility] fibrillation ([facility] ) 06/15/2022    Pneumonia due to Haemophilus influenzae ([facility] ) 2022    Acute encephalopathy 2022    NSTEMI (non-ST elevated myocardial infarction) ([facility] ) 2022    Methamphetamine abuse ([facility] ) 2022    IV drug user 06/10/2022    Cocaine abuse ([facility] ) 06/10/2022    Unresponsiveness 06/10/2022    Acute renal failure ([facility] ) 06/10/2022    Chest pain 2020    Hypertension 2020    PTSD (post-traumatic stress disorder) 2020    History of substance abuse ([place] [address] ) 2020      LOS (days): 10  Geometric Mean LOS (GMLOS) (days):   Days to GMLOS:     OBJECTIVE:  Risk of Unplanned Readmission Score: 16 32         Current admission status: Inpatient   Preferred Pharmacy:   [facility]  [address] [place] [address],Fourth Floor  Phone: [phone] Fax: [phone]    Primary Care Provider: No primary care provider on file  Primary Insurance: [address] [place] [address]  Secondary Insurance:     DISCHARGE DETAILS:      Other Referral/Resources/Interventions Provided:  Referral Comments: CM spoke to pt about therapy recommendations of home PT/OT  Pt was agreeable and did not have any prefered agencies  Pt agreed to referral being sent to [facility]  CM also spoke to the pt about HOST program and pt declined a referral being made at this time  CM left contact information with pt, inviting him to contact CM in the event he changed his mind  CM to make referral to [facility] and will follow

## 2022-06-20 NOTE — PLAN OF CARE
Problem: Prexisting or High Potential for Compromised Skin Integrity  Goal: Skin integrity is maintained or improved  Description: INTERVENTIONS:  - Identify patients at risk for skin breakdown  - Assess and monitor skin integrity  - Assess and monitor nutrition and hydration status  - Monitor labs   - Assess for incontinence   - Turn and reposition patient  - Assist with mobility/ambulation  - Relieve pressure over bony prominences  - Avoid friction and shearing  - Provide appropriate hygiene as needed including keeping skin clean and dry  - Evaluate need for skin moisturizer/barrier cream  - Collaborate with interdisciplinary team   - Patient/family teaching  - Consider wound care consult   Outcome: Progressing     Problem: PAIN - ADULT  Goal: Verbalizes/displays adequate comfort level or baseline comfort level  Description: Interventions:  - Encourage patient to monitor pain and request assistance  - Assess pain using appropriate pain scale  - Administer analgesics based on type and severity of pain and evaluate response  - Implement non-pharmacological measures as appropriate and evaluate response  - Consider cultural and social influences on pain and pain management  - Notify physician/advanced practitioner if interventions unsuccessful or patient reports new pain  Outcome: Progressing     Problem: INFECTION - ADULT  Goal: Absence or prevention of progression during hospitalization  Description: INTERVENTIONS:  - Assess and monitor for signs and symptoms of infection  - Monitor lab/diagnostic results  - Monitor all insertion sites, i e  indwelling lines, tubes, and drains  - Monitor endotracheal if appropriate and nasal secretions for changes in amount and color  - [place] appropriate cooling/warming therapies per order  - Administer medications as ordered  - Instruct and encourage patient and family to use good hand hygiene technique  - Identify and instruct in appropriate isolation precautions for identified infection/condition  Outcome: Progressing     Problem: SAFETY ADULT  Goal: Patient will remain free of falls  Description: INTERVENTIONS:  - Educate patient/family on patient safety including physical limitations  - Instruct patient to call for assistance with activity   - Consult OT/PT to assist with strengthening/mobility   - Keep Call bell within reach  - Keep bed low and locked with side rails adjusted as appropriate  - Keep care items and personal belongings within reach  - Initiate and maintain comfort rounds  - Make Fall Risk Sign visible to staff  - Offer Toileting every 2 Hours, in advance of need  - Initiate/Maintain alarm  - Obtain necessary fall risk management equipment  - Apply yellow socks and bracelet for high fall risk patients  - Consider moving patient to room near nurses station  Outcome: Progressing     Problem: Nutrition/Hydration-ADULT  Goal: Nutrient/Hydration intake appropriate for improving, restoring or maintaining nutritional needs  Description: Monitor and assess patient's nutrition/hydration status for malnutrition  Collaborate with interdisciplinary team and initiate plan and interventions as ordered  Monitor patient's weight and dietary intake as ordered or per policy  Utilize nutrition screening tool and intervene as necessary  Determine patient's food preferences and provide high-protein, high-caloric foods as appropriate       INTERVENTIONS:  - Monitor oral intake, urinary output, labs, and treatment plans  - Assess nutrition and hydration status and recommend course of action  - Evaluate amount of meals eaten  - Assist patient with eating if necessary   - Allow adequate time for meals  - Recommend/ encourage appropriate diets, oral nutritional supplements, and vitamin/mineral supplements  - Order, calculate, and assess calorie counts as needed  - Recommend, monitor, and adjust tube feedings and TPN/PPN based on assessed needs  - Assess need for intravenous fluids  - Provide specific nutrition/hydration education as appropriate  - Include patient/family/caregiver in decisions related to nutrition  Outcome: Progressing

## 2022-06-20 NOTE — PLAN OF CARE
Problem: OCCUPATIONAL THERAPY ADULT  Goal: Performs self-care activities at highest level of function for planned discharge setting  See evaluation for individualized goals  Description: Treatment Interventions: ADL retraining, Functional transfer training, Endurance training, UE strengthening/ROM, Cognitive reorientation, Patient/family training, Equipment evaluation/education, Compensatory technique education, Energy conservation, Activityengagement          See flowsheet documentation for full assessment, interventions and recommendations  Outcome: Completed  Note: Limitation: Decreased ADL status, Decreased UE ROM, Decreased UE strength, Decreased Safe judgement during ADL, Decreased cognition, Decreased endurance, Decreased self-care trans, Decreased high-level ADLs  Prognosis: Fair  Assessment: PT SEEN FOR OT TX SESSION WITH FOCUS ON LB DRESSING, TOILETING, FUNCTIONAL TXFS/MOBILITY, PT/FAMILY EDUCATION/TRAINING AND D/C PLANNING  PT CONTS TO REQUIRE MIN A FOR LB DRESSING INCLUDING DONNING L SOCK  IMPROVEMENT NOTED IN TOILETING TO SUPERVISION LEVEL ON STANDARD TOILET WITH USE OF R GRAB BAR TO ASSIST WITH TXF  IMPROVEMENT NOTED IN FUNCTIONAL TXFS IN ARM CHAIR TO MOD I WITH CUES FOR HAND PLACEMENT AND FUNCTIONAL MOBILITY FOR HOUSEHOLD DISTANCES (FROM ELEVATOR TO PT'S APT DOOR) TO MOD I LEVEL WITH USE OF RW  PT/NILA EDUCATED ON COMPENSATORY DRESSING TECHNIQUES, DME RECS INCLUDING BSC/SC, SAFE TXFS TECHNIQUES, AND ENERGY CONSERVATION TECHNIQUES FOR CARRY OVER UPON D/C  NILA REPORTS SHE IS ABLE TO PROVIDE CURRENT LEVEL OF ASSIST UPON D/C AND PLANS ON WORKING FROM HOME SO THAT SHE CAN BE HOME AT ALL TIMES  ALL CURRENT QUESTIONS/CONCERNS ADDRESSED  NO ADDITIONAL ACUTE CARE OT NEEDS  RECOMMEND HOME WITH INCREASED FAMILY SUPPORT  D/C OT  OT Discharge Recommendation: No rehabilitation needs  OT - OK to Discharge:  Yes

## 2022-06-20 NOTE — PROGRESS NOTES
NEPHROLOGY PROGRESS NOTE   Kaitlin Graf 46 y o  male MRN: [id number]  Unit/Bed#: [place] 806-01 Encounter: [id number]  Reason for Consult:  Acute renal failure      SUMMARY:    43-year-old male with a past medical history of atrial fibrillation, recent H flu pneumonia, substance abuse, NSTEMI who initially presents after being found down   Nephrology is on board for acute kidney injury   Patient was initially intubated   Was initially requiring continuous renal replacement therapy  ASSESSMENT and PLAN:    Acute renal failure/acute tubular necrosis  --currently dialysis dependent  --baseline creatinine 0 9-1 1 mg/dL, but appeared to have an episode of acute kidney injury in September of 2020 with a creatinine was 1 6 mg/dL  --CT scan shows no evidence of hydronephrosis  --urinalysis showed innumerable RBCs  --presumed secondary ATN, rhabdomyolysis shock and drug abuse  --initially required CRRT on June 11th, discontinued on June 13th, transition to intermittent hemodialysis on June 14th  --access:  Right IJ temporary dialysis catheter, will need to be transition to a PermCath  --difficulty getting blood draw, no objections to an IJ PICC line  --currently on a TTS dialysis schedule  --ANCA pending, given recent cocaine use  --this time no renal recovery but no labs since June 18th, S creatinine rising off dialysis and poor urine output  --discussed with the primary team  --plan for dialysis tomorrow  --needs outpatient hemodialysis placement as per Case Management    Polysubstance abuse  --recent use of cocaine methamphetamine    Rhabdomyolysis--improved    Hyponatremia  --no labs since June 18    Hypertension  --currently on amlodipine 5 mg daily, will increase to 10 mg  --elevated systolic and diastolic blood pressure  --avoiding beta blockers at this time to prevent unopposed adrenergic stimulation        SUBJECTIVE / INTERVAL HISTORY:    Unable to get blood work this morning due to hard stick    No complaints  OBJECTIVE:  Current Weight: Weight - Scale: 82 1 kg (181 lb)  Vitals:    06/19/22 0530 06/19/22 0530 06/19/22 0720 06/20/22 0721   BP: (!) 153/102 (!) 153/102 151/94 146/94   Pulse:   88    Resp:   16 20   Temp: (!) 96 2 °F (35 7 °C) (!) 96 2 °F (35 7 °C) (!) 96 6 °F (35 9 °C) 97 7 °F (36 5 °C)   TempSrc:       SpO2:   100%    Weight:       Height:           Intake/Output Summary (Last 24 hours) at 6/20/2022 1055  Last data filed at 6/20/2022 0721  Gross per 24 hour   Intake 0 ml   Output 100 ml   Net -100 ml       Review of Systems:    12 point ROS has been reviewed  Physical Exam  Vitals and nursing note reviewed  Constitutional:       General: He is not in acute distress  Appearance: He is well-developed  HENT:      Head: Normocephalic and atraumatic  Eyes:      General: No scleral icterus  Conjunctiva/sclera: Conjunctivae normal       Pupils: Pupils are equal, round, and reactive to light  Cardiovascular:      Rate and Rhythm: Normal rate and regular rhythm  Heart sounds: S1 normal and S2 normal  No murmur heard  No friction rub  No gallop  Pulmonary:      Effort: Pulmonary effort is normal  No respiratory distress  Breath sounds: Normal breath sounds  No wheezing or rales  Abdominal:      General: Bowel sounds are normal       Palpations: Abdomen is soft  Tenderness: There is no abdominal tenderness  There is no rebound  Musculoskeletal:         General: Normal range of motion  Cervical back: Normal range of motion and neck supple  Skin:     Findings: No rash  Neurological:      Mental Status: He is alert and oriented to person, place, and time     Psychiatric:         Behavior: Behavior normal          Medications:    Current Facility-Administered Medications:     amiodarone tablet 200 mg, 200 mg, Oral, TID With Meals, 200 mg at 06/20/22 0818 **FOLLOWED BY** [START ON 6/29/2022] amiodarone tablet 200 mg, 200 mg, Oral, Daily With Breakfast, Tahira Conley MD    amLODIPine [facility]) tablet 5 mg, 5 mg, Oral, Daily, Latonya Walsh MD, 5 mg at 06/20/22 0818    benzonatate (TESSALON PERLES) capsule 100 mg, 100 mg, Oral, TID PRN, Latonya Walsh MD, 100 mg at 06/18/22 5823    calcium carbonate (TUMS) chewable tablet 500 mg, 500 mg, Oral, TID PRN, Tahira Conley MD, 500 mg at 06/19/22 1352    heparin (porcine) subcutaneous injection 5,000 Units, 5,000 Units, Subcutaneous, Q8H [facility], Tahira Conley MD, 5,000 Units at 06/20/22 0609    hydrALAZINE (APRESOLINE) injection 5 mg, 5 mg, Intravenous, Q6H PRN, Amrita Holm, DO    lidocaine (LIDODERM) 5 % patch 1 patch, 1 patch, Topical, Daily PRN, Chambers Stai, DO, 1 patch at 06/20/22 0827    nicotine (NICODERM CQ) 21 mg/24 hr TD 24 hr patch 21 mg, 21 mg, Transdermal, Daily, Latonya Walsh MD, 21 mg at 06/20/22 0818    Laboratory Results:  Results from last 7 days   Lab Units 06/18/22  0437 06/17/22  0431 06/16/22  1008 06/15/22  1315 06/14/22  0505 06/14/22  0032 06/13/22  1805 06/13/22  1228   WBC Thousand/uL 14 15* 14 02* 11 42* 8 09 7 69  --   --   --    HEMOGLOBIN g/dL 13 9 15 0 14 2 14 4 12 1  --   --   --    HEMATOCRIT % 39 4 41 8 39 5 40 1 35 4*  --   --   --    PLATELETS Thousands/uL 121* 96* 102* 95*  --   --   --   --    POTASSIUM mmol/L 4 2 4 0 3 6 4 0 3 9 3 9 4 4 3 7   CHLORIDE mmol/L 97* 97* 97* 101 105 103 105 111*   CO2 mmol/L 22 28 26 27 26 23 23 24   BUN mg/dL 74* 48* 59* 42* 48* 44* 36* 35*   CREATININE mg/dL 7 73* 6 04* 7 08* 5 38* 4 56* 4 10* 3 18* 2 89*   CALCIUM mg/dL 8 0* 7 9* 7 7* 8 1* 8 5 8 5 9 0 7 3*   MAGNESIUM mg/dL  --   --   --   --  2 2 2 2 2 1 2 0   PHOSPHORUS mg/dL  --   --   --   --  2 8 2 8 2 7 2 8       PLEASE NOTE:  This encounter was completed utilizing the [employer] Direct Speech Voice Recognition Software   Grammatical errors, random word insertions, pronoun errors and incomplete sentences are occasional consequences of the system due to software limitations, ambient noise and hardware issues  These may be missed by proof reading prior to affixing electronic signature  Any questions or concerns about the content, text or information contained within the body of this dictation should be directly addressed to the physician for clarification  Please do not hesitate to call me directly if you have any any questions or concerns

## 2022-06-20 NOTE — PROGRESS NOTES
Received consult for PICC line  After evaluating pt's chart, pt not a candidate for PICC placed in arm d/t ongoing renal issues  Per nephrology, pt to go to IR for tunneled PICC

## 2022-06-20 NOTE — RESTORATIVE TECHNICIAN NOTE
Restorative Technician Note      Patient Name: Carmelo Arredondo     Restorative Tech Visit Date: 6/20/2022  Note Type: Mobility (Pt continues to ambulate with visitors  States that he has no concerns or questions   Pt has been seen ambulating by staff as well)    Matilda Alexander  DPT, Restorative Technician

## 2022-06-20 NOTE — UTILIZATION REVIEW
Continued Stay Review    Date: 6/20/22                          Current Patient Class:  IP  Current Level of Care:  MS    HPI:51 y o  male with hx polysubstance abuseinitially admitted on  6/10/22 with acute renal failure in setting of rhabdomyolysis after found down   Initially required CRRT 6/11-6/13   HD intermittent stated 6/14 thru R IJ temp cath - T , TH, Sat  Nephrology following   Intermittent A fib- loaded with Amniodarone in ICU  Assessment/Plan:   BP elevated - Amlodipine increased from 5 to 10 mg today  Poor UOP  Creat rising-due for HD tomorrow   Pt NPO after MN tonight for permacath insertion 6/21   Rhabdomyolysis improved   Hyponatremia- sodium 126 today   Continue Amiodardone TID until 6/29 when decreases to daily   Telemetry d/c'ed 6/19  Pt completed abx for PNA 2/2 H flu and group B strep  CK downtrending  Vital Signs:   Date/Time Temp Pulse Resp BP MAP (mmHg) SpO2   06/20/22 15:08:19 97 7 °F (36 5 °C) -- 20 145/116 Abnormal  126 --   06/20/22 07:21:24 97 7 °F (36 5 °C) -- 20 146/94 111 --   06/19/22 07:20:37 96 6 °F (35 9 °C) Abnormal  88 16 151/94 113 100 %   06/19/22 05:30:41 96 2 °F (35 7 °C) Abnormal  -- -- 153/102 Abnormal  119 --   06/19/22 05:30:02 96 2 °F (35 7 °C) Abnormal  -- -- 153/102 Abnormal  119 --   06/18/22 22:24:07 96 4 °F (35 8 °C) Abnormal  -- 21 170/98 122 --   06/18/22 19:38:45 98 2 °F (36 8 °C) -- 18 146/96 113 --   06/18/22 15:13:34 97 2 °F (36 2 °C) Abnormal  -- 23 Abnormal  157/102 Abnormal  120 --     Date and Time Eye Opening Best Verbal Response Best Motor Response Napoleon Coma Scale Score   06/20/22 0725 4 5 6 15   06/19/22 2300 4 5 6 15   06/19/22 1918 4 5 6 15   06/19/22 1530 4 5 6 15   06/19/22 1130 4 5 6 15       Pertinent Labs/Diagnostic Results:   6/17 CXR-Status post extubation    Persistent bibasilar opacities likely due to atelectasis  No evidence for pneumothorax        Results from last 7 days   Lab Units 06/20/22  1129 06/18/22  3482 06/17/22  0431 06/16/22  1008 06/15/22  1315   WBC Thousand/uL 14 17* 14 15* 14 02* 11 42* 8 09   HEMOGLOBIN g/dL 13 1 13 9 15 0 14 2 14 4   HEMATOCRIT % 36 1* 39 4 41 8 39 5 40 1   PLATELETS Thousands/uL 166 121* 96* 102* 95*   NEUTROS ABS Thousands/µL 11 29* 11 11* 10 96* 9 46* 6 51         Results from last 7 days   Lab Units 06/20/22  1129 06/18/22  0437 06/17/22  0431 06/16/22  1008 06/15/22  1315 06/14/22  0505 06/14/22  0032 06/13/22  1805   SODIUM mmol/L 126* 129* 132* 131* 134* 137 135* 136   POTASSIUM mmol/L 5 3 4 2 4 0 3 6 4 0 3 9 3 9 4 4   CHLORIDE mmol/L 93* 97* 97* 97* 101 105 103 105   CO2 mmol/L 24 22 28 26 27 26 23 23   ANION GAP mmol/L 9 10 7 8 6 6 9 8   BUN mg/dL 79* 74* 48* 59* 42* 48* 44* 36*   CREATININE mg/dL 9 87* 7 73* 6 04* 7 08* 5 38* 4 56* 4 10* 3 18*   EGFR ml/min/1 73sq m 5 7 9 8 11 13 15 21   CALCIUM mg/dL 7 8* 8 0* 7 9* 7 7* 8 1* 8 5 8 5 9 0   CALCIUM, IONIZED mmol/L  --   --   --   --   --  1 13 1 14 1 18   MAGNESIUM mg/dL  --   --   --   --   --  2 2 2 2 2 1   PHOSPHORUS mg/dL  --   --   --   --   --  2 8 2 8 2 7     Results from last 7 days   Lab Units 06/16/22  1008   AST U/L 510*   ALT U/L 206*   ALK PHOS U/L 140*   TOTAL PROTEIN g/dL 5 3*   ALBUMIN g/dL 2 1*   TOTAL BILIRUBIN mg/dL 1 24*   BILIRUBIN DIRECT mg/dL 0 77*     Results from last 7 days   Lab Units 06/17/22  0729 06/15/22  0133   POC GLUCOSE mg/dl 106 99     Results from last 7 days   Lab Units 06/20/22  1129 06/18/22  0437 06/17/22  0431 06/16/22  1008 06/15/22  1315 06/14/22  0505 06/14/22  0032 06/13/22  1805   GLUCOSE RANDOM mg/dL 113 134 117 209* 167* 99 109 130             No results found for: BETA-HYDROXYBUTYRATE   Results from last 7 days   Lab Units 06/15/22  1359   PH ART  7 440   PCO2 ART mm Hg 35 9*   PO2 ART mm Hg 94 1   HCO3 ART mmol/L 23 8   BASE EXC ART mmol/L 0 1   O2 CONTENT ART mL/dL 18 3   O2 HGB, ARTERIAL % 95 8   ABG SOURCE  Radial, Right     Results from last 7 days   Lab Units 06/15/22  1315 PH CHILANGO  7 434*   PCO2 CHILANGO mm Hg 35 9*   PO2 CHILANGO mm Hg 109 4*   HCO3 CHILANGO mmol/L 23 5*   BASE EXC CHILANGO mmol/L -0 2   O2 CONTENT CHILANGO ml/dL 20 0   O2 HGB, VENOUS % 95 5*         Results from last 7 days   Lab Units 06/20/22  1129 06/17/22  0431 06/16/22  1008   CK TOTAL U/L 777* 2,545* 3,825*   CK MB INDEX % 1 9 <1 0 <1 0   CK MB ng/mL 14 4* 14 5* 20 7*             Results from last 7 days   Lab Units 06/16/22  1317   PROTIME seconds 12 1   INR  0 93                   Medications:   Scheduled Medications:  amiodarone, 200 mg, Oral, TID With Meals   Followed by  Yeimi Sultana ON 6/29/2022] amiodarone, 200 mg, Oral, Daily With Breakfast  [START ON 6/21/2022] amLODIPine, 10 mg, Oral, Daily  heparin (porcine), 5,000 Units, Subcutaneous, Q8H [address]  nicotine, 21 mg, Transdermal, Daily    amLODIPine (NORVASC) tablet 5 mg  Dose: 5 mg  Freq: Daily Route: PO  Start: 06/17/22 0930 End: 06/20/22 1103  Continuous IV Infusions:     PRN Meds:  benzonatate, 100 mg, Oral, TID PRN  calcium carbonate, 500 mg, Oral, TID PRN  hydrALAZINE, 5 mg, Intravenous, Q6H PRN  lidocaine, 1 patch, Topical, Daily PRN x1 6/19, x1 6/20        Discharge Plan: D    Network Utilization Review Department  ATTENTION: Please call with any questions or concerns to [phone] and carefully listen to the prompts so that you are directed to the right person  All voicemails are confidential   Russell Matias all requests for admission clinical reviews, approved or denied determinations and any other requests to dedicated fax number below belonging to the campus where the patient is receiving treatment   List of dedicated fax numbers for the Facilities:  [address] [place] [address] DENIALS (Administrative/Medical Necessity) [phone]   [address] (Maternity/NICU/Pediatrics) [address] [place] [address],[address] [place] [address] [place] [address] [address] [id number] [place] [address] [address] [place] [address] [phone]

## 2022-06-20 NOTE — CERTIFIED RECOVERY SPECIALIST
Certified  Note    Patient name: Farideh Porter  Location: [facility] 806/[facility] 806-01  [place]: [address] [place] [address]  Attending:  Juan Antonio Caballero MD MRN [id number]  : 1970  Age: 46 y o  Sex: male Date 2022         Substance Use History:     Social History     Substance and Sexual Activity   Alcohol Use No        Social History     Substance and Sexual Activity   Drug Use Yes    Types: Marijuana    Comment: smokes every other day       Admission Information  Substances Used at This Admission[de-identified] Cocaine, Methamphetamine, THC  Encounter Type[de-identified] Patient Face-to-Face, Family/Support Face-to-Face    Recovery Support Plan  Declined All Services?: No  Medication Assisted Treatment[de-identified] No  Agreeable to Warm Handoff?: No  Is Patient Accepting MARY Treatment Services?: No  Was Referral Made to Elly Narvaez?: No  Was Narcan Provided at Discharge?: No  Plan Discussed With Treatment Team[de-identified] Yes    Referral to Recovery Supports:  Samira Moy[de-identified] No  Community Based CRS[de-identified] No  Case Management[de-identified] No  Direct Access to MARY Treatment?: No  Resource Guide Given?: Yes  Follow Up With Patient[de-identified] Yes (ongoing)  Family / Other Support[de-identified] Yes  Referral for Community Physical Health[de-identified] No  Referral for Community Mental Health[de-identified] No'    Referral from CM to meet with patient  Met with patient and aparna  Both report previous periods of recovery  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  Patient states feeling like he lacked purpose after this  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  Patient became tearful when recalling past, responding with regret when speaking about experiences  CRS engaged patient regarding his past substance use and recovery, and patient was open in response  Patient related experiences with inpatient treatment facilities and named several professionals by name        CRS walked with aparna to the cafeteria and discussed her experiences leading up to patient's hospitalization relating her own physical ailments  She was grateful for this experience and that patient is alive and able to move forward  She was happy for the private talk  CRS provided hope for patient that he has the ability to regain and repair his life  Both reported starting fresh in [facility], due to employer relocating  CRS informed patient that there is a local treatment facility that he would be able to work in, that would provide CRS training if he is still interested in this work  He agreed to look into it  Provided recovery resources as well as contact information for continued support with their needs  Both were agreeable to another meeting and grateful of the conversation        Jhonny Hook

## 2022-06-20 NOTE — OCCUPATIONAL THERAPY NOTE
Occupational Therapy Progress Note     Patient Name: Omari Montague  EJGIR'W Date: 6/20/2022  Problem List  Principal Problem:    Acute renal failure [facility])  Active Problems:    Hypertension    PTSD (post-traumatic stress disorder)    IV drug user    Cocaine abuse (HCC)    Elevated troponin    Methamphetamine abuse ([facility] [address] )    Acute encephalopathy    Pneumonia due to Haemophilus influenzae [facility])    Atrial fibrillation ([facility] )        06/20/22 7496   OT Last Visit   OT Visit Date 06/20/22   Note Type   Note Type Treatment   Restrictions/Precautions   Weight Bearing Precautions Per Order No   Other Precautions Cognitive; Fall Risk   Pain Assessment   Pain Assessment Tool 0-10   Pain Score No Pain   ADL   LB Dressing Assistance 4  Minimal Assistance   LB Dressing Deficit Don/doff R sock; Don/doff L sock   Toileting Assistance  5  Supervision/Setup   Toileting Deficit Supervison/safety   Transfers   Sit to Stand 6  Modified independent   Additional items Verbal cues   Stand to Sit 6  Modified independent   Additional items Verbal cues   Functional Mobility   Functional Mobility 5  Supervision   Additional items Rolling walker   Toilet Transfers   Toilet Transfer From Rolling walker   Toilet Transfer Type To and from   Toilet Transfer to Standard toilet  (GRAB BARS)   Toilet Transfer Technique Ambulating   Toilet Transfers Supervision   Cognition   Overall Cognitive Status [facility]   Arousal/Participation Alert; Cooperative   Attention Within functional limits   Orientation Level Oriented X4   Memory Decreased recall of recent events   Following Commands Follows one step commands without difficulty   Comments PT IS PLEASANT AND COOPERATIVE  PRESENTS WITH FLAT AFFECT AND LIMITED CONVERSATION  Activity Tolerance   Activity Tolerance Patient tolerated treatment well   Assessment   Assessment PT SEEN FOR OT TX SESSION WITH FOCUS ON LB DRESSING, TOILETING, FUNCTIONAL TXFS/MOBILITY, PT/FAMILY EDUCATION/TRAINING AND D/C PLANNING   PT CONTS TO REQUIRE MIN A FOR LB DRESSING INCLUDING DONNING L SOCK  IMPROVEMENT NOTED IN TOILETING TO SUPERVISION LEVEL ON STANDARD TOILET WITH USE OF R GRAB BAR TO ASSIST WITH TXF  IMPROVEMENT NOTED IN FUNCTIONAL TXFS IN ARM CHAIR TO MOD I WITH CUES FOR HAND PLACEMENT AND FUNCTIONAL MOBILITY FOR HOUSEHOLD DISTANCES (FROM ELEVATOR TO PT'S APT DOOR) TO MOD I LEVEL WITH USE OF RW  PT/FIANCE EDUCATED ON COMPENSATORY DRESSING TECHNIQUES, DME RECS INCLUDING BSC/SC, SAFE TXFS TECHNIQUES, AND ENERGY CONSERVATION TECHNIQUES FOR CARRY OVER UPON D/C  NILA REPORTS SHE IS ABLE TO PROVIDE CURRENT LEVEL OF ASSIST UPON D/C AND PLANS ON WORKING FROM HOME SO THAT SHE CAN BE HOME AT ALL TIMES  ALL CURRENT QUESTIONS/CONCERNS ADDRESSED  NO ADDITIONAL ACUTE CARE OT NEEDS  RECOMMEND HOME WITH INCREASED FAMILY SUPPORT  D/C OT  Plan   Treatment Interventions ADL retraining;Functional transfer training; Endurance training;Cognitive reorientation;Patient/family training;Equipment evaluation/education; Compensatory technique education; Energy conservation; Activityengagement   Goal Expiration Date 07/01/22   OT Treatment Day 1   OT Frequency 3-5x/wk   Recommendation   OT Discharge Recommendation No rehabilitation needs   Equipment Recommended Bedside commode   Commode Type Standard   OT - OK to Discharge Yes   Additional Comments  The patient's raw score on the AM-PAC Daily Activity inpatient short form is 21, standardized score is 44 27, greater than 39 4  Patients at this level are likely to benefit from discharge to home  Please refer to the recommendation of the Occupational Therapist for safe discharge planning     AM-PAC Daily Activity Inpatient   Lower Body Dressing 3   Bathing 3   Toileting 3   Upper Body Dressing 4   Grooming 4   Eating 4   Daily Activity Raw Score 21   Daily Activity Standardized Score (Calc for Raw Score >=11) 44 27   AM-PAC Applied Cognition Inpatient   Following a Speech/Presentation 4   Understanding Ordinary Conversation 4   Taking Medications 3   Remembering Where Things Are Placed or Put Away 4   Remembering List of 4-5 Errands 4   Taking Care of Complicated Tasks 3   Applied Cognition Raw Score 22   Applied Cognition Standardized Score 47 83   Modified [place] Scale   Modified [place] Scale 3       Documentation completed by BISHOP Cueto, OTR/L  [facility] OF THE [facility] Certified ID# [id number]

## 2022-06-20 NOTE — PROGRESS NOTES
INTERNAL MEDICINE RESIDENCY PROGRESS NOTE     Name: Naty Araya   Age & Sex: 46 y o  male   MRN: [id number]  Unit/Bed#: [facility] 806-01   Encounter: [id number]  Team: SOD Team A    PATIENT INFORMATION     Name: Naty Araya   Age & Sex: 46 y o  male   MRN: [id number]  Hospital Stay Days: 10    ASSESSMENT/PLAN     Principal Problem:    Acute renal failure ([facility] )  Active Problems:    Hypertension    PTSD (post-traumatic stress disorder)    Hyperkalemia    IV drug user    Cocaine abuse ([facility] )    Unresponsiveness    NSTEMI (non-ST elevated myocardial infarction) ([facility] )    Methamphetamine abuse ([facility] )    Acute encephalopathy    Pneumonia due to Haemophilus influenzae ([facility] )    Atrial fibrillation ([facility] )      Atrial fibrillation ([facility] )  Assessment & Plan  Pt intermittently having AF with RVR, new on this admission  Loaded with amio in ICU  Plan:   Continue amiodarone 200mg tid  Transition to 200mg daily on 6/29  Monitor on tele   Monitor hemodynamics     Pneumonia due to Haemophilus influenzae [facility])  Assessment & Plan  Sputum revealing H Flu and Group B strep  Initially received Zosyn and Vanco, switched to cefuroxime  6/17 CXR: Persistent bibasilar opacities likely due to atelectasis  No evidence for pneumothorax  WBC 6/17 up to 14, no other obvious source for infection at this time  Plan:   IS   Completed course of Cefuroxime 500mg x4 days for total of 7 days of Abx   Continue to trend fever curve and WBC    Elevated troponin  Assessment & Plan  Elevated troponin level noted on presentation  2/2 ARF, no intervention needed  Troponins now wnl    IV drug user  Assessment & Plan  Pt has a hx of polysubstance abuse including cocaine, meth, THC  Could be contributing to pt's "found down" event      Plan:  Continue to encourage cessation    PTSD (post-traumatic stress disorder)  Assessment & Plan  Not on any medications  Recommend follow up out pt PCP/ psych    Hypertension  Assessment & Plan  Pt with elevated BP readings while in hospital, not improved following dialysis  Plan:   Continue amlodipine 5mg daily   Prn hydralazine   Continue to monitor vitals around HD timing    * Acute renal failure ([address] )  Assessment & Plan  In the setting of rhabdomyolysis   Required CVVHD  - transitioned to intermittent HD  Still oliguric, per pt producing small amounts of dark urine but unmeasured by nursing    Plan:   Continue to monitor mental status around HD timing   Nephro following, appreciate recommendations  //Sat schedule  Tentative PermCath placement   If patient is unable to get Permcath placement on , we will consider   NPO at midnight   Will work with case management to set up outpatient dialysis  Monitor CK, Cr, BMP   Monitor I/Os    Hyperkalemia-resolved as of 2022  Assessment & Plan   to acute renal failure  Peaked T waves on EKG  Treated with insulin, Ca gluconate, and IVF on arrival  Treated with CVVHD in ICU   - started intermittent HD  Improved    Plan:   Continue prn HD per nephro  Monitor BMP       Disposition: Plans for PermCath placement with IR today and outpatient dialysis setup  SUBJECTIVE     Patient seen and examined  No acute events overnight  OBJECTIVE     Vitals:    22 2224 22 0530 22 0530 22 0720   BP: 170/98 (!) 153/102 (!) 153/102 151/94   BP Location:       Pulse:    88   Resp: 21   16   Temp: (!) 96 4 °F (35 8 °C) (!) 96 2 °F (35 7 °C) (!) 96 2 °F (35 7 °C) (!) 96 6 °F (35 9 °C)   TempSrc:       SpO2:    100%   Weight:       Height:          Temperature:   Temp (24hrs), Av 6 °F (35 9 °C), Min:96 6 °F (35 9 °C), Max:96 6 °F (35 9 °C)    Temperature: (!) 96 6 °F (35 9 °C)  Intake & Output:  I/O        07 07 07 07 07 0700    P  O        I V  (mL/kg) 500 (6 1)      Total Intake(mL/kg) 500 (6 1)      Urine (mL/kg/hr)  100 (0 1)     Other 3300      Total Output 3300 100     Net -2800 -100 Weights:   IBW (Ideal Body Weight): 68 4 kg    Body mass index is 27 52 kg/m²  Weight (last 2 days)     None        Physical Exam  Vitals reviewed  HENT:      Head: Normocephalic and atraumatic  Comments: Poor dentition  Cardiovascular:      Rate and Rhythm: Normal rate and regular rhythm  Pulses: Normal pulses  Heart sounds: Normal heart sounds  Pulmonary:      Effort: Pulmonary effort is normal       Breath sounds: Normal breath sounds  Abdominal:      General: Bowel sounds are normal       Palpations: Abdomen is soft  Skin:     General: Skin is warm and dry  Capillary Refill: Capillary refill takes less than 2 seconds  Neurological:      Mental Status: He is alert and oriented to person, place, and time  LABORATORY DATA     Labs: I have personally reviewed pertinent reports  Results from last 7 days   Lab Units 06/18/22  0437 06/17/22  0431 06/16/22  1008   WBC Thousand/uL 14 15* 14 02* 11 42*   HEMOGLOBIN g/dL 13 9 15 0 14 2   HEMATOCRIT % 39 4 41 8 39 5   PLATELETS Thousands/uL 121* 96* 102*   NEUTROS PCT % 79* 77* 84*   MONOS PCT % 9 10 7      Results from last 7 days   Lab Units 06/18/22  0437 06/17/22  0431 06/16/22  1008   POTASSIUM mmol/L 4 2 4 0 3 6   CHLORIDE mmol/L 97* 97* 97*   CO2 mmol/L 22 28 26   BUN mg/dL 74* 48* 59*   CREATININE mg/dL 7 73* 6 04* 7 08*   CALCIUM mg/dL 8 0* 7 9* 7 7*   ALK PHOS U/L  --   --  140*   ALT U/L  --   --  206*   AST U/L  --   --  510*     Results from last 7 days   Lab Units 06/14/22  0505 06/14/22  0032 06/13/22  1805   MAGNESIUM mg/dL 2 2 2 2 2 1     Results from last 7 days   Lab Units 06/14/22  0505 06/14/22  0032 06/13/22  1805   PHOSPHORUS mg/dL 2 8 2 8 2 7      Results from last 7 days   Lab Units 06/16/22  1317   INR  0 93               IMAGING & DIAGNOSTIC TESTING     Radiology Results: I have personally reviewed pertinent reports  CT chest abdomen pelvis wo contrast    Result Date: 6/10/2022  Impression: 1  Limited examination demonstrating no evidence of intrathoracic, intra-abdominal or intrapelvic traumatic injury 2  There is nonspecific perihilar and upper lobe airspace opacities, could represent mild pulmonary edema versus aspiration  3  No pneumothorax, hemothorax or mediastinal air 4  Tiny amount of intravascular and cardiac air, likely introduced through an IV catheter Workstation performed: [id number]     XR chest 1 view portable    Result Date: 6/10/2022  Impression: Endotracheal tube tip is 5 5 cm above the jesika Workstation performed: [id number]     CT head without contrast    Result Date: 6/10/2022  Impression: No acute intracranial abnormality  Bilateral facial subcutaneous air present  Please refer to cervical spine CT report for more specific detail Workstation performed: [id number]     CT spine cervical wo contrast    Result Date: 6/10/2022  Impression: No cervical spine fracture or traumatic malalignment  Subcutaneous emphysema seen in the neck  Given that the CT of the chest abdomen and pelvis demonstrates no evidence of pneumothorax, mediastinal or paraesophageal air, this most likely is related to air introduced through an IV catheter  Also correlate for any penetrating injury that may have allowed generalized subcutaneous air to occur  The examination demonstrates a significant  finding and was documented as such in [facility] for liaison and referring practitioner immediate notification  Workstation performed: [id number]     XR chest portable ICU    Result Date: 6/11/2022  Impression: Deep sulcus sign concerning for a small left basilar pneumothorax  Right lateral decubitus or upright radiograph could be obtained for confirmation  The tip of the left subclavian central venous catheter projects at the superior vena cava  The study was marked in [facility] for immediate notification   Workstation performed: [id number]     US liver doppler only    Result Date: 6/12/2022  Impression: Normal liver Doppler  Workstation performed: [id number]     Other Diagnostic Testing: I have personally reviewed pertinent reports  ACTIVE MEDICATIONS     Current Facility-Administered Medications   Medication Dose Route Frequency    amiodarone tablet 200 mg  200 mg Oral TID With Meals    Followed by   Joe Moreland ON 6/29/2022] amiodarone tablet 200 mg  200 mg Oral Daily With Breakfast    amLODIPine (NORVASC) tablet 5 mg  5 mg Oral Daily    benzonatate (TESSALON PERLES) capsule 100 mg  100 mg Oral TID PRN    calcium carbonate (TUMS) chewable tablet 500 mg  500 mg Oral TID PRN    heparin (porcine) subcutaneous injection 5,000 Units  5,000 Units Subcutaneous Q8H [address]    hydrALAZINE (APRESOLINE) injection 5 mg  5 mg Intravenous Q6H PRN    lidocaine (LIDODERM) 5 % patch 1 patch  1 patch Topical Daily PRN    nicotine (NICODERM CQ) 21 mg/24 hr TD 24 hr patch 21 mg  21 mg Transdermal Daily       VTE Pharmacologic Prophylaxis: Heparin  VTE Mechanical Prophylaxis: sequential compression device    Portions of the record may have been created with voice recognition software  Occasional wrong word or "sound a like" substitutions may have occurred due to the inherent limitations of voice recognition software  Read the chart carefully and recognize, using context, where substitutions have occurred  Mariam Payne MD  Internal Medicine Residency PGY-1  [place]  Available on [email or web address]

## 2022-06-20 NOTE — CASE MANAGEMENT
Case Management Discharge Planning Note    Patient name Macy Barlow  Location [address] [place] [address] [facility] [id number]  : 1970 Date 2022       Current Admission Date: 6/10/2022  Current Admission Diagnosis:Acute renal failure [facility])   Patient Active Problem List    Diagnosis Date Noted    Atrial fibrillation ([facility] ) 06/15/2022    [facility] to [facility] 2022    Acute encephalopathy 2022    NSTEMI (non-ST elevated myocardial infarction) ([facility] ) 2022    Methamphetamine abuse ([facility] ) 2022    IV drug user 06/10/2022    Cocaine [facility] ) 06/10/2022    Unresponsiveness 06/10/2022    Acute [facility] failure [facility] ) 06/10/2022    Chest pain 2020    Hypertension 2020    PTSD (post-traumatic stress disorder) 2020    History of substance abuse ([place] [address] ) 2020      LOS (days): 10  Geometric Mean LOS (GMLOS) (days):   Days to GMLOS:     OBJECTIVE:  Risk of Unplanned Readmission Score: 16 32         Current admission status: Inpatient   Preferred Pharmacy:   [facility]  [address] [place] [address],Fourth Floor  Phone: [phone] Fax: [phone]    Primary Care Provider: No primary care provider on file  Primary Insurance: [address] [place] [address]  Secondary Insurance:     DISCHARGE DETAILS:       Additional Comments: CM spoke on the phone with pt's daughter, Hector Sher  CM reviewed therapy recommendations with pt's daughter, who confirmed the pt is currently staying in Room 12 at the [facility] in [place], an environment that sees frequent drug use by residents, according to the daughter  She also expressed concerns over the pt's SO, who she believes heavily influences his decision to engage in drug use  Ptis daughter also expressed concerns over pt's willingness to fully engage in or complete his dialysis treatment    Pt's daughter explained she is attempting to arrange a safer DC location to stay with family when he leaves the hospital   Pt previously declined HOST referral   CM made referral to Peer  and will follow

## 2022-06-21 ENCOUNTER — APPOINTMENT (INPATIENT)
Dept: DIALYSIS | Facility: HOSPITAL | Age: 52
DRG: 469 | End: 2022-06-21
Payer: COMMERCIAL

## 2022-06-21 ENCOUNTER — APPOINTMENT (INPATIENT)
Dept: RADIOLOGY | Facility: HOSPITAL | Age: 52
DRG: 469 | End: 2022-06-21
Attending: INTERNAL MEDICINE
Payer: COMMERCIAL

## 2022-06-21 PROBLEM — J14 PNEUMONIA DUE TO HAEMOPHILUS INFLUENZAE (HCC): Status: RESOLVED | Noted: 2022-06-14 | Resolved: 2022-06-21

## 2022-06-21 LAB
ANION GAP SERPL CALCULATED.3IONS-SCNC: 13 MMOL/L (ref 4–13)
ANTI-MPO AB (RDL): <0.2 UNITS (ref 0–0.9)
BUN SERPL-MCNC: 102 MG/DL (ref 5–25)
C-ANCA TITR SER IF: NORMAL TITER
CALCIUM SERPL-MCNC: 7.8 MG/DL (ref 8.3–10.1)
CHLORIDE SERPL-SCNC: 94 MMOL/L (ref 100–108)
CO2 SERPL-SCNC: 21 MMOL/L (ref 21–32)
CREAT SERPL-MCNC: 11.4 MG/DL (ref 0.6–1.3)
ERYTHROCYTE [DISTWIDTH] IN BLOOD BY AUTOMATED COUNT: 12.5 % (ref 11.6–15.1)
GFR SERPL CREATININE-BSD FRML MDRD: 4 ML/MIN/1.73SQ M
GLUCOSE SERPL-MCNC: 156 MG/DL (ref 65–140)
HCT VFR BLD AUTO: 35.4 % (ref 36.5–49.3)
HGB BLD-MCNC: 12.4 G/DL (ref 12–17)
MCH RBC QN AUTO: 34.5 PG (ref 26.8–34.3)
MCHC RBC AUTO-ENTMCNC: 35 G/DL (ref 31.4–37.4)
MCV RBC AUTO: 99 FL (ref 82–98)
P-ANCA ATYPICAL TITR SER IF: NORMAL TITER
P-ANCA TITR SER IF: NORMAL TITER
PLATELET # BLD AUTO: 195 THOUSANDS/UL (ref 149–390)
PMV BLD AUTO: 11.4 FL (ref 8.9–12.7)
POTASSIUM SERPL-SCNC: 5.9 MMOL/L (ref 3.5–5.3)
PROTEINASE3 AB SER IA-ACNC: <0.2 UNITS (ref 0–0.9)
RBC # BLD AUTO: 3.59 MILLION/UL (ref 3.88–5.62)
SODIUM SERPL-SCNC: 128 MMOL/L (ref 136–145)
WBC # BLD AUTO: 14.52 THOUSAND/UL (ref 4.31–10.16)

## 2022-06-21 PROCEDURE — 77001 FLUOROGUIDE FOR VEIN DEVICE: CPT | Performed by: RADIOLOGY

## 2022-06-21 PROCEDURE — 99232 SBSQ HOSP IP/OBS MODERATE 35: CPT | Performed by: HOSPITALIST

## 2022-06-21 PROCEDURE — 0JH63XZ INSERTION OF TUNNELED VASCULAR ACCESS DEVICE INTO CHEST SUBCUTANEOUS TISSUE AND FASCIA, PERCUTANEOUS APPROACH: ICD-10-PCS | Performed by: RADIOLOGY

## 2022-06-21 PROCEDURE — 76937 US GUIDE VASCULAR ACCESS: CPT

## 2022-06-21 PROCEDURE — NC001 PR NO CHARGE: Performed by: RADIOLOGY

## 2022-06-21 PROCEDURE — C1894 INTRO/SHEATH, NON-LASER: HCPCS

## 2022-06-21 PROCEDURE — 76937 US GUIDE VASCULAR ACCESS: CPT | Performed by: RADIOLOGY

## 2022-06-21 PROCEDURE — 77001 FLUOROGUIDE FOR VEIN DEVICE: CPT

## 2022-06-21 PROCEDURE — 36558 INSERT TUNNELED CV CATH: CPT | Performed by: RADIOLOGY

## 2022-06-21 PROCEDURE — 36558 INSERT TUNNELED CV CATH: CPT

## 2022-06-21 PROCEDURE — 80048 BASIC METABOLIC PNL TOTAL CA: CPT | Performed by: STUDENT IN AN ORGANIZED HEALTH CARE EDUCATION/TRAINING PROGRAM

## 2022-06-21 PROCEDURE — C1750 CATH, HEMODIALYSIS,LONG-TERM: HCPCS

## 2022-06-21 PROCEDURE — 90935 HEMODIALYSIS ONE EVALUATION: CPT | Performed by: INTERNAL MEDICINE

## 2022-06-21 PROCEDURE — 85027 COMPLETE CBC AUTOMATED: CPT | Performed by: STUDENT IN AN ORGANIZED HEALTH CARE EDUCATION/TRAINING PROGRAM

## 2022-06-21 RX ORDER — MIDAZOLAM HYDROCHLORIDE 2 MG/2ML
INJECTION, SOLUTION INTRAMUSCULAR; INTRAVENOUS CODE/TRAUMA/SEDATION MEDICATION
Status: COMPLETED | OUTPATIENT
Start: 2022-06-21 | End: 2022-06-21

## 2022-06-21 RX ORDER — FENTANYL CITRATE 50 UG/ML
INJECTION, SOLUTION INTRAMUSCULAR; INTRAVENOUS CODE/TRAUMA/SEDATION MEDICATION
Status: COMPLETED | OUTPATIENT
Start: 2022-06-21 | End: 2022-06-21

## 2022-06-21 RX ORDER — CEFAZOLIN SODIUM 2 G/50ML
SOLUTION INTRAVENOUS
Status: COMPLETED | OUTPATIENT
Start: 2022-06-21 | End: 2022-06-21

## 2022-06-21 RX ADMIN — FENTANYL CITRATE 50 MCG: 50 INJECTION INTRAMUSCULAR; INTRAVENOUS at 16:43

## 2022-06-21 RX ADMIN — AMIODARONE HYDROCHLORIDE 200 MG: 200 TABLET ORAL at 17:53

## 2022-06-21 RX ADMIN — NICOTINE 21 MG: 21 PATCH, EXTENDED RELEASE TRANSDERMAL at 08:31

## 2022-06-21 RX ADMIN — AMLODIPINE BESYLATE 10 MG: 10 TABLET ORAL at 12:35

## 2022-06-21 RX ADMIN — FENTANYL CITRATE 50 MCG: 50 INJECTION INTRAMUSCULAR; INTRAVENOUS at 16:50

## 2022-06-21 RX ADMIN — AMIODARONE HYDROCHLORIDE 200 MG: 200 TABLET ORAL at 07:24

## 2022-06-21 RX ADMIN — FENTANYL CITRATE 50 MCG: 50 INJECTION INTRAMUSCULAR; INTRAVENOUS at 16:19

## 2022-06-21 RX ADMIN — AMIODARONE HYDROCHLORIDE 200 MG: 200 TABLET ORAL at 12:35

## 2022-06-21 RX ADMIN — MIDAZOLAM 1 MG: 1 INJECTION INTRAMUSCULAR; INTRAVENOUS at 16:37

## 2022-06-21 RX ADMIN — FENTANYL CITRATE 50 MCG: 50 INJECTION INTRAMUSCULAR; INTRAVENOUS at 16:11

## 2022-06-21 RX ADMIN — HEPARIN SODIUM 5000 UNITS: 5000 INJECTION INTRAVENOUS; SUBCUTANEOUS at 05:50

## 2022-06-21 RX ADMIN — Medication 20 ML: at 16:35

## 2022-06-21 RX ADMIN — CEFAZOLIN SODIUM 2000 MG: 2 SOLUTION INTRAVENOUS at 15:59

## 2022-06-21 RX ADMIN — MIDAZOLAM 1 MG: 1 INJECTION INTRAMUSCULAR; INTRAVENOUS at 16:11

## 2022-06-21 NOTE — PROGRESS NOTES
Patient arrived to IR for tunneled dialysis catheter placement  /88   Pulse 80   Temp 97 6 °F (36 4 °C)   Resp 20   Ht 5' 8" (1 727 m)   Wt 82 1 kg (181 lb)   SpO2 100%   BMI 27 52 kg/m²     The procedure and risks were discussed with the patient  All questions were answered  Informed consent was obtained

## 2022-06-21 NOTE — PROCEDURES
NEPHROLOGY DIALYSIS PROCEDURE NOTE      Patient seen and examined on Hemodialysis, tolerating procedure well  All documentation, labs, medications were reviewed by myself, and the treatment plan was reviewed with nurse and patient       Seen on Dialysis at : 10:42 AM  Dialysis Access: Right IJ non tunneled dialysis catheter  Vitals:  150/103  Dialysis time: 3 hours  Dialyzer: F180  Sodium bath: 138  Potassium bath: 2 K+, will drop to a 1K for the last 30 minutes  Bicarbonate bath: 35  Calcium bath: 2 5  Ultrafiltration: 1-1 5 L  Blood flow rate: 400 cc/min  Dialysis flow rate: 1 5 X Qb  Dialysis temperature: 36C  Medications given on HD:  None    SUMMARY:     57-year-old male with a past medical history of atrial fibrillation, recent H flu pneumonia, substance abuse, NSTEMI who initially presents after being found down   Nephrology is on board for acute kidney injury   Patient was initially intubated   Was initially requiring continuous renal replacement therapy      ASSESSMENT and PLAN:     Acute renal failure/acute tubular necrosis  --currently dialysis dependent  --baseline creatinine 0 9-1 1 mg/dL, but appeared to have an episode of acute kidney injury in September of 2020 with a creatinine was 1 6 mg/dL  --CT scan shows no evidence of hydronephrosis  --urinalysis showed innumerable RBCs  --presumed secondary ATN, rhabdomyolysis shock and drug abuse  --initially required CRRT on June 11th, discontinued on June 13th, transition to intermittent hemodialysis on June 14th  --access:  Right IJ temporary dialysis catheter, will need to be transition to a PermCath, IR has been consulted  --currently on a TTS dialysis schedule  --ANCA pending, given recent cocaine use  --creatinine continues to rise along with potassium off dialysis  --no significant/meaningful urine output, and worsening renal function off dialysis as not supported him to come off dialysis at this time  --can obtain outpatient hemodialysis unit to monitor for renal recovery  --discussed with the primary team     Polysubstance abuse  --recent use of cocaine methamphetamine     Rhabdomyolysis--improved     Hyponatremia  --sodium level slowly improving monitor with ultrafiltration     Hypertension  --amlodipine recently adjusted to 10 mg  --elevated systolic and diastolic blood pressure  --avoiding beta blockers at this time to prevent unopposed adrenergic stimulation    Hyperkalemia  --monitor on hemodialysis    Hepatitis C  --+ antibody  --Hep B panel negative    Review of Systems: The entire 12 point ROS has been reviewed      Physical Exam:    General:  Awake, no acute distress  Skin:  No rashes no lesions  CVS:  S1-S2 appreciated regular rhythm  Lungs:  Clear to auscultation  Abdomen:  Nontender nondistended  Access:  Right IJ temporary catheter with no exudate  Extremities:  No edema  Neuro:  No asterixis          Current Facility-Administered Medications:     amiodarone tablet 200 mg, 200 mg, Oral, TID With Meals, 200 mg at 06/21/22 0724 **FOLLOWED BY** [START ON 6/29/2022] amiodarone tablet 200 mg, 200 mg, Oral, Daily With Breakfast, Oscar Welsh MD    amLODIPine (NORVASC) tablet 10 mg, 10 mg, Oral, Daily, Darryl Cleary MD    benzonatate (TESSALON PERLES) capsule 100 mg, 100 mg, Oral, TID PRN, Sallyanne Goodell, MD, 100 mg at 06/18/22 8367    calcium carbonate (TUMS) chewable tablet 500 mg, 500 mg, Oral, TID PRN, Oscar Welsh MD, 500 mg at 06/19/22 1352    heparin (porcine) subcutaneous injection 5,000 Units, 5,000 Units, Subcutaneous, Q8H [address], Oscar Welsh MD, 5,000 Units at 06/21/22 0550    hydrALAZINE (APRESOLINE) injection 5 mg, 5 mg, Intravenous, Q6H PRN, Amrita Holm DO    lidocaine (LIDODERM) 5 % patch 1 patch, 1 patch, Topical, Daily PRN, Nica Bryan DO, 1 patch at 06/20/22 0827    nicotine (NICODERM CQ) 21 mg/24 hr TD 24 hr patch 21 mg, 21 mg, Transdermal, Daily, Sallyanne Goodell, MD, 21 mg at 06/21/22 0831    ondansetron [facility]) injection 4 mg, 4 mg, Intravenous, Once, Maru Javier MD

## 2022-06-21 NOTE — PROCEDURES
Central Line    Date/Time: 6/21/2022 5:07 PM  Performed by: Blaze Oleary DO  Authorized by: Blaze Oleary DO     Patient location:  IR  Consent:     Consent obtained:  Written    Consent given by:  Patient    Risks discussed:  Arterial puncture, bleeding, infection, incorrect placement, nerve damage and pneumothorax    Alternatives discussed:  No treatment, delayed treatment, alternative treatment and observation  Universal protocol:     Procedure explained and questions answered to patient or proxy's satisfaction: yes      Relevant documents present and verified: yes      Test results available and properly labeled: yes      Radiology Images displayed and confirmed  If images not available, report reviewed: yes      Required blood products, implants, devices, and special equipment available: yes      Site/side marked: yes      Immediately prior to procedure, a time out was called: yes      Patient identity confirmed:  Verbally with patient and hospital-assigned identification number  Pre-procedure details:     Hand hygiene: Hand hygiene performed prior to insertion      Sterile barrier technique: All elements of maximal sterile technique followed      Skin preparation:  2% chlorhexidine    Skin preparation agent: Skin preparation agent completely dried prior to procedure    Indications:     Central line indications: dialysis    Sedation:     Sedation type: Moderate (conscious) sedation  Anesthesia (see MAR for exact dosages):      Anesthesia method:  Local infiltration    Local anesthetic:  Lidocaine 1% WITH epi  Procedure details:     Location:  Left internal jugular    Vessel type: vein      Laterality:  Left    Approach: percutaneous technique used      Catheter type:  Double lumen    Catheter size:  15 5 Fr    Landmarks identified: yes      Ultrasound guidance: yes      Ultrasound image availability:  Images available in PACS and still images obtained    Sterile ultrasound techniques: Sterile gel and sterile probe covers were used      Manometry confirmation: no      Number of attempts:  1    Successful placement: yes    Post-procedure details:     Post-procedure:  Dressing applied and line sutured    Assessment:  Blood return through all ports, no pneumothorax on x-ray and free fluid flow    Post-procedure complications: none      Patient tolerance of procedure:   Tolerated well, no immediate complications

## 2022-06-21 NOTE — PLAN OF CARE
Net UF goal 2kg over 3 hours as krystyna via temporary RIJ  Next HD tx Thurs, 6/23  See flowsheets for further assessment details  Post-Dialysis RN Treatment Note    Blood Pressure:  Pre 163/105 mm/Hg  Post 155/112 mmHg   Mode of weight measurement: N/A bedscale not accurate   Volume Removed  2000 ml    Treatment duration 180 minutes    NS given  No    Treatment shortened?  No   Medications given during Rx None Reported   Estimated Kt/V  0 87   Access type: Temporary HD catheter   Access Issues: No    Report called to primary nurse   Yes, Jordan Palencia RN

## 2022-06-21 NOTE — CASE MANAGEMENT
Case Management Discharge Planning Note    Patient name Rehana Mayorga  [facility] [id number]  : 1970 Date 2022       Current Admission Date: 6/10/2022  Current Admission Diagnosis:Acute renal failure [facility])   Patient Active Problem List    Diagnosis Date Noted    Atrial fibrillation ([facility] ) 06/15/2022    Acute encephalopathy 2022    Elevated troponin 2022    Methamphetamine abuse ([facility] ) 2022    IV drug user 06/10/2022    [facility] 06/10/2022    Unresponsiveness 06/10/2022    Acute renal failure ([facility] ) 06/10/2022    Chest pain 2020    Hypertension 2020    PTSD (post-traumatic stress disorder) 2020    History of substance abuse ([facility] ) 2020      LOS (days): 11  Geometric Mean LOS (GMLOS) (days):   Days to GMLOS:     OBJECTIVE:  Risk of Unplanned Readmission Score: 16 01         Current admission status: Inpatient   Preferred Pharmacy:   [facility] [address], [address] [place] [address],[address] Floor  [address]  Phone: [phone] Fax: [phone]    Primary Care Provider: No primary care provider on file      Primary Insurance: [address] [place] [address]  Secondary Insurance:     DISCHARGE DETAILS:      [facility]         Is the patient interested in [facility] at discharge?: Yes  [address] requested[de-identified] Physical [address] Name[de-identified] [address] Provider[de-identified] PCP  Home Health Services Needed[de-identified] Evaluate Functional Status and Safety, Strengthening/Theraputic Exercises to Improve Function  Homebound Criteria Met[de-identified] Requires the Assistance of Another Person for Safe Ambulation or to Leave the Home  Supporting Clincal Findings[de-identified] Limited Endurance         Other Referral/Resources/Interventions Provided:  Referral Comments: Cm spoke with pt about Nephrology wanting OP HD, and the pt was agreeable to DC plan, confirming a preference for the [facility]  Pt confirmed he would be able to obtain transportation  CM to make referral to [facility]ina and will follow

## 2022-06-21 NOTE — RESTORATIVE TECHNICIAN NOTE
Restorative Technician Note      Patient Name: Ranjeet Barrios     Restorative Tech Visit Date: 6/21/2022  Note Type: Mobility  Patient Position Upon Consult: Supine  Activity Performed: Ambulated  Assistive Device: Standard walker  Patient Position at End of Consult: Bedside chair;  All needs within reach    Keshia Rincon  DPT, Restorative Technician

## 2022-06-21 NOTE — DISCHARGE INSTRUCTIONS
Perma-cath Placement   WHAT YOU NEED TO KNOW:   A perma-cath is a catheter placed through a vein into or near your right atrium  Your right atrium is the right upper chamber of your heart  A perma-cath is used for dialysis in an emergency or until a long-term device is ready to use  After your procedure, you will have some pain and swelling on your chest and neck  You may have some bruises on your chest and neck  You may also have 2 dressings, one on your chest and one on your neck  DISCHARGE INSTRUCTIONS:   Call 911 for any of the following: You feel lightheaded, short of breath, and have chest pain  Your catheter comes out   Contact Interventional Radiology at [phone] Reny PATIENTS: Contact Interventional Radiology at [phone]) Norma Beaversreymundo PATIENTS: Contact Interventional Radiology at [phone]) if:  Blood soaks through your bandage  You have new swelling in your arm, neck, face, or chest on your right side  Your catheter gets wet  Your bruises or pain get worse  You have a fever or chills  Persistent nausea or vomiting  Your incision is red, swollen, or draining pus  You have questions or concerns about your condition or care  Self-care:       Resume your normal diet  Keep your dressings dry  Do not take a shower or swim  You may take a tub bath, but do not get your dressings wet  Water in your wound can cause bacteria to grow and cause an infection  If your dressing gets wet, dry it off and cover it with dry sterile gauze  Call your healthcare provider  Do not use soaps or ointments  Do not change your dressings  Your healthcare provider or dialysis nurse will change your dressings  Your dressings should stay in place until your healthcare provider removes them  The dressing on your chest will stay as long as you have the catheter in place  The dressing prevents infection  Do not remove the red and blue caps from the end of your catheter   The caps prevent air from getting into your catheter  Follow up with your healthcare provider as directed: Write down your questions so you remember to ask them during your visits

## 2022-06-21 NOTE — PROGRESS NOTES
INTERNAL MEDICINE RESIDENCY PROGRESS NOTE     Name: Guera Valerio   Age & Sex: 46 y o  male   MRN: [id number]  Unit/Bed#: [address] 806-01   Encounter: [id number]  Team: SOD Team A    PATIENT INFORMATION     Name: Guera Valerio   Age & Sex: 46 y o  male   MRN: [id number]  Hospital Stay Days: 11    ASSESSMENT/PLAN     Principal Problem:    Acute renal failure ([facility] )  Active Problems:    Hypertension    PTSD (post-traumatic stress disorder)    IV drug user    Cocaine abuse ([facility] )    Elevated troponin    Methamphetamine abuse ([facility] [address] )    Acute encephalopathy    Atrial fibrillation ([facility] [address] )      * [facility]  Assessment & Plan  In the setting of rhabdomyolysis   Required CVVHD  6/14- transitioned to intermittent HD  Still oliguric, per pt producing small amounts of dark urine but unmeasured by nursing  BUN of 102 on 6/21    Plan:   Continue to monitor mental status around HD timing   Nephro following, appreciate recommendations  T/Th/Sat schedule  Tentative PermCath placement 6/21  Patient to get dialysis today  NPO at midnight 6/20  Will work with case management to set up outpatient dialysis  Monitor CK, Cr, BMP   Monitor I/Os  Better dose dialysis to reduce BUN<50    Pneumonia due to Haemophilus influenzae (HCC)-resolved as of 6/21/2022  Assessment & Plan  Sputum revealing H Flu and Group B strep  Initially received Zosyn and Vanco, switched to cefuroxime  6/17 CXR: Persistent bibasilar opacities likely due to atelectasis  No evidence for pneumothorax  WBC 6/17 up to 14, no other obvious source for infection at this time  Plan:   IS   Completed course of Cefuroxime 500mg x4 days for total of 7 days of Abx   Continue to trend fever curve and WBC    Atrial fibrillation ([facility] )  Assessment & Plan  Pt intermittently having AF with RVR, new on this admission  Loaded with amio in ICU       Plan:   Continue amiodarone 200mg tid  Transition to 200mg daily on 6/29  Monitor hemodynamics     Elevated troponin  Assessment & Plan  Elevated troponin level noted on presentation  2/2 ARF, no intervention needed  Due to patients history of cocaine/meth use, patient may be predisposed to increased risk for coronary atherosclerosis and may benefit from an ischemic evaluation prior to discharge  - Troponins now wnl  - Plan for nuclear cardiac test to check for ischemia   - Stress test diet     IV drug user  Assessment & Plan  Pt has a hx of polysubstance abuse including cocaine, meth, THC  Could be contributing to pt's "found down" event  Plan:  Continue to encourage cessation    PTSD (post-traumatic stress disorder)  Assessment & Plan  Not on any medications  Recommend follow up out pt PCP/ psych    Hypertension  Assessment & Plan  Pt with elevated BP readings while in hospital, not improved following dialysis  Not improved on amlodipine 5mg daily    Plan:   Increased amlodipine to 10mg daily   Prn hydralazine   Continue to monitor vitals around HD timing    Hyperkalemia-resolved as of 2022  Assessment & Plan  2/2 to acute renal failure  Peaked T waves on EKG  Treated with insulin, Ca gluconate, and IVF on arrival  Treated with CVVHD in ICU   - started intermittent HD  Improved    Plan:   Continue prn HD per nephro  Monitor BMP       Disposition: Remains inpatient for permacath placement   Working with Case Management for setting up outpatient dialysis  SUBJECTIVE     Patient seen and examined  No acute events overnight  Reports two voids in the last 24 hours that were dark yellow in color and not recorded by nursing  Has been NPO since midnight  for placement of permacath today      OBJECTIVE     Vitals:    22 0930 22 1000 22 1030 22 1100   BP: (!) 169/115 (!) 152/104 (!) 150/110 (!) 147/102   BP Location:       Pulse: 69 80 82 96   Resp: 16 15 15 16   Temp:       TempSrc:       SpO2:       Weight:       Height:          Temperature:   Temp (24hrs), Av 9 °F (36 6 °C), Min:97 3 °F (36 3 °C), Max:99 °F (37 2 °C)    Temperature: 97 7 °F (36 5 °C)  Intake & Output:  I/O       06/19 0701 06/20 0700 06/20 0701 06/21 0700 06/21 0701 06/22 0700    P  O   180 0    I V  (mL/kg)   200 (2 4)    Total Intake(mL/kg)  180 (2 2) 200 (2 4)    Urine (mL/kg/hr) 100 (0 1)  300 (0 8)    Other       Total Output 100  300    Net -100 +180 -100               Weights:   IBW (Ideal Body Weight): 68 4 kg    Body mass index is 27 52 kg/m²  Weight (last 2 days)     None        Physical Exam  Vitals reviewed  Constitutional:       General: He is not in acute distress  HENT:      Head: Normocephalic and atraumatic  Nose: No rhinorrhea  Eyes:      General: No scleral icterus  Cardiovascular:      Rate and Rhythm: Normal rate and regular rhythm  Pulses: Normal pulses  Heart sounds: Normal heart sounds  No murmur heard  No friction rub  No gallop  Pulmonary:      Effort: Pulmonary effort is normal  No respiratory distress  Breath sounds: Normal breath sounds  No wheezing, rhonchi or rales  Abdominal:      General: Bowel sounds are normal  There is no distension  Palpations: Abdomen is soft  Tenderness: There is no abdominal tenderness  There is no guarding  Musculoskeletal:      Right lower leg: No edema  Left lower leg: No edema  Skin:     General: Skin is warm and dry  Capillary Refill: Capillary refill takes less than 2 seconds  Coloration: Skin is not jaundiced  Neurological:      Mental Status: He is alert and oriented to person, place, and time  Comments: CN 2-12 grossly intact, moves all 4 extremities, no dysarthria   Psychiatric:         Behavior: Behavior normal        LABORATORY DATA     Labs: I have personally reviewed pertinent reports    Results from last 7 days   Lab Units 06/21/22  0557 06/20/22  1129 06/18/22  0437 06/17/22  0431   WBC Thousand/uL 14 52* 14 17* 14 15* 14 02*   HEMOGLOBIN g/dL 12 4 13 1 13 9 15 0 HEMATOCRIT % 35 4* 36 1* 39 4 41 8   PLATELETS Thousands/uL 195 166 121* 96*   NEUTROS PCT %  --  80* 79* 77*   MONOS PCT %  --  10 9 10      Results from last 7 days   Lab Units 06/21/22  0557 06/20/22  1129 06/18/22  0437 06/17/22  0431 06/16/22  1008   POTASSIUM mmol/L 5 9* 5 3 4 2   < > 3 6   CHLORIDE mmol/L 94* 93* 97*   < > 97*   CO2 mmol/L 21 24 22   < > 26   BUN mg/dL 102* 79* 74*   < > 59*   CREATININE mg/dL 11 40* 9 87* 7 73*   < > 7 08*   CALCIUM mg/dL 7 8* 7 8* 8 0*   < > 7 7*   ALK PHOS U/L  --   --   --   --  140*   ALT U/L  --   --   --   --  206*   AST U/L  --   --   --   --  510*    < > = values in this interval not displayed  Results from last 7 days   Lab Units 06/16/22  1317   INR  0 93               IMAGING & DIAGNOSTIC TESTING     Radiology Results: I have personally reviewed pertinent reports  CT chest abdomen pelvis wo contrast    Result Date: 6/10/2022  Impression: 1  Limited examination demonstrating no evidence of intrathoracic, intra-abdominal or intrapelvic traumatic injury 2  There is nonspecific perihilar and upper lobe airspace opacities, could represent mild pulmonary edema versus aspiration  3  No pneumothorax, hemothorax or mediastinal air 4  Tiny amount of intravascular and cardiac air, likely introduced through an IV catheter Workstation performed: [id number]     XR chest 1 view portable    Result Date: 6/10/2022  Impression: Endotracheal tube tip is 5 5 cm above the jesika Workstation performed: [id number]     CT head without contrast    Result Date: 6/10/2022  Impression: No acute intracranial abnormality  Bilateral facial subcutaneous air present  Please refer to cervical spine CT report for more specific detail Workstation performed: [id number]     CT spine cervical wo contrast    Result Date: 6/10/2022  Impression: No cervical spine fracture or traumatic malalignment  Subcutaneous emphysema seen in the neck   Given that the CT of the chest abdomen and pelvis demonstrates no evidence of pneumothorax, mediastinal or paraesophageal air, this most likely is related to air introduced through an IV catheter  Also correlate for any penetrating injury that may have allowed generalized subcutaneous air to occur  The examination demonstrates a significant  finding and was documented as such in [facility] for liaison and referring practitioner immediate notification  Workstation performed: [id number]     XR chest portable ICU    Result Date: 6/11/2022  Impression: Deep sulcus sign concerning for a small left basilar pneumothorax  Right lateral decubitus or upright radiograph could be obtained for confirmation  The tip of the left subclavian central venous catheter projects at the superior vena cava  The study was marked in [facility] for immediate notification  Workstation performed: [id number]     US liver doppler only    Result Date: 6/12/2022  Impression: Normal liver Doppler  Workstation performed: [id number]     Other Diagnostic Testing: I have personally reviewed pertinent reports      ACTIVE MEDICATIONS     Current Facility-Administered Medications   Medication Dose Route Frequency    amiodarone tablet 200 mg  200 mg Oral TID With Meals    Followed by   Harlan Shay ON 6/29/2022] amiodarone tablet 200 mg  200 mg Oral Daily With Breakfast    amLODIPine (NORVASC) tablet 10 mg  10 mg Oral Daily    benzonatate (TESSALON PERLES) capsule 100 mg  100 mg Oral TID PRN    calcium carbonate (TUMS) chewable tablet 500 mg  500 mg Oral TID PRN    heparin (porcine) subcutaneous injection 5,000 Units  5,000 Units Subcutaneous Q8H [address]    hydrALAZINE (APRESOLINE) injection 5 mg  5 mg Intravenous Q6H PRN    lidocaine (LIDODERM) 5 % patch 1 patch  1 patch Topical Daily PRN    nicotine (NICODERM CQ) 21 mg/24 hr TD 24 hr patch 21 mg  21 mg Transdermal Daily    ondansetron (ZOFRAN) injection 4 mg  4 mg Intravenous Once       VTE Pharmacologic Prophylaxis: Heparin  VTE Mechanical Prophylaxis: sequential compression device    Portions of the record may have been created with voice recognition software  Occasional wrong word or "sound a like" substitutions may have occurred due to the inherent limitations of voice recognition software  Read the chart carefully and recognize, using context, where substitutions have occurred  Carlos Rosa MD  Internal Medicine Residency PGY-1  [place]  Available on [employer]  [email or web address]

## 2022-06-22 LAB
ANION GAP SERPL CALCULATED.3IONS-SCNC: 8 MMOL/L (ref 4–13)
ATRIAL RATE: 79 BPM
BUN SERPL-MCNC: 68 MG/DL (ref 5–25)
CALCIUM SERPL-MCNC: 7.6 MG/DL (ref 8.3–10.1)
CHLORIDE SERPL-SCNC: 95 MMOL/L (ref 100–108)
CO2 SERPL-SCNC: 26 MMOL/L (ref 21–32)
CREAT SERPL-MCNC: 8.8 MG/DL (ref 0.6–1.3)
ERYTHROCYTE [DISTWIDTH] IN BLOOD BY AUTOMATED COUNT: 12.7 % (ref 11.6–15.1)
GFR SERPL CREATININE-BSD FRML MDRD: 6 ML/MIN/1.73SQ M
GLUCOSE SERPL-MCNC: 167 MG/DL (ref 65–140)
HCT VFR BLD AUTO: 37.4 % (ref 36.5–49.3)
HGB BLD-MCNC: 13 G/DL (ref 12–17)
MCH RBC QN AUTO: 34.2 PG (ref 26.8–34.3)
MCHC RBC AUTO-ENTMCNC: 34.8 G/DL (ref 31.4–37.4)
MCV RBC AUTO: 98 FL (ref 82–98)
P AXIS: 41 DEGREES
PLATELET # BLD AUTO: 205 THOUSANDS/UL (ref 149–390)
PMV BLD AUTO: 11.1 FL (ref 8.9–12.7)
POTASSIUM SERPL-SCNC: 5.5 MMOL/L (ref 3.5–5.3)
PR INTERVAL: 158 MS
QRS AXIS: -2 DEGREES
QRSD INTERVAL: 96 MS
QT INTERVAL: 392 MS
QTC INTERVAL: 449 MS
RBC # BLD AUTO: 3.8 MILLION/UL (ref 3.88–5.62)
SODIUM SERPL-SCNC: 129 MMOL/L (ref 136–145)
T WAVE AXIS: 47 DEGREES
VENTRICULAR RATE: 79 BPM
WBC # BLD AUTO: 14.43 THOUSAND/UL (ref 4.31–10.16)

## 2022-06-22 PROCEDURE — 85027 COMPLETE CBC AUTOMATED: CPT | Performed by: STUDENT IN AN ORGANIZED HEALTH CARE EDUCATION/TRAINING PROGRAM

## 2022-06-22 PROCEDURE — 80048 BASIC METABOLIC PNL TOTAL CA: CPT | Performed by: STUDENT IN AN ORGANIZED HEALTH CARE EDUCATION/TRAINING PROGRAM

## 2022-06-22 PROCEDURE — 99232 SBSQ HOSP IP/OBS MODERATE 35: CPT | Performed by: INTERNAL MEDICINE

## 2022-06-22 PROCEDURE — 93010 ELECTROCARDIOGRAM REPORT: CPT | Performed by: INTERNAL MEDICINE

## 2022-06-22 PROCEDURE — 99232 SBSQ HOSP IP/OBS MODERATE 35: CPT | Performed by: HOSPITALIST

## 2022-06-22 PROCEDURE — 93005 ELECTROCARDIOGRAM TRACING: CPT

## 2022-06-22 RX ORDER — SODIUM POLYSTYRENE SULFONATE 4.1 MEQ/G
15 POWDER, FOR SUSPENSION ORAL; RECTAL ONCE
Status: COMPLETED | OUTPATIENT
Start: 2022-06-22 | End: 2022-06-22

## 2022-06-22 RX ADMIN — NICOTINE 21 MG: 21 PATCH, EXTENDED RELEASE TRANSDERMAL at 08:51

## 2022-06-22 RX ADMIN — HEPARIN SODIUM 5000 UNITS: 5000 INJECTION INTRAVENOUS; SUBCUTANEOUS at 04:28

## 2022-06-22 RX ADMIN — SODIUM POLYSTYRENE SULFONATE 15 G: 4.1 POWDER, FOR SUSPENSION ORAL; RECTAL at 15:01

## 2022-06-22 RX ADMIN — AMIODARONE HYDROCHLORIDE 200 MG: 200 TABLET ORAL at 08:48

## 2022-06-22 RX ADMIN — HEPARIN SODIUM 5000 UNITS: 5000 INJECTION INTRAVENOUS; SUBCUTANEOUS at 15:01

## 2022-06-22 RX ADMIN — AMLODIPINE BESYLATE 10 MG: 10 TABLET ORAL at 08:51

## 2022-06-22 RX ADMIN — AMIODARONE HYDROCHLORIDE 200 MG: 200 TABLET ORAL at 17:41

## 2022-06-22 RX ADMIN — HEPARIN SODIUM 5000 UNITS: 5000 INJECTION INTRAVENOUS; SUBCUTANEOUS at 21:00

## 2022-06-22 RX ADMIN — AMIODARONE HYDROCHLORIDE 200 MG: 200 TABLET ORAL at 11:39

## 2022-06-22 NOTE — PROGRESS NOTES
INTERNAL MEDICINE RESIDENCY PROGRESS NOTE     Name: Macy Barlow   Age & Sex: 46 y o  male   MRN: [id number]  Unit/Bed#: [facility] 806-01   Encounter: [id number]  Team: SOD Team A    PATIENT INFORMATION     Name: Macy Barlow   Age & Sex: 46 y o  male   MRN: [id number]  Hospital Stay Days: 12    ASSESSMENT/PLAN     Principal Problem:    Acute renal failure ([facility] [address] )  Active Problems:    Hypertension    PTSD (post-traumatic stress disorder)    IV drug user    Cocaine abuse ([facility] [address] )    Elevated troponin    Methamphetamine abuse ([facility] [address] )    Acute encephalopathy    Atrial fibrillation ([facility] [address] )      * Acute renal failure ([facility] [address] )  Assessment & Plan  In the setting of rhabdomyolysis   Required CVVHD  6/14- transitioned to intermittent HD  6/22 - still oliguric, per pt producing increased amounts of urine lighter in color  BUN on 6/22 down to 68 from 102    Plan:   Continue to monitor mental status around HD timing   Nephro following, appreciate recommendations  T/Th/Sat schedule  PermCath placed 6/21  Patient to get dialysis 6/23  Will work with case management to set up outpatient dialysis  Monitor CK, Cr, BMP   Monitor I/Os  Better dose dialysis to reduce BUN<50  Hemoglobin check 6/22 after bleeding from permcath site    Atrial fibrillation [facility])  Assessment & Plan  Pt intermittently having AF with RVR, new on this admission  Loaded with amio in ICU  Plan:   Continue amiodarone 200mg tid  Transition to 200mg daily on 6/29  Monitor hemodynamics     Elevated troponin  Assessment & Plan  Elevated troponin level noted on presentation  2/2 ARF, no intervention needed  Due to patients history of cocaine/meth use, patient may be predisposed to increased risk for coronary atherosclerosis and may benefit from an ischemic evaluation prior to discharge    - Troponins now wnl  - Plan for nuclear cardiac test to check for ischemia 6/23  - Stress test diet 6/21    Hypertension  Assessment & Plan  Pt with elevated BP readings while in hospital, not improved following dialysis  Not improved on amlodipine 5mg daily    Plan:   Increased amlodipine to 10mg daily   Prn hydralazine   Continue to monitor vitals around HD timing    IV drug user  Assessment & Plan  Pt has a hx of polysubstance abuse including cocaine, meth, THC  Could be contributing to pt's "found down" event  Plan:  Continue to encourage cessation    PTSD (post-traumatic stress disorder)  Assessment & Plan  Not on any medications  Recommend follow up out pt PCP/ psych    Pneumonia due to Haemophilus influenzae (HCC)-resolved as of 6/21/2022  Assessment & Plan  Sputum revealing H Flu and Group B strep  Initially received Zosyn and Vanco, switched to cefuroxime  6/17 CXR: Persistent bibasilar opacities likely due to atelectasis  No evidence for pneumothorax  WBC 6/17 up to 14, no other obvious source for infection at this time  Plan:   IS   Completed course of Cefuroxime 500mg x4 days for total of 7 days of Abx   Continue to trend fever curve and WBC    Hyperkalemia-resolved as of 6/20/2022  Assessment & Plan  2/2 to acute renal failure  Peaked T waves on EKG  Treated with insulin, Ca gluconate, and IVF on arrival  Treated with CVVHD in ICU   6/14- started intermittent HD  Improved    Plan:   Continue prn HD per nephro  Monitor BMP       Disposition: Remains inpatient for nuclear stress test, HD 6/23 and location for outpatient HD  SUBJECTIVE     Patient seen and examined  [facility] site began bleeding early this morning around 6am  Stat IR consult was placed  On exam, dressing was applied to site showing bleeding through two 4x4 gauze pads that had saturated the dressing  Otherwise patient subjectively feels better  He notes urine output improving and lighter in color      OBJECTIVE     Vitals:    06/21/22 2246 06/22/22 0600 06/22/22 0805 06/22/22 0901   BP: 139/82  146/93 (!) 158/103   BP Location:       Pulse:       Resp: 20  16 16   Temp: 98 °F (36 7 °C)  (!) 97 4 °F (36 3 °C) TempSrc: Oral      SpO2:       Weight:  90 7 kg (200 lb)     Height:          Temperature:   Temp (24hrs), Av 6 °F (36 4 °C), Min:97 °F (36 1 °C), Max:98 °F (36 7 °C)    Temperature: (!) 97 4 °F (36 3 °C)  Intake & Output:  I/O        0701   0700  0701   07 07 0700    P  O  180 0     I V  (mL/kg)  400 (4 4)     Total Intake(mL/kg) 180 (2 2) 400 (4 4)     Urine (mL/kg/hr)  400 (0 2)     Other  2400     Total Output  2800     Net +180 -2400                Weights:   IBW (Ideal Body Weight): 68 4 kg    Body mass index is 30 41 kg/m²  Weight (last 2 days)     Date/Time Weight    22 0600 90 7 (200)        Physical Exam  Vitals reviewed  Constitutional:       General: He is not in acute distress  HENT:      Head: Normocephalic and atraumatic  Neck:      Comments: Bleeding noted at left permcath site  Dressings in place and fully saturated  Cardiovascular:      Rate and Rhythm: Normal rate and regular rhythm  Pulses: Normal pulses  Heart sounds: Normal heart sounds  Pulmonary:      Effort: Pulmonary effort is normal       Breath sounds: Normal breath sounds  Abdominal:      General: Bowel sounds are normal       Palpations: Abdomen is soft  Skin:     General: Skin is warm and dry  Capillary Refill: Capillary refill takes less than 2 seconds  Neurological:      Mental Status: He is alert and oriented to person, place, and time  Comments: CN 2-12 grossly intact, moves all 4 extremities, no dysarthria   Psychiatric:         Behavior: Behavior normal        LABORATORY DATA     Labs: I have personally reviewed pertinent reports    Results from last 7 days   Lab Units 22  0453 22  0557 22  1129 22  0437 22  0431   WBC Thousand/uL 14 43* 14 52* 14 17* 14 15* 14 02*   HEMOGLOBIN g/dL 13 0 12 4 13 1 13 9 15 0   HEMATOCRIT % 37 4 35 4* 36 1* 39 4 41 8   PLATELETS Thousands/uL 205 195 166 121* 96*   NEUTROS PCT %  --   -- 80* 79* 77*   MONOS PCT %  --   --  10 9 10      Results from last 7 days   Lab Units 06/22/22  0453 06/21/22  0557 06/20/22  1129 06/17/22  0431 06/16/22  1008   POTASSIUM mmol/L 5 5* 5 9* 5 3   < > 3 6   CHLORIDE mmol/L 95* 94* 93*   < > 97*   CO2 mmol/L 26 21 24   < > 26   BUN mg/dL 68* 102* 79*   < > 59*   CREATININE mg/dL 8 80* 11 40* 9 87*   < > 7 08*   CALCIUM mg/dL 7 6* 7 8* 7 8*   < > 7 7*   ALK PHOS U/L  --   --   --   --  140*   ALT U/L  --   --   --   --  206*   AST U/L  --   --   --   --  510*    < > = values in this interval not displayed  Results from last 7 days   Lab Units 06/16/22  1317   INR  0 93               IMAGING & DIAGNOSTIC TESTING     Radiology Results: I have personally reviewed pertinent reports  CT chest abdomen pelvis wo contrast    Result Date: 6/10/2022  Impression: 1  Limited examination demonstrating no evidence of intrathoracic, intra-abdominal or intrapelvic traumatic injury 2  There is nonspecific perihilar and upper lobe airspace opacities, could represent mild pulmonary edema versus aspiration  3  No pneumothorax, hemothorax or mediastinal air 4  Tiny amount of intravascular and cardiac air, likely introduced through an IV catheter Workstation performed: [id number]     XR chest 1 view portable    Result Date: 6/10/2022  Impression: Endotracheal tube tip is 5 5 cm above the jesika Workstation performed: [id number]     CT head without contrast    Result Date: 6/10/2022  Impression: No acute intracranial abnormality  Bilateral facial subcutaneous air present  Please refer to cervical spine CT report for more specific detail Workstation performed: [id number]     CT spine cervical wo contrast    Result Date: 6/10/2022  Impression: No cervical spine fracture or traumatic malalignment  Subcutaneous emphysema seen in the neck   Given that the CT of the chest abdomen and pelvis demonstrates no evidence of pneumothorax, mediastinal or paraesophageal air, this most likely is related to air introduced through an IV catheter  Also correlate for any penetrating injury that may have allowed generalized subcutaneous air to occur  The examination demonstrates a significant  finding and was documented as such in [facility] for liaison and referring practitioner immediate notification  Workstation performed: [id number]     XR chest portable ICU    Result Date: 6/11/2022  Impression: Deep sulcus sign concerning for a small left basilar pneumothorax  Right lateral decubitus or upright radiograph could be obtained for confirmation  The tip of the left subclavian central venous catheter projects at the superior vena cava  The study was marked in [facility] for immediate notification  Workstation performed: [id number]     US liver doppler only    Result Date: 6/12/2022  Impression: Normal liver Doppler  Workstation performed: [id number]     Other Diagnostic Testing: I have personally reviewed pertinent reports      ACTIVE MEDICATIONS     Current Facility-Administered Medications   Medication Dose Route Frequency    amiodarone tablet 200 mg  200 mg Oral TID With Meals    Followed by   Gisel Degree ON 6/29/2022] amiodarone tablet 200 mg  200 mg Oral Daily With Breakfast    amLODIPine (NORVASC) tablet 10 mg  10 mg Oral Daily    benzonatate (TESSALON PERLES) capsule 100 mg  100 mg Oral TID PRN    calcium carbonate (TUMS) chewable tablet 500 mg  500 mg Oral TID PRN    heparin (porcine) subcutaneous injection 5,000 Units  5,000 Units Subcutaneous Q8H [facility]    hydrALAZINE (APRESOLINE) injection 5 mg  5 mg Intravenous Q6H PRN    lidocaine (LIDODERM) 5 % patch 1 patch  1 patch Topical Daily PRN    nicotine (NICODERM CQ) 21 mg/24 hr TD 24 hr patch 21 mg  21 mg Transdermal Daily    ondansetron (ZOFRAN) injection 4 mg  4 mg Intravenous Once    sodium polystyrene (KAYEXALATE) powder 15 g  15 g Oral Once       VTE Pharmacologic Prophylaxis: Heparin  VTE Mechanical Prophylaxis: sequential compression device    Portions of the record may have been created with voice recognition software  Occasional wrong word or "sound a like" substitutions may have occurred due to the inherent limitations of voice recognition software  Read the chart carefully and recognize, using context, where substitutions have occurred  Maru Javier MD  Internal Medicine Residency PGY-1  [place]  Available on [employer]  [email or web address]

## 2022-06-22 NOTE — CERTIFIED RECOVERY SPECIALIST
Certified  Note    Patient name: Sridevi Barnes  Location: [facility] 806/[facility] 806-01  [place]: [address] [place] [address]  Attending:  Braxton Corrales MD MRN [id number]  : 1970  Age: 46 y o  Sex: male Date 2022         Substance Use History:     Social History     Substance and Sexual Activity   Alcohol Use Not Currently        Social History     Substance and Sexual Activity   Drug Use Yes    Types: Marijuana    Comment: smokes every other day       Admission Information  Substances Used at This Admission[de-identified] Cocaine, Methamphetamine, THC  Encounter Type[de-identified] Patient Face-to-Face, Family/Support Face-to-Face    Recovery Support Plan  Declined All Services?: No  Medication Assisted Treatment[de-identified] No  Agreeable to Warm Handoff?: No  Is Patient Accepting MARY Treatment Services?: No  [facility]  Was Narcan Provided at Discharge?: No  Plan Discussed With Treatment Team[de-identified] Yes    Referral to Recovery Supports:  Samira Moy[de-identified] No  Community Based CRS[de-identified] No  Case Management[de-identified] No  Direct Access to MARY Treatment?: No  Resource Guide Given?: Yes  Follow Up With Patient[de-identified] Yes (ongoing)  Family / Other Support[de-identified] Yes  Referral for Community Physical Health[de-identified] No  Referral for Community Mental Health[de-identified] No'    CRS met with patient for follow up  CRS provided another contact card for support after discharge  Patient reports discharge will be Thursday or Friday  CRS inquired if there were other recovery supports patient needs, and declined needing anything  CRS reviewed previously provided resources        Taurus Fernandez

## 2022-06-22 NOTE — QUICK NOTE
Called patients significant other Aurora Alcocer, updating them about ongoing treatment plan and any patient updates  Carlos Rosa MD  Internal Medicine Residency PGY-1  [facility]  Available on [email or web address]  [email or web address]

## 2022-06-22 NOTE — RESTORATIVE TECHNICIAN NOTE
Restorative Technician Note      Patient Name: Cuca Burns     Restorative Tech Visit Date: 6/22/2022  Note Type: Mobility  Patient Position Upon Consult: Supine  Activity Performed: Dangled; Stood  Patient Position at End of Consult: Seated edge of bed; All needs within reach    Attempted to ambulate  IV site drainage; nurse alerted and attended to pt  Pt to dialysis  Will f/u      Cabrera Ochoa  DPT, Restorative Technician

## 2022-06-22 NOTE — PROGRESS NOTES
NEPHROLOGY PROGRESS NOTE   Kostas Zhang 46 y o  male MRN: [id number]  Unit/Bed#: [facility] 806-01 Encounter: [id number]  Reason for Consult: JOÃO      SUMMARY:    68-year-old male with a past medical history of atrial fibrillation, recent H flu pneumonia, substance abuse, NSTEMI who initially presents after being found down   Nephrology is on board for acute kidney injury   Patient was initially intubated   Was initially requiring continuous renal replacement therapy      ASSESSMENT and PLAN:     Acute renal failure/acute tubular necrosis  --currently dialysis dependent  --baseline creatinine 0 9-1 1 mg/dL, but appeared to have an episode of acute kidney injury in September of 2020 with a creatinine was 1 6 mg/dL  --CT scan shows no evidence of hydronephrosis  --urinalysis showed innumerable RBCs  --presumed secondary ATN, rhabdomyolysis shock and drug abuse  --initially required CRRT on June 11th, discontinued on June 13th, transition to intermittent hemodialysis on June 14th  --access:  new Left IJ Permcath placed 6/21, having some bleeding from insertion site, IR team at bedside  --currently on a TTS dialysis schedule  --anti-MPO negative, atypical pANCA negative, C-ANCA negative, P-ANCA negative, anti-PR3 abs negative  --creatinine continues to rise along with potassium off dialysis  --no significant/meaningful urine output, and worsening renal function off dialysis as not supported him to come off dialysis at this time  --can obtain outpatient hemodialysis unit to monitor for renal recovery  --plan for HD tomorrow     Polysubstance abuse  --recent use of cocaine methamphetamine     Rhabdomyolysis--improved     Hyponatremia  --sodium stable  --Increase UF and sodium bath on HD     Hypertension  --amlodipine recently adjusted to 10 mg  --elevated systolic and diastolic blood pressure  --avoiding beta blockers at this time to prevent unopposed adrenergic stimulation     Hyperkalemia  --HD yesterday  --low K diet  --in setting of rhabdo and ARF  --kayexalate x 1      Hepatitis C  --+ antibody  --Hep B panel negative      SUBJECTIVE / INTERVAL HISTORY:    Bleeding from new permcath    OBJECTIVE:  Current Weight: Weight - Scale: 90 7 kg (200 lb)  Vitals:    06/21/22 2246 06/22/22 0600 06/22/22 0805 06/22/22 0901   BP: 139/82  146/93 (!) 158/103   BP Location:       Pulse:       Resp: 20 16 16   Temp: 98 °F (36 7 °C)  (!) 97 4 °F (36 3 °C)    TempSrc: Oral      SpO2:       Weight:  90 7 kg (200 lb)     Height:           Intake/Output Summary (Last 24 hours) at 6/22/2022 1030  Last data filed at 6/22/2022 0338  Gross per 24 hour   Intake 200 ml   Output 2500 ml   Net -2300 ml       Review of Systems:    12 point ROS has been reviewed  Physical Exam  Vitals and nursing note reviewed  Exam conducted with a chaperone present  Constitutional:       General: He is not in acute distress  Appearance: Normal appearance  He is not ill-appearing, toxic-appearing or diaphoretic  HENT:      Head: Normocephalic and atraumatic  Mouth/Throat:      Mouth: Mucous membranes are dry  Eyes:      General: No scleral icterus  Right eye: No discharge  Left eye: No discharge  Cardiovascular:      Rate and Rhythm: Normal rate  Pulmonary:      Effort: No respiratory distress  Abdominal:      General: There is no distension  Tenderness: There is no abdominal tenderness  There is no guarding  Skin:     Coloration: Skin is pale  Neurological:      Mental Status: He is alert and oriented to person, place, and time  Psychiatric:         Mood and Affect: Mood normal          Thought Content:  Thought content normal          Judgment: Judgment normal          Medications:    Current Facility-Administered Medications:     amiodarone tablet 200 mg, 200 mg, Oral, TID With Meals, 200 mg at 06/22/22 0848 **FOLLOWED BY** [START ON 6/29/2022] amiodarone tablet 200 mg, 200 mg, Oral, Daily With Breakfast, Armin Villa, MD    amLODIPine (NORVASC) tablet 10 mg, 10 mg, Oral, Daily, Remigio Sharpe MD, 10 mg at 06/22/22 0851    benzonatate (TESSALON PERLES) capsule 100 mg, 100 mg, Oral, TID PRN, Ruth Mcelroy MD, 100 mg at 06/18/22 1408    calcium carbonate (TUMS) chewable tablet 500 mg, 500 mg, Oral, TID PRN, William Hayden MD, 500 mg at 06/19/22 1352    heparin (porcine) subcutaneous injection 5,000 Units, 5,000 Units, Subcutaneous, Q8H [address], William Hayden MD, 5,000 Units at 06/22/22 0428    hydrALAZINE (APRESOLINE) injection 5 mg, 5 mg, Intravenous, Q6H PRN, Amrita Holm DO    lidocaine (LIDODERM) 5 % patch 1 patch, 1 patch, Topical, Daily PRN, Casey Piedra DO, 1 patch at 06/20/22 0827    nicotine (NICODERM CQ) 21 mg/24 hr TD 24 hr patch 21 mg, 21 mg, Transdermal, Daily, Ruth Mcelroy MD, 21 mg at 06/22/22 0851    ondansetron (ZOFRAN) injection 4 mg, 4 mg, Intravenous, Once, Sarah Salas MD    sodium polystyrene (KAYEXALATE) powder 15 g, 15 g, Oral, Once, Remigio Sharpe MD    Laboratory Results:  Results from last 7 days   Lab Units 06/22/22  0453 06/21/22  0557 06/20/22  1129 06/18/22  0437 06/17/22  0431 06/16/22  1008 06/15/22  1315   WBC Thousand/uL 14 43* 14 52* 14 17* 14 15* 14 02* 11 42* 8 09   HEMOGLOBIN g/dL 13 0 12 4 13 1 13 9 15 0 14 2 14 4   HEMATOCRIT % 37 4 35 4* 36 1* 39 4 41 8 39 5 40 1   PLATELETS Thousands/uL 205 195 166 121* 96* 102* 95*   POTASSIUM mmol/L 5 5* 5 9* 5 3 4 2 4 0 3 6 4 0   CHLORIDE mmol/L 95* 94* 93* 97* 97* 97* 101   CO2 mmol/L 26 21 24 22 28 26 27   BUN mg/dL 68* 102* 79* 74* 48* 59* 42*   CREATININE mg/dL 8 80* 11 40* 9 87* 7 73* 6 04* 7 08* 5 38*   CALCIUM mg/dL 7 6* 7 8* 7 8* 8 0* 7 9* 7 7* 8 1*       PLEASE NOTE:  This encounter was completed utilizing the [employer] Direct Speech Voice Recognition Software   Grammatical errors, random word insertions, pronoun errors and incomplete sentences are occasional consequences of the system due to software limitations, ambient noise and hardware issues  These may be missed by proof reading prior to affixing electronic signature  Any questions or concerns about the content, text or information contained within the body of this dictation should be directly addressed to the physician for clarification  Please do not hesitate to call me directly if you have any any questions or concerns

## 2022-06-22 NOTE — QUICK NOTE
Patient had left permcath placement yesterday  Patient oozing from venotomy site  Buried suture and glue was placed to venotomy site yesterday  Pressure held for 20 minutes with continued ooze  5cc lidocaine with epinephrine injected around venotomy site with immediate stop in oozing  D stat pad placed over site with pressure dressing  Original blood outline on current chlorhexidine dressing  Please reach out to IR if notice bleeding from venotomy site       Jonah Nelson PA-C

## 2022-06-22 NOTE — PROGRESS NOTES
I spoke with the patient regarding who he would like contacted first   He stated he would like his daughter, Tamiko Elkins, as primary contact  Primary team made aware

## 2022-06-22 NOTE — CERTIFIED RECOVERY SPECIALIST
Certified  Note    Patient name: Evelia Arzate  Location: [facility] 806/[facility] 806-01  [place]: [address] [place] [address]  Attending:  Chely Howe MD MRN [id number]  : 1970  Age: 46 y o  Sex: male Date 2022         Substance Use History:     Social History     Substance and Sexual Activity   Alcohol Use No        Social History     Substance and Sexual Activity   Drug Use Yes    Types: Marijuana    Comment: smokes every other day     CRS attempted follow up contact but there were several nurses in the room    Will follow up      Ravindra Briones

## 2022-06-22 NOTE — PLAN OF CARE
Problem: Prexisting or High Potential for Compromised Skin Integrity  Goal: Skin integrity is maintained or improved  Description: INTERVENTIONS:  - Identify patients at risk for skin breakdown  - Assess and monitor skin integrity  - Assess and monitor nutrition and hydration status  - Monitor labs   - Assess for incontinence   - Turn and reposition patient  - Assist with mobility/ambulation  - Relieve pressure over bony prominences  - Avoid friction and shearing  - Provide appropriate hygiene as needed including keeping skin clean and dry  - Evaluate need for skin moisturizer/barrier cream  - Collaborate with interdisciplinary team   - Patient/family teaching  - Consider wound care consult   Outcome: Progressing     Problem: PAIN - ADULT  Goal: Verbalizes/displays adequate comfort level or baseline comfort level  Description: Interventions:  - Encourage patient to monitor pain and request assistance  - Assess pain using appropriate pain scale  - Administer analgesics based on type and severity of pain and evaluate response  - Implement non-pharmacological measures as appropriate and evaluate response  - Consider cultural and social influences on pain and pain management  - Notify physician/advanced practitioner if interventions unsuccessful or patient reports new pain  Outcome: Progressing     Problem: INFECTION - ADULT  Goal: Absence or prevention of progression during hospitalization  Description: INTERVENTIONS:  - Assess and monitor for signs and symptoms of infection  - Monitor lab/diagnostic results  - Monitor all insertion sites, i e  indwelling lines, tubes, and drains  - Monitor endotracheal if appropriate and nasal secretions for changes in amount and color  - [place] appropriate cooling/warming therapies per order  - Administer medications as ordered  - Instruct and encourage patient and family to use good hand hygiene technique  - Identify and instruct in appropriate isolation precautions for identified infection/condition  Outcome: Progressing     Problem: SAFETY ADULT  Goal: Patient will remain free of falls  Description: INTERVENTIONS:  - Educate patient/family on patient safety including physical limitations  - Instruct patient to call for assistance with activity   - Consult OT/PT to assist with strengthening/mobility   - Keep Call bell within reach  - Keep bed low and locked with side rails adjusted as appropriate  - Keep care items and personal belongings within reach  - Initiate and maintain comfort rounds  - Make Fall Risk Sign visible to staff  - Apply yellow socks and bracelet for high fall risk patients  - Consider moving patient to room near nurses station  Outcome: Progressing     Problem: DISCHARGE PLANNING  Goal: Discharge to home or other facility with appropriate resources  Description: INTERVENTIONS:  - Identify barriers to discharge w/patient and caregiver  - Arrange for needed discharge resources and transportation as appropriate  - Identify discharge learning needs (meds, wound care, etc )  - Arrange for interpretive services to assist at discharge as needed  - Refer to Case Management Department for coordinating discharge planning if the patient needs post-hospital services based on physician/advanced practitioner order or complex needs related to functional status, cognitive ability, or social support system  Outcome: Progressing     Problem: Knowledge Deficit  Goal: Patient/family/caregiver demonstrates understanding of disease process, treatment plan, medications, and discharge instructions  Description: Complete learning assessment and assess knowledge base    Interventions:  - Provide teaching at level of understanding  - Provide teaching via preferred learning methods  Outcome: Progressing     Problem: Nutrition/Hydration-ADULT  Goal: Nutrient/Hydration intake appropriate for improving, restoring or maintaining nutritional needs  Description: Monitor and assess patient's nutrition/hydration status for malnutrition  Collaborate with interdisciplinary team and initiate plan and interventions as ordered  Monitor patient's weight and dietary intake as ordered or per policy  Utilize nutrition screening tool and intervene as necessary  Determine patient's food preferences and provide high-protein, high-caloric foods as appropriate       INTERVENTIONS:  - Monitor oral intake, urinary output, labs, and treatment plans  - Assess nutrition and hydration status and recommend course of action  - Evaluate amount of meals eaten  - Assist patient with eating if necessary   - Allow adequate time for meals  - Recommend/ encourage appropriate diets, oral nutritional supplements, and vitamin/mineral supplements  - Order, calculate, and assess calorie counts as needed  - Recommend, monitor, and adjust tube feedings and TPN/PPN based on assessed needs  - Assess need for intravenous fluids  - Provide specific nutrition/hydration education as appropriate  - Include patient/family/caregiver in decisions related to nutrition  Outcome: Progressing     Problem: Potential for Falls  Goal: Patient will remain free of falls  Description: INTERVENTIONS:  - Educate patient/family on patient safety including physical limitations  - Instruct patient to call for assistance with activity   - Consult OT/PT to assist with strengthening/mobility   - Keep Call bell within reach  - Keep bed low and locked with side rails adjusted as appropriate  - Keep care items and personal belongings within reach  - Initiate and maintain comfort rounds  - Apply yellow socks and bracelet for high fall risk patients  - Consider moving patient to room near nurses station  Outcome: Progressing     Problem: METABOLIC, FLUID AND ELECTROLYTES - ADULT  Goal: Electrolytes maintained within normal limits  Description: INTERVENTIONS:  - Monitor labs and assess patient for signs and symptoms of electrolyte imbalances  - Administer electrolyte replacement as ordered  - Monitor response to electrolyte replacements, including repeat lab results as appropriate  - Instruct patient on fluid and nutrition as appropriate  Outcome: Progressing  Goal: Fluid balance maintained  Description: INTERVENTIONS:  - Monitor labs   - Monitor I/O and WT  - Instruct patient on fluid and nutrition as appropriate  - Assess for signs & symptoms of volume excess or deficit  Outcome: Progressing

## 2022-06-23 ENCOUNTER — APPOINTMENT (INPATIENT)
Dept: DIALYSIS | Facility: HOSPITAL | Age: 52
DRG: 469 | End: 2022-06-23
Payer: COMMERCIAL

## 2022-06-23 LAB
ANION GAP SERPL CALCULATED.3IONS-SCNC: 11 MMOL/L (ref 4–13)
BUN SERPL-MCNC: 79 MG/DL (ref 5–25)
CALCIUM SERPL-MCNC: 7.9 MG/DL (ref 8.3–10.1)
CHLORIDE SERPL-SCNC: 94 MMOL/L (ref 100–108)
CO2 SERPL-SCNC: 25 MMOL/L (ref 21–32)
CREAT SERPL-MCNC: 10.5 MG/DL (ref 0.6–1.3)
ERYTHROCYTE [DISTWIDTH] IN BLOOD BY AUTOMATED COUNT: 12.6 % (ref 11.6–15.1)
GFR SERPL CREATININE-BSD FRML MDRD: 4 ML/MIN/1.73SQ M
GLUCOSE SERPL-MCNC: 215 MG/DL (ref 65–140)
HCT VFR BLD AUTO: 35 % (ref 36.5–49.3)
HGB BLD-MCNC: 11.8 G/DL (ref 12–17)
MCH RBC QN AUTO: 33.9 PG (ref 26.8–34.3)
MCHC RBC AUTO-ENTMCNC: 33.7 G/DL (ref 31.4–37.4)
MCV RBC AUTO: 101 FL (ref 82–98)
PLATELET # BLD AUTO: 276 THOUSANDS/UL (ref 149–390)
PMV BLD AUTO: 11.2 FL (ref 8.9–12.7)
POTASSIUM SERPL-SCNC: 5.3 MMOL/L (ref 3.5–5.3)
RBC # BLD AUTO: 3.48 MILLION/UL (ref 3.88–5.62)
SODIUM SERPL-SCNC: 130 MMOL/L (ref 136–145)
WBC # BLD AUTO: 14.51 THOUSAND/UL (ref 4.31–10.16)

## 2022-06-23 PROCEDURE — 85027 COMPLETE CBC AUTOMATED: CPT | Performed by: STUDENT IN AN ORGANIZED HEALTH CARE EDUCATION/TRAINING PROGRAM

## 2022-06-23 PROCEDURE — 99232 SBSQ HOSP IP/OBS MODERATE 35: CPT | Performed by: HOSPITALIST

## 2022-06-23 PROCEDURE — 80048 BASIC METABOLIC PNL TOTAL CA: CPT | Performed by: STUDENT IN AN ORGANIZED HEALTH CARE EDUCATION/TRAINING PROGRAM

## 2022-06-23 PROCEDURE — 90935 HEMODIALYSIS ONE EVALUATION: CPT | Performed by: INTERNAL MEDICINE

## 2022-06-23 RX ORDER — TORSEMIDE 20 MG/1
40 TABLET ORAL DAILY
Status: DISCONTINUED | OUTPATIENT
Start: 2022-06-23 | End: 2022-06-24 | Stop reason: HOSPADM

## 2022-06-23 RX ADMIN — AMLODIPINE BESYLATE 10 MG: 10 TABLET ORAL at 14:26

## 2022-06-23 RX ADMIN — AMIODARONE HYDROCHLORIDE 200 MG: 200 TABLET ORAL at 17:01

## 2022-06-23 RX ADMIN — HEPARIN SODIUM 5000 UNITS: 5000 INJECTION INTRAVENOUS; SUBCUTANEOUS at 06:08

## 2022-06-23 RX ADMIN — AMIODARONE HYDROCHLORIDE 200 MG: 200 TABLET ORAL at 14:26

## 2022-06-23 RX ADMIN — TORSEMIDE 40 MG: 20 TABLET ORAL at 14:26

## 2022-06-23 RX ADMIN — NICOTINE 21 MG: 21 PATCH, EXTENDED RELEASE TRANSDERMAL at 08:13

## 2022-06-23 RX ADMIN — HEPARIN SODIUM 5000 UNITS: 5000 INJECTION INTRAVENOUS; SUBCUTANEOUS at 23:05

## 2022-06-23 RX ADMIN — AMIODARONE HYDROCHLORIDE 200 MG: 200 TABLET ORAL at 08:11

## 2022-06-23 RX ADMIN — HEPARIN SODIUM 5000 UNITS: 5000 INJECTION INTRAVENOUS; SUBCUTANEOUS at 14:26

## 2022-06-23 NOTE — PLAN OF CARE
Problem: Prexisting or High Potential for Compromised Skin Integrity  Goal: Skin integrity is maintained or improved  Description: INTERVENTIONS:  - Identify patients at risk for skin breakdown  - Assess and monitor skin integrity  - Assess and monitor nutrition and hydration status  - Monitor labs   - Assess for incontinence   - Turn and reposition patient  - Assist with mobility/ambulation  - Relieve pressure over bony prominences  - Avoid friction and shearing  - Provide appropriate hygiene as needed including keeping skin clean and dry  - Evaluate need for skin moisturizer/barrier cream  - Collaborate with interdisciplinary team   - Patient/family teaching  - Consider wound care consult   Outcome: Progressing     Problem: PAIN - ADULT  Goal: Verbalizes/displays adequate comfort level or baseline comfort level  Description: Interventions:  - Encourage patient to monitor pain and request assistance  - Assess pain using appropriate pain scale  - Administer analgesics based on type and severity of pain and evaluate response  - Implement non-pharmacological measures as appropriate and evaluate response  - Consider cultural and social influences on pain and pain management  - Notify physician/advanced practitioner if interventions unsuccessful or patient reports new pain  Outcome: Progressing     Problem: INFECTION - ADULT  Goal: Absence or prevention of progression during hospitalization  Description: INTERVENTIONS:  - Assess and monitor for signs and symptoms of infection  - Monitor lab/diagnostic results  - Monitor all insertion sites, i e  indwelling lines, tubes, and drains  - Monitor endotracheal if appropriate and nasal secretions for changes in amount and color  - [place] appropriate cooling/warming therapies per order  - Administer medications as ordered  - Instruct and encourage patient and family to use good hand hygiene technique  - Identify and instruct in appropriate isolation precautions for identified infection/condition  Outcome: Progressing     Problem: SAFETY ADULT  Goal: Patient will remain free of falls  Description: INTERVENTIONS:  - Educate patient/family on patient safety including physical limitations  - Instruct patient to call for assistance with activity   - Consult OT/PT to assist with strengthening/mobility   - Keep Call bell within reach  - Keep bed low and locked with side rails adjusted as appropriate  - Keep care items and personal belongings within reach  - Initiate and maintain comfort rounds  - Make Fall Risk Sign visible to staff  - Apply yellow socks and bracelet for high fall risk patients  - Consider moving patient to room near nurses station  Outcome: Progressing     Problem: DISCHARGE PLANNING  Goal: Discharge to home or other facility with appropriate resources  Description: INTERVENTIONS:  - Identify barriers to discharge w/patient and caregiver  - Arrange for needed discharge resources and transportation as appropriate  - Identify discharge learning needs (meds, wound care, etc )  - Arrange for interpretive services to assist at discharge as needed  - Refer to Case Management Department for coordinating discharge planning if the patient needs post-hospital services based on physician/advanced practitioner order or complex needs related to functional status, cognitive ability, or social support system  Outcome: Progressing

## 2022-06-23 NOTE — QUICK NOTE
Called patients daughter Eun Harrell, unable to reach them, left a voice message explaining ongoing treatment plan and any patient updates  John Marte MD  Internal Medicine Residency PGY-1  [facility]  Available on [employer]  [email or web address]

## 2022-06-23 NOTE — PROGRESS NOTES
INTERNAL MEDICINE RESIDENCY PROGRESS NOTE     Name: Rehana Mayorga   Age & Sex: 46 y o  male   MRN: [id number]  Unit/Bed#: [facility] 806-01   Encounter: [id number]  Team: SOD Team A    PATIENT INFORMATION     Name: Rehana Mayorga   Age & Sex: 46 y o  male   MRN: [id number]  Hospital Stay Days: 15    ASSESSMENT/PLAN     Principal Problem:    Acute renal failure ([facility] [address] )  Active Problems:    Hypertension    PTSD (post-traumatic stress disorder)    IV drug user    Cocaine abuse ([facility] [address] )    Elevated troponin    Methamphetamine abuse ([facility] [address] )    Acute encephalopathy    Atrial fibrillation ([facility] [address] )      * Acute [facility] ([facility] )  Assessment & Plan  In the setting of rhabdomyolysis   Required CVVHD  6/14- transitioned to intermittent HD  6/23 - still oliguric, per pt producing increased amounts of urine lighter in color    Plan:   Continue to monitor mental status around HD timing   Nephro following, appreciate recommendations  T/Th/Sat schedule  PermCath placed 6/21  Patient to get dialysis 6/23  Will work with case management to set up outpatient dialysis  Monitor CK, Cr, BMP   Monitor I/Os  Better dose dialysis to reduce BUN<50  Working with case management for outpatient HD     Atrial fibrillation ([facility] )  Assessment & Plan  Pt intermittently having AF with RVR, new on this admission  Loaded with amio in ICU  Plan:   Continue amiodarone 200mg tid  Transition to 200mg daily on 6/29  Monitor hemodynamics     Elevated troponin  Assessment & Plan  Elevated troponin level noted on presentation  2/2 ARF, no intervention needed  Due to patients history of cocaine/meth use, patient may be predisposed to increased risk for coronary atherosclerosis and may benefit from an ischemic evaluation prior to discharge  - Troponins now wnl  - Plan for nuclear cardiac test as outpatient    Hypertension  Assessment & Plan  Pt with elevated BP readings while in hospital, not improved following dialysis   Not improved on amlodipine 5mg daily    Plan: Increased amlodipine to 10mg daily   Prn hydralazine   Continue to monitor vitals around HD timing    IV drug user  Assessment & Plan  Pt has a hx of polysubstance abuse including cocaine, meth, THC  Could be contributing to pt's "found down" event  Plan:  Continue to encourage cessation    PTSD (post-traumatic stress disorder)  Assessment & Plan  Not on any medications  Recommend follow up out pt PCP/ psych    Pneumonia due to Haemophilus influenzae (HCC)-resolved as of 2022  Assessment & Plan  Sputum revealing H Flu and Group B strep  Initially received Zosyn and Vanco, switched to cefuroxime   CXR: Persistent bibasilar opacities likely due to atelectasis  No evidence for pneumothorax  WBC  up to 14, no other obvious source for infection at this time  Plan:   IS   Completed course of Cefuroxime 500mg x4 days for total of 7 days of Abx   Continue to trend fever curve and WBC    Hyperkalemia-resolved as of 2022  Assessment & Plan  2/2 to acute renal failure  Peaked T waves on EKG  Treated with insulin, Ca gluconate, and IVF on arrival  Treated with CVVHD in ICU   - started intermittent HD  Improved    Plan:   Continue prn HD per nephro  Monitor BMP       Disposition: Remains inpatient pending set up with outpatient dialysis  SUBJECTIVE     Patient seen and examined  No acute events overnight  Patient feels well and ready to go home  OBJECTIVE     Vitals:    22 0920 22 0930 22 1000 22 1030   BP: (!) 159/112 (!) 164/105 (!) 172/116 145/99   Pulse: 87 87 86 (!) 107   Resp: 18 18 18 18   Temp: 97 5 °F (36 4 °C)      TempSrc:       SpO2:       Weight:       Height:          Temperature:   Temp (24hrs), Av 8 °F (36 6 °C), Min:97 4 °F (36 3 °C), Max:98 8 °F (37 1 °C)    Temperature: 97 5 °F (36 4 °C)  Intake & Output:  I/O        0701   0700  0701   0700  0701   0700    P  O  0 360 240    I V  (mL/kg) 400 (4 4)  200 (2 2) Total Intake(mL/kg) 400 (4 4) 360 (4) 440 (4 9)    Urine (mL/kg/hr) 400 (0 2) 200 (0 1) 500 (1 4)    Other 2400      Total Output 2800 200 500    Net -2400 +160 -60               Weights:   IBW (Ideal Body Weight): 68 4 kg    Body mass index is 30 41 kg/m²  Weight (last 2 days)     Date/Time Weight    06/22/22 0600 90 7 (200)        Physical Exam  Vitals reviewed  Constitutional:       Appearance: He is obese  HENT:      Head: Normocephalic and atraumatic  Cardiovascular:      Rate and Rhythm: Normal rate and regular rhythm  Pulses: Normal pulses  Heart sounds: Normal heart sounds  Pulmonary:      Effort: Pulmonary effort is normal       Breath sounds: Normal breath sounds  Abdominal:      General: Bowel sounds are normal       Palpations: Abdomen is soft  Skin:     General: Skin is warm and dry  Capillary Refill: Capillary refill takes less than 2 seconds  Neurological:      Mental Status: He is alert and oriented to person, place, and time  Comments: CN 2-12 grossly intact, moves all 4 extremities, no dysarthria   Psychiatric:         Behavior: Behavior normal        LABORATORY DATA     Labs: I have personally reviewed pertinent reports    Results from last 7 days   Lab Units 06/23/22  0931 06/22/22  0453 06/21/22  0557 06/20/22  1129 06/18/22  0437 06/17/22  0431   WBC Thousand/uL 14 51* 14 43* 14 52* 14 17* 14 15* 14 02*   HEMOGLOBIN g/dL 11 8* 13 0 12 4 13 1 13 9 15 0   HEMATOCRIT % 35 0* 37 4 35 4* 36 1* 39 4 41 8   PLATELETS Thousands/uL 276 205 195 166 121* 96*   NEUTROS PCT %  --   --   --  80* 79* 77*   MONOS PCT %  --   --   --  10 9 10      Results from last 7 days   Lab Units 06/23/22  0931 06/22/22  0453 06/21/22  0557   POTASSIUM mmol/L 5 3 5 5* 5 9*   CHLORIDE mmol/L 94* 95* 94*   CO2 mmol/L 25 26 21   BUN mg/dL 79* 68* 102*   CREATININE mg/dL 10 50* 8 80* 11 40*   CALCIUM mg/dL 7 9* 7 6* 7 8*              Results from last 7 days   Lab Units 06/16/22  1317   INR 0 93               IMAGING & DIAGNOSTIC TESTING     Radiology Results: I have personally reviewed pertinent reports  CT chest abdomen pelvis wo contrast    Result Date: 6/10/2022  Impression: 1  Limited examination demonstrating no evidence of intrathoracic, intra-abdominal or intrapelvic traumatic injury 2  There is nonspecific perihilar and upper lobe airspace opacities, could represent mild pulmonary edema versus aspiration  3  No pneumothorax, hemothorax or mediastinal air 4  Tiny amount of intravascular and cardiac air, likely introduced through an IV catheter Workstation performed: [id number]     XR chest 1 view portable    Result Date: 6/10/2022  Impression: Endotracheal tube tip is 5 5 cm above the jesika Workstation performed: [id number]     CT head without contrast    Result Date: 6/10/2022  Impression: No acute intracranial abnormality  Bilateral facial subcutaneous air present  Please refer to cervical spine CT report for more specific detail Workstation performed: [id number]     CT spine cervical wo contrast    Result Date: 6/10/2022  Impression: No cervical spine fracture or traumatic malalignment  Subcutaneous emphysema seen in the neck  Given that the CT of the chest abdomen and pelvis demonstrates no evidence of pneumothorax, mediastinal or paraesophageal air, this most likely is related to air introduced through an IV catheter  Also correlate for any penetrating injury that may have allowed generalized subcutaneous air to occur  The examination demonstrates a significant  finding and was documented as such in Psychiatric for liaison and referring practitioner immediate notification  Workstation performed: [id number]     XR chest portable ICU    Result Date: 6/11/2022  Impression: Deep sulcus sign concerning for a small left basilar pneumothorax  Right lateral decubitus or upright radiograph could be obtained for confirmation   The tip of the left subclavian central venous catheter projects at the superior vena cava  The study was marked in [facility] for immediate notification  Workstation performed: [id number]     US liver doppler only    Result Date: 6/12/2022  Impression: Normal liver Doppler  Workstation performed: [id number]     Other Diagnostic Testing: I have personally reviewed pertinent reports  ACTIVE MEDICATIONS     Current Facility-Administered Medications   Medication Dose Route Frequency    amiodarone tablet 200 mg  200 mg Oral TID With Meals    Followed by   Ozie Po ON 6/29/2022] amiodarone tablet 200 mg  200 mg Oral Daily With Breakfast    amLODIPine (NORVASC) tablet 10 mg  10 mg Oral Daily    benzonatate (TESSALON PERLES) capsule 100 mg  100 mg Oral TID PRN    calcium carbonate (TUMS) chewable tablet 500 mg  500 mg Oral TID PRN    heparin (porcine) subcutaneous injection 5,000 Units  5,000 Units Subcutaneous Q8H [facility]    hydrALAZINE (APRESOLINE) injection 5 mg  5 mg Intravenous Q6H PRN    lidocaine (LIDODERM) 5 % patch 1 patch  1 patch Topical Daily PRN    nicotine (NICODERM CQ) 21 mg/24 hr TD 24 hr patch 21 mg  21 mg Transdermal Daily    ondansetron (ZOFRAN) injection 4 mg  4 mg Intravenous Once       VTE Pharmacologic Prophylaxis: Heparin  VTE Mechanical Prophylaxis: sequential compression device    Portions of the record may have been created with voice recognition software  Occasional wrong word or "sound a like" substitutions may have occurred due to the inherent limitations of voice recognition software  Read the chart carefully and recognize, using context, where substitutions have occurred  Cayla Casey MD  Internal Medicine Residency PGY-1  [place]  Available on [employer]  [email or web address]

## 2022-06-23 NOTE — PLAN OF CARE
Problem: Prexisting or High Potential for Compromised Skin Integrity  Goal: Skin integrity is maintained or improved  Description: INTERVENTIONS:  - Identify patients at risk for skin breakdown  - Assess and monitor skin integrity  - Assess and monitor nutrition and hydration status  - Monitor labs   - Assess for incontinence   - Turn and reposition patient  - Assist with mobility/ambulation  - Relieve pressure over bony prominences  - Avoid friction and shearing  - Provide appropriate hygiene as needed including keeping skin clean and dry  - Evaluate need for skin moisturizer/barrier cream  - Collaborate with interdisciplinary team   - Patient/family teaching  - Consider wound care consult   Outcome: Progressing     Problem: PAIN - ADULT  Goal: Verbalizes/displays adequate comfort level or baseline comfort level  Description: Interventions:  - Encourage patient to monitor pain and request assistance  - Assess pain using appropriate pain scale  - Administer analgesics based on type and severity of pain and evaluate response  - Implement non-pharmacological measures as appropriate and evaluate response  - Consider cultural and social influences on pain and pain management  - Notify physician/advanced practitioner if interventions unsuccessful or patient reports new pain  Outcome: Progressing     Problem: INFECTION - ADULT  Goal: Absence or prevention of progression during hospitalization  Description: INTERVENTIONS:  - Assess and monitor for signs and symptoms of infection  - Monitor lab/diagnostic results  - Monitor all insertion sites, i e  indwelling lines, tubes, and drains  - Monitor endotracheal if appropriate and nasal secretions for changes in amount and color  - [place] appropriate cooling/warming therapies per order  - Administer medications as ordered  - Instruct and encourage patient and family to use good hand hygiene technique  - Identify and instruct in appropriate isolation precautions for identified infection/condition  Outcome: Progressing     Problem: SAFETY ADULT  Goal: Patient will remain free of falls  Description: INTERVENTIONS:  - Educate patient/family on patient safety including physical limitations  - Instruct patient to call for assistance with activity   - Consult OT/PT to assist with strengthening/mobility   - Keep Call bell within reach  - Keep bed low and locked with side rails adjusted as appropriate  - Keep care items and personal belongings within reach  - Initiate and maintain comfort rounds  - Apply yellow socks and bracelet for high fall risk patients  - Consider moving patient to room near nurses station  Outcome: Progressing     Problem: DISCHARGE PLANNING  Goal: Discharge to home or other facility with appropriate resources  Description: INTERVENTIONS:  - Identify barriers to discharge w/patient and caregiver  - Arrange for needed discharge resources and transportation as appropriate  - Identify discharge learning needs (meds, wound care, etc )  - Arrange for interpretive services to assist at discharge as needed  - Refer to Case Management Department for coordinating discharge planning if the patient needs post-hospital services based on physician/advanced practitioner order or complex needs related to functional status, cognitive ability, or social support system  Outcome: Progressing     Problem: Knowledge Deficit  Goal: Patient/family/caregiver demonstrates understanding of disease process, treatment plan, medications, and discharge instructions  Description: Complete learning assessment and assess knowledge base    Interventions:  - Provide teaching at level of understanding  - Provide teaching via preferred learning methods  Outcome: Progressing     Problem: Potential for Falls  Goal: Patient will remain free of falls  Description: INTERVENTIONS:  - Educate patient/family on patient safety including physical limitations  - Instruct patient to call for assistance with activity   - Consult OT/PT to assist with strengthening/mobility   - Keep Call bell within reach  - Keep bed low and locked with side rails adjusted as appropriate  - Keep care items and personal belongings within reach  - Initiate and maintain comfort rounds  - Apply yellow socks and bracelet for high fall risk patients  - Consider moving patient to room near nurses station  Outcome: Progressing     Problem: METABOLIC, FLUID AND ELECTROLYTES - ADULT  Goal: Electrolytes maintained within normal limits  Description: INTERVENTIONS:  - Monitor labs and assess patient for signs and symptoms of electrolyte imbalances  - Administer electrolyte replacement as ordered  - Monitor response to electrolyte replacements, including repeat lab results as appropriate  - Instruct patient on fluid and nutrition as appropriate  Outcome: Progressing  Goal: Fluid balance maintained  Description: INTERVENTIONS:  - Monitor labs   - Monitor I/O and WT  - Instruct patient on fluid and nutrition as appropriate  - Assess for signs & symptoms of volume excess or deficit  Outcome: Progressing

## 2022-06-23 NOTE — PLAN OF CARE
Post-Dialysis RN Treatment Note    Blood Pressure:  Pre 159/112 mm/Hg  Post 92/53 mmHg   Weight:  Pre 88 7 kg   Post 85 7 kg   Mode of weight measurement: Standing Scale   Volume Removed   3000 ml    Treatment duration 240 minutes    NS given  No    Treatment shortened?  No   Medications given during Rx None Reported   Estimated Kt/V  1 32   Access type: Permacath/TDC   Access Issues: No    Report called to primary nurse   Yes, Grabiel Pinzon RN

## 2022-06-23 NOTE — PROGRESS NOTES
Follow up Consultation    Nephrology   Kostas Zhang 46 y o  male MRN: [id number]  Unit/Bed#: [facility] 806-01 Encounter: [id number]      Physician Requesting Consult: Jose Keating MD        ASSESSMENT/PLAN:  43-year-old male with multiple comorbidities including AFib, substance abuse with recent pneumonia and NSTEMI admitted after being found down  Nephrology consulted for evaluation management acute kidney injury  Acute kidney injury (POA)/currently dialysis dependent:  JOÃO multifactorial most likely secondary to rhabdomyolysis plus shock plus substance abuse with subsequent ATN  After review of records In [facility] as well as Care everywhere it appears that the patient has a baseline Creatinine of 0 9-1 2 mg/dL  patient was admitted with a creatinine of 2 93 mg/dL on 06/10/2022  patient's creatinine today is at 10 50 mg/dL, continues to rise in between treatments     Patient initially required CRRT on 06/11 till 6/13  Status post left IJ PermCath on 06/21  Started I HD on 06/14  Last HD treatment on 06/23  Next planned HD on 06/25  Continue to monitor for renal recovery  CT scan with no hydronephrosis  Currently on TTS dialysis schedule  Please see detailed note with regards to negative antibodies workup from my colleague from 06/22/2022  check BMP in a m  Await renal recovery  Optimize hemodynamic status to avoid delay in renal recovery  Place on a renal diet when allowed diet order  Avoid nephrotoxins, adjust meds to appropriate GFR  Strict I/O  Daily weights  Urinary retention protocol if patient does not have a Marsh  will need to set up patient for follow up with Nephrology as an outpatient post hospitalization  Awaiting outpatient dialysis placement for acute kidney injury  Patient preferring to beta Graf  No signs of renal recovery at this time    Blood pressure/hypertension:  current medications:  Norvasc 10 mg p o  Q day  recommendations:  Start torsemide 40 mg p o   Q day, increase UF with HD today  Optimize hemodynamics  Maintain MAP > 65mmHg  Avoid BP fluctuations  H/H/anemia:  most recent hemoglobin at 11 8 grams/deciliter  maintain hemoglobin greater than 8 grams/deciliter    Acid-base electrolytes:    Electrolytes:    Hyponatremia:   Most recent sodium at 130 mEq  Remains stable  To be corrected with dialysis    Hyperkalemia:  Follow low-potassium diet  Most recent potassium 5 3 mEq  Will do 1K bath the last hour    Hyperphosphatemia:  Most recent phosphorus at 2 8 as of 2022  Check phosphorus level    Acid-base:    Most recent bicarb at 25, acidosis improved    Other medical problems:  Proteinuria: Most recent UA with 2+ protein as of 06/10/2022 in light of Lobito I will need testing when stable  Rhabdomyolysis:  Management per primary team   Most recent CK level 777  Substance abuse:  Management per primary team      Thanks for the consult  Will continue to follow  Please call with questions/ concerns  Above-mentioned orders and Plan in terms of dialysis was discussed with the team in [address], [facility], 2022, 1:14 PM              Objective :   Patient seen and examined while on hemodialysis at 10:00 a m  Tolerating dialysis well aiming for UF close to 3 kilos with treatment will do 1K bath the last hour  No issues with access  Urine output close to 200 cc  PHYSICAL EXAM  /100   Pulse 99   Temp 97 5 °F (36 4 °C)   Resp 15   Ht 5' 8" (1 727 m)   Wt 88 7 kg (195 lb 8 8 oz)   SpO2 95%   BMI 29 73 kg/m²   Temp (24hrs), Av 8 °F (36 6 °C), Min:97 4 °F (36 3 °C), Max:98 8 °F (37 1 °C)        Intake/Output Summary (Last 24 hours) at 2022 1314  Last data filed at 2022 0920  Gross per 24 hour   Intake 680 ml   Output 700 ml   Net -20 ml       I/O last 24 hours: In: 12 [P O :600;  I V :200]  Out: 700 [Urine:700]      Current Weight: Weight - Scale: 88 7 kg (195 lb 8 8 oz)  First Weight: Weight - Scale: 81 6 kg (179 lb 14 3 oz)  Physical Exam  Vitals and nursing note reviewed  Constitutional:       General: He is not in acute distress  Appearance: Normal appearance  He is normal weight  He is not ill-appearing, toxic-appearing or diaphoretic  HENT:      Head: Normocephalic and atraumatic  Mouth/Throat:      Mouth: Mucous membranes are moist       Pharynx: Oropharynx is clear  No oropharyngeal exudate  Eyes:      General: No scleral icterus  Conjunctiva/sclera: Conjunctivae normal    Neck:      Comments: PermCath in place  Cardiovascular:      Heart sounds: Normal heart sounds  No murmur heard  Pulmonary:      Effort: No respiratory distress  Breath sounds: Normal breath sounds  No wheezing  Abdominal:      General: There is no distension  Palpations: Abdomen is soft  There is no mass  Tenderness: There is no abdominal tenderness  Musculoskeletal:         General: Swelling present  Cervical back: Neck supple  No rigidity  Comments: Plus one edema bilateral extremities   Skin:     General: Skin is warm  Coloration: Skin is not jaundiced  Neurological:      General: No focal deficit present  Mental Status: He is alert and oriented to person, place, and time  Psychiatric:         Mood and Affect: Mood normal          Behavior: Behavior normal              Review of Systems   Constitutional: Positive for fatigue  Negative for fever  HENT: Negative for congestion  Respiratory: Negative for cough, shortness of breath and wheezing  Cardiovascular: Positive for leg swelling  Gastrointestinal: Negative for abdominal pain, constipation, diarrhea, nausea and vomiting  Genitourinary: Positive for decreased urine volume  Musculoskeletal: Negative for back pain  Neurological: Negative for headaches  Psychiatric/Behavioral: Negative for agitation and confusion  All other systems reviewed and are negative        Scheduled Meds:  Current Facility-Administered Medications   Medication Dose Route Frequency Provider Last Rate    amiodarone  200 mg Oral TID With Meals Miquel Yan MD      Followed by   Rigoberto Tinoco ON 6/29/2022] amiodarone  200 mg Oral Daily With Breakfast Miquel Yan MD      amLODIPine  10 mg Oral Daily Vianey Downs MD      benzonatate  100 mg Oral TID PRN Joana Magana MD      calcium carbonate  500 mg Oral TID PRN Miquel Yan MD      heparin (porcine)  5,000 Units Subcutaneous [facility] Miquel Yan MD      hydrALAZINE  5 mg Intravenous Q6H PRN Yobany Holm DO      lidocaine  1 patch Topical Daily PRN Nicolette Lee DO      nicotine  21 mg Transdermal Daily Joana Magana MD      ondansetron  4 mg Intravenous Once Mayte Gasca MD         PRN Meds:   benzonatate    calcium carbonate    hydrALAZINE    lidocaine    Continuous Infusions:       Invasive Devices: Invasive Devices  Report    Peripheral Intravenous Line  Duration           Peripheral IV 06/22/22 Left;Proximal;Ventral (anterior) Forearm <1 day          Hemodialysis Catheter  Duration           HD Permanent Double Catheter 1 day                  LABORATORY:    Results from last 7 days   Lab Units 06/23/22  0931 06/22/22  0453 06/21/22  0557 06/20/22  1129 06/18/22  0437 06/17/22  0431   WBC Thousand/uL 14 51* 14 43* 14 52* 14 17* 14 15* 14 02*   HEMOGLOBIN g/dL 11 8* 13 0 12 4 13 1 13 9 15 0   HEMATOCRIT % 35 0* 37 4 35 4* 36 1* 39 4 41 8   PLATELETS Thousands/uL 276 205 195 166 121* 96*   POTASSIUM mmol/L 5 3 5 5* 5 9* 5 3 4 2 4 0   CHLORIDE mmol/L 94* 95* 94* 93* 97* 97*   CO2 mmol/L 25 26 21 24 22 28   BUN mg/dL 79* 68* 102* 79* 74* 48*   CREATININE mg/dL 10 50* 8 80* 11 40* 9 87* 7 73* 6 04*   CALCIUM mg/dL 7 9* 7 6* 7 8* 7 8* 8 0* 7 9*      rest all reviewed    RADIOLOGY:  IR tunneled dialysis catheter placement   Final Result by Syed Lopes DO (06/21 3056)   Tunneled dialysis catheter placement  The catheter is ready for use        Of note, right internal jugular vein was occluded  Right IJ temporary dialysis catheter was removed at the end of the case       _______________________________________________________________   COMPARISON: None  PROCEDURE DETAILS:    Operators: Dr Mariam Yang   Anesthesia: Local anesthesia  Medications: 1% lidocaine   Contrast: 0 mL of Omnipaque 300   Fluoroscopy time: 1 1 minutes   Images: 3      COMMENTS:   The site was prepped and draped in the usual sterile fashion  All elements of maximal sterile barrier technique were followed (cap, mask, sterile gown, sterile gloves, large sterile full-body drape, hand hygiene, and 2% chlorhexidine for cutaneous    antisepsis)  Sterile ultrasound technique with sterile gel and sterile probe cover was also utilized  A preprocedure timeout was performed per [facility] protocol  A preprocedure timeout was performed per [facility] protocol  Under continuous ultrasound guidance, the patent left internal jugular vein was compressible and accessed using a micropuncture needle  A static ultrasound image was saved to PACS  Following microwire advanced under fluoroscopic guidance, the needle was    exchanged for a micropuncture sheath  A J-wire was then advanced into the inferior vena cava  Next, superficial and deeper local anesthesia of the anticipated tunnel tract was performed using lidocaine mixed with epinephrine  A small skin incision was made at the tunnel entry site allowing dialysis catheter tunneling to the venotomy site  The    venotomy track was serially dilated allowing peel-away sheath placement over the J-wire  The catheter was then threaded into the right side of the heart with the tip in the right atrium with subsequent sheath removal        The catheter were aspirated, flushed and TEGO capped  The catheter was sutured in place with 2-0 Prolene sutures  3-0 Vicryl suture and Exofin glue was applied to the venotomy site and sterile dressings were applied           Workstation performed: [id number]         XR chest portable   Final Result by Dylan Solorio MD (06/17 1008)      Status post extubation  Persistent bibasilar opacities likely due to atelectasis  No evidence for pneumothorax  Workstation performed: [id number]         US liver doppler only   Final Result by Jerri Edwards MD (06/12 1651)      Normal liver Doppler  Workstation performed: [id number]         XR chest portable ICU   Final Result by Odette Yuen MD (06/11 3213)      Deep sulcus sign concerning for a small left basilar pneumothorax  Right lateral decubitus or upright radiograph could be obtained for confirmation  The tip of the left subclavian central venous catheter projects at the superior vena cava  The study was marked in [facility] for immediate notification  Workstation performed: [id number]           Rest all reviewed    Portions of the record may have been created with voice recognition software  Occasional wrong word or "sound a like" substitutions may have occurred due to the inherent limitations of voice recognition software  Read the chart carefully and recognize, using context, where substitutions have occurred  If you have any questions, please contact the dictating provider

## 2022-06-24 ENCOUNTER — APPOINTMENT (INPATIENT)
Dept: DIALYSIS | Facility: HOSPITAL | Age: 52
DRG: 469 | End: 2022-06-24
Payer: COMMERCIAL

## 2022-06-24 VITALS
RESPIRATION RATE: 12 BRPM | SYSTOLIC BLOOD PRESSURE: 156 MMHG | HEIGHT: 68 IN | WEIGHT: 190.7 LBS | TEMPERATURE: 97.6 F | HEART RATE: 94 BPM | DIASTOLIC BLOOD PRESSURE: 112 MMHG | OXYGEN SATURATION: 96 % | BODY MASS INDEX: 28.9 KG/M2

## 2022-06-24 PROBLEM — R77.8 ELEVATED TROPONIN: Status: RESOLVED | Noted: 2022-06-11 | Resolved: 2022-06-24

## 2022-06-24 PROBLEM — G93.40 ACUTE ENCEPHALOPATHY: Status: RESOLVED | Noted: 2022-06-13 | Resolved: 2022-06-24

## 2022-06-24 LAB
ANION GAP SERPL CALCULATED.3IONS-SCNC: 9 MMOL/L (ref 4–13)
BUN SERPL-MCNC: 61 MG/DL (ref 5–25)
CALCIUM SERPL-MCNC: 7.7 MG/DL (ref 8.3–10.1)
CHLORIDE SERPL-SCNC: 97 MMOL/L (ref 100–108)
CO2 SERPL-SCNC: 26 MMOL/L (ref 21–32)
CREAT SERPL-MCNC: 8.28 MG/DL (ref 0.6–1.3)
GFR SERPL CREATININE-BSD FRML MDRD: 6 ML/MIN/1.73SQ M
GLUCOSE SERPL-MCNC: 170 MG/DL (ref 65–140)
POTASSIUM SERPL-SCNC: 5.1 MMOL/L (ref 3.5–5.3)
SODIUM SERPL-SCNC: 132 MMOL/L (ref 136–145)

## 2022-06-24 PROCEDURE — 90935 HEMODIALYSIS ONE EVALUATION: CPT | Performed by: INTERNAL MEDICINE

## 2022-06-24 PROCEDURE — 80048 BASIC METABOLIC PNL TOTAL CA: CPT | Performed by: INTERNAL MEDICINE

## 2022-06-24 PROCEDURE — 99238 HOSP IP/OBS DSCHRG MGMT 30/<: CPT | Performed by: HOSPITALIST

## 2022-06-24 RX ORDER — NICOTINE 21 MG/24HR
1 PATCH, TRANSDERMAL 24 HOURS TRANSDERMAL DAILY
Qty: 28 PATCH | Refills: 0 | Status: SHIPPED | OUTPATIENT
Start: 2022-06-25

## 2022-06-24 RX ORDER — AMIODARONE HYDROCHLORIDE 200 MG/1
200 TABLET ORAL
Qty: 9 TABLET | Refills: 0 | Status: SHIPPED | OUTPATIENT
Start: 2022-06-24 | End: 2022-08-05

## 2022-06-24 RX ORDER — AMLODIPINE BESYLATE 10 MG/1
10 TABLET ORAL DAILY
Qty: 30 TABLET | Refills: 1 | Status: SHIPPED | OUTPATIENT
Start: 2022-06-25

## 2022-06-24 RX ADMIN — HEPARIN SODIUM 5000 UNITS: 5000 INJECTION INTRAVENOUS; SUBCUTANEOUS at 05:23

## 2022-06-24 RX ADMIN — AMLODIPINE BESYLATE 10 MG: 10 TABLET ORAL at 08:39

## 2022-06-24 RX ADMIN — AMIODARONE HYDROCHLORIDE 200 MG: 200 TABLET ORAL at 16:47

## 2022-06-24 RX ADMIN — NICOTINE 21 MG: 21 PATCH, EXTENDED RELEASE TRANSDERMAL at 08:40

## 2022-06-24 RX ADMIN — AMIODARONE HYDROCHLORIDE 200 MG: 200 TABLET ORAL at 08:39

## 2022-06-24 RX ADMIN — TORSEMIDE 40 MG: 20 TABLET ORAL at 08:39

## 2022-06-24 NOTE — PROGRESS NOTES
Follow up Consultation    Nephrology   Delereva Lesia 46 y o  male MRN: [id number]  Unit/Bed#: [facility] 806-01 Encounter: [id number]      Physician Requesting Consult: Kathie Sadler MD        ASSESSMENT/PLAN:  61-year-old male with multiple comorbidities including AFib, substance abuse with recent pneumonia and NSTEMI admitted after being found down  Nephrology consulted for evaluation management acute kidney injury  Acute kidney injury (POA)/currently dialysis dependent:  JOÃO multifactorial most likely secondary to rhabdomyolysis plus shock plus substance abuse with subsequent ATN  After review of records In [facility] as well as Care everywhere it appears that the patient has a baseline Creatinine of 0 9-1 2 mg/dL  patient was admitted with a creatinine of 2 93 mg/dL on 06/10/2022  patient's creatinine yesterday is at 10 50 mg/dL, continues to rise in between treatments     Patient initially required CRRT on 06/11 till 6/13  Status post left IJ PermCath on 06/21  Started I HD on 06/14  Last HD treatment on 06/23  Next dialysis today on 06/24/2022 so that patient can be discharge and then report to his outpatient dialysis unit on 06/28/2022  Continue to monitor for renal recovery  CT scan with no hydronephrosis  Currently on TTS dialysis schedule  Please see detailed note with regards to negative antibodies workup from my colleague from 06/22/2022  check BMP in a m if still in the hospital   Await renal recovery  Optimize hemodynamic status to avoid delay in renal recovery  Place on a renal diet when allowed diet order  Avoid nephrotoxins, adjust meds to appropriate GFR  Strict I/O  Daily weights  Urinary retention protocol if patient does not have a Marsh  will need to set up patient for follow up with Nephrology as an outpatient post hospitalization  Awaiting outpatient dialysis placement for acute kidney injury    Patient preferring to beta Graf  No signs of renal recovery at this time  Stable for discharge from renal standpoint when medically cleared and has outpatient dialysis arranged  As per case management patient has outpatient dialysis arranged for [facility] TTS schedule    Blood pressure/hypertension:  current medications:  Norvasc 10 mg p o  Q day, torsemide 40 mg p o  Q day  recommendations:  Aim for UF close to 3 kilos with treatment today  Optimize hemodynamics  Maintain MAP > 65mmHg  Avoid BP fluctuations  H/H/anemia:  most recent hemoglobin at 11 8 grams/deciliter  maintain hemoglobin greater than 8 grams/deciliter    Acid-base electrolytes:    Electrolytes:    Hyponatremia:   Most recent sodium at 130 mEq  Remains stable  To be corrected with dialysis    Hyperkalemia:  Follow low-potassium diet  Most recent potassium 5 3 mEq  Check BMP today  Awaiting lab work to adjust potassium bath currently on 2 K bath    Hyperphosphatemia:  Most recent phosphorus at 2 8 as of 06/14/2022  Check phosphorus level    Acid-base:    Most recent bicarb at 25, acidosis improved    Other medical problems:  Proteinuria: Most recent UA with 2+ protein as of 06/10/2022 in light of Lobito I will need testing when stable  Rhabdomyolysis:  Management per primary team   Most recent CK level 777  Substance abuse:  Management per primary team      Thanks for the consult  Will continue to follow  Please call with questions/ concerns  Above-mentioned orders and Plan in terms of dialysis was discussed with the team in [address], [facility], 6/24/2022, 12:17 PM              Objective :   Patient seen and examined in his room earlier this a m and then again on dialysis at 12:10 p m  Tolerating dialysis well no issues aiming for UF close to 33 5 kilos with treatment today  Hemodynamically stable remains afebrile status post hemodialysis yesterday with 3 kilos removal   Patient has outpatient spot available starting TTS on 06/28  Will do dialysis treatment today  Also spoke to patient's spouse        PHYSICAL EXAM  BP 142/97   Pulse 83   Temp (!) 96 1 °F (35 6 °C)   Resp 14   Ht 5' 8" (1 727 m)   Wt 86 5 kg (190 lb 11 2 oz)   SpO2 96%   BMI 29 00 kg/m²   Temp (24hrs), Av 7 °F (36 5 °C), Min:96 1 °F (35 6 °C), Max:98 2 °F (36 8 °C)        Intake/Output Summary (Last 24 hours) at 2022 1217  Last data filed at 2022 0852  Gross per 24 hour   Intake 1702 ml   Output 3500 ml   Net -1798 ml       I/O last 24 hours: In: 2142 [P O :1642; I V :500]  Out: 4000 [Urine:500; Other:3500]      Current Weight: Weight - Scale: 86 5 kg (190 lb 11 2 oz)  First Weight: Weight - Scale: 81 6 kg (179 lb 14 3 oz)  Physical Exam  Vitals and nursing note reviewed  Constitutional:       General: He is not in acute distress  Appearance: Normal appearance  He is normal weight  He is not ill-appearing, toxic-appearing or diaphoretic  HENT:      Head: Normocephalic and atraumatic  Mouth/Throat:      Mouth: Mucous membranes are moist       Pharynx: Oropharynx is clear  No oropharyngeal exudate  Eyes:      General: No scleral icterus  Conjunctiva/sclera: Conjunctivae normal    Neck:      Comments: Left IJ PermCath  Cardiovascular:      Rate and Rhythm: Normal rate  Heart sounds: Normal heart sounds  No friction rub  Pulmonary:      Effort: Pulmonary effort is normal  No respiratory distress  Breath sounds: Normal breath sounds  No stridor  No wheezing  Abdominal:      General: There is no distension  Palpations: Abdomen is soft  There is no mass  Tenderness: There is no abdominal tenderness  Musculoskeletal:         General: Swelling present  Cervical back: Neck supple  No rigidity  Comments: Trace edema bilateral extremities   Skin:     General: Skin is warm  Coloration: Skin is not jaundiced  Neurological:      General: No focal deficit present  Mental Status: He is alert and oriented to person, place, and time     Psychiatric:         Mood and Affect: Mood normal  Behavior: Behavior normal              Review of Systems   Constitutional: Negative for chills and fatigue  HENT: Negative for congestion  Respiratory: Negative for cough, shortness of breath and wheezing  Cardiovascular: Positive for leg swelling  Gastrointestinal: Negative for abdominal pain, constipation, diarrhea, nausea and vomiting  Genitourinary: Positive for decreased urine volume  Musculoskeletal: Negative for back pain  Neurological: Negative for headaches  Psychiatric/Behavioral: Negative for agitation and confusion  All other systems reviewed and are negative  Scheduled Meds:  Current Facility-Administered Medications   Medication Dose Route Frequency Provider Last Rate    amiodarone  200 mg Oral TID With Meals Juhi Mireles MD      Followed by   aJnet Rocha ON 6/29/2022] amiodarone  200 mg Oral Daily With Breakfast Juhi Mireles MD      amLODIPine  10 mg Oral Daily Jake Garcia MD      benzonatate  100 mg Oral TID PRN Leny Yu MD      calcium carbonate  500 mg Oral TID PRN Juhi Mireles MD      heparin (porcine)  5,000 Units Subcutaneous [facility]hi Mireles [facility]      hydrALAZINE  5 mg Intravenous Q6H PRN Júnior Holm, DO      lidocaine  1 patch Topical Daily PRN Frances Whitfield, DO      nicotine  21 mg Transdermal Daily Leny Yu MD      torsemide  40 mg Oral Daily Ulysses Harpin, MD         PRN Meds:   benzonatate    calcium carbonate    hydrALAZINE    lidocaine    Continuous Infusions:       Invasive Devices:      Invasive Devices  Report    Peripheral Intravenous Line  Duration           Peripheral IV 06/22/22 Left;Proximal;Ventral (anterior) Forearm 1 day          Hemodialysis Catheter  Duration           HD Permanent Double Catheter 2 days                  LABORATORY:    Results from last 7 days   Lab Units 06/23/22  0931 06/22/22  0453 06/21/22  0557 06/20/22  1129 06/18/22  0437   WBC Thousand/uL 14 51* 14 43* 14 52* 14 17* 14 15* HEMOGLOBIN g/dL 11 8* 13 0 12 4 13 1 13 9   HEMATOCRIT % 35 0* 37 4 35 4* 36 1* 39 4   PLATELETS Thousands/uL 276 205 195 166 121*   POTASSIUM mmol/L 5 3 5 5* 5 9* 5 3 4 2   CHLORIDE mmol/L 94* 95* 94* 93* 97*   CO2 mmol/L 25 26 21 24 22   BUN mg/dL 79* 68* 102* 79* 74*   CREATININE mg/dL 10 50* 8 80* 11 40* 9 87* 7 73*   CALCIUM mg/dL 7 9* 7 6* 7 8* 7 8* 8 0*      rest all reviewed    RADIOLOGY:  IR tunneled dialysis catheter placement   Final Result by Carla Del Valle DO (06/21 1714)   Tunneled dialysis catheter placement  The catheter is ready for use  Of note, right internal jugular vein was occluded  Right IJ temporary dialysis catheter was removed at the end of the case       _______________________________________________________________   COMPARISON: None  PROCEDURE DETAILS:    Operators: Dr Darryle Mcmurray   Anesthesia: Local anesthesia  Medications: 1% lidocaine   Contrast: 0 mL of Omnipaque 300   Fluoroscopy time: 1 1 minutes   Images: 3      COMMENTS:   The site was prepped and draped in the usual sterile fashion  All elements of maximal sterile barrier technique were followed (cap, mask, sterile gown, sterile gloves, large sterile full-body drape, hand hygiene, and 2% chlorhexidine for cutaneous    antisepsis)  Sterile ultrasound technique with sterile gel and sterile probe cover was also utilized  A preprocedure timeout was performed per [facility] protocol  A preprocedure timeout was performed per [facility] protocol  Under continuous ultrasound guidance, the patent left internal jugular vein was compressible and accessed using a micropuncture needle  A static ultrasound image was saved to PACS  Following microwire advanced under fluoroscopic guidance, the needle was    exchanged for a micropuncture sheath  A J-wire was then advanced into the inferior vena cava        Next, superficial and deeper local anesthesia of the anticipated tunnel tract was performed using lidocaine mixed with epinephrine  A small skin incision was made at the tunnel entry site allowing dialysis catheter tunneling to the venotomy site  The    venotomy track was serially dilated allowing peel-away sheath placement over the J-wire  The catheter was then threaded into the right side of the heart with the tip in the right atrium with subsequent sheath removal        The catheter were aspirated, flushed and TEGO capped  The catheter was sutured in place with 2-0 Prolene sutures  3-0 Vicryl suture and Exofin glue was applied to the venotomy site and sterile dressings were applied  Workstation performed: [id number]         XR chest portable   Final Result by Stone Kaur MD (06/17 1008)      Status post extubation  Persistent bibasilar opacities likely due to atelectasis  No evidence for pneumothorax  Workstation performed: [id number]         US liver doppler only   Final Result by Sachin Rucker MD (06/12 1651)      Normal liver Doppler  Workstation performed: [id number]         XR chest portable ICU   Final Result by Dave Jerome MD (06/11 0709)      Deep sulcus sign concerning for a small left basilar pneumothorax  Right lateral decubitus or upright radiograph could be obtained for confirmation  The tip of the left subclavian central venous catheter projects at the superior vena cava  The study was marked in [facility] for immediate notification  Workstation performed: [id number]           Rest all reviewed    Portions of the record may have been created with voice recognition software  Occasional wrong word or "sound a like" substitutions may have occurred due to the inherent limitations of voice recognition software  Read the chart carefully and recognize, using context, where substitutions have occurred  If you have any questions, please contact the dictating provider

## 2022-06-24 NOTE — PLAN OF CARE
Problem: Prexisting or High Potential for Compromised Skin Integrity  Goal: Skin integrity is maintained or improved  Description: INTERVENTIONS:  - Identify patients at risk for skin breakdown  - Assess and monitor skin integrity  - Assess and monitor nutrition and hydration status  - Monitor labs   - Assess for incontinence   - Turn and reposition patient  - Assist with mobility/ambulation  - Relieve pressure over bony prominences  - Avoid friction and shearing  - Provide appropriate hygiene as needed including keeping skin clean and dry  - Evaluate need for skin moisturizer/barrier cream  - Collaborate with interdisciplinary team   - Patient/family teaching  - Consider wound care consult   Outcome: Progressing     Problem: PAIN - ADULT  Goal: Verbalizes/displays adequate comfort level or baseline comfort level  Description: Interventions:  - Encourage patient to monitor pain and request assistance  - Assess pain using appropriate pain scale  - Administer analgesics based on type and severity of pain and evaluate response  - Implement non-pharmacological measures as appropriate and evaluate response  - Consider cultural and social influences on pain and pain management  - Notify physician/advanced practitioner if interventions unsuccessful or patient reports new pain  Outcome: Progressing     Problem: INFECTION - ADULT  Goal: Absence or prevention of progression during hospitalization  Description: INTERVENTIONS:  - Assess and monitor for signs and symptoms of infection  - Monitor lab/diagnostic results  - Monitor all insertion sites, i e  indwelling lines, tubes, and drains  - Monitor endotracheal if appropriate and nasal secretions for changes in amount and color  - [place] appropriate cooling/warming therapies per order  - Administer medications as ordered  - Instruct and encourage patient and family to use good hand hygiene technique  - Identify and instruct in appropriate isolation precautions for identified infection/condition  Outcome: Progressing     Problem: SAFETY ADULT  Goal: Patient will remain free of falls  Description: INTERVENTIONS:  - Educate patient/family on patient safety including physical limitations  - Instruct patient to call for assistance with activity   - Consult OT/PT to assist with strengthening/mobility   - Keep Call bell within reach  - Keep bed low and locked with side rails adjusted as appropriate  - Keep care items and personal belongings within reach  - Initiate and maintain comfort rounds  - Make Fall Risk Sign visible to staff  - Apply yellow socks and bracelet for high fall risk patients  - Consider moving patient to room near nurses station  Outcome: Progressing     Problem: DISCHARGE PLANNING  Goal: Discharge to home or other facility with appropriate resources  Description: INTERVENTIONS:  - Identify barriers to discharge w/patient and caregiver  - Arrange for needed discharge resources and transportation as appropriate  - Identify discharge learning needs (meds, wound care, etc )  - Arrange for interpretive services to assist at discharge as needed  - Refer to Case Management Department for coordinating discharge planning if the patient needs post-hospital services based on physician/advanced practitioner order or complex needs related to functional status, cognitive ability, or social support system  Outcome: Progressing     Problem: Knowledge Deficit  Goal: Patient/family/caregiver demonstrates understanding of disease process, treatment plan, medications, and discharge instructions  Description: Complete learning assessment and assess knowledge base    Interventions:  - Provide teaching at level of understanding  - Provide teaching via preferred learning methods  Outcome: Progressing     Problem: Potential for Falls  Goal: Patient will remain free of falls  Description: INTERVENTIONS:  - Educate patient/family on patient safety including physical limitations  - Instruct patient to call for assistance with activity   - Consult OT/PT to assist with strengthening/mobility   - Keep Call bell within reach  - Keep bed low and locked with side rails adjusted as appropriate  - Keep care items and personal belongings within reach  - Initiate and maintain comfort rounds  - Make Fall Risk Sign visible to staff  - Apply yellow socks and bracelet for high fall risk patients  - Consider moving patient to room near nurses station  Outcome: Progressing     Problem: METABOLIC, FLUID AND ELECTROLYTES - ADULT  Goal: Electrolytes maintained within normal limits  Description: INTERVENTIONS:  - Monitor labs and assess patient for signs and symptoms of electrolyte imbalances  - Administer electrolyte replacement as ordered  - Monitor response to electrolyte replacements, including repeat lab results as appropriate  - Instruct patient on fluid and nutrition as appropriate  Outcome: Progressing  Goal: Fluid balance maintained  Description: INTERVENTIONS:  - Monitor labs   - Monitor I/O and WT  - Instruct patient on fluid and nutrition as appropriate  - Assess for signs & symptoms of volume excess or deficit  Outcome: Progressing

## 2022-06-24 NOTE — CASE MANAGEMENT
Case Management Discharge Planning Note    Patient name Clifton Tinsley  Location [address] [place] [address] Rd 806/PPHP 766-75 MRN [id number]  : 1970 Date 2022       Current Admission Date: 6/10/2022  Current Admission Diagnosis:Acute renal failure [facility])   Patient Active Problem List    Diagnosis Date Noted    Atrial fibrillation ([facility] ) 06/15/2022    Acute encephalopathy 2022    Elevated troponin 2022    Methamphetamine abuse ([facility] ) 2022    IV drug user 06/10/2022    [facility] ) 06/10/2022    Unresponsiveness 06/10/2022    Acute renal failure ([facility] ) 06/10/2022    Chest pain 2020    Hypertension 2020    PTSD (post-traumatic stress disorder) 2020    History of substance abuse ([facility] ) 2020      LOS (days): 14  Geometric Mean LOS (GMLOS) (days):   Days to GMLOS:     OBJECTIVE:  Risk of Unplanned Readmission Score: 18 83         Current admission status: Inpatient   Preferred Pharmacy:   [facility] [place], [address] [facility],4Th Floor  [address] [place] [address],Fourth Floor  Phone: [phone] Fax: [phone]    Primary Care Provider: No primary care provider on file  Primary Insurance: Kenny Ko  Secondary Insurance:     DISCHARGE DETAILS:          Additional Comments: Pt accepted at [facility] for HD with a Tue/Thur/Sat 1030 chair time  Pt will start next 22  Pt and his daughter, Brandyn Delatorre, updated  Pt's daughter also informed CM that pt has an appointment scheduled with his new PCP Dr Quyen Villatoro at 10:30 on 22

## 2022-06-24 NOTE — QUICK NOTE
Called patients daughter Ms Yadira Alcantar, unable to reach them, voice message inbox full  Josr Hook MD  Internal Medicine Residency PGY-1  [place]  Available on [email or web address]

## 2022-06-24 NOTE — CASE MANAGEMENT
Case Management Discharge Planning Note    Patient name Evelia Arzate  Location [address] [facility] 806/PPHP 704-54 MRN [id number]  : 1970 Date 2022       Current Admission Date: 6/10/2022  Current Admission Diagnosis:Acute renal failure [facility])   Patient Active Problem List    Diagnosis Date Noted    Atrial fibrillation ([facility] ) 06/15/2022    Acute encephalopathy 2022    Elevated troponin 2022    Methamphetamine abuse ([facility] ) 2022    IV drug user 06/10/2022    Cocaine abuse ([facility] ) 06/10/2022    Unresponsiveness 06/10/2022    Acute renal failure ([facility] ) 06/10/2022    Chest pain 2020    Hypertension 2020    PTSD (post-traumatic stress disorder) 2020    History of substance abuse ([place] [address] ) 2020      LOS (days): 14  Geometric Mean LOS (GMLOS) (days):   Days to GMLOS:     OBJECTIVE:  Risk of Unplanned Readmission Score: 18 83         Current admission status: Inpatient   Preferred Pharmacy:   [facility] [address], [facility] [facility],4Th Floor  [address]  Phone: [phone] Fax: [phone]    Primary Care Provider: No primary care provider on file  Primary Insurance: Merly Solorio  Secondary Insurance:     DISCHARGE DETAILS:         Additional Comments: Pt accepted at [facility] for HD with a Tue/Thur/Sat 1030 chair time  Pt will start next 22

## 2022-06-24 NOTE — PLAN OF CARE
3 5 hours of hd tx completed  Tx well tolerated; no complications  Post-Dialysis RN Treatment Note    Blood Pressure:  Pre 151/91 mm/Hg  Post 156/112 mmHg   EDW  tbd kg    Weight:  Pre 89 kg   Post 85 5 kg   Mode of weight measurement: Bed Scale   Volume Removed  3500 ml    Treatment duration 210 minutes    NS given  No    Treatment shortened?  No   Medications given during Rx n/a   Estimated Kt/V  Not Applicable   Access type: Permacath/TDC   Access Issues: No    Report called to primary nurse   Yes      Problem: METABOLIC, FLUID AND ELECTROLYTES - ADULT  Goal: Electrolytes maintained within normal limits  Description: INTERVENTIONS:  - Monitor labs and assess patient for signs and symptoms of electrolyte imbalances  - Administer electrolyte replacement as ordered  - Monitor response to electrolyte replacements, including repeat lab results as appropriate  - Instruct patient on fluid and nutrition as appropriate  Outcome: Progressing  Goal: Fluid balance maintained  Description: INTERVENTIONS:  - Monitor labs   - Monitor I/O and WT  - Instruct patient on fluid and nutrition as appropriate  - Assess for signs & symptoms of volume excess or deficit  Outcome: Progressing

## 2022-06-24 NOTE — DISCHARGE SUMMARY
INTERNAL MEDICINE RESIDENCY DISCHARGE SUMMARY     Michelle Wright   46 y o  male  MRN: [id number]  Room/Bed: [facility] 806/[facility] 806-[address] [place] [address]   Encounter: [id number]    Principal Problem:    Acute renal failure [facility])  Active Problems:    Hypertension    PTSD (post-traumatic stress disorder)    IV drug user    Cocaine abuse (HCC)    Elevated troponin    Methamphetamine abuse ([facility] )    Acute encephalopathy    Atrial fibrillation ([facility] )      * Acute renal failure ([facility] )  Assessment & Plan  In the setting of rhabdomyolysis   Required CVVHD  6/14- transitioned to intermittent HD  6/24 - still oliguric, per pt producing increased amounts of urine lighter in color    Plan:   Continue to monitor mental status around HD timing   Nephro following, appreciate recommendations  T/Th/Sat schedule  PermCath placed 6/21  Monitor CK, Cr, BMP   Monitor I/Os  Better dose dialysis to reduce BUN<50  Outpatient HD is set up, plan for discharge after HD 6/24    Pneumonia due to Haemophilus influenzae (HCC)-resolved as of 6/21/2022  Assessment & Plan  Sputum revealing H Flu and Group B strep  Initially received Zosyn and Vanco, switched to cefuroxime  6/17 CXR: Persistent bibasilar opacities likely due to atelectasis  No evidence for pneumothorax  WBC 6/17 up to 14, no other obvious source for infection at this time  Plan:   IS   Completed course of Cefuroxime 500mg x4 days for total of 7 days of Abx   Continue to trend fever curve and WBC    Atrial fibrillation ([facility] )  Assessment & Plan  Pt intermittently having AF with RVR, new on this admission  Loaded with amio in ICU  Plan:   Continue amiodarone 200mg tid  Discontinue on 6/28 after 14 day course  Monitor hemodynamics     Elevated troponin  Assessment & Plan  Elevated troponin level noted on presentation  2/2 ARF, no intervention needed   Due to patients history of cocaine/meth use, patient may be predisposed to increased risk for coronary atherosclerosis and may benefit from an ischemic evaluation prior to discharge  - Troponins now wnl  - Plan for nuclear cardiac test as outpatient    IV drug user  Assessment & Plan  Pt has a hx of polysubstance abuse including cocaine, meth, THC  Could be contributing to pt's "found down" event  Plan:  Continue to encourage cessation    PTSD (post-traumatic stress disorder)  Assessment & Plan  Not on any medications  Recommend follow up out pt PCP/ psych    Hypertension  Assessment & Plan  Pt with elevated BP readings while in hospital, not improved following dialysis  Not improved on amlodipine 5mg daily    Plan:   Increased amlodipine to 10mg daily   Prn hydralazine   Continue to monitor vitals around HD timing    Hyperkalemia-resolved as of 6/20/2022  Assessment & Plan  2/2 to acute renal failure  Peaked T waves on EKG  Treated with insulin, Ca gluconate, and IVF on arrival  Treated with CVVHD in ICU   6/14- started intermittent HD  Improved    Plan:   Continue prn HD per nephro  Monitor BMP       [address]     Per HPI:    "Jose Ramon Starr is a 46 y o  male   with PMH remarkable for: IV drug use, hepatitis C, PTSD and nicotine smoking   had witnessed collapse while with a friend, deemed shaky and the friend noted that there was some blood unclear source if hemoptysis/hematemisis or other, then patient collapsed and EMS was called who took him to the ED    found to have hyperkalemic K 8 3, troponin 485 > 974, LA 4 6, ABG 7 2, 43 5, 131, 17 0 & U cocaine, Meth and TCH +,  was intubated in ED for airway protection, given Ca Gluconate, Insulin, Dextrose, 2 L Isolyte + 1 L NS +  cc/hr started, BP remained low, was started on Levo, and brought from ED to MICU      On arrival to the MICU, sedated, intubated, ABG improved, LA trending down 2 6, HS Trop trending up, bedside echo and chest xray both unremarkable, on levo 6, propofol 20,  cc/hr, volume control 24/500/6/40, followed commands on holding propofol during exam    Daughter who is next of kin, son and his girlfriend (do not live together) came into bedside, confirmed full code, consented for PICC, A line, dialysis,+/- transfusion if needed  Family reported patient noted not feeling well x 1-2 weeks, "some" nausea and vomiting; works as  and carry "tires" outdoor with extensive sweating/dehydration reported, confirmed drug abuse including iv injection, no allergy (tylenol was in error reported as allergy in chart), updated and agreeable  History obtained from chart review, the patient and unobtainable from patient due to mental status "    Pt admitted to ICU  Was intubated/sedated, put on vasopressors, NPO, strict I/O, broad spectrum abx with Vanc and Zosyn for suspected PNA, temporary CVVHD for ARF 2/2 rhabdomyolysis  Pt showed elevated troponin  Pt developed short episode of Afib w RVR which was new for pt  Converted to NSR  Pt extubated 6/12 and improved without pressor support  Pt transferred to /S 6/14 and started on iHD  During hospital course has continually improved urine output, still requiring HD on a Tues/Thurs/Sat schedule  Pt has had elevated BP during hospital course which has required 10mg amlodipine  PermCath placed 6/21 which showed oozing on the morning of 6/22  Pt remained hemodynamically stable, and IR placed a new stitch which stopped the bleeding  Plan for outpatient HD and nuclear stress test to assess for ischemia in the setting of elevated troponins on admission  Currently on amiodarone 200mg TID with plan to discontinue after 14 day course on 6/28          DISCHARGE INFORMATION     PCP at Discharge: [facility]    Admitting Provider: Marylene Kawasaki, MD  Admission Date: 6/10/2022    Discharge Provider: Concetta Zhao MD  Discharge Date: 6/24/2022    [facility]  Discharge Condition: stable  Discharge with Lines: PermCath for dialysis    Discharge Diet: renal diet  Activity Restrictions: none  Test Results Pending at Discharge: N/A    Discharge Diagnoses:  Principal Problem:    Acute renal failure ([facility] )  Active Problems:    Hypertension    PTSD (post-traumatic stress disorder)    IV drug user    Cocaine abuse (HCC)    Elevated troponin    Methamphetamine abuse (HCC)    Acute encephalopathy    Atrial fibrillation ([facility] )  Resolved Problems:    Pneumonia due to Haemophilus influenzae (HCC)    Hyperkalemia      Consulting Providers:      Diagnostic & Therapeutic Procedures Performed:  CT chest abdomen pelvis wo contrast    Result Date: 6/10/2022  Impression: 1  Limited examination demonstrating no evidence of intrathoracic, intra-abdominal or intrapelvic traumatic injury 2  There is nonspecific perihilar and upper lobe airspace opacities, could represent mild pulmonary edema versus aspiration  3  No pneumothorax, hemothorax or mediastinal air 4  Tiny amount of intravascular and cardiac air, likely introduced through an IV catheter Workstation performed: [id number]     XR chest 1 view portable    Result Date: 6/10/2022  Impression: Endotracheal tube tip is 5 5 cm above the jesika Workstation performed: [id number]     CT head without contrast    Result Date: 6/10/2022  Impression: No acute intracranial abnormality  Bilateral facial subcutaneous air present  Please refer to cervical spine CT report for more specific detail Workstation performed: [id number]     CT spine cervical wo contrast    Result Date: 6/10/2022  Impression: No cervical spine fracture or traumatic malalignment  Subcutaneous emphysema seen in the neck  Given that the CT of the chest abdomen and pelvis demonstrates no evidence of pneumothorax, mediastinal or paraesophageal air, this most likely is related to air introduced through an IV catheter  Also correlate for any penetrating injury that may have allowed generalized subcutaneous air to occur   The examination demonstrates a significant  finding and was documented as such in [facility] for liaison and referring practitioner immediate notification  Workstation performed: [id number]     XR chest portable ICU    Result Date: 6/11/2022  Impression: Deep sulcus sign concerning for a small left basilar pneumothorax  Right lateral decubitus or upright radiograph could be obtained for confirmation  The tip of the left subclavian central venous catheter projects at the superior vena cava  The study was marked in [facility] for immediate notification  Workstation performed: [id number]     US liver doppler only    Result Date: 6/12/2022  Impression: Normal liver Doppler  Workstation performed: [id number]       Code Status: Level 1 - Full Code  Advance Directive & Living Will: <no information>  Power of :    POLST:      Medications:  Current Discharge Medication List        Current Discharge Medication List        Current Discharge Medication List      CONTINUE these medications which have NOT CHANGED    Details   naproxen (NAPROSYN) 500 mg tablet Take 1 tablet (500 mg total) by mouth 2 (two) times a day as needed for mild pain  Qty: 10 tablet, Refills: 0    Associated Diagnoses: Tendinitis of left wrist             Allergies:  No Known Allergies    FOLLOW-UP     PCP Outpatient Follow-up:  [facility]    Consulting Providers Follow-up:  nephrology     Active Issues Requiring Follow-up:   patient will likely require outpatient HD and stress test to assess for ischemia     Discharge Statement:   I spent 30 minutes minutes discharging the patient  This time was spent on the day of discharge  I had direct contact with the patient on the day of discharge  Additional documentation is required if more than 30 minutes were spent on discharge  Portions of the record may have been created with voice recognition software    Occasional wrong word or "sound a like" substitutions may have occurred due to the inherent limitations of voice recognition software  Read the chart carefully and recognize, using context, where substitutions have occurred  Jose Yeboah MD  Internal Medicine Residency PGY-1  [place]  Available on [employer]  [email or web address]  [facility]

## 2022-06-24 NOTE — PLAN OF CARE
Problem: PAIN - ADULT  Goal: Verbalizes/displays adequate comfort level or baseline comfort level  Description: Interventions:  - Encourage patient to monitor pain and request assistance  - Assess pain using appropriate pain scale  - Administer analgesics based on type and severity of pain and evaluate response  - Implement non-pharmacological measures as appropriate and evaluate response  - Consider cultural and social influences on pain and pain management  - Notify physician/advanced practitioner if interventions unsuccessful or patient reports new pain  Outcome: Progressing     Problem: INFECTION - ADULT  Goal: Absence or prevention of progression during hospitalization  Description: INTERVENTIONS:  - Assess and monitor for signs and symptoms of infection  - Monitor lab/diagnostic results  - Monitor all insertion sites, i e  indwelling lines, tubes, and drains  - Monitor endotracheal if appropriate and nasal secretions for changes in amount and color  - [place] appropriate cooling/warming therapies per order  - Administer medications as ordered  - Instruct and encourage patient and family to use good hand hygiene technique  - Identify and instruct in appropriate isolation precautions for identified infection/condition  Outcome: Progressing     Problem: METABOLIC, FLUID AND ELECTROLYTES - ADULT  Goal: Electrolytes maintained within normal limits  Description: INTERVENTIONS:  - Monitor labs and assess patient for signs and symptoms of electrolyte imbalances  - Administer electrolyte replacement as ordered  - Monitor response to electrolyte replacements, including repeat lab results as appropriate  - Instruct patient on fluid and nutrition as appropriate  Outcome: Progressing  Goal: Fluid balance maintained  Description: INTERVENTIONS:  - Monitor labs   - Monitor I/O and WT  - Instruct patient on fluid and nutrition as appropriate  - Assess for signs & symptoms of volume excess or deficit  Outcome: Progressing

## 2022-06-27 NOTE — UTILIZATION REVIEW
Notification of Discharge   This is a Notification of Discharge from our facility [address]  Please be advised that this patient has been discharge from our facility  Below you will find the admission and discharge date and time including the patients disposition  UTILIZATION REVIEW CONTACT:  Joao Rey  Utilization   Network Utilization Review Department  Phone: [phone] x carefully listen to the prompts  All voicemails are confidential   Email: [email or web address]     PHYSICIAN ADVISORY SERVICES:  FOR [facility]-FW-IM[facility] REVIEW - MEDICAL NECESSITY DENIAL  Phone: [phone]  Fax: [phone]  Email: [email or web address]     PRESENTATION DATE: 6/10/2022  6:49 PM  OBERVATION ADMISSION DATE:   INPATIENT ADMISSION DATE: 6/10/22  6:49 PM   DISCHARGE DATE: 6/24/2022  6:32 PM  DISPOSITION: Home with [place] with [address] [place] [address] Road INFORMATION:  Send all requests for admission clinical reviews, approved or denied determinations and any other requests to dedicated fax number below belonging to the campus where the patient is receiving treatment   List of dedicated fax numbers:  [address] DENIALS (Administrative/Medical Necessity) [phone]   [address] (Maternity/NICU/Pediatrics) [phone]   [facility] [phone]   130 [facility] [phone]   [address] [place] [address] [phone]   [address] [phone]   [facility]  [phone]   [address] [place] [address], [facility]  2401 [facility] 1000 [facility] [phone]

## 2022-07-11 ENCOUNTER — HOSPITAL ENCOUNTER (OUTPATIENT)
Dept: NON INVASIVE DIAGNOSTICS | Facility: CLINIC | Age: 52
Discharge: HOME | End: 2022-07-11

## 2022-07-11 DIAGNOSIS — F14.10 COCAINE ABUSE (HCC): ICD-10-CM

## 2022-07-11 DIAGNOSIS — R77.8 ELEVATED TROPONIN: ICD-10-CM

## 2022-07-13 ENCOUNTER — EVALUATION (OUTPATIENT)
Dept: PHYSICAL THERAPY | Facility: CLINIC | Age: 52
End: 2022-07-13
Payer: OTHER GOVERNMENT

## 2022-07-13 VITALS — DIASTOLIC BLOOD PRESSURE: 90 MMHG | SYSTOLIC BLOOD PRESSURE: 130 MMHG

## 2022-07-13 DIAGNOSIS — R53.81 PHYSICAL DECONDITIONING: ICD-10-CM

## 2022-07-13 DIAGNOSIS — R42 VERTIGO: ICD-10-CM

## 2022-07-13 DIAGNOSIS — R53.83 OTHER FATIGUE: Primary | ICD-10-CM

## 2022-07-13 PROCEDURE — 97162 PT EVAL MOD COMPLEX 30 MIN: CPT | Performed by: PHYSICAL THERAPIST

## 2022-07-13 NOTE — LETTER
2022    [facility] [address]    Patient: Kylie Soliman   YOB: 1970   Date of Visit: 2022     Encounter Diagnosis     ICD-10-CM    1  Other fatigue  R53 83    2  Vertigo  R42    3  Physical deconditioning  R53 81        Dear Dr Jossie Duque:    Thank you for your recent referral of Kylie Soliman  Please review the attached evaluation summary from Gagandeep's recent visit  Please verify that you agree with the plan of care by signing the attached order  If you have any questions or concerns, please do not hesitate to call  I sincerely appreciate the opportunity to share in the care of one of your patients and hope to have another opportunity to work with you in the near future  Sincerely,    Dana Gavin, PT      Referring Provider:      I certify that I have read the below Plan of Care and certify the need for these services furnished under this plan of treatment while under my care  Valarie Hyde MD  [address] [place] [address]  Via Fax: [phone]          PT Evaluation    Today's date: 2022   Patient name: Kylie Soliman  : 1970  MRN: [id number]  Referring provider: No ref  provider found  Dx:   Encounter Diagnosis     ICD-10-CM    1  Other fatigue  R53 83            Subjective Evaluation     History of Present Illness  Patient presents with c/o fatigue when walking long distances  This began 2-3 weeks ago  He was in a wheelchair and now is doing better  This was due to a fall about 6 weeks and he has been getting dialysis for a month  He has been incarcerated since 3 weeks ago  He was at a [facility] and he passed out and had to go to the ER on 6/10  Since then he has had  He gets dizzy when getting up and has fallen a couple times from that  Once he hit his head on the floor  He wishes he could have more things to drink  His dizziness lasts 30 secs        Neuro signs: tingling in his L foot- unsure why since this all began   Dizziness, blurry vision- if stand to fast,   Bowel and bladder sxs: none  Red flags: none  Occupation: Drive truck    Dizziness worse- lying down to sitting    Pain    At worst pain ratin        Patient Goals  Patient goals for therapy: to get better-walk longer distances    STGs  1  Decrease fatigue by 20% in 2-4 weeks to improve standing tolerance  2  Improve ocular ROM testing s dizziness to improve sit to  4 weeks  3  Improve LLE strength by 1/3 grade in 2-4 weeks to improve stairs performance to step through pattern  LTGs  1  Decrease fatigue by 60% in 6-8 weeks  2  Improve walking tolerance to >15 minutes in 6-8 weeks to perform community ambulation  3  Perform job activities independently without fatigue in 6-8 weeks  4  Improve stairs tolerance to 1 flight  in 8 weeks         Objective Measurements:    Gait: a bit unsteady slowed pace   Sensation: WFL    Ocular ROM (H): diagonal L to upper R dizziness  Observation:  CN : WFL    Saccades: to L caused dizziness 4/10 up and down slowed movement looking up 4/10  VOR:  To L caused dizziness    Hearing:-  Yan Hallpike:L caused dizziness 12 sec then dissipates  R caused dizziness 3 sec  Epleys: performed and 2nd and 3rd position no change in sxs    Balance:   Seated EC: mild wobble dizziness (improved to no dizziness nor wobble p epleys  Standing NBOS EC: mod wobble 10 sec - dizziness    Tug: 10 sec c imbalance on turn      Lumbar ROM L R Strength knee L R   Flex    Flex 3+ 5   Ext Min palma  Ext 5 5   Rot        EIL        SB        Hip A/PROM        Flex  /  / Flex 3+ 5   ER at 90   ER     IR at 90   IR     Abd   Abd     Ext   Ext             Knee A/PROM   Ankle     Flex   DF     Ext   PF           Assessment:    Derek Badillo is a pleasant 46 y o  male who presents with primary movement impairment diagnosis of LLE weakness, decreased endurance, L sided vestibular hypofunction resulting in pathoanatomical symptoms of vertigo and decreased endurance  This is limiting  his ability to walk s falls, and make transitional movements safely  Orthostatic hypotension was ruled out by checking BP  The patient's greatest concern is improving how he feels and walks  No further referral appears necessary at this time based upon examination results  Etiologic factors include falls which began since his syncopal episode  Negative prognostic indicators:hx of drug use  Positive prognostic indicators: desire to improve  Please contact me if you have any further questions or recommendations  Thank you very much for the kind referral         Plan  Patient would benefit from:Skilled physical therapy  Planned therapy interventions: manual therapy, neuromuscular re-education, stretching, strengthening, therapeutic activities, therapeutic exercise, patient education, home exercise program, and activity modification      Frequency: 2x week  Duration in weeks: 6  Treatment plan discussed with: patient             Goals: Patient's goal is to improve his endurance    Precautions: hx of iv drug use, cocaine, HTN, falls  Dx: L vestibular hypofunction and decreased LLE strength      Daily Treatment Diary     Manuals 7/13/2022        L epleys 2'                                   Ther Ex         bike         Standing marching         Prone knee flexion                                                               Neuro Re-ed         vor 1 object         vor 2 object         NBOS EC         Seated EC on pball when krystyna                  Ther Activity         stairs                           Gait Training         Walking laps for time                  Modalities

## 2022-07-13 NOTE — PROGRESS NOTES
PT Evaluation    Today's date: 2022   Patient name: Lynn Mcmanus  : 1970  MRN: [id number]  Referring provider: No ref  provider found  Dx:   Encounter Diagnosis     ICD-10-CM    1  Other fatigue  R53 83            Subjective Evaluation     History of Present Illness  Patient presents with c/o fatigue when walking long distances  This began 2-3 weeks ago  He was in a wheelchair and now is doing better  This was due to a fall about 6 weeks and he has been getting dialysis for a month  He has been incarcerated since 3 weeks ago  He was at a [facility] house and he passed out and had to go to the ER on 6/10  Since then he has had  He gets dizzy when getting up and has fallen a couple times from that  Once he hit his head on the floor  He wishes he could have more things to drink  His dizziness lasts 30 secs  Neuro signs: tingling in his L foot- unsure why since this all began   Dizziness, blurry vision- if stand to fast,   Bowel and bladder sxs: none  Red flags: none  Occupation: Drive truck    Dizziness worse- lying down to sitting    Pain    At worst pain ratin        Patient Goals  Patient goals for therapy: to get better-walk longer distances    STGs  1  Decrease fatigue by 20% in 2-4 weeks to improve standing tolerance  2  Improve ocular ROM testing s dizziness to improve sit to  4 weeks  3  Improve LLE strength by 1/3 grade in 2-4 weeks to improve stairs performance to step through pattern  LTGs  1  Decrease fatigue by 60% in 6-8 weeks  2  Improve walking tolerance to >15 minutes in 6-8 weeks to perform community ambulation  3  Perform job activities independently without fatigue in 6-8 weeks  4  Improve stairs tolerance to 1 flight  in 8 weeks         Objective Measurements:    Gait: a bit unsteady slowed pace   Sensation: WFL    Ocular ROM (H): diagonal L to upper R dizziness  Observation:  CN : WFL    Saccades: to L caused dizziness 4/10 up and down slowed movement looking up 4/10  VOR:  To L caused dizziness    Hearing:-  Yan Hallpike:L caused dizziness 12 sec then dissipates  R caused dizziness 3 sec  Epleys: performed and 2nd and 3rd position no change in sxs    Balance:   Seated EC: mild wobble dizziness (improved to no dizziness nor wobble p epleys  Standing NBOS EC: mod wobble 10 sec - dizziness    Tug: 10 sec c imbalance on turn      Lumbar ROM L R Strength knee L R   Flex    Flex 3+ 5   Ext Min palma  Ext 5 5   Rot        EIL        SB        Hip A/PROM        Flex  /  / Flex 3+ 5   ER at 90   ER     IR at 90   IR     Abd   Abd     Ext   Ext             Knee A/PROM   Ankle     Flex   DF     Ext   PF           Assessment:    Kylie Soliman is a pleasant 46 y o  male who presents with primary movement impairment diagnosis of LLE weakness, decreased endurance, L sided vestibular hypofunction resulting in pathoanatomical symptoms of vertigo and decreased endurance  This is limiting  his ability to walk s falls, and make transitional movements safely  Orthostatic hypotension was ruled out by checking BP  The patient's greatest concern is improving how he feels and walks  No further referral appears necessary at this time based upon examination results  Etiologic factors include falls which began since his syncopal episode  Negative prognostic indicators:hx of drug use  Positive prognostic indicators: desire to improve  Please contact me if you have any further questions or recommendations  Thank you very much for the kind referral         Plan  Patient would benefit from:Skilled physical therapy  Planned therapy interventions: manual therapy, neuromuscular re-education, stretching, strengthening, therapeutic activities, therapeutic exercise, patient education, home exercise program, and activity modification      Frequency: 2x week  Duration in weeks: 6  Treatment plan discussed with: patient             Goals: Patient's goal is to improve his endurance    Precautions: hx of iv drug use, cocaine, HTN, falls  Dx: L vestibular hypofunction and decreased LLE strength      Daily Treatment Diary     Manuals 7/13/2022        L epleys 2'                                   Ther Ex         bike         Standing marching         Prone knee flexion                                                               Neuro Re-ed         vor 1 object         vor 2 object         NBOS EC         Seated EC on pball when krystyna                  Ther Activity         stairs                           Gait Training         Walking laps for time                  Modalities

## 2022-07-26 ENCOUNTER — OFFICE VISIT (OUTPATIENT)
Dept: PHYSICAL THERAPY | Facility: CLINIC | Age: 52
End: 2022-07-26
Payer: OTHER GOVERNMENT

## 2022-07-26 DIAGNOSIS — R53.81 PHYSICAL DECONDITIONING: ICD-10-CM

## 2022-07-26 DIAGNOSIS — R42 VERTIGO: ICD-10-CM

## 2022-07-26 DIAGNOSIS — R53.83 OTHER FATIGUE: Primary | ICD-10-CM

## 2022-07-26 PROCEDURE — 97112 NEUROMUSCULAR REEDUCATION: CPT | Performed by: PHYSICAL THERAPIST

## 2022-07-26 PROCEDURE — 97110 THERAPEUTIC EXERCISES: CPT | Performed by: PHYSICAL THERAPIST

## 2022-07-26 NOTE — PROGRESS NOTES
Daily Note     Today's date: 2022  Patient name: Courtney Hartmann  : 1970  MRN: [id number]  Referring provider: No ref  provider found  Dx:   Encounter Diagnosis     ICD-10-CM    1  Other fatigue  R53 83    2  Vertigo  R42    3  Physical deconditioning  R53 81                   Subjective: He states he has trialed exercises and he does feel a bit better  Objective: See treatment diary below      Assessment: Tolerated treatment well  His endurance is much improved but still has decreased walking endurance  He had dizziness c L VOR 1 object  Epley's did not produce dizziness initially but in second position it did but went away within 10 sec  Patient would benefit from continued PT      Plan: Continue per plan of care        Goals: Patient's goal is to improve his endurance    Precautions: hx of iv drug use, cocaine, HTN, falls  Dx: L vestibular hypofunction and decreased LLE strength      Daily Treatment Diary     Manuals 2022       L epleys 2' 3                                  Ther Ex         bike  6'       Standing marching  20x       Prone knee flexion  20x                                                             Neuro Re-ed         vor 1 object  20x       vor 2 object  20x       NBOS EC  3x :20       Seated EC on pball when krystyna                  Ther Activity         stairs  1 flight                         Gait Training         Walking laps for time  2 min 10 sec treadmill nv                Modalities

## 2022-07-27 ENCOUNTER — TELEPHONE (OUTPATIENT)
Dept: NEPHROLOGY | Facility: CLINIC | Age: 52
End: 2022-07-27

## 2022-07-27 DIAGNOSIS — N17.9 ACUTE RENAL FAILURE, UNSPECIFIED ACUTE RENAL FAILURE TYPE (HCC): Primary | ICD-10-CM

## 2022-07-27 NOTE — TELEPHONE ENCOUNTER
There is an appt available tomorrow, is this too early for patient to be seen? Next available in EO after that is 8/25 with Jarett Pandey

## 2022-07-27 NOTE — TELEPHONE ENCOUNTER
BMP's in system    ----- Message from Julita Ford MD sent at 7/27/2022 10:10 AM EDT -----  The patient needs weekly BMPs  He will need an office appointment in the next 1-3 weeks at the most with anyone!   He is a dialysis patient down stairs with acute kidney injury that is resolving  His last treatment will be today  I would also get a CBC magnesium and phosphorus in 2 weeks along with the basic metabolic profile    Also we will need to arrange for removal of the tunneled dialysis catheter but I will also talk to dialysis about this today  Thank you

## 2022-07-28 NOTE — TELEPHONE ENCOUNTER
Spoke to [facility] and scheduled patient for a 30 minute appt 8/5 with Jasmin in EO at 8 am as it was one of the only appts available within that window as first or last of the day  Please advise if this is acceptable

## 2022-07-28 NOTE — TELEPHONE ENCOUNTER
Renzo Poster, MD Meza Wean  Caller: Unspecified Jamel Aase,  4:46 PM)  Totally fine   He should beginning basic metabolic profile weekly   Thank you   Also Kat Nguyen, can you please make sure that his tunneled dialysis catheters removed that was supposed to be arranged by [facility] down stairs

## 2022-07-28 NOTE — TELEPHONE ENCOUNTER
Spoke to [facility] who informed me that they don't schedule, IR does but it should be on the books  According to patient's appt desk the catheter removal is scheduled for 8/11

## 2022-08-04 ENCOUNTER — HOSPITAL ENCOUNTER (OUTPATIENT)
Dept: NON INVASIVE DIAGNOSTICS | Facility: CLINIC | Age: 52
Discharge: HOME | End: 2022-08-04
Payer: OTHER GOVERNMENT

## 2022-08-04 VITALS
HEART RATE: 78 BPM | HEIGHT: 68 IN | WEIGHT: 190 LBS | SYSTOLIC BLOOD PRESSURE: 124 MMHG | BODY MASS INDEX: 28.79 KG/M2 | DIASTOLIC BLOOD PRESSURE: 80 MMHG | OXYGEN SATURATION: 99 %

## 2022-08-04 LAB
BASELINE ST DEPRESSION: 0 MM
MAX HR PERCENT: 73 %
MAX HR: 123 BPM
NUC STRESS EJECTION FRACTION: 60 %
SL CV REST NUCLEAR ISOTOPE DOSE: 10.07 MCI
SL CV STRESS RECOVERY BP: NORMAL MMHG
SL CV STRESS RECOVERY HR: 89 BPM
SL CV STRESS RECOVERY O2 SAT: 98 %
SL CV STRESS STAGE REACHED: 2
STRESS ANGINA INDEX: 0
STRESS BASELINE BP: NORMAL MMHG
STRESS BASELINE HR: 78 BPM
STRESS DUKE TREADMILL SCORE: 7
STRESS O2 SAT REST: 99 %
STRESS PEAK HR: 123 BPM
STRESS POST ESTIMATED WORKLOAD: 7 METS
STRESS POST EXERCISE DUR MIN: 6 MIN
STRESS POST EXERCISE DUR SEC: 40 SEC
STRESS POST O2 SAT PEAK: 98 %
STRESS ST DEPRESSION: 0 MM
STRESS/REST PERFUSION RATIO: 1.01

## 2022-08-04 PROCEDURE — A9502 TC99M TETROFOSMIN: HCPCS

## 2022-08-04 PROCEDURE — 78452 HT MUSCLE IMAGE SPECT MULT: CPT

## 2022-08-04 PROCEDURE — 93018 CV STRESS TEST I&R ONLY: CPT | Performed by: INTERNAL MEDICINE

## 2022-08-04 PROCEDURE — 78452 HT MUSCLE IMAGE SPECT MULT: CPT | Performed by: INTERNAL MEDICINE

## 2022-08-04 PROCEDURE — 93016 CV STRESS TEST SUPVJ ONLY: CPT | Performed by: INTERNAL MEDICINE

## 2022-08-04 PROCEDURE — 93017 CV STRESS TEST TRACING ONLY: CPT

## 2022-08-04 PROCEDURE — G1004 CDSM NDSC: HCPCS

## 2022-08-04 RX ADMIN — REGADENOSON 0.4 MG: 0.08 INJECTION, SOLUTION INTRAVENOUS at 13:55

## 2022-08-04 NOTE — PATIENT INSTRUCTIONS
Your kidney function clinically appears stable  Creatinine stable at 1 38 on most recent labs on 7/27/2022  No labs since discontinuation of hemodialysis  We will continue surveillance as outlined below  Avoid all NSAIDs to include ibuprofen, Motrin, Aleve, Advil, Naproxen, Celebrex, Indomethacin, Toradol  Stay well hydrated  Continue all prescribed medications  Call if blood pressure consistently more than 140/90 or less than 110/50s  High and low blood pressures may affect your kidney function  Recommend low salt diet (2 gm sodium diet)  Please let us know if you are scheduled for any studies with IV contrast (ex: CT scan, arteriogram or cardiac catheterization)   Will obtain blood work weekly to continue to follow recovery of kidney function  Will obtain blood work now to reassess kidney function since discontinuation hemodialysis on 7/27/2022  Repeat blood work in 1 week with additional labs  Follow up in 4 weeks with Dr Sherman Paz with repeat labs prior to appointment  Hemodialysis catheter removal scheduled 8/11/2022 with interventional radiology  Please contact the office with new symptoms or concerns

## 2022-08-05 ENCOUNTER — OFFICE VISIT (OUTPATIENT)
Dept: NEPHROLOGY | Facility: CLINIC | Age: 52
End: 2022-08-05
Payer: OTHER GOVERNMENT

## 2022-08-05 VITALS
HEIGHT: 68 IN | WEIGHT: 171 LBS | SYSTOLIC BLOOD PRESSURE: 120 MMHG | BODY MASS INDEX: 25.91 KG/M2 | DIASTOLIC BLOOD PRESSURE: 78 MMHG

## 2022-08-05 DIAGNOSIS — N17.9 ACUTE RENAL FAILURE, UNSPECIFIED ACUTE RENAL FAILURE TYPE (HCC): Primary | ICD-10-CM

## 2022-08-05 DIAGNOSIS — I48.91 ATRIAL FIBRILLATION, UNSPECIFIED TYPE (HCC): ICD-10-CM

## 2022-08-05 DIAGNOSIS — F19.90 IV DRUG USER: ICD-10-CM

## 2022-08-05 DIAGNOSIS — F19.11 HISTORY OF SUBSTANCE ABUSE (HCC): ICD-10-CM

## 2022-08-05 DIAGNOSIS — I10 HYPERTENSION, UNSPECIFIED TYPE: ICD-10-CM

## 2022-08-05 LAB
CHEST PAIN STATEMENT: NORMAL
MAX DIASTOLIC BP: 80 MMHG
MAX HEART RATE: 123 BPM
MAX PREDICTED HEART RATE: 167 BPM
MAX. SYSTOLIC BP: 144 MMHG
PROTOCOL NAME: NORMAL
REASON FOR TERMINATION: NORMAL
TARGET HR FORMULA: NORMAL
TEST INDICATION: NORMAL
TIME IN EXERCISE PHASE: NORMAL

## 2022-08-05 PROCEDURE — 99214 OFFICE O/P EST MOD 30 MIN: CPT | Performed by: PHYSICIAN ASSISTANT

## 2022-08-11 ENCOUNTER — HOSPITAL ENCOUNTER (OUTPATIENT)
Dept: RADIOLOGY | Facility: HOSPITAL | Age: 52
Discharge: HOME | End: 2022-08-11
Attending: INTERNAL MEDICINE
Payer: OTHER GOVERNMENT

## 2022-08-11 DIAGNOSIS — N18.9 CKD (CHRONIC KIDNEY DISEASE): ICD-10-CM

## 2022-08-11 PROCEDURE — 36589 REMOVAL TUNNELED CV CATH: CPT

## 2022-08-11 NOTE — DISCHARGE INSTRUCTIONS
Perma-cath Placement   WHAT YOU NEED TO KNOW:   A perma-cath is a catheter placed through a vein into or near your right atrium  Your right atrium is the right upper chamber of your heart  A perma-cath is used for dialysis in an emergency or until a long-term device is ready to use  After your procedure, you will have some pain and swelling on your chest and neck  You may have some bruises on your chest and neck  You may also have 2 dressings, one on your chest and one on your neck  DISCHARGE INSTRUCTIONS:   Call 911 for any of the following: You feel lightheaded, short of breath, and have chest pain  Your catheter comes out   Contact Interventional Radiology at [phone] Reny PATIENTS: Contact Interventional Radiology at [phone]) Rivera Schrader PATIENTS: Contact Interventional Radiology at [phone]) if:  Blood soaks through your bandage  You have new swelling in your arm, neck, face, or chest on your right side  Your catheter gets wet  Your bruises or pain get worse  You have a fever or chills  Persistent nausea or vomiting  Your incision is red, swollen, or draining pus  You have questions or concerns about your condition or care  Self-care:       Resume your normal diet  Keep your dressings dry  Do not take a shower or swim  You may take a tub bath, but do not get your dressings wet  Water in your wound can cause bacteria to grow and cause an infection  If your dressing gets wet, dry it off and cover it with dry sterile gauze  Call your healthcare provider  Do not use soaps or ointments  Do not change your dressings  Your healthcare provider or dialysis nurse will change your dressings  Your dressings should stay in place until your healthcare provider removes them  The dressing on your chest will stay as long as you have the catheter in place  The dressing prevents infection  Do not remove the red and blue caps from the end of your catheter   The caps prevent air from getting into your catheter  Follow up with your healthcare provider as directed: Write down your questions so you remember to ask them during your visits

## 2022-08-11 NOTE — BRIEF OP NOTE (RAD/CATH)
INTERVENTIONAL RADIOLOGY PROCEDURE NOTE    Date: 8/11/2022    Procedure: IR TUNNELED DIALYSIS CATHETER REMOVAL    Preoperative diagnosis:   1  CKD (chronic kidney disease)         Postoperative diagnosis: Same  Surgeon: Qing Aguillon MD     Assistant: None  No qualified resident was available  Blood loss: None    Specimens: None     Findings: Successful removal of left IJV tunneled HD catheter without incident  Complications: None immediate      Anesthesia: local

## 2022-09-08 ENCOUNTER — OFFICE VISIT (OUTPATIENT)
Dept: NEPHROLOGY | Facility: CLINIC | Age: 52
End: 2022-09-08
Payer: OTHER GOVERNMENT

## 2022-09-08 VITALS
HEIGHT: 68 IN | SYSTOLIC BLOOD PRESSURE: 130 MMHG | DIASTOLIC BLOOD PRESSURE: 80 MMHG | BODY MASS INDEX: 26.98 KG/M2 | WEIGHT: 178 LBS

## 2022-09-08 DIAGNOSIS — N18.31 STAGE 3A CHRONIC KIDNEY DISEASE (HCC): Primary | ICD-10-CM

## 2022-09-08 PROCEDURE — 99213 OFFICE O/P EST LOW 20 MIN: CPT | Performed by: STUDENT IN AN ORGANIZED HEALTH CARE EDUCATION/TRAINING PROGRAM

## 2022-09-08 NOTE — PATIENT INSTRUCTIONS
Thank you for coming to your visit today  Your kidney function is stable, your creatinine is 1 48mg/dL      Recommend low sodium (salt) food    Avoid nonsteroidal anti-inflammatory drugs such as Naprosyn, ibuprofen, Aleve, Advil, Celebrex, Meloxicam (Mobic) etc   You can use Tylenol as needed if you do not have any liver condition to be concerned about    Try to avoid medications such as pantoprazole or  Protonix/Nexium or Esomeprazole)/Prilosec or omeprazole/Prevacid or lansoprazole/AcipHex or Rabeprazole  If you are able to, use Pepcid as this is safer for your kidneys      Try to exercise at least 30 minutes 3 days a week to begin with with an ultimate goal of 5 days a week for at least 30 minutes    Next Visit in 6 months with results   If you need to see us earlier we can change the appointment for you      Viridiana Jameson MD  Nephrology Attending

## 2022-09-08 NOTE — PROGRESS NOTES
NEPHROLOGY OUTPATIENT PROGRESS NOTE   Angelika Gutierrez 45 y o  male MRN: [id number]  DATE: 9/8/2022    Reason for visit:   Chief Complaint   Patient presents with    Follow-up     JOÃO        Patient Instructions   Thank you for coming to your visit today  Your kidney function is stable, your creatinine is 1 48mg/dL       Recommend low sodium (salt) food     Avoid nonsteroidal anti-inflammatory drugs such as Naprosyn, ibuprofen, Aleve, Advil, Celebrex, Meloxicam (Mobic) etc   You can use Tylenol as needed if you do not have any liver condition to be concerned about     Try to avoid medications such as pantoprazole or  Protonix/Nexium or Esomeprazole)/Prilosec or omeprazole/Prevacid or lansoprazole/AcipHex or Rabeprazole  If you are able to, use Pepcid as this is safer for your kidneys   Try to exercise at least 30 minutes 3 days a week to begin with with an ultimate goal of 5 days a week for at least 30 minutes    Next Visit in 6 months with results   If you need to see us earlier we can change the appointment for you      Tyrel Ramos MD  Nephrology Attending             Lalo Mcadams was seen today for follow-up  Diagnoses and all orders for this visit:    Stage 3a chronic kidney disease ([facility] [address] )  -     Basic metabolic panel; Future  -     Urinalysis with microscopic  -     Protein, Total w/Creat, Random Urine        Assessment/Plan:  44 yo man with PMH HTN, substance abuse, was admitted with JOÃO and shock, reuniting dialysis  Patient is here for follow up of JOÃO     PLAN:     #Non-Oliguric KDIGO JOÃO stage 3    Etiology:likley secondary to ATN in the settings of plus shock plus substance abuse   Patient was dialysis dependant    Baseline creatinine: 0 9-1 2 mg/dL   Current creatinine: 1 4mg/dL pilloley new baseline    UA:hematuria   Renal imaging :no hydronephrosis    Fluid Status    Treatment:   No indication of further dialysis , has recovered kidney function to CKD G3a   Avoid nephrotoxic agents such as NSAIDs    #Acid-base Disorder   serum HCO3 60BLKG/D   Metabolic alkalosis likely secondary to vasoconstriction in the settings of diuretics     #Volume status/hypertension:   Volume:euvolemic    Blood pressure:normotenisve /80mmhg , goal <140/90mmhg    Recommend:   Low sodium diet    norvasc 10mg   Torsemide 20mg     #Anemia:   Current hemoglobin: 11 8mg/dl    At goal        SUBJECTIVE / INTERVAL HISTORY:  45 y o  male presents in follow up of JOÃO  Patient was on dialysis un til June 2022, he is now in longterm  Morris SOB, no CP, no recent hospitalizations       Dayron Dubon denies any recent illness/hospitalizations/medication changes since last office visit  Review of Systems   Constitutional: Negative for activity change  HENT: Negative for congestion  Eyes: Negative for discharge  Respiratory: Negative for shortness of breath and stridor  Cardiovascular: Negative for palpitations and leg swelling  Gastrointestinal: Negative for abdominal pain  Endocrine: Negative for cold intolerance  Genitourinary: Negative for dysuria  Musculoskeletal: Negative for arthralgias  Skin: Negative for pallor, rash and wound  Neurological: Negative for dizziness  Psychiatric/Behavioral: Negative for agitation  OBJECTIVE:  /80   Ht 5' 8" (1 727 m)   Wt 80 7 kg (178 lb)   BMI 27 06 kg/m²  Body mass index is 27 06 kg/m²      Physical exam:  Physical Exam     General:  no acute distress at this time  Skin:  No acute rash  Eyes:  No scleral icterus and noninjected  ENT:  mucous membranes moist  Neck:  no carotid bruits  Chest:  Clear to auscultation percussion, good respiratory effort, no use of accessory respiratory muscles  CVS:  Regular rate and rhythm without a murmur rub   Abdomen:  soft and nontender   Extremities:  Trace LE edema   Neuro:  No gross focality  Psych:  Alert , cooperative       Medications:    Current Outpatient Medications:     amiodarone 200 mg tablet, Take 1 tablet (200 mg total) by mouth 3 (three) times a day with meals for 3 days, Disp: 9 tablet, Rfl: 0    amLODIPine (NORVASC) 10 mg tablet, Take 1 tablet (10 mg total) by mouth daily, Disp: 30 tablet, Rfl: 1    torsemide 40 MG TABS, Take 40 mg by mouth daily, Disp: 30 tablet, Rfl: 1    nicotine (NICODERM CQ) 21 mg/24 hr TD 24 hr patch, Place 1 patch on the skin daily, Disp: 28 patch, Rfl: 0    Allergies: Allergies as of 09/08/2022    (No Known Allergies)       The following portions of the patient's history were reviewed and updated as appropriate: past family history, past surgical history and problem list     Laboratory Results:  Lab Results   Component Value Date    SODIUM 138 07/27/2022    K 3 7 07/27/2022    CL 99 07/27/2022    CO2 29 07/27/2022    BUN 20 07/27/2022    CREATININE 1 38 (H) 07/27/2022    GLUC 143 (H) 07/27/2022    CALCIUM 8 9 07/27/2022        Lab Results   Component Value Date    CALCIUM 8 9 07/27/2022    PHOS 2 8 06/14/2022       Portions of the record may have been created with voice recognition software  Occasional wrong word or "sound a like" substitutions may have occurred due to the inherent limitations of voice recognition software  Read the chart carefully and recognize, using context, where substitutions have occurred

## 2022-09-16 NOTE — PROGRESS NOTES
Discharge Note  Krzysztof Lavelle has made fair functional progress with physical therapy  he was educated and updated in his home exercise program  Prema Sargent will be discharged from formal therapy due to no further visits scheduled but will follow up as needed

## 2022-10-07 ENCOUNTER — APPOINTMENT (OUTPATIENT)
Dept: LAB | Facility: CLINIC | Age: 52
End: 2022-10-07
Payer: OTHER GOVERNMENT

## 2022-10-07 DIAGNOSIS — N18.31 STAGE 3A CHRONIC KIDNEY DISEASE (HCC): ICD-10-CM

## 2022-10-07 DIAGNOSIS — N17.9 ACUTE RENAL FAILURE, UNSPECIFIED ACUTE RENAL FAILURE TYPE (HCC): ICD-10-CM

## 2022-10-07 LAB
ANION GAP SERPL CALCULATED.3IONS-SCNC: 7 MMOL/L (ref 4–13)
BACTERIA UR QL AUTO: ABNORMAL /HPF
BILIRUB UR QL STRIP: NEGATIVE
BUN SERPL-MCNC: 23 MG/DL (ref 5–25)
CALCIUM SERPL-MCNC: 9.5 MG/DL (ref 8.4–10.2)
CHLORIDE SERPL-SCNC: 101 MMOL/L (ref 96–108)
CLARITY UR: CLEAR
CO2 SERPL-SCNC: 31 MMOL/L (ref 21–32)
COLOR UR: ABNORMAL
CREAT SERPL-MCNC: 1.51 MG/DL (ref 0.6–1.3)
CREAT UR-MCNC: 45 MG/DL
GFR SERPL CREATININE-BSD FRML MDRD: 51 ML/MIN/1.73SQ M
GLUCOSE SERPL-MCNC: 117 MG/DL (ref 65–140)
GLUCOSE UR STRIP-MCNC: NEGATIVE MG/DL
HGB UR QL STRIP.AUTO: NEGATIVE
HYALINE CASTS #/AREA URNS LPF: ABNORMAL /LPF
KETONES UR STRIP-MCNC: NEGATIVE MG/DL
LEUKOCYTE ESTERASE UR QL STRIP: NEGATIVE
MUCOUS THREADS UR QL AUTO: ABNORMAL
NITRITE UR QL STRIP: NEGATIVE
NON-SQ EPI CELLS URNS QL MICRO: ABNORMAL /HPF
PH UR STRIP.AUTO: 6 [PH]
POTASSIUM SERPL-SCNC: 4.6 MMOL/L (ref 3.5–5.3)
PROT UR STRIP-MCNC: NEGATIVE MG/DL
PROT UR-MCNC: 7 MG/DL
PROT/CREAT UR: 0.16 MG/G{CREAT} (ref 0–0.1)
RBC #/AREA URNS AUTO: ABNORMAL /HPF
SODIUM SERPL-SCNC: 139 MMOL/L (ref 135–147)
SP GR UR STRIP.AUTO: 1.01 (ref 1–1.03)
UROBILINOGEN UR STRIP-ACNC: <2 MG/DL
WBC #/AREA URNS AUTO: ABNORMAL /HPF

## 2022-10-07 PROCEDURE — 36415 COLL VENOUS BLD VENIPUNCTURE: CPT

## 2022-10-07 PROCEDURE — 84156 ASSAY OF PROTEIN URINE: CPT

## 2022-10-07 PROCEDURE — 81001 URINALYSIS AUTO W/SCOPE: CPT | Performed by: STUDENT IN AN ORGANIZED HEALTH CARE EDUCATION/TRAINING PROGRAM

## 2022-10-07 PROCEDURE — 82570 ASSAY OF URINE CREATININE: CPT

## 2022-10-07 PROCEDURE — 80048 BASIC METABOLIC PNL TOTAL CA: CPT

## 2022-12-14 DIAGNOSIS — I10 HYPERTENSION, UNSPECIFIED TYPE: ICD-10-CM

## 2022-12-14 RX ORDER — AMLODIPINE BESYLATE 10 MG/1
10 TABLET ORAL DAILY
Qty: 30 TABLET | Refills: 0 | Status: SHIPPED | OUTPATIENT
Start: 2022-12-14

## 2023-01-04 DIAGNOSIS — I10 HYPERTENSION, UNSPECIFIED TYPE: Primary | ICD-10-CM

## 2023-01-04 RX ORDER — FUROSEMIDE 40 MG/1
40 TABLET ORAL DAILY
Qty: 90 TABLET | Refills: 0 | Status: SHIPPED | OUTPATIENT
Start: 2023-01-04

## 2023-03-10 DIAGNOSIS — I10 HYPERTENSION, UNSPECIFIED TYPE: ICD-10-CM

## 2023-03-10 DIAGNOSIS — N18.31 STAGE 3A CHRONIC KIDNEY DISEASE (HCC): Primary | ICD-10-CM

## 2023-03-13 ENCOUNTER — TELEPHONE (OUTPATIENT)
Dept: NEPHROLOGY | Facility: CLINIC | Age: 53
End: 2023-03-13

## 2023-03-15 ENCOUNTER — TELEPHONE (OUTPATIENT)
Dept: NEPHROLOGY | Facility: CLINIC | Age: 53
End: 2023-03-15

## 2023-03-17 ENCOUNTER — TELEPHONE (OUTPATIENT)
Dept: NEPHROLOGY | Facility: CLINIC | Age: 53
End: 2023-03-17

## 2023-03-17 NOTE — TELEPHONE ENCOUNTER
Called patient and spoke with Zehra Hall to remained to please have lab done before the follow up appointment

## 2023-03-18 ENCOUNTER — APPOINTMENT (OUTPATIENT)
Dept: LAB | Facility: CLINIC | Age: 53
End: 2023-03-18

## 2023-03-18 LAB
ANION GAP SERPL CALCULATED.3IONS-SCNC: 5 MMOL/L (ref 4–13)
BUN SERPL-MCNC: 21 MG/DL (ref 5–25)
CALCIUM SERPL-MCNC: 8.7 MG/DL (ref 8.3–10.1)
CHLORIDE SERPL-SCNC: 107 MMOL/L (ref 96–108)
CO2 SERPL-SCNC: 24 MMOL/L (ref 21–32)
CREAT SERPL-MCNC: 1.07 MG/DL (ref 0.6–1.3)
GFR SERPL CREATININE-BSD FRML MDRD: 78 ML/MIN/1.73SQ M
GLUCOSE SERPL-MCNC: 111 MG/DL (ref 65–140)
POTASSIUM SERPL-SCNC: 4.3 MMOL/L (ref 3.5–5.3)
SODIUM SERPL-SCNC: 136 MMOL/L (ref 135–147)

## 2023-03-21 ENCOUNTER — OFFICE VISIT (OUTPATIENT)
Dept: NEPHROLOGY | Facility: CLINIC | Age: 53
End: 2023-03-21

## 2023-03-21 DIAGNOSIS — F19.11 HISTORY OF SUBSTANCE ABUSE (HCC): ICD-10-CM

## 2023-03-21 DIAGNOSIS — I48.91 ATRIAL FIBRILLATION, UNSPECIFIED TYPE (HCC): ICD-10-CM

## 2023-03-21 DIAGNOSIS — N17.9 ACUTE RENAL FAILURE, UNSPECIFIED ACUTE RENAL FAILURE TYPE (HCC): Primary | ICD-10-CM

## 2023-03-21 DIAGNOSIS — I10 HYPERTENSION, UNSPECIFIED TYPE: ICD-10-CM

## 2023-03-21 RX ORDER — AMLODIPINE BESYLATE 10 MG/1
10 TABLET ORAL DAILY
Qty: 30 TABLET | Refills: 3 | Status: SHIPPED | OUTPATIENT
Start: 2023-03-21

## 2023-03-21 NOTE — PROGRESS NOTES
NEPHROLOGY OUTPATIENT PROGRESS NOTE   Kalie Voss 45 y o  male MRN: [id number]  DATE: 3/26/2023    Reason for visit:   Chief Complaint   Patient presents with   • Follow-up   • Hypertension        Patient Instructions   Thank you for coming to your visit today  As we discussed you kidney function is normal  Please follow the recommendations below       • Recommend low sodium (salt) food    • Avoid nonsteroidal anti-inflammatory drugs such as Naprosyn, ibuprofen, Aleve, Advil, Celebrex, Meloxicam (Mobic) etc   You can use Tylenol as needed if you do not have any liver condition to be concerned about    • Try to avoid medications such as pantoprazole or  Protonix/Nexium or Esomeprazole)/Prilosec or omeprazole/Prevacid or lansoprazole/AcipHex or Rabeprazole  If you are able to, use Pepcid as this is safer for your kidneys  • Try to exercise at least 30 minutes 3 days a week to begin with with an ultimate goal of 5 days a week for at least 30 minutes    You can continue follow up with your PCP   If you need to see us we can change the appointment for you      Demi Mcdonnell MD  Nephrology Attending               Brent Swann was seen today for follow-up and hypertension  Diagnoses and all orders for this visit:    Acute renal failure, unspecified acute renal failure type ([facility] [address] )    Hypertension, unspecified type  -     amLODIPine (NORVASC) 10 mg tablet; Take 1 tablet (10 mg total) by mouth daily    Atrial fibrillation, unspecified type (HCC)    History of substance abuse ([facility] [address] )        Assessment/Plan:  46 yo man with PMH HTN, substance abuse, was admitted with JOÃO and shock, reuniting dialysis  Patient is here for follow up of JOÃO      PLAN:      #Non-Oliguric KDIGO JOÃO stage 3  with evidence of kidney recovery  • Etiology:likley secondary to ATN in the settings of plus shock plus substance abuse   Patient was dialysis dependant   • Baseline creatinine: 0 9-1 2 mg/dL  • Current creatinine: 1 07 mg/dL, back to "baseline   • UA: No hematuria, no proteinuria, hyaline casts  • UPCr 0 16 g/g  • Renal imaging :no hydronephrosis   • Treatment:  • has recovered kidney function  • Avoid nephrotoxic agents such as NSAIDs  • We will follow-up with PCP     #Acid-base Disorder  • serum HCO3 24 mmol/L  • At goal     #Volume status/hypertension:  • Volume: Euvolemic   • Blood pressure:normotenisve /80mmhg , goal <140/90mmhg   • Recommend:  • Low sodium diet   • Recommend restart amlodipine 10 mg  • Not taking torsemide torsemide 20mg      #Anemia:  • Current hemoglobin: 11 8mg/dl   • At goal      #A-fib  · Not taking amiodarone    #History of substance abuse  · With JOÃO now recovered    SUBJECTIVE / INTERVAL HISTORY:  45 y o  male presents in follow up of JOÃO  Patient is well, no shortness of breath, no chest pain, no recent hospitalizations  No taking any medications at this time    Francie Billingsley denies any recent illness/hospitalizations/medication changes since last office visit  Review of Systems   Constitutional: Negative for activity change and appetite change  HENT: Negative for congestion  Eyes: Negative for discharge  Respiratory: Negative for cough and shortness of breath  Cardiovascular: Negative for chest pain and leg swelling  Gastrointestinal: Negative for abdominal distention and abdominal pain  Endocrine: Negative for cold intolerance and heat intolerance  Genitourinary: Negative for difficulty urinating and dysuria  Musculoskeletal: Negative for arthralgias and back pain  Skin: Negative for color change and pallor  Neurological: Negative for dizziness  Psychiatric/Behavioral: Negative for agitation  OBJECTIVE:  /80   Ht 5' 8\" (1 727 m)   Wt 75 8 kg (167 lb)   BMI 25 39 kg/m²  Body mass index is 25 39 kg/m²      Physical exam:  Physical Exam  General:  , no acute distress at this time  Skin:  No acute rash  Eyes:  No scleral icterus and noninjected  ENT:  mucous membranes " "moist  Neck:  no carotid bruits  Chest:  Clear to auscultation percussion, good respiratory effort, no use of accessory respiratory muscles  CVS:  Regular rate and rhythm without rub  Abdomen:  soft and nontender   Extremities:  no significant lower extremity edema  Neuro:  No gross focality  Psych:  Alert , cooperative     Medications:    Current Outpatient Medications:   •  amLODIPine (NORVASC) 10 mg tablet, Take 1 tablet (10 mg total) by mouth daily, Disp: 30 tablet, Rfl: 3  •  amiodarone 200 mg tablet, Take 1 tablet (200 mg total) by mouth 3 (three) times a day with meals for 3 days (Patient not taking: Reported on 3/21/2023), Disp: 9 tablet, Rfl: 0  •  furosemide (LASIX) 40 mg tablet, Take 1 tablet (40 mg total) by mouth daily (Patient not taking: Reported on 3/21/2023), Disp: 90 tablet, Rfl: 0  •  nicotine (NICODERM CQ) 21 mg/24 hr TD 24 hr patch, Place 1 patch on the skin daily (Patient not taking: Reported on 3/21/2023), Disp: 28 patch, Rfl: 0    Allergies: Allergies as of 03/21/2023   • (No Known Allergies)       The following portions of the patient's history were reviewed and updated as appropriate: past family history, past surgical history and problem list     Laboratory Results:  Lab Results   Component Value Date    SODIUM 136 03/18/2023    K 4 3 03/18/2023     03/18/2023    CO2 24 03/18/2023    BUN 21 03/18/2023    CREATININE 1 07 03/18/2023    GLUC 111 03/18/2023    CALCIUM 8 7 03/18/2023        Lab Results   Component Value Date    CALCIUM 8 7 03/18/2023    PHOS 2 8 06/14/2022       Portions of the record may have been created with voice recognition software  Occasional wrong word or \"sound a like\" substitutions may have occurred due to the inherent limitations of voice recognition software  Read the chart carefully and recognize, using context, where substitutions have occurred      "

## 2023-03-21 NOTE — PATIENT INSTRUCTIONS
Thank you for coming to your visit today  As we discussed you kidney function is normal  Please follow the recommendations below       Recommend low sodium (salt) food    Avoid nonsteroidal anti-inflammatory drugs such as Naprosyn, ibuprofen, Aleve, Advil, Celebrex, Meloxicam (Mobic) etc   You can use Tylenol as needed if you do not have any liver condition to be concerned about    Try to avoid medications such as pantoprazole or  Protonix/Nexium or Esomeprazole)/Prilosec or omeprazole/Prevacid or lansoprazole/AcipHex or Rabeprazole  If you are able to, use Pepcid as this is safer for your kidneys      Try to exercise at least 30 minutes 3 days a week to begin with with an ultimate goal of 5 days a week for at least 30 minutes    You can continue follow up with your PCP   If you need to see us we can change the appointment for you      Uche Lima MD  Nephrology Attending

## 2023-03-26 VITALS
WEIGHT: 167 LBS | HEIGHT: 68 IN | BODY MASS INDEX: 25.31 KG/M2 | SYSTOLIC BLOOD PRESSURE: 130 MMHG | DIASTOLIC BLOOD PRESSURE: 80 MMHG

## 2024-04-07 NOTE — PROGRESS NOTES
Assessment and Plan:  JOÃO:  Suspect multifactorial to include rhabdomyolysis +shock +subsequent use with subsequent ATN  -Admission creatinine 2 93 6/10/22 with peak creatinine  -previously hemodialysis dependent  Initially requiring CRRT 6/11-6/13/22-->IHD 6/14-7/27/22  Pt was transitioned to MWF at [facility]  -hemodialysis discontinued after last treatment on 7/27/2022  -Most recent creatinine 1 38 on 7/27/22  No repeat labs since discontinuation of hemodialysis  -Imaging:  CT demonstrated no hydronephrosis  -clinically, examines euvolemic  -nonoliguric  UO adequate  -Clinically, patient is not uremic and there is no acute indication for renal replacement therapy (dialysis)  -continue torsemide  -tunneled PermCath removal scheduled 8/11/2022 with IR  -I/Os, daily weights  -Avoid nephrotoxins, NSAIDs, IV contrast if possible  -Avoid hypotension   -Adjust meds to appropriate GFR  -Optimize hemodynamic status to avoid delay in renal recovery  -Trend BMP weekly  Will obtain BMP now repeat BMP in 1 week with CBC, magnesium, phosphorus  -follow-up in office with Dr Ruth Ortega in 4 weeks with repeat blood and urine studies    Access:  tunneled L IJV PermCath   -site clear   -plan for PermCath removal by IR 8/11/2022    Hypertension/Volume status:  -BP acceptable and well controlled  -clinically, examines euvolemic  -continue Current medications:  Amlodipine 10 mg daily, torsemide 40 mg daily  -Optimize hemodynamic status to avoid delay in renal recovery   -Avoid hypotension or fluctuations in blood pressure  -continue to monitor    Anemia of CKD:  -Hgb 11 8 on hospital discharge on 6/23/2022    At goal  -unable to access labs from [address] since patient discharge from unit  -will obtain CBC with BMP in 1 week  -continue to monitor    Atrial fibrillation:   -stable, rate controlled   -on amiodarone  -management per Cardiology    Substance abuse/IV drug use:   -recommend abstaining from drug use      Star Tovar was seen None today for follow-up and hypertension  Diagnoses and all orders for this visit:    Acute renal failure, unspecified acute renal failure type ([place] [address] )  -     Basic metabolic panel; Standing  -     CBC; Future  -     Phosphorus; Future  -     Magnesium; Future    Hypertension, unspecified type    Atrial fibrillation, unspecified type ([place] [address] )    History of substance abuse ([place] [address] )    IV drug user        Continue weekly BMP  Will obtain BMP now  CBC, magnesium, phosphorus and BMP in 1 week  Follow up with Dr Marian Davies in 1 months with repeat blood and urine studies  Please call the office with any questions or concerns  Reason for Visit: Follow-up and Hypertension    HPI: Franklin Jewell is a 46 y o  male smoker with [address]  with JOÃO requiring IHD until 7/27/22, IV drug abuse, hepatitis-C, PTSD, NSTEMI, pneumonia, HTN, atrial fibrillation who presents for follow up of JOÃO with recent recovery  Pt w/witnessed collapse 6/10/22 and admitted to [facility] with JOÃO, rhabdomyolysis hyperkalemia, lactic acidosis and acute respiratory failure requiring intubation and CVVH  Patient transitioned to IHD 6/14/22 and was undergoing HD via tunneled PermCath MWF  Hemodialysis discontinued after treatment on 7/27/2022 due to renal recovery  Patient presents to office today for evaluation  Creatinine 1 38 with GFR 57 on 7/27/2022  No repeat labs for review  Patient reports adequate urine output  Patient denies NSAID use  Patient denies nausea, vomiting, abdominal pain, dyspnea, orthopnea, PND, edema, hematuria or foamy urine  Scheduled for tunneled PermCath removal by IR on 8/11/2022  s/p nuclear stress yesterday, 8/4/2022, which was normal without inducible ischemia or perfusion defect  ROS: A complete review of systems was performed and was negative unless otherwise noted in the history of present illness  Allergies:   Patient has no known allergies      Medications:     Current Outpatient Medications:    amiodarone 200 mg tablet, Take 1 tablet (200 mg total) by mouth 3 (three) times a day with meals for 3 days, Disp: 9 tablet, Rfl: 0    amLODIPine (NORVASC) 10 mg tablet, Take 1 tablet (10 mg total) by mouth daily, Disp: 30 tablet, Rfl: 1    torsemide 40 MG TABS, Take 40 mg by mouth daily, Disp: 30 tablet, Rfl: 1    nicotine (NICODERM CQ) 21 mg/24 hr TD 24 hr patch, Place 1 patch on the skin daily, Disp: 28 patch, Rfl: 0  No current facility-administered medications for this visit  Past Medical History:   Diagnosis Date    Hepatitis C     PTSD (post-traumatic stress disorder)      Past Surgical History:   Procedure Laterality Date    APPENDECTOMY      IR TUNNELED DIALYSIS CATHETER PLACEMENT  6/21/2022     History reviewed  No pertinent family history  reports that he has been smoking cigarettes  He has a 12 50 pack-year smoking history  He has never used smokeless tobacco  He reports previous alcohol use  He reports current drug use  Drug: Marijuana  Physical Exam:   Vitals:    08/05/22 0748 08/05/22 0801   BP:  120/78   BP Location:  Right arm   Patient Position:  Sitting   Cuff Size:  Standard   Weight: 77 6 kg (171 lb)    Height: 5' 8" (1 727 m)      Body mass index is 26 kg/m²  General:  Awake, alert, appears comfortable and in no acute distress  Nontoxic  Skin:  No rash, warm, good skin turgor   Eyes:  PERRL, EOMI, sclerae nonicteric   no periorbital edema   ENT:  Moist mucous membranes  Neck:  Trachea midline, symmetric  No JVD  No carotid bruits  Chest:  Clear to auscultation bilaterally without wheezes, crackles or rhonchi  CVS:  Regular rate and rhythm without murmur, gallop or rub  S1 and S2 identified and normal   No S3, S4    Abdomen:  Soft, nontender, nondistended without masses  Normal bowel sounds x 4 quadrants  No bruit  Extremities:  Warm, pink, motor and sensory intact and well perfused  No cyanosis, pallor  No BLE edema  Neuro:  Awake, alert, oriented x3    Grossly intact  Psych:  Appropriate affect  Mentating appropriately  Normal mental status exam  Access:  Left Tunneled IJV PermCath site clear without erythema, induration drainage  Dressing intact  Procedure:  No results found for this or any previous visit  Lab Results   Component Value Date    GLUCOSE 100 11/15/2015    CALCIUM 8 9 07/27/2022     11/15/2015    K 3 7 07/27/2022    CO2 29 07/27/2022    CL 99 07/27/2022    BUN 20 07/27/2022    CREATININE 1 38 (H) 07/27/2022             Invalid input(s): ALBUMIN    EMR, including Epic, Care Everywhere and outside scanned documents reviewed  I have personally reviewed the blood work as stated above and in my note  I have personally reviewed PCP, consultants and prior nephrology notes